# Patient Record
Sex: FEMALE | Race: WHITE | NOT HISPANIC OR LATINO | Employment: UNEMPLOYED | URBAN - METROPOLITAN AREA
[De-identification: names, ages, dates, MRNs, and addresses within clinical notes are randomized per-mention and may not be internally consistent; named-entity substitution may affect disease eponyms.]

---

## 2017-01-10 ENCOUNTER — ALLSCRIPTS OFFICE VISIT (OUTPATIENT)
Dept: OTHER | Facility: OTHER | Age: 22
End: 2017-01-10

## 2017-01-10 ENCOUNTER — LAB REQUISITION (OUTPATIENT)
Dept: LAB | Facility: HOSPITAL | Age: 22
End: 2017-01-10
Payer: COMMERCIAL

## 2017-01-10 DIAGNOSIS — Z32.01 ENCOUNTER FOR PREGNANCY TEST, RESULT POSITIVE: ICD-10-CM

## 2017-01-10 LAB
ABO GROUP BLD: NORMAL
B-HCG SERPL-ACNC: 3944 MIU/ML
BLD GP AB SCN SERPL QL: NEGATIVE
PROGEST SERPL-MCNC: 5.8 NG/ML
RH BLD: POSITIVE

## 2017-01-10 PROCEDURE — 86850 RBC ANTIBODY SCREEN: CPT | Performed by: OBSTETRICS & GYNECOLOGY

## 2017-01-10 PROCEDURE — 86900 BLOOD TYPING SEROLOGIC ABO: CPT | Performed by: OBSTETRICS & GYNECOLOGY

## 2017-01-10 PROCEDURE — 84702 CHORIONIC GONADOTROPIN TEST: CPT | Performed by: OBSTETRICS & GYNECOLOGY

## 2017-01-10 PROCEDURE — 86901 BLOOD TYPING SEROLOGIC RH(D): CPT | Performed by: OBSTETRICS & GYNECOLOGY

## 2017-01-10 PROCEDURE — 84144 ASSAY OF PROGESTERONE: CPT | Performed by: OBSTETRICS & GYNECOLOGY

## 2017-01-11 ENCOUNTER — GENERIC CONVERSION - ENCOUNTER (OUTPATIENT)
Dept: OTHER | Facility: OTHER | Age: 22
End: 2017-01-11

## 2017-01-16 ENCOUNTER — ALLSCRIPTS OFFICE VISIT (OUTPATIENT)
Dept: OTHER | Facility: OTHER | Age: 22
End: 2017-01-16

## 2017-01-17 LAB — HCG QUANTITATIVE (HISTORICAL): ABNORMAL MIU/ML

## 2017-03-30 ENCOUNTER — ALLSCRIPTS OFFICE VISIT (OUTPATIENT)
Dept: OTHER | Facility: OTHER | Age: 22
End: 2017-03-30

## 2017-03-31 ENCOUNTER — LAB CONVERSION - ENCOUNTER (OUTPATIENT)
Dept: OTHER | Facility: OTHER | Age: 22
End: 2017-03-31

## 2017-07-31 ENCOUNTER — ALLSCRIPTS OFFICE VISIT (OUTPATIENT)
Dept: OTHER | Facility: OTHER | Age: 22
End: 2017-07-31

## 2017-07-31 LAB — HCG, QUALITATIVE (HISTORICAL): NEGATIVE

## 2017-08-09 ENCOUNTER — LAB REQUISITION (OUTPATIENT)
Dept: LAB | Facility: HOSPITAL | Age: 22
End: 2017-08-09
Payer: COMMERCIAL

## 2017-08-09 ENCOUNTER — ALLSCRIPTS OFFICE VISIT (OUTPATIENT)
Dept: OTHER | Facility: OTHER | Age: 22
End: 2017-08-09

## 2017-08-09 DIAGNOSIS — Z11.3 ENCOUNTER FOR SCREENING FOR INFECTIONS WITH PREDOMINANTLY SEXUAL MODE OF TRANSMISSION: ICD-10-CM

## 2017-08-09 PROCEDURE — 87491 CHLMYD TRACH DNA AMP PROBE: CPT | Performed by: NURSE PRACTITIONER

## 2017-08-09 PROCEDURE — 87591 N.GONORRHOEAE DNA AMP PROB: CPT | Performed by: NURSE PRACTITIONER

## 2017-08-11 LAB — MISCELLANEOUS LAB TEST RESULT: NORMAL

## 2017-08-28 ENCOUNTER — GENERIC CONVERSION - ENCOUNTER (OUTPATIENT)
Dept: OTHER | Facility: OTHER | Age: 22
End: 2017-08-28

## 2017-08-28 ENCOUNTER — LAB REQUISITION (OUTPATIENT)
Dept: LAB | Facility: HOSPITAL | Age: 22
End: 2017-08-28
Payer: COMMERCIAL

## 2017-08-28 DIAGNOSIS — B37.3 CANDIDIASIS OF VULVA AND VAGINA: ICD-10-CM

## 2017-08-28 DIAGNOSIS — Z11.3 ENCOUNTER FOR SCREENING FOR INFECTIONS WITH PREDOMINANTLY SEXUAL MODE OF TRANSMISSION: ICD-10-CM

## 2017-08-28 PROCEDURE — 87591 N.GONORRHOEAE DNA AMP PROB: CPT | Performed by: NURSE PRACTITIONER

## 2017-08-28 PROCEDURE — 36415 COLL VENOUS BLD VENIPUNCTURE: CPT | Performed by: NURSE PRACTITIONER

## 2017-08-28 PROCEDURE — 87510 GARDNER VAG DNA DIR PROBE: CPT | Performed by: NURSE PRACTITIONER

## 2017-08-28 PROCEDURE — 87660 TRICHOMONAS VAGIN DIR PROBE: CPT | Performed by: NURSE PRACTITIONER

## 2017-08-28 PROCEDURE — 87480 CANDIDA DNA DIR PROBE: CPT | Performed by: NURSE PRACTITIONER

## 2017-08-28 PROCEDURE — 87491 CHLMYD TRACH DNA AMP PROBE: CPT | Performed by: NURSE PRACTITIONER

## 2017-08-29 LAB
CANDIDA RRNA VAG QL PROBE: POSITIVE
G VAGINALIS RRNA GENITAL QL PROBE: NEGATIVE
T VAGINALIS RRNA GENITAL QL PROBE: NEGATIVE

## 2017-08-30 LAB — MISCELLANEOUS LAB TEST RESULT: NORMAL

## 2017-11-10 ENCOUNTER — LAB REQUISITION (OUTPATIENT)
Dept: LAB | Facility: HOSPITAL | Age: 22
End: 2017-11-10
Payer: COMMERCIAL

## 2017-11-10 ENCOUNTER — ALLSCRIPTS OFFICE VISIT (OUTPATIENT)
Dept: OTHER | Facility: OTHER | Age: 22
End: 2017-11-10

## 2017-11-10 DIAGNOSIS — N91.2 AMENORRHEA: ICD-10-CM

## 2017-11-10 DIAGNOSIS — R10.2 PELVIC AND PERINEAL PAIN: ICD-10-CM

## 2017-11-10 LAB
ABO GROUP BLD: NORMAL
B-HCG SERPL-ACNC: <2 MIU/ML
BLD GP AB SCN SERPL QL: NEGATIVE
PROGEST SERPL-MCNC: 1.4 NG/ML
RH BLD: POSITIVE
SPECIMEN EXPIRATION DATE: NORMAL

## 2017-11-10 PROCEDURE — 84702 CHORIONIC GONADOTROPIN TEST: CPT | Performed by: OBSTETRICS & GYNECOLOGY

## 2017-11-10 PROCEDURE — 86850 RBC ANTIBODY SCREEN: CPT | Performed by: OBSTETRICS & GYNECOLOGY

## 2017-11-10 PROCEDURE — 86901 BLOOD TYPING SEROLOGIC RH(D): CPT | Performed by: OBSTETRICS & GYNECOLOGY

## 2017-11-10 PROCEDURE — 86900 BLOOD TYPING SEROLOGIC ABO: CPT | Performed by: OBSTETRICS & GYNECOLOGY

## 2017-11-10 PROCEDURE — 84144 ASSAY OF PROGESTERONE: CPT | Performed by: OBSTETRICS & GYNECOLOGY

## 2017-11-10 PROCEDURE — 87086 URINE CULTURE/COLONY COUNT: CPT | Performed by: OBSTETRICS & GYNECOLOGY

## 2017-11-11 LAB — BACTERIA UR CULT: NORMAL

## 2017-11-14 ENCOUNTER — GENERIC CONVERSION - ENCOUNTER (OUTPATIENT)
Dept: OTHER | Facility: OTHER | Age: 22
End: 2017-11-14

## 2017-12-14 ENCOUNTER — GENERIC CONVERSION - ENCOUNTER (OUTPATIENT)
Dept: OTHER | Facility: OTHER | Age: 22
End: 2017-12-14

## 2017-12-14 ENCOUNTER — LAB REQUISITION (OUTPATIENT)
Dept: LAB | Facility: HOSPITAL | Age: 22
End: 2017-12-14
Payer: COMMERCIAL

## 2017-12-14 DIAGNOSIS — Z11.3 ENCOUNTER FOR SCREENING FOR INFECTIONS WITH PREDOMINANTLY SEXUAL MODE OF TRANSMISSION: ICD-10-CM

## 2017-12-14 LAB — HBV SURFACE AG SER QL: NORMAL

## 2017-12-14 PROCEDURE — 87591 N.GONORRHOEAE DNA AMP PROB: CPT | Performed by: NURSE PRACTITIONER

## 2017-12-14 PROCEDURE — 86592 SYPHILIS TEST NON-TREP QUAL: CPT | Performed by: NURSE PRACTITIONER

## 2017-12-14 PROCEDURE — 87491 CHLMYD TRACH DNA AMP PROBE: CPT | Performed by: NURSE PRACTITIONER

## 2017-12-14 PROCEDURE — 87389 HIV-1 AG W/HIV-1&-2 AB AG IA: CPT | Performed by: NURSE PRACTITIONER

## 2017-12-14 PROCEDURE — 87340 HEPATITIS B SURFACE AG IA: CPT | Performed by: NURSE PRACTITIONER

## 2017-12-15 LAB
CHLAMYDIA DNA CVX QL NAA+PROBE: NORMAL
HIV 1+2 AB+HIV1 P24 AG SERPL QL IA: NORMAL
N GONORRHOEA DNA GENITAL QL NAA+PROBE: NORMAL
RPR SER QL: NORMAL

## 2018-01-10 NOTE — PROGRESS NOTES
Chief Complaint  Pt presents for hcg, progesterone and t&s  LMP 11/28/16 +upt  Pt feeling very anxious as this pregnancy was unplanned  Aware will receive call with results as soon as they are back  Active Problems    1  Abnormal weight gain (783 1) (R63 5)   2  Amenorrhea (626 0) (N91 2)   3  Birth control counseling (V25 09) (Z30 9)   4  Bleeding after intercourse (626 7) (N93 0)   5  Depression with anxiety (300 4) (F41 8)   6  Encounter for gynecological examination with Papanicolaou smear of cervix   (V72 31,V76 2) (Z01 419,Z12 4)   7  Encounter for gynecological examination with Papanicolaou smear of cervix   (V72 31,V76 2) (Z01 419,Z12 4)   8  Female pelvic pain (625 9) (R10 2)   9  General counseling for initiation of other contraceptive measures (V25 02) (Z30 09)   10  IUD check up (V25 42) (Z30 431)   11  IUD contraception (V45 51) (Z97 5)   12  Oral contraceptive prescribed (V25 01) (Z30 011)   13  Painful intercourse (625 0)   14  Pregnancy test-positive (V72 42) (Z32 01)   15  Screen for sexually transmitted diseases (V74 5) (Z11 3)   16  Screening for STD (sexually transmitted disease) (V74 5) (Z11 3)    Current Meds   1  ALPRAZolam 0 5 MG Oral Tablet; Therapy: [id number] to (Evaluate:12Jun2016) Recorded   2  Lo Loestrin Fe 1 MG-10 MCG / 10 MCG Oral Tablet; Take 1 tablet daily; Therapy: 59Wog5998 to (Evaluate:95Szw2145)  Requested for: 89Qrb2661; Last   Rx:[id number] Ordered    Allergies    1  No Known Drug Allergies    2  Seasonal    Signatures   Electronically signed by : Cameron Torres, ; Elijah 10 2017 11:45AM EST                       (Author)    Electronically signed by :  Corrinne Pap, M D ; Elijha 10 2017  1:33PM EST                       (Author)

## 2018-01-12 NOTE — PROGRESS NOTES
Chief Complaint  Pt presents for hcg/prog/t&s and urine cc  Pt has h/o irregular menses  LMP 06/2017  Pt states she began Lo Loestrin Fe 06/2017 as well but was very irregular taking pills  Pt d/c OCP approximately 3w ago but did not start menses  Pt had unprotected sex and took morning after pill 11/2/17  Pt took 2 negative upts  Pt still concerned about pregnancy thus labs drawn today  Pt also notes lower back pain and cramping  Urine cc sent  Pt will RTO Monday 11/13 for std testing and further infection check  ms      Active Problems    1  Abnormal weight gain (783 1) (R63 5)   2  Amenorrhea (626 0) (N91 2)   3  Bacterial infection (041 9) (A49 9)   4  Birth control counseling (V25 09) (Z30 09)   5  Bleeding after intercourse (626 7) (N93 0)   6  Candidiasis (112 9) (B37 9)   7  Depression with anxiety (300 4) (F41 8)   8  Encounter for gynecological examination with Papanicolaou smear of cervix (V72 31)   (Z01 419)   9  Encounter for gynecological examination with Papanicolaou smear of cervix (V72 31)   (Z01 419)   10  Female pelvic pain (625 9) (R10 2)   11  General counseling for initiation of other contraceptive measures (V25 02) (Z30 09)   12  IUD check up (V25 42) (Z30 431)   13  IUD contraception (V45 51) (Z97 5)   14  Oral contraceptive prescribed (V25 01) (Z30 011)   15  Painful intercourse (625 0)   16  Pregnancy test-positive (V72 42) (Z32 01)   17  Screen for sexually transmitted diseases (V74 5) (Z11 3)   18  Screening for STD (sexually transmitted disease) (V74 5) (Z11 3)   19  Vaginal candidiasis (112 1) (B37 3)   20  Vaginal itching (698 1) (L29 8)   21  Visit for routine gyn exam (V72 31) (Z01 419)    Current Meds   1  ALPRAZolam 0 5 MG Oral Tablet; Therapy: 74HTV1135 to (Evaluate:12Jun2016) Recorded   2  Fluconazole 150 MG Oral Tablet; TAKE 1 TABLET NOW, AND REPEAT IN 4 DAYS; Therapy: 41KYT4114 to (Last Rx:30Mar2017)  Requested for: 03Apr2017 Ordered   3   Fluconazole 200 MG Oral Tablet; take one tablet now and repeat in 4 days; Therapy: 24Xjh3730 to (Last Jeremías Batista)  Requested for: 28Aug2017 Ordered   4  Lo Loestrin Fe 1 MG-10 MCG / 10 MCG Oral Tablet; Take 1 tablet daily; Therapy: 84Qjx8255 to (Evaluate:24Jan2018)  Requested for: 09Aug2017; Last   Rx:09Aug2017 Ordered   5  MetroNIDAZOLE 500 MG Oral Tablet; TAKE 1 TABLET TWICE DAILY UNTIL FINISHED; Therapy: 89NFJ0167 to (Evaluate:10Apr2017)  Requested for: 07Apr2017; Last   Rx:03Apr2017 Ordered    Allergies    1  No Known Drug Allergies    2   Seasonal    Signatures   Electronically signed by : Jerry Warren, ; Nov 10 2017  9:37AM EST                       (Co-author)    Electronically signed by : CHLOE Connelly ; Nov 10 2017  3:56PM EST

## 2018-01-13 VITALS — WEIGHT: 229.56 LBS | DIASTOLIC BLOOD PRESSURE: 80 MMHG | BODY MASS INDEX: 38.2 KG/M2 | SYSTOLIC BLOOD PRESSURE: 120 MMHG

## 2018-01-13 VITALS
BODY MASS INDEX: 39.52 KG/M2 | SYSTOLIC BLOOD PRESSURE: 118 MMHG | HEIGHT: 65 IN | WEIGHT: 237.19 LBS | DIASTOLIC BLOOD PRESSURE: 60 MMHG

## 2018-01-13 NOTE — RESULT NOTES
Verified Results  (1) PROGESTERONE [id number] 01:24PM Chick Person     Test Name Result Flag Reference   PROGESTERONE 5 80 ng/mL     PROGESTERONE:     Serum progesterone 5-25 ng/mL should not be the sole determinant in excluding pregnancy  Menstruating Females:    Follicular phase 0 616-9 28 ng/mL   Luteal phase 2 25-24 2 ng/mL   Mid-luteal phase 8 76-21 6 ng/mL   Postmenopausal Females <0 200-0 901 ng/mL     Pregnant Females:   First trimester of pregnancy 11 4-41 0 ng/mL   Second trimester of pregnancy 13 8-156 ng/mL   Third trimester of pregnancy 51 4->200 ng/mL     (1) HCG QUANT [id number] 01:24PM Chick Person     Test Name Result Flag Reference   HCG QUANTITATIVE 3944 mIU/mL H <=6   Expected Ranges:     Approximate               Approximate HCG  Gestation age          Concentration ( mIU/mL)  _____________          ______________________   Ashley Oar                      HCG values  0 2-1                       5-50  1-2                           2-3                         100-5000  3-4                         500-28157  4-5                         1000-15657  5-6                         [id number]  6-8                         [id number]  8-12                        [id number]     (1) TYPE & SCREEN [id number] 01:24PM Chick Person     Test Name Result Flag Reference   ABO GROUPING A     RH FACTOR Positive     ANTIBODY SCREEN Negative

## 2018-01-16 NOTE — RESULT NOTES
Verified Results  (1) HCG QUANT [id number] 12:51PM Peola Ache     Test Name Result Flag Reference   HCG QUANTITATIVE <2 mIU/mL  <=6   Expected Ranges:     Approximate               Approximate HCG  Gestation age          Concentration ( mIU/mL)  _____________          ______________________   Jhon Bowen                      HCG values  0 2-1                       5-50  1-2                           2-3                         100-5000  3-4                         500-16158  4-5                         1000-79167  5-6                         [id number]  6-8                         [id number]  8-12                        [id number]     (1) PROGESTERONE [id number] 12:51PM [facility], [address]     Test Name Result Flag Reference   PROGESTERONE 1 40 ng/mL     Patients taking DHEA-S may have falsely elevated Progesterone levels  Recommend ordering Progesterone, Lab [facility] [id number] to be done by [facility]  PROGESTERONE:     Serum progesterone 5-25 ng/mL should not be the sole determinant in excluding pregnancy  Menstruating Females:    Follicular phase 3 273-7 92 ng/mL   Luteal phase 2 25-24 2 ng/mL   Mid-luteal phase 8 76-21 6 ng/mL   Postmenopausal Females <0 200-0 901 ng/mL     Pregnant Females:   First trimester of pregnancy 11 4-41 0 ng/mL   Second trimester of pregnancy 13 8-156 ng/mL   Third trimester of pregnancy 51 4->200 ng/mL     (1) TYPE & SCREEN [id number] 12:51PM Daphnei, [address]     Test Name Result Flag Reference   ABO GROUPING A     RH FACTOR Positive     ANTIBODY SCREEN Negative     SPECIMEN EXPIRATION DATE [id number]       (1) URINE CULTURE [id number] 12:51PM Peola Ache     Test Name Result Flag Reference   CLINICAL REPORT (Report)     Test:        Urine culture  Specimen Type:   Urine  Specimen Date:   11/10/2017 12:51 PM  Result Date:    11/11/2017 10:32 AM  Result Status:   Final result  Resulting Lab:   [address] [facility]            Tel: [phone]      CULTURE                                       ------------------                                   No Growth <1000 cfu/mL

## 2018-01-22 VITALS — SYSTOLIC BLOOD PRESSURE: 118 MMHG | DIASTOLIC BLOOD PRESSURE: 78 MMHG | BODY MASS INDEX: 38.77 KG/M2 | WEIGHT: 233 LBS

## 2018-01-23 NOTE — PROCEDURES
Assessment    1  General counseling for initiation of other contraceptive measures (V25 02) (Z30 09)   2  Birth control counseling (V25 09) (Z30 9)   3  Oral contraceptive prescribed (V25 01) (Z30 011)    Discussion/Summary  Discussion Summary:   IUD removed easily and intact  bc methods d/w pt and partner and she plans to use condoms  after discussion, she consented to ocp use and 3 sample packs of lo loestrin were given along with written and verbal instructions for use  also given info on paragard to review, liked mirena, but didn't like s e  she experienced  will rto in 3 months for annual and ocp check  Medication SE Review and Pt Understands Tx: Possible side effects of new medications were reviewed with the patient/guardian today  The treatment plan was reviewed with the patient/guardian  The patient/guardian understands and agrees with the treatment plan   Counseling Documentation With Imm: The patient was counseled regarding diagnostic results, instructions for management, risk factor reductions, prognosis, patient and family education, impressions, risks and benefits of treatment options, importance of compliance with treatment  total time of encounter was 15 minutes and 10 minutes was spent counseling  Active Problems    1  Abnormal weight gain (783 1) (R63 5)   2  Amenorrhea (626 0) (N91 2)   3  Birth control counseling (V25 09) (Z30 9)   4  Bleeding after intercourse (626 7) (N93 0)   5  Depression with anxiety (300 4) (F41 8)   6  Female pelvic pain (625 9) (R10 2)   7  General counseling for initiation of other contraceptive measures (V25 02) (Z30 09)   8  IUD check up (V25 42) (Z30 431)   9  IUD contraception (V45 51) (Z97 5)   10  Oral contraceptive prescribed (V25 01) (Z30 011)   11  Painful intercourse (625 0)   12  Pregnancy test-positive (V72 42) (Z32 01)   13  Screen for sexually transmitted diseases (V74 5) (Z11 3)   14   Screening for STD (sexually transmitted disease) (V74 5) (Z11 3)    Current Meds    1  ALPRAZolam 0 5 MG Oral Tablet; Therapy: 09SVY1856 to (Evaluate:12Jun2016) Recorded    Allergies    1  Seasonal    Procedure  Procedure: removal of Mirena IUD  Indications for the procedure include c/o wt gain and moodiness on this method  Risks and benefits were discussed with the patient  Consent was obtained prior to the procedure and is detailed in the patient's record  Procedure Note:   Procedure Start Time: 230   Procedure Completion Time: 240 a speculum was placed in the vagina, the IUD strings were visualized and the strings were grasped with forceps and the IUD was removed  The IUD was discarded  Post-Procedure:   the patient tolerated the procedure well  Complications: hemostasis was achievednone  Follow-up in the office in 3 month(s) and for annual and ocp check  Future Appointments    Date/Time Provider Specialty Site   08/24/2016 04:00 PM [facility] Obstetrics[facility]Gyne[facility] [place]     Signatures   Electronically signed by :  Deborah Leigh; May 23 2016  2:45PM EST                       (Author)    Electronically signed by : CHLOE Faria ; May 27 2016  2:32PM EST                       (Author)

## 2018-01-24 VITALS
HEIGHT: 65 IN | DIASTOLIC BLOOD PRESSURE: 76 MMHG | SYSTOLIC BLOOD PRESSURE: 112 MMHG | WEIGHT: 220.06 LBS | BODY MASS INDEX: 36.66 KG/M2

## 2018-01-26 ENCOUNTER — OFFICE VISIT (OUTPATIENT)
Dept: OBGYN CLINIC | Facility: MEDICAL CENTER | Age: 23
End: 2018-01-26
Payer: COMMERCIAL

## 2018-01-26 VITALS — BODY MASS INDEX: 36.94 KG/M2 | DIASTOLIC BLOOD PRESSURE: 80 MMHG | SYSTOLIC BLOOD PRESSURE: 120 MMHG | WEIGHT: 222 LBS

## 2018-01-26 DIAGNOSIS — Z32.02 PREGNANCY TEST NEGATIVE: ICD-10-CM

## 2018-01-26 DIAGNOSIS — Z11.3 SCREENING FOR STD (SEXUALLY TRANSMITTED DISEASE): ICD-10-CM

## 2018-01-26 DIAGNOSIS — N89.8 VAGINAL DISCHARGE: Primary | ICD-10-CM

## 2018-01-26 LAB — SL AMB POCT URINE HCG: NEGATIVE

## 2018-01-26 PROCEDURE — 99213 OFFICE O/P EST LOW 20 MIN: CPT | Performed by: NURSE PRACTITIONER

## 2018-01-26 PROCEDURE — 87660 TRICHOMONAS VAGIN DIR PROBE: CPT | Performed by: NURSE PRACTITIONER

## 2018-01-26 PROCEDURE — 87480 CANDIDA DNA DIR PROBE: CPT | Performed by: NURSE PRACTITIONER

## 2018-01-26 PROCEDURE — 81025 URINE PREGNANCY TEST: CPT | Performed by: NURSE PRACTITIONER

## 2018-01-26 PROCEDURE — 87591 N.GONORRHOEAE DNA AMP PROB: CPT | Performed by: NURSE PRACTITIONER

## 2018-01-26 PROCEDURE — 87491 CHLMYD TRACH DNA AMP PROBE: CPT | Performed by: NURSE PRACTITIONER

## 2018-01-26 PROCEDURE — 87510 GARDNER VAG DNA DIR PROBE: CPT | Performed by: NURSE PRACTITIONER

## 2018-01-26 RX ORDER — ALPRAZOLAM 0.5 MG/1
TABLET ORAL
COMMUNITY
Start: 2016-05-13 | End: 2019-06-13

## 2018-01-26 RX ORDER — CARBAMIDE PEROXIDE 6.5% 6.5 G/100ML
LIQUID AURICULAR (OTIC)
Refills: 0 | COMMUNITY
Start: 2017-12-22 | End: 2019-09-30 | Stop reason: ALTCHOICE

## 2018-01-26 RX ORDER — CLINDAMYCIN PHOSPHATE 10 UG/ML
LOTION TOPICAL
Refills: 5 | COMMUNITY
Start: 2018-01-17 | End: 2019-09-30 | Stop reason: ALTCHOICE

## 2018-01-26 RX ORDER — FLUTICASONE PROPIONATE 50 MCG
SPRAY, SUSPENSION (ML) NASAL
COMMUNITY
Start: 2018-01-22 | End: 2019-09-30 | Stop reason: ALTCHOICE

## 2018-01-26 RX ORDER — BUPROPION HYDROCHLORIDE 150 MG/1
TABLET ORAL
Refills: 1 | COMMUNITY
Start: 2018-01-17 | End: 2019-09-30 | Stop reason: ALTCHOICE

## 2018-01-26 NOTE — PROGRESS NOTES
A/P:  25 y o  yo female with concerns about "possible infection  Shruti was seen today for vaginal itching and d/c  Thinks she might have a bv infection  Denies odor  Recently ended relationship, used condoms "all the time"  Had neg std screen 12/17  Vaginal discharge    1  Wet prep was not obtained  Cultures for gonorrhea and chlamydia were collected  Affirm was obtained  2   Will contact pt with results  3  Patient was not treated today       CHIEF COMPLAINT: vaginal discharge    HISTORY OF PRESENT ILLNESS:  Digna Vieyra is a 25 y o  yo female who presents for vag d/c c/o  Dara Soulier Her symptoms started 1 week ago  Alleviating factors: none  Aggravating factors:none    ROS:   She denies hematuria, dysuria, constipation, diarrhea, fevers, chills, nausea or emesis  Social History     Social History    Marital status: Single     Spouse name: N/A    Number of children: N/A    Years of education: N/A     Occupational History    Not on file  Social History Main Topics    Smoking status: denies    Smokeless tobacco: Not on file    Alcohol use social    Drug use: denies    Sexual activity: yes     Other Topics Concern    Not on file     Social History Narrative    No narrative on file       Wt 101 kg (222 lb)   LMP 01/14/2018   BMI 36 94 kg/m²     GEN: The patient was alert and oriented x3, pleasant well-appearing female in no acute distress  Pelvic: Normal appearing external female genitalia, normal vaginal epithelium, normalappearing cervix  positive discharge noted

## 2018-01-27 LAB
CANDIDA RRNA VAG QL PROBE: NEGATIVE
G VAGINALIS RRNA GENITAL QL PROBE: POSITIVE
T VAGINALIS RRNA GENITAL QL PROBE: NEGATIVE

## 2018-01-30 LAB
CHLAMYDIA DNA CVX QL NAA+PROBE: NORMAL
N GONORRHOEA DNA GENITAL QL NAA+PROBE: NORMAL

## 2018-02-02 DIAGNOSIS — N76.0 BV (BACTERIAL VAGINOSIS): Primary | ICD-10-CM

## 2018-02-02 DIAGNOSIS — Z30.41 ENCOUNTER FOR SURVEILLANCE OF CONTRACEPTIVE PILLS: ICD-10-CM

## 2018-02-02 DIAGNOSIS — B96.89 BV (BACTERIAL VAGINOSIS): Primary | ICD-10-CM

## 2018-02-04 RX ORDER — METRONIDAZOLE 500 MG/1
500 TABLET ORAL EVERY 8 HOURS SCHEDULED
Qty: 21 TABLET | Refills: 0 | Status: SHIPPED | OUTPATIENT
Start: 2018-02-04 | End: 2018-02-11

## 2018-02-04 RX ORDER — NORETHINDRONE ACETATE AND ETHINYL ESTRADIOL, ETHINYL ESTRADIOL AND FERROUS FUMARATE 1MG-10(24)
KIT ORAL
Qty: 84 TABLET | Refills: 1 | Status: SHIPPED | OUTPATIENT
Start: 2018-02-04 | End: 2018-02-05 | Stop reason: SDUPTHER

## 2018-02-05 DIAGNOSIS — Z30.41 ENCOUNTER FOR SURVEILLANCE OF CONTRACEPTIVE PILLS: ICD-10-CM

## 2018-02-19 DIAGNOSIS — B37.3 CANDIDIASIS OF GENITALIA IN FEMALE: Primary | ICD-10-CM

## 2018-02-19 RX ORDER — FLUCONAZOLE 150 MG/1
150 TABLET ORAL ONCE
Qty: 1 TABLET | Refills: 1 | Status: SHIPPED | OUTPATIENT
Start: 2018-02-19 | End: 2018-02-19

## 2018-03-01 DIAGNOSIS — N89.8 VAGINAL ITCHING: Primary | ICD-10-CM

## 2018-03-01 RX ORDER — FLUCONAZOLE 150 MG/1
150 TABLET ORAL ONCE
Qty: 1 TABLET | Refills: 0 | Status: SHIPPED | OUTPATIENT
Start: 2018-03-01 | End: 2018-03-01

## 2018-03-01 NOTE — TELEPHONE ENCOUNTER
Pt was seen the other day for an infection check  States she had Bv  Took the antibiotic but is still having sxs  Ok to call in a dose of fluconazole and if sxs do not get better, have pt follow up for an appt?

## 2018-03-26 ENCOUNTER — OFFICE VISIT (OUTPATIENT)
Dept: OBGYN CLINIC | Facility: MEDICAL CENTER | Age: 23
End: 2018-03-26
Payer: COMMERCIAL

## 2018-03-26 VITALS — BODY MASS INDEX: 35.1 KG/M2 | DIASTOLIC BLOOD PRESSURE: 60 MMHG | SYSTOLIC BLOOD PRESSURE: 110 MMHG | WEIGHT: 210.9 LBS

## 2018-03-26 DIAGNOSIS — N89.8 VAGINAL DISCHARGE: Primary | ICD-10-CM

## 2018-03-26 DIAGNOSIS — R30.0 BURNING WITH URINATION: ICD-10-CM

## 2018-03-26 DIAGNOSIS — Z72.51 UNPROTECTED SEXUAL INTERCOURSE: ICD-10-CM

## 2018-03-26 PROBLEM — A49.9 BACTERIAL INFECTION: Status: ACTIVE | Noted: 2017-04-03

## 2018-03-26 PROCEDURE — 99214 OFFICE O/P EST MOD 30 MIN: CPT | Performed by: NURSE PRACTITIONER

## 2018-03-26 NOTE — PROGRESS NOTES
A/P:  25 y o  yo female with: c/o vaginal d/c, burning with urination      1  Wet prep was not obtained due to limited amt of d/c  Cultures for gonorrhea and chlamydia were not collected  Sure swab culture was obtained  2   Will contact pt with results  3  Patient was not treated   4  Vaginal hygiene d/w pt, enc  Her to refrain from scented products to vulva  5  Enc  Safe sex practices with condom use and given written and verbal info on nexplanon to review  Will call if wants this method and prior auth  Will be initiated  HISTORY OF PRESENT ILLNESS:  Jennifer Key is a 25 y o  yo No obstetric history on file  female who presents for vaginal discharge  She describes the discharge as  watery and white  Her symptoms started 1 weeks ago  and  show no change  Alleviating factors:none  Aggravating factors: burning when urine hits the skin w voiding  ROS:   She denies hematuria, dysuria, constipation, diarrhea, fever, chills, nausea or emesis  No past medical history on file  No past medical history on file  Social History     Social History    Marital status: Single     Spouse name: N/A    Number of children: N/A    Years of education: N/A     Occupational History    Not on file  Social History Main Topics    Smoking status: Not on file    Smokeless tobacco: Not on file    Alcohol use Not on file    Drug use: Unknown    Sexual activity: Not on file     Other Topics Concern    Not on file     Social History Narrative    No narrative on file       /60   Wt 95 7 kg (210 lb 14 4 oz)   BMI 35 10 kg/m²     GEN: The patient was alert and oriented x3, pleasant well-appearing female in no acute distress  Pelvic: Normal appearing external female genitalia, normal vaginal epithelium, normalappearing cervix  positive discharge noted        Wet Prep: no pathogens

## 2018-04-05 DIAGNOSIS — N76.0 BV (BACTERIAL VAGINOSIS): Primary | ICD-10-CM

## 2018-04-05 DIAGNOSIS — B96.89 BV (BACTERIAL VAGINOSIS): Primary | ICD-10-CM

## 2018-04-05 RX ORDER — METRONIDAZOLE 500 MG/1
500 TABLET ORAL EVERY 12 HOURS SCHEDULED
Qty: 14 TABLET | Refills: 0 | Status: SHIPPED | OUTPATIENT
Start: 2018-04-05 | End: 2018-04-12

## 2018-04-17 ENCOUNTER — OFFICE VISIT (OUTPATIENT)
Dept: OBGYN CLINIC | Facility: MEDICAL CENTER | Age: 23
End: 2018-04-17
Payer: COMMERCIAL

## 2018-04-17 VITALS — BODY MASS INDEX: 35.45 KG/M2 | SYSTOLIC BLOOD PRESSURE: 108 MMHG | WEIGHT: 213 LBS | DIASTOLIC BLOOD PRESSURE: 74 MMHG

## 2018-04-17 DIAGNOSIS — B96.89 BV (BACTERIAL VAGINOSIS): ICD-10-CM

## 2018-04-17 DIAGNOSIS — Z11.3 SCREENING EXAMINATION FOR STD (SEXUALLY TRANSMITTED DISEASE): Primary | ICD-10-CM

## 2018-04-17 DIAGNOSIS — N76.0 BV (BACTERIAL VAGINOSIS): ICD-10-CM

## 2018-04-17 PROCEDURE — 87389 HIV-1 AG W/HIV-1&-2 AB AG IA: CPT | Performed by: NURSE PRACTITIONER

## 2018-04-17 PROCEDURE — 36415 COLL VENOUS BLD VENIPUNCTURE: CPT | Performed by: NURSE PRACTITIONER

## 2018-04-17 PROCEDURE — 87340 HEPATITIS B SURFACE AG IA: CPT | Performed by: NURSE PRACTITIONER

## 2018-04-17 PROCEDURE — 86592 SYPHILIS TEST NON-TREP QUAL: CPT | Performed by: NURSE PRACTITIONER

## 2018-04-17 PROCEDURE — 99214 OFFICE O/P EST MOD 30 MIN: CPT | Performed by: NURSE PRACTITIONER

## 2018-04-17 RX ORDER — CLINDAMYCIN HYDROCHLORIDE 300 MG/1
CAPSULE ORAL
Qty: 14 CAPSULE | Refills: 0 | Status: SHIPPED | OUTPATIENT
Start: 2018-04-17 | End: 2018-04-24

## 2018-04-17 NOTE — PROGRESS NOTES
A/P:  25 y o  yo female presenting w request to change rx for her treatment of bv       1   Wet prep was not obtained  Cultures for gonorrhea and chlamydia were not collected  Affirm was not obtained today, recent one + for bv    2   Will contact pt with results  3  Patient was treated with clindamycin  4  Pt  Will rto after rx completed for dave  5  D/W her my recommendation to start probiotic  Gave written instructions of what to look for and how often to take it  CHIEF COMPLAINT:     HISTORY OF PRESENT ILLNESS:  Coretta Blanco is a 25 y o  yo  female who presents for vaginal discharge  She describes the discharge as watery and white  Her symptoms started 1 weeks ago  She saw me a few days ago and was given flagyl for tx  States she decided not to take it b/c if makes her gag with the taste of it  She wants to try a different rx  Show no change  Alleviating factors: none  Aggravating factors: none  ROS:   She denies hematuria, dysuria, constipation, diarrhea, fever, chills, nausea or emesis  History reviewed  No pertinent past medical history  History reviewed  No pertinent past medical history  Social History     Social History    Marital status: Single     Spouse name: N/A    Number of children: N/A    Years of education: N/A     Occupational History    Not on file  Social History Main Topics    Smoking status: Current Every Day Smoker     Packs/day: 0 25     Years: 8 00    Smokeless tobacco: Never Used    Alcohol use No    Drug use: No    Sexual activity: Not Currently     Other Topics Concern    Not on file     Social History Narrative    No narrative on file       /74   Wt 96 6 kg (213 lb)   LMP 04/10/2018 (Exact Date)   BMI 35 45 kg/m²     GEN: The patient was alert and oriented x3, pleasant well-appearing female in no acute distress  Wet Prep: not done again

## 2018-04-18 LAB
HBV SURFACE AG SER QL: NORMAL
HIV 1+2 AB+HIV1 P24 AG SERPL QL IA: NORMAL
RPR SER QL: NORMAL

## 2018-04-20 ENCOUNTER — TELEPHONE (OUTPATIENT)
Dept: OBGYN CLINIC | Facility: MEDICAL CENTER | Age: 23
End: 2018-04-20

## 2018-04-20 NOTE — TELEPHONE ENCOUNTER
Patient called stating that you recommended she start a probiotic otc  She said they are costly and wanted to know if there was one we could send a script for into her pharm  Suggestions?

## 2018-04-24 DIAGNOSIS — B96.89 BV (BACTERIAL VAGINOSIS): Primary | ICD-10-CM

## 2018-04-24 DIAGNOSIS — N76.0 BV (BACTERIAL VAGINOSIS): Primary | ICD-10-CM

## 2018-04-24 RX ORDER — SACCHAROMYCES BOULARDII 250 MG
250 CAPSULE ORAL 2 TIMES DAILY
Qty: 60 CAPSULE | Refills: 2 | Status: SHIPPED | OUTPATIENT
Start: 2018-04-24 | End: 2018-05-24

## 2018-04-25 ENCOUNTER — TELEPHONE (OUTPATIENT)
Dept: OBGYN CLINIC | Facility: MEDICAL CENTER | Age: 23
End: 2018-04-25

## 2018-04-25 NOTE — TELEPHONE ENCOUNTER
----- Message from Leticia Simmons, [address] sent at 4/24/2018  8:52 AM EDT -----  Pl call and tell her I sent a probiotic to her pharmacy

## 2018-07-25 ENCOUNTER — OFFICE VISIT (OUTPATIENT)
Dept: OBGYN CLINIC | Facility: MEDICAL CENTER | Age: 23
End: 2018-07-25
Payer: COMMERCIAL

## 2018-07-25 VITALS — SYSTOLIC BLOOD PRESSURE: 100 MMHG | WEIGHT: 209 LBS | BODY MASS INDEX: 34.78 KG/M2 | DIASTOLIC BLOOD PRESSURE: 72 MMHG

## 2018-07-25 DIAGNOSIS — Z30.015 ENCOUNTER FOR INITIAL PRESCRIPTION OF VAGINAL RING HORMONAL CONTRACEPTIVE: Primary | ICD-10-CM

## 2018-07-25 DIAGNOSIS — B37.3 CANDIDIASIS OF FEMALE GENITALIA: ICD-10-CM

## 2018-07-25 DIAGNOSIS — Z11.3 SCREENING EXAMINATION FOR SEXUALLY TRANSMITTED DISEASE: ICD-10-CM

## 2018-07-25 DIAGNOSIS — Z30.011 ENCOUNTER FOR BCP (BIRTH CONTROL PILLS) INITIAL PRESCRIPTION: ICD-10-CM

## 2018-07-25 LAB — SL AMB POCT URINE HCG: NEGATIVE

## 2018-07-25 PROCEDURE — 99214 OFFICE O/P EST MOD 30 MIN: CPT | Performed by: NURSE PRACTITIONER

## 2018-07-25 PROCEDURE — 87491 CHLMYD TRACH DNA AMP PROBE: CPT | Performed by: NURSE PRACTITIONER

## 2018-07-25 PROCEDURE — 81025 URINE PREGNANCY TEST: CPT | Performed by: NURSE PRACTITIONER

## 2018-07-25 PROCEDURE — 87591 N.GONORRHOEAE DNA AMP PROB: CPT | Performed by: NURSE PRACTITIONER

## 2018-07-25 RX ORDER — FLUCONAZOLE 200 MG/1
TABLET ORAL
Qty: 2 TABLET | Refills: 0 | Status: SHIPPED | OUTPATIENT
Start: 2018-07-25 | End: 2018-07-29

## 2018-07-25 RX ORDER — TOPIRAMATE 25 MG/1
25 CAPSULE, COATED PELLETS ORAL 2 TIMES DAILY
COMMUNITY
End: 2019-06-13

## 2018-07-25 RX ORDER — ETONOGESTREL AND ETHINYL ESTRADIOL 11.7; 2.7 MG/1; MG/1
INSERT, EXTENDED RELEASE VAGINAL
Qty: 1 EACH | Refills: 2 | Status: SHIPPED | OUTPATIENT
Start: 2018-07-25 | End: 2018-11-22 | Stop reason: SDUPTHER

## 2018-07-25 NOTE — PROGRESS NOTES
Assessment Diagnoses and all orders for this visit:    Encounter for initial prescription of vaginal ring hormonal contraceptive  -     etonogestrel-ethinyl estradiol (NUVARING) 0 12-0 015 MG/24HR vaginal ring; Insert vaginally and leave in place for 3 consecutive weeks, then remove for 1 week  Screening examination for sexually transmitted disease  -     Chlamydia/GC amplified DNA by PCR    Encounter for BCP (birth control pills) initial prescription  -     POCT urine HCG    Candidiasis of female genitalia  -     fluconazole (DIFLUCAN) 200 mg tablet; Take one tablet now and repeat in 4 days if needed  Other orders  -     topiramate (TOPAMAX) 25 mg sprinkle capsule; Take 25 mg by mouth 2 (two) times a day        Plan: Nuva ring start: Written and verbal information on this was previously given to her  She understands that this will not be effective barrier to prevent STDs and needs to continue with condom use for that  Patient advised to insert the NuvaRing calendar day 1 in removing calendar day 25 for her convenience she will then have bleeding at the times when the ring is out  She is to call me in 3 months to let me know if she would like to continue this method she will call sooner with any questions or concerns  Will call her with results of testing   21 y o  female starting NuvaRing vaginal inserts, no contraindications  Livier Galicia is a 21 y o  female who presents for contraception counseling  The patient does have complaints today  The patient is not currently sexually active  Pertinent past medical history: none  Request UPT because of unprotected sex  Wants G and C culture done today  Itching and is pretty certain that she has a yeast infection  States has thought about birth control that I have discussed with her in the past and would like a prescription for the NuvaRing        Patient Active Problem List   Diagnosis    Bacterial infection       Past Medical History: Diagnosis Date    Migraine     Seasonal allergies        No past surgical history on file  Family History   Problem Relation Age of Onset    No Known Problems Mother        Social History     Social History    Marital status: Single     Spouse name: N/A    Number of children: N/A    Years of education: N/A     Occupational History    Not on file  Social History Main Topics    Smoking status: Current Every Day Smoker     Packs/day: 0 25     Years: 8 00    Smokeless tobacco: Never Used    Alcohol use Yes      Comment: occasional    Drug use: No    Sexual activity: Yes     Partners: Male     Birth control/ protection: None     Other Topics Concern    Not on file     Social History Narrative    No narrative on file          Current Outpatient Prescriptions:     fluocinonide (LIDEX) 0 05 % cream, APPLY TO AREAS OF ECZEMA TWICE A DAY FOR 2-3 DAYS A WEEK ONLY AS NEEDED, Disp: , Rfl: 2    fluticasone (FLONASE) 50 mcg/act nasal spray, USE 1 SPRAY INTO EACH NOSTRIL 2 (TWO) TIMES A DAY , Disp: , Rfl:     topiramate (TOPAMAX) 25 mg sprinkle capsule, Take 25 mg by mouth 2 (two) times a day, Disp: , Rfl:     ALPRAZolam (XANAX) 0 5 mg tablet, Take by mouth, Disp: , Rfl:     buPROPion (WELLBUTRIN XL) 150 mg 24 hr tablet, TAKE 1 TABLET (150 MG TOTAL) BY MOUTH EVERY MORNING , Disp: , Rfl: 1    clindamycin (CLEOCIN T) 1 % lotion, APPLY TO PIMPLE LIKE RASH ON BUTTOCKS TWICE DAILY AS NEEDED, Disp: , Rfl: 5    [employer] EAR DROPS 6 5 % otic solution, ADMINISTER 5 DROPS INTO BOTH EARS 2 (TWO) TIMES A DAY FOR 10 DAYS , Disp: , Rfl: 0    etonogestrel-ethinyl estradiol (NUVARING) 0 12-0 015 MG/24HR vaginal ring, Insert vaginally and leave in place for 3 consecutive weeks, then remove for 1 week , Disp: 1 each, Rfl: 2    fluconazole (DIFLUCAN) 200 mg tablet, Take one tablet now and repeat in 4 days if needed  , Disp: 2 tablet, Rfl: 0    Norethin-Eth Estrad-Fe Biphas (LO LOESTRIN FE) 1 MG-10 MCG / 10 MCG TABS, Take 1 tablet by mouth daily, Disp: 84 tablet, Rfl: 1    Allergies   Allergen Reactions    Other     Pollen Extract        Review of Systems  Constitutional :no fever, feels well, no tiredness, no recent weight gain or loss  ENT: no ear ache, no loss of hearing, no nosebleeds or nasal discharge, no sore throat or hoarseness  Cardiovascular: no complaints of slow or fast heart beat, no chest pain, no palpitations, no leg claudication or lower extremity edema  Respiratory: no complaints of shortness of shortness of breath, no ZAVALA  Breasts:no complaints of breast pain, breast lump, or nipple discharge  Gastrointestinal: no complaints of abdominal pain, constipation, nausea, vomiting, or diarrhea or bloody stools  Genitourinary : no complaints of dysuria, incontinence, pelvic pain, no dysmenorrhea, vaginal discharge or abnormal vaginal bleeding and as noted in HPI  Musculoskeletal: no complaints of arthralgia, no myalgia, no joint swelling or stiffness, no limb pain or swelling  Integumentary: no complaints of skin rash or lesion, itching or dry skin  Neurological: no complaints of headache, no confusion, no numbness or tingling, no dizziness or fainting    Objective     /72   Wt 94 8 kg (209 lb)   LMP 07/03/2018   BMI 34 78 kg/m²     General:   appears stated age, cooperative, alert normal mood and affect   Vulva: normal female genitalia   Vagina: normal vagina + blood, wet mt= few spores/hyphae   Urethra: normal   Cervix: Normal, no discharge  GCC done  Uterus: not examined       Lymphatic palpation of lymph nodes in neck, axilla, groin and/or other locations: no lymphadenopathy or masses noted   Skin normal skin turgor and no rashes     Psychiatric orientation to person, place, and time: normal  mood and affect: normal

## 2018-07-27 LAB
CHLAMYDIA DNA CVX QL NAA+PROBE: NORMAL
N GONORRHOEA DNA GENITAL QL NAA+PROBE: NORMAL

## 2018-11-22 DIAGNOSIS — Z30.015 ENCOUNTER FOR INITIAL PRESCRIPTION OF VAGINAL RING HORMONAL CONTRACEPTIVE: ICD-10-CM

## 2018-11-24 RX ORDER — ETONOGESTREL/ETHINYL ESTRADIOL .12-.015MG
RING, VAGINAL VAGINAL
Qty: 3 EACH | Refills: 2 | Status: SHIPPED | OUTPATIENT
Start: 2018-11-24 | End: 2020-01-16 | Stop reason: SDUPTHER

## 2019-01-09 ENCOUNTER — OFFICE VISIT (OUTPATIENT)
Dept: OBGYN CLINIC | Facility: MEDICAL CENTER | Age: 24
End: 2019-01-09
Payer: COMMERCIAL

## 2019-01-09 VITALS — DIASTOLIC BLOOD PRESSURE: 74 MMHG | WEIGHT: 202 LBS | SYSTOLIC BLOOD PRESSURE: 114 MMHG | BODY MASS INDEX: 33.61 KG/M2

## 2019-01-09 DIAGNOSIS — N89.8 VAGINAL DISCHARGE: Primary | ICD-10-CM

## 2019-01-09 DIAGNOSIS — Z11.3 SCREENING EXAMINATION FOR STD (SEXUALLY TRANSMITTED DISEASE): ICD-10-CM

## 2019-01-09 DIAGNOSIS — N30.00 ACUTE CYSTITIS WITHOUT HEMATURIA: ICD-10-CM

## 2019-01-09 DIAGNOSIS — N89.8 VAGINAL ODOR: ICD-10-CM

## 2019-01-09 LAB
C TRACH DNA SPEC QL NAA+PROBE: NEGATIVE
N GONORRHOEA DNA SPEC QL NAA+PROBE: NEGATIVE

## 2019-01-09 PROCEDURE — 87660 TRICHOMONAS VAGIN DIR PROBE: CPT | Performed by: NURSE PRACTITIONER

## 2019-01-09 PROCEDURE — 87480 CANDIDA DNA DIR PROBE: CPT | Performed by: NURSE PRACTITIONER

## 2019-01-09 PROCEDURE — 99214 OFFICE O/P EST MOD 30 MIN: CPT | Performed by: NURSE PRACTITIONER

## 2019-01-09 PROCEDURE — 87591 N.GONORRHOEAE DNA AMP PROB: CPT | Performed by: NURSE PRACTITIONER

## 2019-01-09 PROCEDURE — 87510 GARDNER VAG DNA DIR PROBE: CPT | Performed by: NURSE PRACTITIONER

## 2019-01-09 PROCEDURE — 87491 CHLMYD TRACH DNA AMP PROBE: CPT | Performed by: NURSE PRACTITIONER

## 2019-01-09 RX ORDER — SULFAMETHOXAZOLE AND TRIMETHOPRIM 800; 160 MG/1; MG/1
1 TABLET ORAL EVERY 12 HOURS SCHEDULED
Qty: 6 TABLET | Refills: 1 | Status: SHIPPED | OUTPATIENT
Start: 2019-01-09 | End: 2019-01-12

## 2019-01-09 NOTE — PROGRESS NOTES
Ralph Ruiz was seen today for urinary tract infection and vaginal discharge  Diagnoses and all orders for this visit:    Acute cystitis without hematuria  -     sulfamethoxazole-trimethoprim (BACTRIM DS) 800-160 mg per tablet; Take 1 tablet by mouth every 12 (twelve) hours for 3 days    Vaginal odor         Plan:  rx for bactrim ds sent to pharmacy  Advised increase  Water intake  Affirm culture of vaginal discharge sent, GC and chlamydia sent  Patient given a note excusing her from work for today  Encourage safe sex practices    Subjective   Edwina Farooq is a 21 y o  female here for a problem visit  Patient is complaining of  Urinary frequency and urgency, symptoms started several days ago  complaining about vaginal discharge with odor,  Also requesting to have the day off today because of urinating frequently and pain  States that she used azo last night and that helped her symptoms a little  Patient Active Problem List   Diagnosis    Bacterial infection       Gynecologic History  Patient's last menstrual period was 12/15/2018 (approximate)  The current method of family planning is condoms most of the time  Past Medical History:   Diagnosis Date    Migraine     Seasonal allergies      No past surgical history on file  Family History   Problem Relation Age of Onset    No Known Problems Mother      Social History     Social History    Marital status: Single     Spouse name: N/A    Number of children: N/A    Years of education: N/A     Occupational History    Not on file       Social History Main Topics    Smoking status: Current Every Day Smoker     Packs/day: 0 25     Years: 8 00    Smokeless tobacco: Never Used    Alcohol use Yes      Comment: occasional    Drug use: No    Sexual activity: Yes     Partners: Male     Birth control/ protection: None     Other Topics Concern    Not on file     Social History Narrative    No narrative on file     Allergies   Allergen Reactions    Other     Pollen Extract        Current Outpatient Prescriptions:     ALPRAZolam (XANAX) 0 5 mg tablet, Take by mouth, Disp: , Rfl:     buPROPion (WELLBUTRIN XL) 150 mg 24 hr tablet, TAKE 1 TABLET (150 MG TOTAL) BY MOUTH EVERY MORNING , Disp: , Rfl: 1    clindamycin (CLEOCIN T) 1 % lotion, APPLY TO PIMPLE LIKE RASH ON BUTTOCKS TWICE DAILY AS NEEDED, Disp: , Rfl: 5    fluocinonide (LIDEX) 0 05 % cream, APPLY TO AREAS OF ECZEMA TWICE A DAY FOR 2-3 DAYS A WEEK ONLY AS NEEDED, Disp: , Rfl: 2    fluticasone (FLONASE) 50 mcg/act nasal spray, USE 1 SPRAY INTO EACH NOSTRIL 2 (TWO) TIMES A DAY , Disp: , Rfl:     topiramate (TOPAMAX) 25 mg sprinkle capsule, Take 25 mg by mouth 2 (two) times a day, Disp: , Rfl:     [facility] EAR DROPS 6 5 % otic solution, ADMINISTER 5 DROPS INTO BOTH EARS 2 (TWO) TIMES A DAY FOR 10 DAYS , Disp: , Rfl: 0    NUVARING 0 12-0 015 MG/24HR vaginal ring, INSERT VAGINALLY AND LEAVE IN FOR 3 WEEKS THEN REMOVE FOR 1 WEEK (Patient not taking: Reported on 1/9/2019), Disp: 3 each, Rfl: 2    sulfamethoxazole-trimethoprim (BACTRIM DS) 800-160 mg per tablet, Take 1 tablet by mouth every 12 (twelve) hours for 3 days, Disp: 6 tablet, Rfl: 1    Review of Systems  Constitutional :no fever, feels well, no tiredness, no recent weight gain or loss  ENT: no ear ache, no loss of hearing, no nosebleeds or nasal discharge, no sore throat or hoarseness  Cardiovascular: no complaints of slow or fast heart beat, no chest pain, no palpitations, no leg claudication or lower extremity edema    Respiratory: no complaints of shortness of shortness of breath, no ZAVALA  Breasts:no complaints of breast pain, breast lump, or nipple discharge  Gastrointestinal: no complaints of abdominal pain, constipation, nausea, vomiting, or diarrhea or bloody stools  Genitourinary : no complaints of dysuria, incontinence, pelvic pain, no dysmenorrhea, vaginal discharge or abnormal vaginal bleeding and as noted in HPI   Musculoskeletal: no complaints of arthralgia, no myalgia, no joint swelling or stiffness, no limb pain or swelling  Integumentary: no complaints of skin rash or lesion, itching or dry skin  Neurological: no complaints of headache, no confusion, no numbness or tingling, no dizziness or fainting     Objective     /74   Wt 91 6 kg (202 lb)   LMP 12/15/2018 (Approximate)   BMI 33 61 kg/m²     General:   appears stated age, cooperative, alert normal mood and affect   Abdomen: soft, non-tender, without masses or organomegaly   Vulva: normal female genitalia   Vagina: normal vagina, thin white d/c present  Urethra: normal   Cervix: Normal, no discharge  Uterus: normal size, contour, position, consistency, mobility, non-tender   Adnexa: normal adnexa       Skin normal skin turgor and no rashes     Psychiatric orientation to person, place, and time: normal  mood and affect: normal

## 2019-01-11 DIAGNOSIS — B96.89 BV (BACTERIAL VAGINOSIS): Primary | ICD-10-CM

## 2019-01-11 DIAGNOSIS — N76.0 BV (BACTERIAL VAGINOSIS): Primary | ICD-10-CM

## 2019-01-11 RX ORDER — METRONIDAZOLE 500 MG/1
500 TABLET ORAL EVERY 12 HOURS SCHEDULED
Qty: 14 TABLET | Refills: 0 | Status: SHIPPED | OUTPATIENT
Start: 2019-01-11 | End: 2019-01-18

## 2019-01-17 ENCOUNTER — TELEPHONE (OUTPATIENT)
Dept: OBGYN CLINIC | Facility: MEDICAL CENTER | Age: 24
End: 2019-01-17

## 2019-01-17 NOTE — TELEPHONE ENCOUNTER
Attempt to contact patient unsuccessful  + [phone] is not accepting calls  # [phone] has a voicemail that is not set-up    Letter sent to patients listed home address asking her to contact office regarding results

## 2019-01-28 DIAGNOSIS — A49.9 BACTERIAL INFECTION: Primary | ICD-10-CM

## 2019-01-28 RX ORDER — METRONIDAZOLE 500 MG/1
500 TABLET ORAL EVERY 12 HOURS SCHEDULED
Qty: 14 TABLET | Refills: 0 | Status: SHIPPED | OUTPATIENT
Start: 2019-01-28 | End: 2019-02-04

## 2019-01-28 NOTE — TELEPHONE ENCOUNTER
We have been attempting to contact the patient via phone and mail and she was able to get back to us on Friday  I attempted to call and leave a message but was unable to  Per Roger Luther a script for the Metronidazole was sent over to the pharmacy in the patient's chart

## 2019-02-04 ENCOUNTER — TELEPHONE (OUTPATIENT)
Dept: OBGYN CLINIC | Facility: MEDICAL CENTER | Age: 24
End: 2019-02-04

## 2019-02-04 DIAGNOSIS — N76.0 BV (BACTERIAL VAGINOSIS): Primary | ICD-10-CM

## 2019-02-04 DIAGNOSIS — B96.89 BV (BACTERIAL VAGINOSIS): Primary | ICD-10-CM

## 2019-02-04 RX ORDER — TINIDAZOLE 500 MG/1
2 TABLET ORAL
Qty: 8 TABLET | Refills: 0 | Status: SHIPPED | OUTPATIENT
Start: 2019-02-04 | End: 2019-02-06

## 2019-02-04 NOTE — TELEPHONE ENCOUNTER
The patient called in late this morning and stated that she wanted to know if a different medication could be called in for her instead of the metronidazole because she does not like the taste  Can you please let me know what can be sent so I can contact the patient

## 2019-02-07 ENCOUNTER — OFFICE VISIT (OUTPATIENT)
Dept: OBGYN CLINIC | Facility: MEDICAL CENTER | Age: 24
End: 2019-02-07
Payer: COMMERCIAL

## 2019-02-07 VITALS — DIASTOLIC BLOOD PRESSURE: 82 MMHG | SYSTOLIC BLOOD PRESSURE: 120 MMHG

## 2019-02-07 DIAGNOSIS — N89.8 VAGINAL ODOR: Primary | ICD-10-CM

## 2019-02-07 PROCEDURE — 99213 OFFICE O/P EST LOW 20 MIN: CPT | Performed by: NURSE PRACTITIONER

## 2019-02-07 PROCEDURE — 87480 CANDIDA DNA DIR PROBE: CPT | Performed by: NURSE PRACTITIONER

## 2019-02-07 PROCEDURE — 87660 TRICHOMONAS VAGIN DIR PROBE: CPT | Performed by: NURSE PRACTITIONER

## 2019-02-07 PROCEDURE — 87510 GARDNER VAG DNA DIR PROBE: CPT | Performed by: NURSE PRACTITIONER

## 2019-02-07 NOTE — PROGRESS NOTES
A/P:  21 y o  yo female with: vaginal d/c and odor  1   Wet prep was not obtained  Cultures for gonorrhea and chlamydia were not collected  Affirm was obtained  2   Will contact pt with results  3  Patient was not treated   4  Advised safe sex with condoms every time  Will  rx I sent for nuva ring and will start it on            HISTORY OF PRESENT ILLNESS:  Kayla Goldstein is a 21 y o  yo  female who presents for vaginal discharge  She describes the discharge as white  Her symptoms started 1 weeks ago  show no change  States recently became sexually active with new partner and her told her she has a vag  Odor  She states partner has just been released from senior care, but has a good job now and goes to [employer]  She does not notice it but had bv one month ago and worried that the tx did not work  No itching, burning or abnormal d/c   Alleviating factors: none   Aggravating factors: sex    ROS:   She denies hematuria, dysuria, constipation, diarrhea, fever, chills, nausea or emesis  Past Medical History:   Diagnosis Date    Migraine     Seasonal allergies        Past Medical History:   Diagnosis Date    Migraine     Seasonal allergies        Social History     Social History    Marital status: Single     Spouse name: N/A    Number of children: N/A    Years of education: N/A     Occupational History    Not on file  Social History Main Topics    Smoking status: Current Every Day Smoker     Packs/day: 0 25     Years: 8 00    Smokeless tobacco: Never Used    Alcohol use Yes      Comment: occasional    Drug use: No    Sexual activity: Yes     Partners: Male     Birth control/ protection: None     Other Topics Concern    Not on file     Social History Narrative    No narrative on file       /82   LMP 2019     GEN: The patient was alert and oriented x3, pleasant well-appearing female in no acute distress     Pelvic: Normal appearing external female genitalia, normal vaginal epithelium, normalappearing cervix  positive discharge noted

## 2019-02-11 DIAGNOSIS — N76.0 BV (BACTERIAL VAGINOSIS): Primary | ICD-10-CM

## 2019-02-11 DIAGNOSIS — B96.89 BV (BACTERIAL VAGINOSIS): Primary | ICD-10-CM

## 2019-02-11 RX ORDER — METRONIDAZOLE 7.5 MG/G
GEL VAGINAL
Qty: 70 G | Refills: 1 | Status: SHIPPED | OUTPATIENT
Start: 2019-02-11 | End: 2019-02-15

## 2019-02-14 ENCOUNTER — TELEPHONE (OUTPATIENT)
Dept: OBGYN CLINIC | Facility: MEDICAL CENTER | Age: 24
End: 2019-02-14

## 2019-02-14 NOTE — TELEPHONE ENCOUNTER
Left message x3 on pts  Listed preferred number asking her to contact office    Letter sent to listed home address

## 2019-03-27 ENCOUNTER — OFFICE VISIT (OUTPATIENT)
Dept: OBGYN CLINIC | Facility: MEDICAL CENTER | Age: 24
End: 2019-03-27
Payer: COMMERCIAL

## 2019-03-27 VITALS
BODY MASS INDEX: 34.16 KG/M2 | HEIGHT: 65 IN | SYSTOLIC BLOOD PRESSURE: 112 MMHG | DIASTOLIC BLOOD PRESSURE: 72 MMHG | WEIGHT: 205 LBS

## 2019-03-27 DIAGNOSIS — R30.0 DYSURIA: ICD-10-CM

## 2019-03-27 DIAGNOSIS — N92.6 ABNORMAL MENSES: ICD-10-CM

## 2019-03-27 DIAGNOSIS — N89.8 VAGINAL DISCHARGE: Primary | ICD-10-CM

## 2019-03-27 LAB
SL AMB  POCT GLUCOSE, UA: NORMAL
SL AMB LEUKOCYTE ESTERASE,UA: NORMAL
SL AMB POCT BILIRUBIN,UA: NORMAL
SL AMB POCT BLOOD,UA: NORMAL
SL AMB POCT CLARITY,UA: NORMAL
SL AMB POCT COLOR,UA: YELLOW
SL AMB POCT KETONES,UA: NORMAL
SL AMB POCT NITRITE,UA: NORMAL
SL AMB POCT PH,UA: 7
SL AMB POCT SPECIFIC GRAVITY,UA: 1.01
SL AMB POCT URINE HCG: NEGATIVE
SL AMB POCT URINE PROTEIN: NORMAL
SL AMB POCT UROBILINOGEN: 0.2

## 2019-03-27 PROCEDURE — 87660 TRICHOMONAS VAGIN DIR PROBE: CPT | Performed by: NURSE PRACTITIONER

## 2019-03-27 PROCEDURE — 87480 CANDIDA DNA DIR PROBE: CPT | Performed by: NURSE PRACTITIONER

## 2019-03-27 PROCEDURE — 81002 URINALYSIS NONAUTO W/O SCOPE: CPT | Performed by: NURSE PRACTITIONER

## 2019-03-27 PROCEDURE — 99214 OFFICE O/P EST MOD 30 MIN: CPT | Performed by: NURSE PRACTITIONER

## 2019-03-27 PROCEDURE — 87077 CULTURE AEROBIC IDENTIFY: CPT | Performed by: NURSE PRACTITIONER

## 2019-03-27 PROCEDURE — 81025 URINE PREGNANCY TEST: CPT | Performed by: NURSE PRACTITIONER

## 2019-03-27 PROCEDURE — 87510 GARDNER VAG DNA DIR PROBE: CPT | Performed by: NURSE PRACTITIONER

## 2019-03-27 PROCEDURE — 87086 URINE CULTURE/COLONY COUNT: CPT | Performed by: NURSE PRACTITIONER

## 2019-03-27 PROCEDURE — 87147 CULTURE TYPE IMMUNOLOGIC: CPT | Performed by: NURSE PRACTITIONER

## 2019-03-27 NOTE — PROGRESS NOTES
A/P:  21 y o  yo female with: Hx of bv infections, wants recheck     1  Wet prep was not obtained  Cultures for gonorrhea and chlamydia were not collected  Affirm was obtained  2   Will contact pt with results  3  Patient was not treated   4  Will call with results of urine culture and affim  If + for bv will treat with flagyl and then start suppression tx with metrogel 2  Times a week for 4 months  5  If urinary sxs persist, will d/w pcp urology referral        HISTORY OF PRESENT ILLNESS:  Jennifer Key is a 21 y o  yo  female who presents for vaginal discharge  She describes the discharge as minimal  Her symptoms started 1 week  ago  And  show no change, she was treated for bv in  and feb  That were confirmed with affirm cultures and pt states sxs did resolve after treatment  Recently diagnosed with Celiac disease and undergoing work up  Has nuva ring for bcm but forgot to insert it, will start it , to use condoms in interim  [facility] d/w pt and given info on Paragard and nexplanon  C/o feeling like she has to urinate all the time, but small amts come out  Admits to lots of caffeine in diet and not enough water  Alleviating factors: none  Aggravating factors: none known    ROS:   She denies hematuria, dysuria, constipation, diarrhea, fever, chills, nausea or emesis      Past Medical History:   Diagnosis Date    Migraine     Seasonal allergies        Past Medical History:   Diagnosis Date    Migraine     Seasonal allergies        Social History     Socioeconomic History    Marital status: Single     Spouse name: Not on file    Number of children: Not on file    Years of education: Not on file    Highest education level: Not on file   Occupational History    Not on file   Social Needs    Financial resource strain: Not on file    Food insecurity:     Worry: Not on file     Inability: Not on file    Transportation needs:     Medical: Not on file     Non-medical: Not on file   Tobacco Use    Smoking status: Current Every Day Smoker     Packs/day: 0 25     Years: 8 00     Pack years: 2 00    Smokeless tobacco: Never Used   Substance and Sexual Activity    Alcohol use: Yes     Comment: occasional    Drug use: No    Sexual activity: Yes     Partners: Male     Birth control/protection: None   Lifestyle    Physical activity:     Days per week: Not on file     Minutes per session: Not on file    Stress: Not on file   Relationships    Social connections:     Talks on phone: Not on file     Gets together: Not on file     Attends Church service: Not on file     Active member of club or organization: Not on file     Attends meetings of clubs or organizations: Not on file     Relationship status: Not on file    Intimate partner violence:     Fear of current or ex partner: Not on file     Emotionally abused: Not on file     Physically abused: Not on file     Forced sexual activity: Not on file   Other Topics Concern    Not on file   Social History Narrative    Not on file       /72   Ht 5' 5" (1 651 m)   Wt 93 kg (205 lb)   LMP 03/01/2019 Comment: only spotting  BMI 34 11 kg/m²     GEN: The patient was alert and oriented x3, pleasant well-appearing female in no acute distress  Pelvic: Normal appearing external female genitalia, normal vaginal epithelium, normalappearing cervix  positive discharge noted        Wet Prep: not done due to scant amt of vag d/c

## 2019-03-28 LAB
CANDIDA RRNA VAG QL PROBE: NEGATIVE
G VAGINALIS RRNA GENITAL QL PROBE: NEGATIVE
T VAGINALIS RRNA GENITAL QL PROBE: NEGATIVE

## 2019-03-29 LAB — BACTERIA UR CULT: ABNORMAL

## 2019-04-01 DIAGNOSIS — R82.71 GBS BACTERIURIA: Primary | ICD-10-CM

## 2019-04-01 RX ORDER — AMOXICILLIN 500 MG/1
500 CAPSULE ORAL EVERY 12 HOURS SCHEDULED
Qty: 14 CAPSULE | Refills: 0 | Status: SHIPPED | OUTPATIENT
Start: 2019-04-01 | End: 2019-09-04 | Stop reason: SDUPTHER

## 2019-05-14 ENCOUNTER — OFFICE VISIT (OUTPATIENT)
Dept: OBGYN CLINIC | Facility: MEDICAL CENTER | Age: 24
End: 2019-05-14
Payer: COMMERCIAL

## 2019-05-14 VITALS
WEIGHT: 201 LBS | HEIGHT: 65 IN | SYSTOLIC BLOOD PRESSURE: 100 MMHG | DIASTOLIC BLOOD PRESSURE: 60 MMHG | BODY MASS INDEX: 33.49 KG/M2

## 2019-05-14 DIAGNOSIS — N39.3 STRESS INCONTINENCE OF URINE: Primary | ICD-10-CM

## 2019-05-14 LAB
SL AMB  POCT GLUCOSE, UA: NORMAL
SL AMB LEUKOCYTE ESTERASE,UA: NORMAL
SL AMB POCT BILIRUBIN,UA: NORMAL
SL AMB POCT BLOOD,UA: NORMAL
SL AMB POCT CLARITY,UA: NORMAL
SL AMB POCT COLOR,UA: YELLOW
SL AMB POCT KETONES,UA: NORMAL
SL AMB POCT NITRITE,UA: NORMAL
SL AMB POCT PH,UA: 6
SL AMB POCT SPECIFIC GRAVITY,UA: 1.03
SL AMB POCT URINE PROTEIN: NORMAL
SL AMB POCT UROBILINOGEN: 0.2

## 2019-05-14 PROCEDURE — 81002 URINALYSIS NONAUTO W/O SCOPE: CPT | Performed by: NURSE PRACTITIONER

## 2019-05-14 PROCEDURE — 99214 OFFICE O/P EST MOD 30 MIN: CPT | Performed by: NURSE PRACTITIONER

## 2019-06-13 ENCOUNTER — OFFICE VISIT (OUTPATIENT)
Dept: OBGYN CLINIC | Facility: CLINIC | Age: 24
End: 2019-06-13
Payer: COMMERCIAL

## 2019-06-13 VITALS — HEIGHT: 65 IN | BODY MASS INDEX: 33.92 KG/M2 | WEIGHT: 203.6 LBS

## 2019-06-13 DIAGNOSIS — R39.9 URINARY SYMPTOM OR SIGN: ICD-10-CM

## 2019-06-13 DIAGNOSIS — R10.2 PELVIC PAIN IN FEMALE: ICD-10-CM

## 2019-06-13 DIAGNOSIS — N89.8 VAGINAL DISCHARGE: Primary | ICD-10-CM

## 2019-06-13 LAB
SL AMB  POCT GLUCOSE, UA: NEGATIVE
SL AMB LEUKOCYTE ESTERASE,UA: ABNORMAL
SL AMB POCT BILIRUBIN,UA: NEGATIVE
SL AMB POCT BLOOD,UA: ABNORMAL
SL AMB POCT CLARITY,UA: ABNORMAL
SL AMB POCT COLOR,UA: YELLOW
SL AMB POCT KETONES,UA: NEGATIVE
SL AMB POCT NITRITE,UA: NEGATIVE
SL AMB POCT PH,UA: 6.5
SL AMB POCT SPECIFIC GRAVITY,UA: 1.02
SL AMB POCT URINE HCG: NEGATIVE
SL AMB POCT URINE PROTEIN: ABNORMAL
SL AMB POCT UROBILINOGEN: NEGATIVE

## 2019-06-13 PROCEDURE — 87186 SC STD MICRODIL/AGAR DIL: CPT | Performed by: NURSE PRACTITIONER

## 2019-06-13 PROCEDURE — 87660 TRICHOMONAS VAGIN DIR PROBE: CPT | Performed by: NURSE PRACTITIONER

## 2019-06-13 PROCEDURE — 87480 CANDIDA DNA DIR PROBE: CPT | Performed by: NURSE PRACTITIONER

## 2019-06-13 PROCEDURE — 87086 URINE CULTURE/COLONY COUNT: CPT | Performed by: NURSE PRACTITIONER

## 2019-06-13 PROCEDURE — 81002 URINALYSIS NONAUTO W/O SCOPE: CPT | Performed by: NURSE PRACTITIONER

## 2019-06-13 PROCEDURE — 99214 OFFICE O/P EST MOD 30 MIN: CPT | Performed by: NURSE PRACTITIONER

## 2019-06-13 PROCEDURE — 87510 GARDNER VAG DNA DIR PROBE: CPT | Performed by: NURSE PRACTITIONER

## 2019-06-13 PROCEDURE — 81025 URINE PREGNANCY TEST: CPT | Performed by: NURSE PRACTITIONER

## 2019-06-13 PROCEDURE — 87077 CULTURE AEROBIC IDENTIFY: CPT | Performed by: NURSE PRACTITIONER

## 2019-06-13 RX ORDER — NITROFURANTOIN 25; 75 MG/1; MG/1
100 CAPSULE ORAL 2 TIMES DAILY
Qty: 10 CAPSULE | Refills: 0 | Status: SHIPPED | OUTPATIENT
Start: 2019-06-13 | End: 2019-06-18

## 2019-06-13 RX ORDER — ALPRAZOLAM 0.25 MG/1
0.25 TABLET ORAL 2 TIMES DAILY PRN
COMMUNITY
Start: 2019-05-14 | End: 2020-01-16 | Stop reason: DRUGHIGH

## 2019-06-15 LAB
BACTERIA UR CULT: ABNORMAL
CANDIDA RRNA VAG QL PROBE: NEGATIVE
G VAGINALIS RRNA GENITAL QL PROBE: POSITIVE
T VAGINALIS RRNA GENITAL QL PROBE: NEGATIVE

## 2019-06-17 DIAGNOSIS — N76.0 BV (BACTERIAL VAGINOSIS): Primary | ICD-10-CM

## 2019-06-17 DIAGNOSIS — B37.3 VAGINAL YEAST INFECTION: ICD-10-CM

## 2019-06-17 DIAGNOSIS — B96.89 BV (BACTERIAL VAGINOSIS): Primary | ICD-10-CM

## 2019-06-17 RX ORDER — METRONIDAZOLE 500 MG/1
500 TABLET ORAL EVERY 12 HOURS SCHEDULED
Qty: 14 TABLET | Refills: 0 | Status: SHIPPED | OUTPATIENT
Start: 2019-06-17 | End: 2019-06-24

## 2019-06-17 RX ORDER — FLUCONAZOLE 200 MG/1
200 TABLET ORAL DAILY
Qty: 1 TABLET | Refills: 1 | Status: SHIPPED | OUTPATIENT
Start: 2019-06-17 | End: 2019-06-18

## 2019-09-04 DIAGNOSIS — R82.71 GBS BACTERIURIA: ICD-10-CM

## 2019-09-09 RX ORDER — AMOXICILLIN 500 MG/1
500 CAPSULE ORAL EVERY 12 HOURS SCHEDULED
Qty: 14 CAPSULE | Refills: 0 | Status: SHIPPED | OUTPATIENT
Start: 2019-09-09 | End: 2019-12-22 | Stop reason: SDUPTHER

## 2019-09-30 ENCOUNTER — OFFICE VISIT (OUTPATIENT)
Dept: OBGYN CLINIC | Facility: MEDICAL CENTER | Age: 24
End: 2019-09-30
Payer: COMMERCIAL

## 2019-09-30 VITALS — DIASTOLIC BLOOD PRESSURE: 72 MMHG | SYSTOLIC BLOOD PRESSURE: 104 MMHG | BODY MASS INDEX: 35.11 KG/M2 | WEIGHT: 211 LBS

## 2019-09-30 DIAGNOSIS — Z11.3 SCREENING EXAMINATION FOR STD (SEXUALLY TRANSMITTED DISEASE): ICD-10-CM

## 2019-09-30 DIAGNOSIS — N89.8 VAGINAL DISCHARGE: Primary | ICD-10-CM

## 2019-09-30 PROCEDURE — 87480 CANDIDA DNA DIR PROBE: CPT | Performed by: NURSE PRACTITIONER

## 2019-09-30 PROCEDURE — 87510 GARDNER VAG DNA DIR PROBE: CPT | Performed by: NURSE PRACTITIONER

## 2019-09-30 PROCEDURE — 87591 N.GONORRHOEAE DNA AMP PROB: CPT | Performed by: NURSE PRACTITIONER

## 2019-09-30 PROCEDURE — 99213 OFFICE O/P EST LOW 20 MIN: CPT | Performed by: NURSE PRACTITIONER

## 2019-09-30 PROCEDURE — 87660 TRICHOMONAS VAGIN DIR PROBE: CPT | Performed by: NURSE PRACTITIONER

## 2019-09-30 PROCEDURE — 87491 CHLMYD TRACH DNA AMP PROBE: CPT | Performed by: NURSE PRACTITIONER

## 2019-09-30 NOTE — PROGRESS NOTES
A/P:  25 y o  yo female with:  Diagnoses and all orders for this visit:    Screening examination for STD (sexually transmitted disease)  -     Chlamydia/GC amplified DNA by PCR    Vaginal discharge        1  Wet prep was not obtained  Cultures for gonorrhea and chlamydia were collected  Affirm was obtained  2   Will contact pt with results  3  Patient was not treated   HISTORY OF PRESENT ILLNESS:  Paresh Powers is a 25 y o  yo  female who presents for vaginal discharge  She describes the discharge as watery and fishy odor  Her symptoms started 1 weeks ago  show no change    Alleviating factors: none  Aggravating factors: none    ROS:   She denies hematuria, dysuria, constipation, diarrhea, fever, chills, nausea or emesis      Past Medical History:   Diagnosis Date    Migraine     Seasonal allergies        Past Medical History:   Diagnosis Date    Migraine     Seasonal allergies        Social History     Socioeconomic History    Marital status: Single     Spouse name: Not on file    Number of children: Not on file    Years of education: Not on file    Highest education level: Not on file   Occupational History    Not on file   Social Needs    Financial resource strain: Not on file    Food insecurity:     Worry: Not on file     Inability: Not on file    Transportation needs:     Medical: Not on file     Non-medical: Not on file   Tobacco Use    Smoking status: Current Every Day Smoker     Packs/day: 0 25     Years: 8 00     Pack years: 2 00    Smokeless tobacco: Never Used   Substance and Sexual Activity    Alcohol use: Yes     Comment: occasional    Drug use: No    Sexual activity: Yes     Partners: Male     Birth control/protection: None   Lifestyle    Physical activity:     Days per week: Not on file     Minutes per session: Not on file    Stress: Not on file   Relationships    Social connections:     Talks on phone: Not on file     Gets together: Not on file Attends Worship service: Not on file     Active member of club or organization: Not on file     Attends meetings of clubs or organizations: Not on file     Relationship status: Not on file    Intimate partner violence:     Fear of current or ex partner: Not on file     Emotionally abused: Not on file     Physically abused: Not on file     Forced sexual activity: Not on file   Other Topics Concern    Not on file   Social History Narrative    Not on file       /72   Wt 95 7 kg (211 lb)   LMP 09/03/2019   BMI 35 11 kg/m²     GEN: The patient was alert and oriented x3, pleasant well-appearing female in no acute distress  Pelvic: Normal appearing external female genitalia, normal vaginal epithelium, normalappearing cervix  positive discharge noted  Wet Prep: not done due to not enough sample size after affirm sent

## 2019-10-01 LAB
C TRACH DNA SPEC QL NAA+PROBE: NEGATIVE
N GONORRHOEA DNA SPEC QL NAA+PROBE: NEGATIVE

## 2019-10-02 LAB
CANDIDA RRNA VAG QL PROBE: POSITIVE
G VAGINALIS RRNA GENITAL QL PROBE: POSITIVE
T VAGINALIS RRNA GENITAL QL PROBE: NEGATIVE

## 2019-10-03 DIAGNOSIS — B96.89 BV (BACTERIAL VAGINOSIS): Primary | ICD-10-CM

## 2019-10-03 DIAGNOSIS — N76.0 BV (BACTERIAL VAGINOSIS): Primary | ICD-10-CM

## 2019-10-03 DIAGNOSIS — B37.9 YEAST INFECTION: ICD-10-CM

## 2019-10-03 RX ORDER — FLUCONAZOLE 200 MG/1
200 TABLET ORAL DAILY
Qty: 1 TABLET | Refills: 1 | Status: SHIPPED | OUTPATIENT
Start: 2019-10-03 | End: 2019-10-05

## 2019-10-03 RX ORDER — METRONIDAZOLE 500 MG/1
500 TABLET ORAL 2 TIMES DAILY
Qty: 14 TABLET | Refills: 0 | Status: SHIPPED | OUTPATIENT
Start: 2019-10-03 | End: 2019-10-10

## 2019-12-22 DIAGNOSIS — R82.71 GBS BACTERIURIA: ICD-10-CM

## 2019-12-24 RX ORDER — AMOXICILLIN 500 MG/1
500 CAPSULE ORAL EVERY 12 HOURS SCHEDULED
Qty: 14 CAPSULE | Refills: 0 | Status: SHIPPED | OUTPATIENT
Start: 2019-12-24 | End: 2019-12-31

## 2020-01-15 DIAGNOSIS — R82.71 GBS BACTERIURIA: ICD-10-CM

## 2020-01-16 ENCOUNTER — OFFICE VISIT (OUTPATIENT)
Dept: OBGYN CLINIC | Facility: CLINIC | Age: 25
End: 2020-01-16
Payer: COMMERCIAL

## 2020-01-16 VITALS
SYSTOLIC BLOOD PRESSURE: 122 MMHG | BODY MASS INDEX: 35.82 KG/M2 | DIASTOLIC BLOOD PRESSURE: 72 MMHG | HEIGHT: 65 IN | WEIGHT: 215 LBS

## 2020-01-16 DIAGNOSIS — N76.0 BV (BACTERIAL VAGINOSIS): Primary | ICD-10-CM

## 2020-01-16 DIAGNOSIS — B96.89 BV (BACTERIAL VAGINOSIS): Primary | ICD-10-CM

## 2020-01-16 DIAGNOSIS — Z30.015 ENCOUNTER FOR INITIAL PRESCRIPTION OF VAGINAL RING HORMONAL CONTRACEPTIVE: ICD-10-CM

## 2020-01-16 DIAGNOSIS — B37.3 YEAST VAGINITIS: ICD-10-CM

## 2020-01-16 LAB
BV WHIFF TEST VAG QL: ABNORMAL
CLUE CELLS SPEC QL WET PREP: ABNORMAL
PH SMN: ABNORMAL [PH]
SL AMB POCT WET MOUNT: ABNORMAL
T VAGINALIS VAG QL WET PREP: ABNORMAL
YEAST VAG QL WET PREP: ABNORMAL

## 2020-01-16 PROCEDURE — 87210 SMEAR WET MOUNT SALINE/INK: CPT | Performed by: NURSE PRACTITIONER

## 2020-01-16 PROCEDURE — 99214 OFFICE O/P EST MOD 30 MIN: CPT | Performed by: NURSE PRACTITIONER

## 2020-01-16 RX ORDER — CETIRIZINE HYDROCHLORIDE 10 MG/1
10 TABLET ORAL DAILY PRN
Refills: 3 | COMMUNITY
Start: 2019-12-17 | End: 2020-05-12 | Stop reason: ALTCHOICE

## 2020-01-16 RX ORDER — METRONIDAZOLE 250 MG/1
500 TABLET ORAL EVERY 12 HOURS SCHEDULED
Status: DISCONTINUED | OUTPATIENT
Start: 2020-01-16 | End: 2020-01-16

## 2020-01-16 RX ORDER — FAMOTIDINE 20 MG/1
TABLET, FILM COATED ORAL
COMMUNITY
Start: 2020-01-14 | End: 2020-07-13

## 2020-01-16 RX ORDER — ALPRAZOLAM 0.5 MG/1
0.5 TABLET ORAL 2 TIMES DAILY PRN
COMMUNITY
Start: 2019-11-13 | End: 2020-07-13

## 2020-01-16 RX ORDER — PROCHLORPERAZINE MALEATE 10 MG
TABLET ORAL
Refills: 0 | COMMUNITY
Start: 2019-12-13 | End: 2020-04-10

## 2020-01-16 RX ORDER — ETONOGESTREL AND ETHINYL ESTRADIOL 11.7; 2.7 MG/1; MG/1
1 INSERT, EXTENDED RELEASE VAGINAL
Qty: 3 EACH | Refills: 3 | Status: SHIPPED | OUTPATIENT
Start: 2020-01-16 | End: 2020-05-21

## 2020-01-16 RX ORDER — METRONIDAZOLE 500 MG/1
500 TABLET ORAL EVERY 12 HOURS SCHEDULED
Qty: 14 TABLET | Refills: 0 | Status: SHIPPED | OUTPATIENT
Start: 2020-01-16 | End: 2020-01-23

## 2020-01-16 RX ORDER — FLUCONAZOLE 200 MG/1
TABLET ORAL
Qty: 2 TABLET | Refills: 1 | Status: SHIPPED | OUTPATIENT
Start: 2020-01-16 | End: 2020-01-20

## 2020-01-16 RX ORDER — TOPIRAMATE 100 MG/1
100 TABLET, FILM COATED ORAL DAILY
COMMUNITY
Start: 2019-08-28 | End: 2020-07-13

## 2020-01-16 RX ORDER — AMOXICILLIN 500 MG/1
500 CAPSULE ORAL EVERY 12 HOURS SCHEDULED
Qty: 14 CAPSULE | Refills: 0 | OUTPATIENT
Start: 2020-01-16 | End: 2020-01-23

## 2020-01-16 RX ORDER — ESCITALOPRAM OXALATE 10 MG/1
10 TABLET ORAL DAILY
COMMUNITY
Start: 2019-08-28 | End: 2020-07-13

## 2020-01-16 NOTE — PROGRESS NOTES
A/P:  25 y o  yo female with:  Diagnoses and all orders for this visit:    BV (bacterial vaginosis)  -     metroNIDAZOLE (FLAGYL) tablet 500 mg  -     POCT wet mount    Encounter for initial prescription of vaginal ring hormonal contraceptive  -     etonogestrel-ethinyl estradiol (NUVARING) 0 12-0 015 MG/24HR vaginal ring; Insert 1 each into the vagina every 28 days Insert vaginally and leave in place for 3 consecutive weeks, then remove for 1 week  Yeast vaginitis  -     fluconazole (DIFLUCAN) 200 mg tablet; Take one tab after antibiotic completed and repeat in 4 days if needed  Other orders  -     ALPRAZolam (XANAX) 0 5 mg tablet; Take 0 5 mg by mouth 2 (two) times a day as needed  -     topiramate (TOPAMAX) 100 mg tablet; Take 100 mg by mouth daily  -     prochlorperazine (COMPAZINE) 10 mg tablet; TAKE 1 TABLET (10 MG TOTAL) BY MOUTH EVERY 6 (SIX) HOURS AS NEEDED FOR NAUSEA OR VOMITING  -     cetirizine (ZyrTEC) 10 mg tablet; Take 10 mg by mouth daily as needed  -     famotidine (PEPCID) 20 mg tablet  -     escitalopram (LEXAPRO) 10 mg tablet; Take 10 mg by mouth daily        1  Wet prep was not obtained  Cultures for gonorrhea and chlamydia were not collected  Affirm was not obtained  2   Will contact pt with results  3  Patient was treated with flagyl and fluconazole        HISTORY OF PRESENT ILLNESS:  Carlito Bedoya is a 25 y o  yo  female who presents for vaginal discharge  She describes the discharge as white  Her symptoms started 1 weeks ago  show no change    Alleviating factors: none  Aggravating factors:none    ROS:   She denies hematuria, dysuria, constipation, diarrhea, fever, chills, nausea or emesis                    A/P:  25 y o  yo female with:  Diagnoses and all orders for this visit:    BV (bacterial vaginosis)  -     metroNIDAZOLE (FLAGYL) tablet 500 mg  -     POCT wet mount    Encounter for initial prescription of vaginal ring hormonal contraceptive  - etonogestrel-ethinyl estradiol (NUVARING) 0 12-0 015 MG/24HR vaginal ring; Insert 1 each into the vagina every 28 days Insert vaginally and leave in place for 3 consecutive weeks, then remove for 1 week  Yeast vaginitis  -     fluconazole (DIFLUCAN) 200 mg tablet; Take one tab after antibiotic completed and repeat in 4 days if needed  Other orders  -     ALPRAZolam (XANAX) 0 5 mg tablet; Take 0 5 mg by mouth 2 (two) times a day as needed  -     topiramate (TOPAMAX) 100 mg tablet; Take 100 mg by mouth daily  -     prochlorperazine (COMPAZINE) 10 mg tablet; TAKE 1 TABLET (10 MG TOTAL) BY MOUTH EVERY 6 (SIX) HOURS AS NEEDED FOR NAUSEA OR VOMITING  -     cetirizine (ZyrTEC) 10 mg tablet; Take 10 mg by mouth daily as needed  -     famotidine (PEPCID) 20 mg tablet  -     escitalopram (LEXAPRO) 10 mg tablet; Take 10 mg by mouth daily        1  Wet prep was obtained  Cultures for gonorrhea and chlamydia were not collected  Affirm was not obtained  2   Will contact pt with results  3  Patient was treated with flagyl and fluconazole, vaginal hygeine and use of unscented products to vulva/vagina d/w her  4  Refill rx for nuva ring  Reviewed how to restart nuva ring  Last for menes but recent neg serum hcg testing  5  IF UNABLE TO TOLERATE FLAGYL WILL CALL OFFICE AND GET ORDER FOR METROGEL  PT AWARE I WILL BE ON VACAY AND WILL ASK MA TO GET RX FROM [facility] 25 minutes was spent with pt of which >50% was counseling and coordination of care  HISTORY OF PRESENT ILLNESS:  Tasia Cortés is a 25 y o  yo  female who presents for vaginal discharge  She describes the discharge as white  Her symptoms started 1 weeks ago  show no change  Is working for [employer], likes the job, good money  Is very active there and notices a foul vaginal odor  Thinks it might be BV which she is prone to       Alleviating factors: NONE  Aggravating factors: NONE    ROS:   She denies hematuria, dysuria, constipation, diarrhea, fever, chills, nausea or emesis  Past Medical History:   Diagnosis Date    Migraine     Seasonal allergies        Past Medical History:   Diagnosis Date    Migraine     Seasonal allergies        Social History     Socioeconomic History    Marital status: Single     Spouse name: Not on file    Number of children: Not on file    Years of education: Not on file    Highest education level: Not on file   Occupational History    Not on file   Social Needs    Financial resource strain: Not on file    Food insecurity:     Worry: Not on file     Inability: Not on file    Transportation needs:     Medical: Not on file     Non-medical: Not on file   Tobacco Use    Smoking status: Current Every Day Smoker     Packs/day: 0 25     Years: 8 00     Pack years: 2 00    Smokeless tobacco: Never Used   Substance and Sexual Activity    Alcohol use: Yes     Comment: occasional    Drug use: No    Sexual activity: Yes     Partners: Male     Birth control/protection: None   Lifestyle    Physical activity:     Days per week: Not on file     Minutes per session: Not on file    Stress: Not on file   Relationships    Social connections:     Talks on phone: Not on file     Gets together: Not on file     Attends Confucianist service: Not on file     Active member of club or organization: Not on file     Attends meetings of clubs or organizations: Not on file     Relationship status: Not on file    Intimate partner violence:     Fear of current or ex partner: Not on file     Emotionally abused: Not on file     Physically abused: Not on file     Forced sexual activity: Not on file   Other Topics Concern    Not on file   Social History Narrative    Not on file       /72   Ht 5' 5" (1 651 m)   Wt 97 5 kg (215 lb)   Breastfeeding? No   BMI 35 78 kg/m²     GEN: The patient was alert and oriented x3, pleasant well-appearing female in no acute distress     Pelvic: Normal appearing external female genitalia, normal vaginal epithelium, normalappearing cervix  positive discharge noted        Wet Prep: positive hyphae, positive clue cells and positive whiff test

## 2020-03-02 ENCOUNTER — HOSPITAL ENCOUNTER (EMERGENCY)
Facility: HOSPITAL | Age: 25
Discharge: HOME | End: 2020-03-02
Attending: EMERGENCY MEDICINE | Admitting: EMERGENCY MEDICINE
Payer: COMMERCIAL

## 2020-03-02 ENCOUNTER — APPOINTMENT (EMERGENCY)
Dept: CT IMAGING | Facility: HOSPITAL | Age: 25
End: 2020-03-02
Payer: COMMERCIAL

## 2020-03-02 VITALS
OXYGEN SATURATION: 97 % | WEIGHT: 214.73 LBS | BODY MASS INDEX: 35.73 KG/M2 | TEMPERATURE: 99.9 F | SYSTOLIC BLOOD PRESSURE: 98 MMHG | RESPIRATION RATE: 18 BRPM | HEART RATE: 83 BPM | DIASTOLIC BLOOD PRESSURE: 48 MMHG

## 2020-03-02 DIAGNOSIS — E86.0 DEHYDRATION: ICD-10-CM

## 2020-03-02 DIAGNOSIS — R11.2 NAUSEA AND VOMITING: ICD-10-CM

## 2020-03-02 DIAGNOSIS — R10.9 ACUTE ABDOMINAL PAIN: Primary | ICD-10-CM

## 2020-03-02 LAB
ALBUMIN SERPL BCP-MCNC: 3.8 G/DL (ref 3.5–5)
ALP SERPL-CCNC: 107 U/L (ref 46–116)
ALT SERPL W P-5'-P-CCNC: 27 U/L (ref 12–78)
ANION GAP SERPL CALCULATED.3IONS-SCNC: 14 MMOL/L (ref 4–13)
AST SERPL W P-5'-P-CCNC: 11 U/L (ref 5–45)
BACTERIA UR QL AUTO: ABNORMAL /HPF
BASOPHILS # BLD AUTO: 0.04 THOUSANDS/ΜL (ref 0–0.1)
BASOPHILS NFR BLD AUTO: 0 % (ref 0–1)
BILIRUB SERPL-MCNC: 0.52 MG/DL (ref 0.2–1)
BILIRUB UR QL STRIP: NEGATIVE
BUN SERPL-MCNC: 11 MG/DL (ref 5–25)
CALCIUM SERPL-MCNC: 8.4 MG/DL (ref 8.3–10.1)
CHLORIDE SERPL-SCNC: 100 MMOL/L (ref 100–108)
CLARITY UR: ABNORMAL
CO2 SERPL-SCNC: 22 MMOL/L (ref 21–32)
COLOR UR: YELLOW
CREAT SERPL-MCNC: 0.98 MG/DL (ref 0.6–1.3)
EOSINOPHIL # BLD AUTO: 0.02 THOUSAND/ΜL (ref 0–0.61)
EOSINOPHIL NFR BLD AUTO: 0 % (ref 0–6)
ERYTHROCYTE [DISTWIDTH] IN BLOOD BY AUTOMATED COUNT: 12.2 % (ref 11.6–15.1)
EXT PREG TEST URINE: NEGATIVE
EXT. CONTROL ED NAV: NORMAL
GFR SERPL CREATININE-BSD FRML MDRD: 81 ML/MIN/1.73SQ M
GLUCOSE SERPL-MCNC: 121 MG/DL (ref 65–140)
GLUCOSE UR STRIP-MCNC: NEGATIVE MG/DL
HCT VFR BLD AUTO: 44.4 % (ref 34.8–46.1)
HGB BLD-MCNC: 14.8 G/DL (ref 11.5–15.4)
HGB UR QL STRIP.AUTO: ABNORMAL
IMM GRANULOCYTES # BLD AUTO: 0.18 THOUSAND/UL (ref 0–0.2)
IMM GRANULOCYTES NFR BLD AUTO: 1 % (ref 0–2)
KETONES UR STRIP-MCNC: NEGATIVE MG/DL
LEUKOCYTE ESTERASE UR QL STRIP: NEGATIVE
LIPASE SERPL-CCNC: 136 U/L (ref 73–393)
LYMPHOCYTES # BLD AUTO: 1.19 THOUSANDS/ΜL (ref 0.6–4.47)
LYMPHOCYTES NFR BLD AUTO: 7 % (ref 14–44)
MCH RBC QN AUTO: 31.1 PG (ref 26.8–34.3)
MCHC RBC AUTO-ENTMCNC: 33.3 G/DL (ref 31.4–37.4)
MCV RBC AUTO: 93 FL (ref 82–98)
MONOCYTES # BLD AUTO: 0.9 THOUSAND/ΜL (ref 0.17–1.22)
MONOCYTES NFR BLD AUTO: 5 % (ref 4–12)
NEUTROPHILS # BLD AUTO: 15.83 THOUSANDS/ΜL (ref 1.85–7.62)
NEUTS SEG NFR BLD AUTO: 87 % (ref 43–75)
NITRITE UR QL STRIP: NEGATIVE
NON-SQ EPI CELLS URNS QL MICRO: ABNORMAL /HPF
NRBC BLD AUTO-RTO: 0 /100 WBCS
PH UR STRIP.AUTO: 7 [PH] (ref 4.5–8)
PLATELET # BLD AUTO: 361 THOUSANDS/UL (ref 149–390)
PMV BLD AUTO: 10.4 FL (ref 8.9–12.7)
POTASSIUM SERPL-SCNC: 3.8 MMOL/L (ref 3.5–5.3)
PROT SERPL-MCNC: 7.8 G/DL (ref 6.4–8.2)
PROT UR STRIP-MCNC: ABNORMAL MG/DL
RBC # BLD AUTO: 4.76 MILLION/UL (ref 3.81–5.12)
RBC #/AREA URNS AUTO: ABNORMAL /HPF
SODIUM SERPL-SCNC: 136 MMOL/L (ref 136–145)
SP GR UR STRIP.AUTO: 1.02 (ref 1–1.03)
UROBILINOGEN UR QL STRIP.AUTO: 0.2 E.U./DL
WBC # BLD AUTO: 18.16 THOUSAND/UL (ref 4.31–10.16)
WBC #/AREA URNS AUTO: ABNORMAL /HPF

## 2020-03-02 PROCEDURE — 99284 EMERGENCY DEPT VISIT MOD MDM: CPT | Performed by: EMERGENCY MEDICINE

## 2020-03-02 PROCEDURE — 81001 URINALYSIS AUTO W/SCOPE: CPT

## 2020-03-02 PROCEDURE — 80053 COMPREHEN METABOLIC PANEL: CPT | Performed by: EMERGENCY MEDICINE

## 2020-03-02 PROCEDURE — 99284 EMERGENCY DEPT VISIT MOD MDM: CPT

## 2020-03-02 PROCEDURE — 36415 COLL VENOUS BLD VENIPUNCTURE: CPT | Performed by: EMERGENCY MEDICINE

## 2020-03-02 PROCEDURE — 74177 CT ABD & PELVIS W/CONTRAST: CPT

## 2020-03-02 PROCEDURE — 85025 COMPLETE CBC W/AUTO DIFF WBC: CPT | Performed by: EMERGENCY MEDICINE

## 2020-03-02 PROCEDURE — 96361 HYDRATE IV INFUSION ADD-ON: CPT

## 2020-03-02 PROCEDURE — 96375 TX/PRO/DX INJ NEW DRUG ADDON: CPT

## 2020-03-02 PROCEDURE — 83690 ASSAY OF LIPASE: CPT | Performed by: EMERGENCY MEDICINE

## 2020-03-02 PROCEDURE — 81025 URINE PREGNANCY TEST: CPT | Performed by: EMERGENCY MEDICINE

## 2020-03-02 PROCEDURE — 96374 THER/PROPH/DIAG INJ IV PUSH: CPT

## 2020-03-02 RX ORDER — ACETAMINOPHEN 325 MG/1
650 TABLET ORAL ONCE
Status: COMPLETED | OUTPATIENT
Start: 2020-03-02 | End: 2020-03-02

## 2020-03-02 RX ORDER — KETOROLAC TROMETHAMINE 30 MG/ML
15 INJECTION, SOLUTION INTRAMUSCULAR; INTRAVENOUS ONCE
Status: COMPLETED | OUTPATIENT
Start: 2020-03-02 | End: 2020-03-02

## 2020-03-02 RX ORDER — ONDANSETRON 4 MG/1
4 TABLET, FILM COATED ORAL EVERY 8 HOURS PRN
Qty: 7 TABLET | Refills: 0 | Status: SHIPPED | OUTPATIENT
Start: 2020-03-02 | End: 2020-04-10

## 2020-03-02 RX ORDER — FAMOTIDINE 20 MG/1
20 TABLET, FILM COATED ORAL 2 TIMES DAILY
Qty: 28 TABLET | Refills: 0 | Status: SHIPPED | OUTPATIENT
Start: 2020-03-02 | End: 2020-04-10

## 2020-03-02 RX ORDER — ONDANSETRON 2 MG/ML
4 INJECTION INTRAMUSCULAR; INTRAVENOUS ONCE
Status: COMPLETED | OUTPATIENT
Start: 2020-03-02 | End: 2020-03-02

## 2020-03-02 RX ADMIN — KETOROLAC TROMETHAMINE 15 MG: 30 INJECTION, SOLUTION INTRAMUSCULAR at 16:19

## 2020-03-02 RX ADMIN — SODIUM CHLORIDE 1000 ML: 0.9 INJECTION, SOLUTION INTRAVENOUS at 15:49

## 2020-03-02 RX ADMIN — ONDANSETRON 4 MG: 2 INJECTION INTRAMUSCULAR; INTRAVENOUS at 16:20

## 2020-03-02 RX ADMIN — ACETAMINOPHEN 650 MG: 325 TABLET ORAL at 16:39

## 2020-03-02 RX ADMIN — IOHEXOL 100 ML: 350 INJECTION, SOLUTION INTRAVENOUS at 17:09

## 2020-03-02 NOTE — ED NOTES
Pt stated having her menstrual period at this time ok to get toradol for pain     Sixto Arechiga RN  03/02/20 [id number]

## 2020-03-02 NOTE — ED PROVIDER NOTES
History  Chief Complaint   Patient presents with    Abdominal Pain     pt reports vomiting and abdominal paint that started this morning; patient also reports diarrhea       History provided by:  Patient  Abdominal Pain   Pain location:  Generalized  Pain quality: sharp    Pain radiates to:  Does not radiate  Pain severity:  Severe  Onset quality:  Gradual  Duration:  1 day  Timing:  Constant  Progression:  Worsening  Chronicity:  New  Relieved by:  Nothing  Worsened by:  Nothing  Associated symptoms: chills, diarrhea (w/o blood/mucus), fever, nausea and vomiting (nbnb)    Associated symptoms: no anorexia, no belching, no chest pain, no constipation, no cough, no dysuria, no fatigue, no hematuria, no melena, no shortness of breath, no vaginal bleeding and no vaginal discharge        Prior to Admission Medications   Prescriptions Last Dose Informant Patient Reported? Taking? ALPRAZolam (XANAX) 0 5 mg tablet   Yes Yes   Sig: Take 0 5 mg by mouth 2 (two) times a day as needed   cetirizine (ZyrTEC) 10 mg tablet   Yes Yes   Sig: Take 10 mg by mouth daily as needed   escitalopram (LEXAPRO) 10 mg tablet 3/1/2020 at Unknown time  Yes Yes   Sig: Take 10 mg by mouth daily   etonogestrel-ethinyl estradiol (NUVARING) 0 12-0 015 MG/24HR vaginal ring   No No   Sig: Insert 1 each into the vagina every 28 days Insert vaginally and leave in place for 3 consecutive weeks, then remove for 1 week  famotidine (PEPCID) 20 mg tablet Not Taking at Unknown time  Yes No   prochlorperazine (COMPAZINE) 10 mg tablet Not Taking at Unknown time  Yes No   Sig: TAKE 1 TABLET (10 MG TOTAL) BY MOUTH EVERY 6 (SIX) HOURS AS NEEDED FOR NAUSEA OR VOMITING  topiramate (TOPAMAX) 100 mg tablet 3/1/2020 at Unknown time  Yes Yes   Sig: Take 100 mg by mouth daily      Facility-Administered Medications: None       Past Medical History:   Diagnosis Date    Migraine     Seasonal allergies        History reviewed  No pertinent surgical history      Family History   Problem Relation Age of Onset    No Known Problems Mother      I have reviewed and agree with the history as documented  E-Cigarette/Vaping     E-Cigarette/Vaping Substances     Social History     Tobacco Use    Smoking status: Current Every Day Smoker     Packs/day: 0 25     Years: 8 00     Pack years: 2 00    Smokeless tobacco: Never Used   Substance Use Topics    Alcohol use: Yes     Comment: occasional    Drug use: No       Review of Systems   Constitutional: Positive for chills and fever  Negative for appetite change, diaphoresis and fatigue  HENT: Negative for congestion, dental problem and rhinorrhea  Eyes: Negative for pain  Respiratory: Negative for cough, chest tightness and shortness of breath  Cardiovascular: Negative for chest pain and leg swelling  Gastrointestinal: Positive for abdominal pain, diarrhea (w/o blood/mucus), nausea and vomiting (nbnb)  Negative for anorexia, blood in stool, constipation and melena  Endocrine: Negative for polydipsia, polyphagia and polyuria  Genitourinary: Negative for difficulty urinating, dyspareunia, dysuria, enuresis, flank pain, frequency, hematuria, pelvic pain, vaginal bleeding and vaginal discharge  Musculoskeletal: Negative for arthralgias, back pain and myalgias  Skin: Negative for color change and rash  Neurological: Negative for dizziness, weakness, light-headedness and headaches  Psychiatric/Behavioral: Negative for hallucinations and suicidal ideas  Physical Exam  Physical Exam   Constitutional: She is oriented to person, place, and time  She appears well-developed and well-nourished  HENT:   Mouth/Throat: No oropharyngeal exudate  TMs normal bilaterally no pharyngeal erythema no rhinorrhea nontender palpation of sinuses, normal looking turbinates   Eyes: Conjunctivae and EOM are normal    Neck: Normal range of motion  Neck supple     No meningeal signs   Cardiovascular: Normal rate, regular rhythm, normal heart sounds and intact distal pulses  Pulmonary/Chest: Effort normal and breath sounds normal  No respiratory distress  She has no wheezes  She has no rales  She exhibits no tenderness  Abdominal: Soft  Bowel sounds are normal  She exhibits no distension and no mass  There is no tenderness  No hernia  No cvat   Musculoskeletal: Normal range of motion  She exhibits no edema  Lymphadenopathy:     She has no cervical adenopathy  Neurological: She is alert and oriented to person, place, and time  No cranial nerve deficit  Skin: No rash noted  No erythema  No edema   Psychiatric: She has a normal mood and affect  Her behavior is normal    Nursing note and vitals reviewed        Vital Signs  ED Triage Vitals   Temperature Pulse Respirations Blood Pressure SpO2   03/02/20 1520 03/02/20 1520 03/02/20 1520 03/02/20 1521 03/02/20 1520   99 6 °F (37 6 °C) 83 20 114/55 99 %      Temp Source Heart Rate Source Patient Position - Orthostatic VS BP Location FiO2 (%)   03/02/20 1520 03/02/20 1737 03/02/20 1520 03/02/20 1520 --   Temporal Monitor Sitting Right arm       Pain Score       03/02/20 1520       Worst Possible Pain           Vitals:    03/02/20 1520 03/02/20 1521 03/02/20 1622 03/02/20 1737   BP:  114/55 103/57 93/52   Pulse: 83  85 90   Patient Position - Orthostatic VS: Sitting  Lying Lying         Visual Acuity      ED Medications  Medications   sodium chloride 0 9 % bolus 1,000 mL (0 mL Intravenous Stopped 3/2/20 1630)   ketorolac (TORADOL) injection 15 mg (15 mg Intravenous Given 3/2/20 1619)   ondansetron (ZOFRAN) injection 4 mg (4 mg Intravenous Given 3/2/20 1620)   acetaminophen (TYLENOL) tablet 650 mg (650 mg Oral Given 3/2/20 1639)   iohexol (OMNIPAQUE) 350 MG/ML injection (SINGLE-DOSE) 100 mL (100 mL Intravenous Given 3/2/20 1709)       Diagnostic Studies  Results Reviewed     Procedure Component Value Units Date/Time    Urine Microscopic [[id number]]  (Abnormal) Collected:  03/02/20 1643    Lab Status:  Final result Specimen:  Urine, Clean Catch Updated:  03/02/20 1726     RBC, UA 2-4 /hpf      WBC, UA 0-1 /hpf      Epithelial Cells Occasional /hpf      Bacteria, UA Occasional /hpf     POCT pregnancy, urine [[id number]]  (Normal) Resulted:  03/02/20 1645    Lab Status:  Final result Updated:  03/02/20 1646     EXT PREG TEST UR (Ref: Negative) Negative     Control Valid    POCT urinalysis dipstick [[id number]]  (Abnormal) Resulted:  03/02/20 1645    Lab Status:  Final result Specimen:  Urine Updated:  03/02/20 1645    Urine Macroscopic, POC [[id number]]  (Abnormal) Collected:  03/02/20 1643    Lab Status:  Final result Specimen:  Urine Updated:  03/02/20 1645     Color, UA Yellow     Clarity, UA Cloudy     pH, UA 7 0     Leukocytes, UA Negative     Nitrite, UA Negative     Protein, UA Trace mg/dl      Glucose, UA Negative mg/dl      Ketones, UA Negative mg/dl      Urobilinogen, UA 0 2 E U /dl      Bilirubin, UA Negative     Blood, UA Trace     Specific [place], UA 1 025    Narrative:       CLINITEK RESULT    Comprehensive metabolic panel [[id number]]  (Abnormal) Collected:  03/02/20 1549    Lab Status:  Final result Specimen:  Blood from Arm, Right Updated:  03/02/20 1609     Sodium 136 mmol/L      Potassium 3 8 mmol/L      Chloride 100 mmol/L      CO2 22 mmol/L      ANION GAP 14 mmol/L      BUN 11 mg/dL      Creatinine 0 98 mg/dL      Glucose 121 mg/dL      Calcium 8 4 mg/dL      AST 11 U/L      ALT 27 U/L      Alkaline Phosphatase 107 U/L      Total Protein 7 8 g/dL      Albumin 3 8 g/dL      Total Bilirubin 0 52 mg/dL      eGFR 81 ml/min/1 73sq m     Narrative:       [facility] guidelines for Chronic Kidney Disease (CKD):     Stage 1 with normal or high GFR (GFR > 90 mL/min/1 73 square meters)    Stage 2 Mild CKD (GFR = 60-89 mL/min/1 73 square meters)    Stage 3A Moderate CKD (GFR = 45-59 mL/min/1 73 square meters)    Stage 3B Moderate CKD (GFR = 30-44 mL/min/1 73 square meters)    Stage 4 Severe CKD (GFR = 15-29 mL/min/1 73 square meters)    Stage 5 End Stage CKD (GFR <15 mL/min/1 73 square meters)  Note: GFR calculation is accurate only with a steady state creatinine    Lipase [[id number]]  (Normal) Collected:  03/02/20 1549    Lab Status:  Final result Specimen:  Blood from Arm, Right Updated:  03/02/20 1609     Lipase 136 u/L     CBC and differential [[id number]]  (Abnormal) Collected:  03/02/20 1549    Lab Status:  Final result Specimen:  Blood from Arm, Right Updated:  03/02/20 1556     WBC 18 16 Thousand/uL      RBC 4 76 Million/uL      Hemoglobin 14 8 g/dL      Hematocrit 44 4 %      MCV 93 fL      MCH 31 1 pg      MCHC 33 3 g/dL      RDW 12 2 %      MPV 10 4 fL      Platelets 732 Thousands/uL      nRBC 0 /100 WBCs      Neutrophils Relative 87 %      Immat GRANS % 1 %      Lymphocytes Relative 7 %      Monocytes Relative 5 %      Eosinophils Relative 0 %      Basophils Relative 0 %      Neutrophils Absolute 15 83 Thousands/µL      Immature Grans Absolute 0 18 Thousand/uL      Lymphocytes Absolute 1 19 Thousands/µL      Monocytes Absolute 0 90 Thousand/µL      Eosinophils Absolute 0 02 Thousand/µL      Basophils Absolute 0 04 Thousands/µL                  CT abdomen pelvis with contrast   Final Result by Ronie Runner, MD (03/02 1730)      No acute inflammatory process in the abdomen or pelvis  Workstation performed: [id number]                    Procedures  Procedures         ED Course  ED Course as of Mar 02 1823   Mon Mar 02, 2020   1634 Leukocytosis and fever-will do CT abdomen pelvis rule out  acute intra-abdominal pathology  1816 Patient's workup is reviewed and benign  Her symptoms have improved  Patient is appropriate for discharge home with reassurance, counseling, symptomatic treatment                                    MDM  Number of Diagnoses or Management Options  Diagnosis management comments: Abdominal pain, nausea vomiting, diarrhea with benign exam-will check abdominal labs, urine dip, urine pregnancy, IV fluids, treat symptoms, reassess  Disposition  Final diagnoses:   Acute abdominal pain   Nausea and vomiting   Dehydration     Time reflects when diagnosis was documented in both MDM as applicable and the Disposition within this note     Time User Action Codes Description Comment    3/2/2020  6:17 PM Kayden Ryan Add [R10 9] Acute abdominal pain     3/2/2020  6:18 PM Kayden Ryan Add [R11 2] Nausea and vomiting     3/2/2020  6:18 PM Kayden Ryan Add [E86 0] Dehydration       ED Disposition     ED Disposition Condition Date/Time Comment    Discharge Stable Mon Mar 2, 2020  6:17 PM New [place] discharge to home/self care  Follow-up Information     Follow up With Specialties Details Why Contact Info    [employer]  Schedule an appointment as soon as possible for a visit in 2 days  [phone]            Patient's Medications   Discharge Prescriptions    FAMOTIDINE (PEPCID) 20 MG TABLET    Take 1 tablet (20 mg total) by mouth 2 (two) times a day for 28 doses       Start Date: 3/2/2020  End Date: 3/16/2020       Order Dose: 20 mg       Quantity: 28 tablet    Refills: 0    ONDANSETRON (ZOFRAN) 4 MG TABLET    Take 1 tablet (4 mg total) by mouth every 8 (eight) hours as needed for nausea or vomiting for up to 7 doses       Start Date: 3/2/2020  End Date: --       Order Dose: 4 mg       Quantity: 7 tablet    Refills: 0     No discharge procedures on file      PDMP Review     None          ED Provider  Electronically Signed by           Padilla Pacheco MD  03/02/20 [id number]

## 2020-03-13 DIAGNOSIS — R82.71 GBS BACTERIURIA: ICD-10-CM

## 2020-03-16 RX ORDER — AMOXICILLIN 500 MG/1
500 CAPSULE ORAL EVERY 12 HOURS SCHEDULED
Qty: 14 CAPSULE | Refills: 0 | OUTPATIENT
Start: 2020-03-16 | End: 2020-03-23

## 2020-04-10 ENCOUNTER — TELEPHONE (OUTPATIENT)
Dept: OBGYN CLINIC | Facility: MEDICAL CENTER | Age: 25
End: 2020-04-10

## 2020-04-10 ENCOUNTER — TELEMEDICINE (OUTPATIENT)
Dept: OBGYN CLINIC | Facility: MEDICAL CENTER | Age: 25
End: 2020-04-10
Payer: COMMERCIAL

## 2020-04-10 DIAGNOSIS — N76.0 BV (BACTERIAL VAGINOSIS): Primary | ICD-10-CM

## 2020-04-10 DIAGNOSIS — B96.89 BV (BACTERIAL VAGINOSIS): Primary | ICD-10-CM

## 2020-04-10 DIAGNOSIS — N92.6 MISSED MENSES: ICD-10-CM

## 2020-04-10 PROCEDURE — 99213 OFFICE O/P EST LOW 20 MIN: CPT | Performed by: NURSE PRACTITIONER

## 2020-04-10 RX ORDER — METRONIDAZOLE 500 MG/1
500 TABLET ORAL EVERY 12 HOURS SCHEDULED
Qty: 14 TABLET | Refills: 0 | Status: SHIPPED | OUTPATIENT
Start: 2020-04-10 | End: 2020-04-17

## 2020-05-12 ENCOUNTER — HOSPITAL ENCOUNTER (EMERGENCY)
Facility: HOSPITAL | Age: 25
Discharge: HOME | End: 2020-05-12
Attending: EMERGENCY MEDICINE | Admitting: EMERGENCY MEDICINE
Payer: COMMERCIAL

## 2020-05-12 VITALS
TEMPERATURE: 98.2 F | HEART RATE: 67 BPM | RESPIRATION RATE: 18 BRPM | SYSTOLIC BLOOD PRESSURE: 113 MMHG | DIASTOLIC BLOOD PRESSURE: 80 MMHG | BODY MASS INDEX: 33.9 KG/M2 | WEIGHT: 203.71 LBS | OXYGEN SATURATION: 100 %

## 2020-05-12 DIAGNOSIS — R11.2 NAUSEA AND VOMITING: ICD-10-CM

## 2020-05-12 DIAGNOSIS — R10.13 EPIGASTRIC ABDOMINAL PAIN: Primary | ICD-10-CM

## 2020-05-12 LAB
ALBUMIN SERPL BCP-MCNC: 4.1 G/DL (ref 3.5–5)
ALP SERPL-CCNC: 78 U/L (ref 46–116)
ALT SERPL W P-5'-P-CCNC: 35 U/L (ref 12–78)
ANION GAP SERPL CALCULATED.3IONS-SCNC: 9 MMOL/L (ref 4–13)
AST SERPL W P-5'-P-CCNC: 33 U/L (ref 5–45)
BASOPHILS # BLD AUTO: 0.04 THOUSANDS/ΜL (ref 0–0.1)
BASOPHILS NFR BLD AUTO: 0 % (ref 0–1)
BILIRUB SERPL-MCNC: 0.74 MG/DL (ref 0.2–1)
BILIRUB UR QL STRIP: NEGATIVE
BUN SERPL-MCNC: 9 MG/DL (ref 5–25)
CALCIUM SERPL-MCNC: 8.9 MG/DL (ref 8.3–10.1)
CHLORIDE SERPL-SCNC: 106 MMOL/L (ref 100–108)
CLARITY UR: CLEAR
CO2 SERPL-SCNC: 24 MMOL/L (ref 21–32)
COLOR UR: YELLOW
COLOR, POC: YELLOW
CREAT SERPL-MCNC: 0.74 MG/DL (ref 0.6–1.3)
EOSINOPHIL # BLD AUTO: 0.03 THOUSAND/ΜL (ref 0–0.61)
EOSINOPHIL NFR BLD AUTO: 0 % (ref 0–6)
ERYTHROCYTE [DISTWIDTH] IN BLOOD BY AUTOMATED COUNT: 12.3 % (ref 11.6–15.1)
EXT PREG TEST URINE: NEGATIVE
EXT. CONTROL ED NAV: NORMAL
GFR SERPL CREATININE-BSD FRML MDRD: 114 ML/MIN/1.73SQ M
GLUCOSE SERPL-MCNC: 91 MG/DL (ref 65–140)
GLUCOSE UR STRIP-MCNC: NEGATIVE MG/DL
HCT VFR BLD AUTO: 41.4 % (ref 34.8–46.1)
HGB BLD-MCNC: 14 G/DL (ref 11.5–15.4)
HGB UR QL STRIP.AUTO: NEGATIVE
IMM GRANULOCYTES # BLD AUTO: 0.05 THOUSAND/UL (ref 0–0.2)
IMM GRANULOCYTES NFR BLD AUTO: 0 % (ref 0–2)
KETONES UR STRIP-MCNC: ABNORMAL MG/DL
LEUKOCYTE ESTERASE UR QL STRIP: NEGATIVE
LIPASE SERPL-CCNC: 104 U/L (ref 73–393)
LYMPHOCYTES # BLD AUTO: 2.73 THOUSANDS/ΜL (ref 0.6–4.47)
LYMPHOCYTES NFR BLD AUTO: 20 % (ref 14–44)
MCH RBC QN AUTO: 31.1 PG (ref 26.8–34.3)
MCHC RBC AUTO-ENTMCNC: 33.8 G/DL (ref 31.4–37.4)
MCV RBC AUTO: 92 FL (ref 82–98)
MONOCYTES # BLD AUTO: 0.74 THOUSAND/ΜL (ref 0.17–1.22)
MONOCYTES NFR BLD AUTO: 5 % (ref 4–12)
NEUTROPHILS # BLD AUTO: 10.38 THOUSANDS/ΜL (ref 1.85–7.62)
NEUTS SEG NFR BLD AUTO: 75 % (ref 43–75)
NITRITE UR QL STRIP: NEGATIVE
NRBC BLD AUTO-RTO: 0 /100 WBCS
PH UR STRIP.AUTO: 6 [PH] (ref 4.5–8)
PLATELET # BLD AUTO: 318 THOUSANDS/UL (ref 149–390)
PMV BLD AUTO: 10.9 FL (ref 8.9–12.7)
POTASSIUM SERPL-SCNC: 3.9 MMOL/L (ref 3.5–5.3)
PROT SERPL-MCNC: 8.4 G/DL (ref 6.4–8.2)
PROT UR STRIP-MCNC: NEGATIVE MG/DL
RBC # BLD AUTO: 4.5 MILLION/UL (ref 3.81–5.12)
SODIUM SERPL-SCNC: 139 MMOL/L (ref 136–145)
SP GR UR STRIP.AUTO: 1.02 (ref 1–1.03)
UROBILINOGEN UR QL STRIP.AUTO: 0.2 E.U./DL
WBC # BLD AUTO: 13.97 THOUSAND/UL (ref 4.31–10.16)

## 2020-05-12 PROCEDURE — 96361 HYDRATE IV INFUSION ADD-ON: CPT

## 2020-05-12 PROCEDURE — 80053 COMPREHEN METABOLIC PANEL: CPT | Performed by: PHYSICIAN ASSISTANT

## 2020-05-12 PROCEDURE — 99283 EMERGENCY DEPT VISIT LOW MDM: CPT

## 2020-05-12 PROCEDURE — 81003 URINALYSIS AUTO W/O SCOPE: CPT

## 2020-05-12 PROCEDURE — 85025 COMPLETE CBC W/AUTO DIFF WBC: CPT | Performed by: PHYSICIAN ASSISTANT

## 2020-05-12 PROCEDURE — 96375 TX/PRO/DX INJ NEW DRUG ADDON: CPT

## 2020-05-12 PROCEDURE — 99285 EMERGENCY DEPT VISIT HI MDM: CPT | Performed by: PHYSICIAN ASSISTANT

## 2020-05-12 PROCEDURE — 83690 ASSAY OF LIPASE: CPT | Performed by: PHYSICIAN ASSISTANT

## 2020-05-12 PROCEDURE — 36415 COLL VENOUS BLD VENIPUNCTURE: CPT | Performed by: PHYSICIAN ASSISTANT

## 2020-05-12 PROCEDURE — 96374 THER/PROPH/DIAG INJ IV PUSH: CPT

## 2020-05-12 PROCEDURE — 81025 URINE PREGNANCY TEST: CPT | Performed by: PHYSICIAN ASSISTANT

## 2020-05-12 RX ORDER — FAMOTIDINE 20 MG/1
20 TABLET, FILM COATED ORAL DAILY
Qty: 14 TABLET | Refills: 0 | Status: SHIPPED | OUTPATIENT
Start: 2020-05-12 | End: 2020-05-19

## 2020-05-12 RX ORDER — ONDANSETRON 4 MG/1
4 TABLET, ORALLY DISINTEGRATING ORAL EVERY 6 HOURS PRN
Qty: 20 TABLET | Refills: 0 | Status: SHIPPED | OUTPATIENT
Start: 2020-05-12 | End: 2020-07-13

## 2020-05-12 RX ORDER — ALUMINA, MAGNESIA, AND SIMETHICONE 2400; 2400; 240 MG/30ML; MG/30ML; MG/30ML
15 SUSPENSION ORAL EVERY 6 HOURS PRN
Qty: 355 ML | Refills: 0 | Status: SHIPPED | OUTPATIENT
Start: 2020-05-12 | End: 2020-07-13

## 2020-05-12 RX ORDER — ONDANSETRON 2 MG/ML
4 INJECTION INTRAMUSCULAR; INTRAVENOUS ONCE
Status: COMPLETED | OUTPATIENT
Start: 2020-05-12 | End: 2020-05-12

## 2020-05-12 RX ADMIN — FAMOTIDINE 20 MG: 10 INJECTION, SOLUTION INTRAVENOUS at 13:50

## 2020-05-12 RX ADMIN — SODIUM CHLORIDE 1000 ML: 0.9 INJECTION, SOLUTION INTRAVENOUS at 13:48

## 2020-05-12 RX ADMIN — ONDANSETRON 4 MG: 2 INJECTION INTRAMUSCULAR; INTRAVENOUS at 13:48

## 2020-05-19 ENCOUNTER — TELEMEDICINE (OUTPATIENT)
Dept: OBGYN CLINIC | Facility: MEDICAL CENTER | Age: 25
End: 2020-05-19
Payer: COMMERCIAL

## 2020-05-19 DIAGNOSIS — N76.1 CHRONIC VAGINITIS: Primary | ICD-10-CM

## 2020-05-19 PROCEDURE — G2012 BRIEF CHECK IN BY MD/QHP: HCPCS | Performed by: OBSTETRICS & GYNECOLOGY

## 2020-05-21 ENCOUNTER — OFFICE VISIT (OUTPATIENT)
Dept: OBGYN CLINIC | Facility: CLINIC | Age: 25
End: 2020-05-21
Payer: COMMERCIAL

## 2020-05-21 VITALS
TEMPERATURE: 99.1 F | SYSTOLIC BLOOD PRESSURE: 112 MMHG | DIASTOLIC BLOOD PRESSURE: 74 MMHG | WEIGHT: 209 LBS | BODY MASS INDEX: 34.78 KG/M2

## 2020-05-21 DIAGNOSIS — N89.8 VAGINAL DISCHARGE: Primary | ICD-10-CM

## 2020-05-21 PROCEDURE — 99213 OFFICE O/P EST LOW 20 MIN: CPT | Performed by: OBSTETRICS & GYNECOLOGY

## 2020-05-28 ENCOUNTER — OFFICE VISIT (OUTPATIENT)
Dept: OBGYN CLINIC | Facility: MEDICAL CENTER | Age: 25
End: 2020-05-28
Payer: COMMERCIAL

## 2020-05-28 VITALS
BODY MASS INDEX: 35.28 KG/M2 | SYSTOLIC BLOOD PRESSURE: 110 MMHG | DIASTOLIC BLOOD PRESSURE: 80 MMHG | TEMPERATURE: 98.9 F | WEIGHT: 212 LBS

## 2020-05-28 DIAGNOSIS — R30.0 BURNING WITH URINATION: ICD-10-CM

## 2020-05-28 DIAGNOSIS — N89.8 VAGINAL DISCHARGE: Primary | ICD-10-CM

## 2020-05-28 LAB
BV WHIFF TEST VAG QL: NORMAL
CLUE CELLS SPEC QL WET PREP: NORMAL
PH SMN: NORMAL [PH]
SL AMB  POCT GLUCOSE, UA: NEGATIVE
SL AMB LEUKOCYTE ESTERASE,UA: NORMAL
SL AMB POCT BILIRUBIN,UA: NORMAL
SL AMB POCT BLOOD,UA: NORMAL
SL AMB POCT CLARITY,UA: NORMAL
SL AMB POCT COLOR,UA: YELLOW
SL AMB POCT KETONES,UA: NORMAL
SL AMB POCT NITRITE,UA: NORMAL
SL AMB POCT PH,UA: 5
SL AMB POCT SPECIFIC GRAVITY,UA: 1.01
SL AMB POCT URINE PROTEIN: NORMAL
SL AMB POCT UROBILINOGEN: 0.2
SL AMB POCT WET MOUNT: NORMAL
T VAGINALIS VAG QL WET PREP: NORMAL
YEAST VAG QL WET PREP: NORMAL

## 2020-05-28 PROCEDURE — 99213 OFFICE O/P EST LOW 20 MIN: CPT | Performed by: NURSE PRACTITIONER

## 2020-05-28 PROCEDURE — 87210 SMEAR WET MOUNT SALINE/INK: CPT | Performed by: NURSE PRACTITIONER

## 2020-05-28 PROCEDURE — 81002 URINALYSIS NONAUTO W/O SCOPE: CPT | Performed by: NURSE PRACTITIONER

## 2020-06-22 ENCOUNTER — APPOINTMENT (OUTPATIENT)
Dept: LAB | Age: 25
End: 2020-06-22
Payer: COMMERCIAL

## 2020-06-22 DIAGNOSIS — N91.2 AMENORRHEA: Primary | ICD-10-CM

## 2020-06-22 DIAGNOSIS — N91.2 AMENORRHEA: ICD-10-CM

## 2020-06-22 LAB
ABO GROUP BLD: NORMAL
B-HCG SERPL-ACNC: 1395 MIU/ML
BLD GP AB SCN SERPL QL: NEGATIVE
PROGEST SERPL-MCNC: 13.4 NG/ML
RH BLD: POSITIVE
SPECIMEN EXPIRATION DATE: NORMAL

## 2020-06-22 PROCEDURE — 84144 ASSAY OF PROGESTERONE: CPT

## 2020-06-22 PROCEDURE — 84702 CHORIONIC GONADOTROPIN TEST: CPT

## 2020-06-22 PROCEDURE — 86850 RBC ANTIBODY SCREEN: CPT

## 2020-06-22 PROCEDURE — 36415 COLL VENOUS BLD VENIPUNCTURE: CPT

## 2020-06-22 PROCEDURE — 86901 BLOOD TYPING SEROLOGIC RH(D): CPT

## 2020-06-22 PROCEDURE — 86900 BLOOD TYPING SEROLOGIC ABO: CPT

## 2020-06-23 DIAGNOSIS — N92.6 MISSED MENSES: Primary | ICD-10-CM

## 2020-06-24 ENCOUNTER — APPOINTMENT (OUTPATIENT)
Dept: LAB | Age: 25
End: 2020-06-24
Payer: COMMERCIAL

## 2020-06-24 LAB — B-HCG SERPL-ACNC: 2748 MIU/ML

## 2020-06-24 PROCEDURE — 84702 CHORIONIC GONADOTROPIN TEST: CPT

## 2020-06-24 PROCEDURE — 36415 COLL VENOUS BLD VENIPUNCTURE: CPT

## 2020-06-26 ENCOUNTER — NURSE TRIAGE (OUTPATIENT)
Dept: OTHER | Facility: OTHER | Age: 25
End: 2020-06-26

## 2020-07-06 ENCOUNTER — HOSPITAL ENCOUNTER (EMERGENCY)
Facility: HOSPITAL | Age: 25
Discharge: HOME | End: 2020-07-06
Attending: EMERGENCY MEDICINE | Admitting: EMERGENCY MEDICINE
Payer: COMMERCIAL

## 2020-07-06 ENCOUNTER — TELEPHONE (OUTPATIENT)
Dept: OBGYN CLINIC | Facility: MEDICAL CENTER | Age: 25
End: 2020-07-06

## 2020-07-06 VITALS
DIASTOLIC BLOOD PRESSURE: 58 MMHG | WEIGHT: 211.64 LBS | RESPIRATION RATE: 18 BRPM | TEMPERATURE: 98.1 F | BODY MASS INDEX: 35.22 KG/M2 | OXYGEN SATURATION: 100 % | HEART RATE: 70 BPM | SYSTOLIC BLOOD PRESSURE: 100 MMHG

## 2020-07-06 DIAGNOSIS — O21.9 NAUSEA AND VOMITING IN PREGNANCY: Primary | ICD-10-CM

## 2020-07-06 LAB
ALBUMIN SERPL BCP-MCNC: 3.9 G/DL (ref 3.5–5)
ALP SERPL-CCNC: 59 U/L (ref 46–116)
ALT SERPL W P-5'-P-CCNC: 20 U/L (ref 12–78)
ANION GAP SERPL CALCULATED.3IONS-SCNC: 11 MMOL/L (ref 4–13)
AST SERPL W P-5'-P-CCNC: 14 U/L (ref 5–45)
BASOPHILS # BLD AUTO: 0.05 THOUSANDS/ΜL (ref 0–0.1)
BASOPHILS NFR BLD AUTO: 0 % (ref 0–1)
BILIRUB SERPL-MCNC: 0.41 MG/DL (ref 0.2–1)
BILIRUB UR QL STRIP: NEGATIVE
BUN SERPL-MCNC: 5 MG/DL (ref 5–25)
CALCIUM SERPL-MCNC: 8.8 MG/DL (ref 8.3–10.1)
CHLORIDE SERPL-SCNC: 104 MMOL/L (ref 100–108)
CLARITY UR: CLEAR
CLARITY, POC: CLEAR
CO2 SERPL-SCNC: 21 MMOL/L (ref 21–32)
COLOR UR: YELLOW
COLOR, POC: YELLOW
CREAT SERPL-MCNC: 0.64 MG/DL (ref 0.6–1.3)
EOSINOPHIL # BLD AUTO: 0.02 THOUSAND/ΜL (ref 0–0.61)
EOSINOPHIL NFR BLD AUTO: 0 % (ref 0–6)
ERYTHROCYTE [DISTWIDTH] IN BLOOD BY AUTOMATED COUNT: 12.5 % (ref 11.6–15.1)
GFR SERPL CREATININE-BSD FRML MDRD: 124 ML/MIN/1.73SQ M
GLUCOSE SERPL-MCNC: 94 MG/DL (ref 65–140)
GLUCOSE UR STRIP-MCNC: NEGATIVE MG/DL
HCT VFR BLD AUTO: 38.8 % (ref 34.8–46.1)
HGB BLD-MCNC: 13 G/DL (ref 11.5–15.4)
HGB UR QL STRIP.AUTO: NEGATIVE
IMM GRANULOCYTES # BLD AUTO: 0.06 THOUSAND/UL (ref 0–0.2)
IMM GRANULOCYTES NFR BLD AUTO: 0 % (ref 0–2)
KETONES UR STRIP-MCNC: ABNORMAL MG/DL
LEUKOCYTE ESTERASE UR QL STRIP: NEGATIVE
LIPASE SERPL-CCNC: 90 U/L (ref 73–393)
LYMPHOCYTES # BLD AUTO: 2.67 THOUSANDS/ΜL (ref 0.6–4.47)
LYMPHOCYTES NFR BLD AUTO: 17 % (ref 14–44)
MCH RBC QN AUTO: 31.4 PG (ref 26.8–34.3)
MCHC RBC AUTO-ENTMCNC: 33.5 G/DL (ref 31.4–37.4)
MCV RBC AUTO: 94 FL (ref 82–98)
MONOCYTES # BLD AUTO: 0.67 THOUSAND/ΜL (ref 0.17–1.22)
MONOCYTES NFR BLD AUTO: 4 % (ref 4–12)
NEUTROPHILS # BLD AUTO: 11.93 THOUSANDS/ΜL (ref 1.85–7.62)
NEUTS SEG NFR BLD AUTO: 79 % (ref 43–75)
NITRITE UR QL STRIP: NEGATIVE
NRBC BLD AUTO-RTO: 0 /100 WBCS
PH UR STRIP.AUTO: 7.5 [PH] (ref 4.5–8)
PLATELET # BLD AUTO: 315 THOUSANDS/UL (ref 149–390)
PMV BLD AUTO: 11.2 FL (ref 8.9–12.7)
POTASSIUM SERPL-SCNC: 3.7 MMOL/L (ref 3.5–5.3)
PROT SERPL-MCNC: 7.9 G/DL (ref 6.4–8.2)
PROT UR STRIP-MCNC: NEGATIVE MG/DL
RBC # BLD AUTO: 4.14 MILLION/UL (ref 3.81–5.12)
SODIUM SERPL-SCNC: 136 MMOL/L (ref 136–145)
SP GR UR STRIP.AUTO: 1.02 (ref 1–1.03)
UROBILINOGEN UR QL STRIP.AUTO: 0.2 E.U./DL
WBC # BLD AUTO: 15.4 THOUSAND/UL (ref 4.31–10.16)

## 2020-07-06 PROCEDURE — 85025 COMPLETE CBC W/AUTO DIFF WBC: CPT | Performed by: NURSE PRACTITIONER

## 2020-07-06 PROCEDURE — 96374 THER/PROPH/DIAG INJ IV PUSH: CPT

## 2020-07-06 PROCEDURE — 81003 URINALYSIS AUTO W/O SCOPE: CPT

## 2020-07-06 PROCEDURE — 36415 COLL VENOUS BLD VENIPUNCTURE: CPT | Performed by: NURSE PRACTITIONER

## 2020-07-06 PROCEDURE — 99284 EMERGENCY DEPT VISIT MOD MDM: CPT | Performed by: EMERGENCY MEDICINE

## 2020-07-06 PROCEDURE — 96361 HYDRATE IV INFUSION ADD-ON: CPT

## 2020-07-06 PROCEDURE — 99284 EMERGENCY DEPT VISIT MOD MDM: CPT

## 2020-07-06 PROCEDURE — 83690 ASSAY OF LIPASE: CPT | Performed by: NURSE PRACTITIONER

## 2020-07-06 PROCEDURE — 87086 URINE CULTURE/COLONY COUNT: CPT

## 2020-07-06 PROCEDURE — 80053 COMPREHEN METABOLIC PANEL: CPT | Performed by: NURSE PRACTITIONER

## 2020-07-06 RX ORDER — ONDANSETRON 2 MG/ML
4 INJECTION INTRAMUSCULAR; INTRAVENOUS ONCE
Status: COMPLETED | OUTPATIENT
Start: 2020-07-06 | End: 2020-07-06

## 2020-07-06 RX ORDER — DEXTROSE AND SODIUM CHLORIDE 5; .9 G/100ML; G/100ML
1000 INJECTION, SOLUTION INTRAVENOUS ONCE
Status: COMPLETED | OUTPATIENT
Start: 2020-07-06 | End: 2020-07-06

## 2020-07-06 RX ORDER — ONDANSETRON 4 MG/1
4 TABLET, FILM COATED ORAL EVERY 6 HOURS
Qty: 20 TABLET | Refills: 0 | Status: SHIPPED | OUTPATIENT
Start: 2020-07-06 | End: 2021-09-23 | Stop reason: ALTCHOICE

## 2020-07-06 RX ADMIN — DEXTROSE AND SODIUM CHLORIDE 1000 ML: 5; .9 INJECTION, SOLUTION INTRAVENOUS at 12:52

## 2020-07-06 RX ADMIN — ONDANSETRON 4 MG: 2 INJECTION INTRAMUSCULAR; INTRAVENOUS at 12:50

## 2020-07-06 NOTE — TELEPHONE ENCOUNTER
Patient called c/o severe vomiting  [place] given promethazine  However she is not having any relief and is unable to keep water down  Spoke to [address] who recommends patient go back to the ER for fluids  Patient aware

## 2020-07-06 NOTE — ED PROVIDER NOTES
History  Chief Complaint   Patient presents with    Vomiting During Pregnancy     vomiting ongoing for 6 days  seen in ED on Saturday was given promethazine no relief today  last emesis episode 1 hr pta  called ob advised to come to ed for eval denies abd or bleeding     This is a 23 yo female that is approxamtely 7 weeks pregnant, Martina Fuller, presenting today for evaluation pregnancy associated vomiting  Patient does have a history of bacterial vaginosis, gastroparesis, Celiac's disease  Patient states that she has been vomiting for the past 6 days and was recently admitted for intractable vomiting at a hospital in [facility]  At discharge, she was prescribed promethazine for continued nausea vomiting  Patient states that since discharge her nausea has been mild and typically present in the morning; until this morning where she describes having significant vomiting that was uncontrolled with Promethazine  Patient denies any fevers, chills, abdominal pain, dysuria, vaginal discharge, vaginal bleeding, hematuria, constipation, diarrhea, headache, dizziness, lightheadedness  Patient states that she had hyperemesis with her 1st pregnancy as well  Patient states that her p o  Intake has been minimal over the past several days  Prior to Admission Medications   Prescriptions Last Dose Informant Patient Reported? Taking?    ALPRAZolam (XANAX) 0 5 mg tablet   Yes No   Sig: Take 0 5 mg by mouth 2 (two) times a day as needed   aluminum-magnesium hydroxide-simethicone (MAALOX MAX) 400-400-40 MG/5ML suspension   No No   Sig: Take 15 mL by mouth every 6 (six) hours as needed for indigestion or heartburn   escitalopram (LEXAPRO) 10 mg tablet   Yes No   Sig: Take 10 mg by mouth daily   famotidine (PEPCID) 20 mg tablet   Yes No   ondansetron (ZOFRAN-ODT) 4 mg disintegrating tablet   No No   Sig: Take 1 tablet (4 mg total) by mouth every 6 (six) hours as needed for nausea or vomiting   topiramate (TOPAMAX) 100 mg tablet   Yes Yes   Sig: Take 100 mg by mouth daily      Facility-Administered Medications: None       Past Medical History:   Diagnosis Date    Celiac disease     Gastroparesis     Migraine     Seasonal allergies        History reviewed  No pertinent surgical history  Family History   Problem Relation Age of Onset    No Known Problems Mother      I have reviewed and agree with the history as documented  E-Cigarette/Vaping    E-Cigarette Use Never User      E-Cigarette/Vaping Substances     Social History     Tobacco Use    Smoking status: Current Every Day Smoker     Packs/day: 0 25     Years: 8 00     Pack years: 2 00    Smokeless tobacco: Never Used   Substance Use Topics    Alcohol use: Yes     Comment: occasional    Drug use: No       Review of Systems   Constitutional: Positive for appetite change  Negative for chills, diaphoresis, fatigue and fever  HENT: Negative for hearing loss, mouth sores, rhinorrhea, sinus pressure, sinus pain, sneezing and sore throat  Respiratory: Negative for cough, choking, chest tightness, shortness of breath and wheezing  Cardiovascular: Negative for chest pain, palpitations and leg swelling  Gastrointestinal: Positive for nausea and vomiting  Negative for abdominal distention, abdominal pain, anal bleeding, blood in stool, constipation, diarrhea and rectal pain  Genitourinary: Negative for decreased urine volume, dysuria, flank pain, frequency, hematuria, urgency, vaginal bleeding, vaginal discharge and vaginal pain  Skin: Negative for color change, pallor, rash and wound  Neurological: Negative for dizziness, syncope, weakness, light-headedness and headaches  Physical Exam  Physical Exam   Constitutional: She is oriented to person, place, and time  She appears well-developed and well-nourished  No distress  HENT:   Head: Normocephalic and atraumatic     Right Ear: Tympanic membrane, external ear and ear canal normal    Left Ear: Tympanic membrane, external ear and ear canal normal    Mouth/Throat: Uvula is midline, oropharynx is clear and moist and mucous membranes are normal  Mucous membranes are not pale, not dry and not cyanotic  Eyes: Pupils are equal, round, and reactive to light  Conjunctivae and EOM are normal  Lids are everted and swept, no foreign bodies found  Neck: Trachea normal and normal range of motion  Neck supple  Cardiovascular: Normal rate, regular rhythm, normal heart sounds, intact distal pulses and normal pulses  Pulmonary/Chest: Effort normal and breath sounds normal    Abdominal: Soft  Bowel sounds are normal  There is no hepatosplenomegaly  There is no tenderness  No hernia  Musculoskeletal: Normal range of motion  Neurological: She is alert and oriented to person, place, and time  She has normal strength  No sensory deficit  GCS eye subscore is 4  GCS verbal subscore is 5  GCS motor subscore is 6  Skin: Skin is warm, dry and intact  Capillary refill takes less than 2 seconds  She is not diaphoretic  No pallor  Psychiatric: She has a normal mood and affect   Her behavior is normal  Judgment and thought content normal        Vital Signs  ED Triage Vitals [07/06/20 1151]   Temperature Pulse Respirations Blood Pressure SpO2   98 1 °F (36 7 °C) 81 16 120/80 99 %      Temp Source Heart Rate Source Patient Position - Orthostatic VS BP Location FiO2 (%)   Temporal Monitor Sitting Right arm --      Pain Score       No Pain           Vitals:    07/06/20 1151 07/06/20 1334 07/06/20 1404   BP: 120/80 106/60 100/58   Pulse: 81 75 70   Patient Position - Orthostatic VS: Sitting Lying Lying         Visual Acuity      ED Medications  Medications   ondansetron (ZOFRAN) injection 4 mg (4 mg Intravenous Given 7/6/20 1250)   dextrose 5 % and sodium chloride 0 9 % infusion (0 mL Intravenous Stopped 7/6/20 1429)       Diagnostic Studies  Results Reviewed     Procedure Component Value Units Date/Time    Comprehensive metabolic panel [[id number]] Collected:  07/06/20 1249    Lab Status:  Final result Specimen:  Blood from Arm, Left Updated:  07/06/20 1334     Sodium 136 mmol/L      Potassium 3 7 mmol/L      Chloride 104 mmol/L      CO2 21 mmol/L      ANION GAP 11 mmol/L      BUN 5 mg/dL      Creatinine 0 64 mg/dL      Glucose 94 mg/dL      Calcium 8 8 mg/dL      AST 14 U/L      ALT 20 U/L      Alkaline Phosphatase 59 U/L      Total Protein 7 9 g/dL      Albumin 3 9 g/dL      Total Bilirubin 0 41 mg/dL      eGFR 124 ml/min/1 73sq m     Narrative:       National Kidney Disease Foundation guidelines for Chronic Kidney Disease (CKD):     Stage 1 with normal or high GFR (GFR > 90 mL/min/1 73 square meters)    Stage 2 Mild CKD (GFR = 60-89 mL/min/1 73 square meters)    Stage 3A Moderate CKD (GFR = 45-59 mL/min/1 73 square meters)    Stage 3B Moderate CKD (GFR = 30-44 mL/min/1 73 square meters)    Stage 4 Severe CKD (GFR = 15-29 mL/min/1 73 square meters)    Stage 5 End Stage CKD (GFR <15 mL/min/1 73 square meters)  Note: GFR calculation is accurate only with a steady state creatinine    Lipase [[id number]]  (Normal) Collected:  07/06/20 1249    Lab Status:  Final result Specimen:  Blood from Arm, Left Updated:  07/06/20 1334     Lipase 90 u/L     POCT urinalysis dipstick [[id number]]  (Normal) Resulted:  07/06/20 1333    Lab Status:  Final result Specimen:  Urine Updated:  07/06/20 1333     Color, UA yellow     Clarity, UA clear    CBC and differential [[id number]]  (Abnormal) Collected:  07/06/20 1249    Lab Status:  Final result Specimen:  Blood from Arm, Left Updated:  07/06/20 1300     WBC 15 40 Thousand/uL      RBC 4 14 Million/uL      Hemoglobin 13 0 g/dL      Hematocrit 38 8 %      MCV 94 fL      MCH 31 4 pg      MCHC 33 5 g/dL      RDW 12 5 %      MPV 11 2 fL      Platelets 846 Thousands/uL      nRBC 0 /100 WBCs      Neutrophils Relative 79 %      Immat GRANS % 0 %      Lymphocytes Relative 17 %      Monocytes Relative 4 % Eosinophils Relative 0 %      Basophils Relative 0 %      Neutrophils Absolute 11 93 Thousands/µL      Immature Grans Absolute 0 06 Thousand/uL      Lymphocytes Absolute 2 67 Thousands/µL      Monocytes Absolute 0 67 Thousand/µL      Eosinophils Absolute 0 02 Thousand/µL      Basophils Absolute 0 05 Thousands/µL     Urine culture [[id number]] Collected:  07/06/20 1214    Lab Status: In process Specimen:  Urine, Clean Catch Updated:  07/06/20 1231    Urine Macroscopic, POC [[id number]]  (Abnormal) Collected:  07/06/20 1214    Lab Status:  Final result Specimen:  Urine Updated:  07/06/20 1216     Color, UA Yellow     Clarity, UA Clear     pH, UA 7 5     Leukocytes, UA Negative     Nitrite, UA Negative     Protein, UA Negative mg/dl      Glucose, UA Negative mg/dl      Ketones, UA Trace mg/dl      Urobilinogen, UA 0 2 E U /dl      Bilirubin, UA Negative     Blood, UA Negative     Specific Gravity, UA 1 025    Narrative:       CLINITEK RESULT                 No orders to display              Procedures  Procedures         ED Course  ED Course as of Jul 06 1613   Mon Jul 06, 2020   1348 Patient states that she feels much better and is agreeable to p o  Challenge  1420 Patient passed p o  Challenge  US AUDIT      Most Recent Value   Initial Alcohol Screen: US AUDIT-C    1  How often do you have a drink containing alcohol?  0 Filed at: 07/06/2020 1150   2  How many drinks containing alcohol do you have on a typical day you are drinking? 0 Filed at: 07/06/2020 1150   3a  Male UNDER 65: How often do you have five or more drinks on one occasion? 0 Filed at: 07/06/2020 1150   3b  FEMALE Any Age, or MALE 65+: How often do you have 4 or more drinks on one occassion? 0 Filed at: 07/06/2020 1150   Audit-C Score  0 Filed at: 07/06/2020 1150                  ARIES/DAST-10      Most Recent Value   How many times in the past year have you       Used an illegal drug or used a prescription medication for non-medical reasons? Never Filed at: 07/06/2020 1150                                [facility]  Number of Diagnoses or Management Options  Nausea and vomiting in pregnancy: new and requires workup  Diagnosis management comments: This is a 61-year-old female presenting today for evaluation of pregnancy associated vomiting  Patient was recently admitted 2 days ago at a hospital in [facility] for pregnancy associated intractable vomiting  Patient was discharged with promethazine, which has been helping to limit her feelings of nausea and vomiting to morning, but today patient describes nausea and vomiting being worse, again  Patient denies having any associated fevers, chills, dizziness, lightheadedness, syncope, abdominal pain, dysuria, vaginal discharge/bleeding  Patient's symptoms are likely again associated with her pregnancy, but I will get basic blood and urine studies to rule out any associated signs of infection, dehydration, electrolyte abnormalities  There is no signs of aborted pregnancy at this time  On re-evaluation, patient's symptoms were relieved by 1 dose of Zofran  Patient will be prescribe Zofran  Messaged sent to [place] provider, notifying them of admission  Return precautions were discussed patient verbally confirmed understanding  Amount and/or Complexity of Data Reviewed  Clinical lab tests: ordered and reviewed  Tests in the radiology section of CPT®: ordered and reviewed    Patient Progress  Patient progress: improved        Disposition  Final diagnoses:   Nausea and vomiting in pregnancy     Time reflects when diagnosis was documented in both MDM as applicable and the Disposition within this note     Time User Action Codes Description Comment    7/6/2020  2:21 PM Cherise Lockett [O21 9] Nausea and vomiting in pregnancy       ED Disposition     ED Disposition Condition Date/Time Comment    Discharge Stable Mon Jul 6, 2020  2:21 PM [place] discharge to home/self care  Follow-up Information     Follow up With Specialties Details Why Contact Info Additional [facility], Nurse Practitioner, Obstetrics, Gynecology Go in 1 week  [place] [phone]       [address] [place] [address] Emergency Department Emergency Medicine  As needed, If symptoms worsen [place] [id number]  [phone] [place] [facility], [address] [facility] , [place], [place], [address]          Discharge Medication List as of 7/6/2020  2:24 PM      START taking these medications    Details   ondansetron (ZOFRAN) 4 mg tablet Take 1 tablet (4 mg total) by mouth every 6 (six) hours, Starting Mon 7/6/2020, Print         CONTINUE these medications which have NOT CHANGED    Details   topiramate (TOPAMAX) 100 mg tablet Take 100 mg by mouth daily, Starting Wed 8/28/2019, Historical Med      ALPRAZolam (XANAX) 0 5 mg tablet Take 0 5 mg by mouth 2 (two) times a day as needed, Starting Wed 11/13/2019, Historical Med      aluminum-magnesium hydroxide-simethicone (MAALOX MAX) 400-400-40 MG/5ML suspension Take 15 mL by mouth every 6 (six) hours as needed for indigestion or heartburn, Starting Tue 5/12/2020, Normal      escitalopram (LEXAPRO) 10 mg tablet Take 10 mg by mouth daily, Starting Wed 8/28/2019, Until Tue 5/12/2020, Historical Med      famotidine (PEPCID) 20 mg tablet Starting Tue 1/14/2020, Historical Med      ondansetron (ZOFRAN-ODT) 4 mg disintegrating tablet Take 1 tablet (4 mg total) by mouth every 6 (six) hours as needed for nausea or vomiting, Starting Tue 5/12/2020, Normal           No discharge procedures on file      PDMP Review     None          ED Provider  Electronically Signed by           Von Szymanski  07/06/20 2230

## 2020-07-06 NOTE — ED ATTENDING ATTESTATION
7/6/2020  IJoselito MD, saw and evaluated the patient  I have discussed the patient with the resident/non-physician practitioner and agree with the resident's/non-physician practitioner's findings, Plan of Care, and MDM as documented in the resident's/non-physician practitioner's note, except where noted  All available labs and Radiology studies were reviewed  I was present for key portions of any procedure(s) performed by the resident/non-physician practitioner and I was immediately available to provide assistance  At this point I agree with the current assessment done in the Emergency Department  I have conducted an independent evaluation of this patient a history and physical is as follows:    23 YO female presents with vomiting during pregnancy, denies formal U/S, likely ~7 weeks pregnant  States issues with vomiting during previous pregnancies  Was admitted over the weekend for hyperemesis  Pt was discharged with Promethazine as Zofran was not working  Did try the Promethazine this morning which did not help  Pt denies abdominal pain or vaginal bleeding  Pt denies CP/SOB/F/C/N/V/D/C, no dysuria, burning on urination or blood in urine  Gen: Pt is in NAD  HEENT: Head is atraumatic, EOM's intact, neck has FROM  Chest: CTAB, non-tender  Heart: RRR  Abdomen: Soft, NT/ND  Musculoskeletal: FROM in all extremities  Skin: No rash, no ecchymosis  Neuro: Awake, alert, oriented x4; Cranial nerves II-XII intact  Psych: Normal affect    MDM -  Pt with continuing vomiting during pregnancy, Hx of similar during previous pregnancies  Will check urine for hydration status, CBC, metabolic panel for electrolyte abnormalities and dehydration, Will give dextrose containing fluids IV and antiemetics      ED Course         Critical Care Time  Procedures

## 2020-07-08 LAB — BACTERIA UR CULT: NORMAL

## 2020-07-13 ENCOUNTER — ULTRASOUND (OUTPATIENT)
Dept: OBGYN CLINIC | Facility: MEDICAL CENTER | Age: 25
End: 2020-07-13
Payer: COMMERCIAL

## 2020-07-13 VITALS
WEIGHT: 212 LBS | TEMPERATURE: 98.4 F | BODY MASS INDEX: 35.28 KG/M2 | DIASTOLIC BLOOD PRESSURE: 70 MMHG | SYSTOLIC BLOOD PRESSURE: 102 MMHG

## 2020-07-13 DIAGNOSIS — F41.9 ANXIETY: ICD-10-CM

## 2020-07-13 DIAGNOSIS — N91.2 AMENORRHEA: Primary | ICD-10-CM

## 2020-07-13 PROCEDURE — 99214 OFFICE O/P EST MOD 30 MIN: CPT | Performed by: OBSTETRICS & GYNECOLOGY

## 2020-07-13 RX ORDER — ALPRAZOLAM 0.5 MG/1
0.5 TABLET ORAL
Qty: 30 TABLET | Refills: 0 | Status: SHIPPED | OUTPATIENT
Start: 2020-07-13 | End: 2021-01-03 | Stop reason: ALTCHOICE

## 2020-07-13 NOTE — PROGRESS NOTES
LMP 5/19/20 EDC 2/23/21-   LMP and sonogram are consistent dating       Pregnancy complicated by     1  Bipolor / Anxiety    Previous on Topomax - 7/5- stopped    Switched to lamital awaiting to start   Will give a few xanax to hold over till the meds kick in     2  Smoker - quit also in early July was only smoking about 10 -15 a  Week      Pregnancy Counseling     1  Testing   Advised of the labs that we drawl and why and when they should be done     Genetic testing    CF / SMA - info given for codes to call insurance company for coverage      Nuchal / blood - pt explained timing of the test and why we do it      US - dating and anatomy scan     2  Life style    Exercise    Cont with the plan that done now if nothing recommend exercise 3 x week for  20 min    Weight gain - 25-30 pounds total gain    No smoking or drinking and no drugs       3   Eating    Recommendation of diet during pregnancy

## 2020-07-18 ENCOUNTER — HOSPITAL ENCOUNTER (EMERGENCY)
Facility: HOSPITAL | Age: 25
Discharge: HOME | End: 2020-07-18
Attending: EMERGENCY MEDICINE | Admitting: EMERGENCY MEDICINE
Payer: COMMERCIAL

## 2020-07-18 ENCOUNTER — APPOINTMENT (EMERGENCY)
Dept: RADIOLOGY | Facility: HOSPITAL | Age: 25
End: 2020-07-18
Payer: COMMERCIAL

## 2020-07-18 VITALS
SYSTOLIC BLOOD PRESSURE: 105 MMHG | HEART RATE: 89 BPM | DIASTOLIC BLOOD PRESSURE: 58 MMHG | OXYGEN SATURATION: 99 % | WEIGHT: 210.32 LBS | TEMPERATURE: 98.1 F | BODY MASS INDEX: 35 KG/M2 | RESPIRATION RATE: 16 BRPM

## 2020-07-18 DIAGNOSIS — S80.212A ABRASION OF LEFT KNEE, INITIAL ENCOUNTER: ICD-10-CM
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
## 2020-07-18 DIAGNOSIS — S93.602A FOOT SPRAIN, LEFT, INITIAL ENCOUNTER: ICD-10-CM

## 2020-07-18 DIAGNOSIS — S93.601A SPRAIN OF RIGHT FOOT, INITIAL ENCOUNTER: ICD-10-CM

## 2020-07-18 PROCEDURE — 99284 EMERGENCY DEPT VISIT MOD MDM: CPT | Performed by: PHYSICIAN ASSISTANT

## 2020-07-18 PROCEDURE — 73630 X-RAY EXAM OF FOOT: CPT

## 2020-07-18 PROCEDURE — 99283 EMERGENCY DEPT VISIT LOW MDM: CPT

## 2020-07-18 RX ORDER — BACITRACIN, NEOMYCIN, POLYMYXIN B 400; 3.5; 5 [USP'U]/G; MG/G; [USP'U]/G
1 OINTMENT TOPICAL ONCE
Status: COMPLETED | OUTPATIENT
Start: 2020-07-18 | End: 2020-07-18

## 2020-07-18 RX ORDER — ONDANSETRON 4 MG/1
4 TABLET, ORALLY DISINTEGRATING ORAL ONCE
Status: COMPLETED | OUTPATIENT
Start: 2020-07-18 | End: 2020-07-18

## 2020-07-18 RX ADMIN — BACITRACIN, NEOMYCIN, POLYMYXIN B 1 SMALL APPLICATION: 400; 3.5; 5 OINTMENT TOPICAL at 22:33

## 2020-07-18 RX ADMIN — ONDANSETRON 4 MG: 4 TABLET, ORALLY DISINTEGRATING ORAL at 22:33

## 2020-07-19 NOTE — ED PROVIDER NOTES
History  Chief Complaint   Patient presents with    Foot Injury     Pt reports missed 1 step and fell  c/o left foot and knee pain  Pt reports being 9 weeks pregnant denies any vaginal bleeding or contractions  Patient is a 75-year-old female  currently 9 weeks who presents with fall just prior to arrival with bilateral foot pain and abrasion to left knee  Patient was stepping over the threshold of the door and did not realize there was a step down  She fell forward, "bending all the toes on both my feet backward," and scrapped to my left knee  She states she was able to ambulate after the incident, but notes pain at the base of all of her toes, pain worse on the left compared to the right with mild swelling  The pain is worse with walking, better with rest   She denies any head injury, LOC, headaches, vision changes, abdominal injury, abdominal pain, vaginal bleeding or discharge  She notes an abrasion to her left knee, but denies any pain or decreased range of motion of the knee  Patient also denies any numbness, tingling, weakness of the legs, decreased range of motion  She denies any other injuries  Patient notes her baseline nausea, which has been present for her entire pregnancy, but has been unable to take her Zofran and is requesting a dose here  She states she has been following with her OB and just had her ultrasound earlier this week, and states everything has been going well with the pregnancy  She has not taken anything to help alleviate her pain  Patient states she is otherwise in her usual state of health and denies any fevers, chills, diaphoresis, congestion, cough, shortness of breath, chest pain, palpitations, dysuria, hematuria, urinary frequency or urgency, recent travel, known sick contacts  Prior to Admission Medications   Prescriptions Last Dose Informant Patient Reported? Taking?    ALPRAZolam (XANAX) 0 5 mg tablet   No No   Sig: Take 1 tablet (0 5 mg total) by mouth daily at bedtime as needed for anxiety   Fexofenadine HCl (ALLEGRA PO)   Yes No   Sig: Take by mouth   Prenatal Vit-Fe Fumarate-FA (PRENATAL VITAMINS PO)   Yes No   Sig: Take 1 tablet by mouth daily   ondansetron (ZOFRAN) 4 mg tablet   No No   Sig: Take 1 tablet (4 mg total) by mouth every 6 (six) hours      Facility-Administered Medications: None       Past Medical History:   Diagnosis Date    Celiac disease     Gastroparesis     Migraine     Seasonal allergies        History reviewed  No pertinent surgical history  Family History   Problem Relation Age of Onset    No Known Problems Mother      I have reviewed and agree with the history as documented  E-Cigarette/Vaping    E-Cigarette Use Never User      E-Cigarette/Vaping Substances     Social History     Tobacco Use    Smoking status: Former Smoker     Packs/day: 0 25     Years: 8 00     Pack years: 2 00    Smokeless tobacco: Never Used   Substance Use Topics    Alcohol use: Not Currently     Comment: occasional    Drug use: No       Review of Systems   Constitutional: Negative for chills, diaphoresis and fever  HENT: Negative for congestion  Eyes: Negative for visual disturbance  Respiratory: Negative for cough, shortness of breath, wheezing and stridor  Cardiovascular: Negative for chest pain, palpitations and leg swelling  Gastrointestinal: Positive for nausea  Negative for abdominal pain, diarrhea and vomiting  Genitourinary: Negative for difficulty urinating, dysuria, frequency, hematuria, urgency, vaginal bleeding, vaginal discharge and vaginal pain  Musculoskeletal: Negative for neck pain and neck stiffness  Pain in bilateral feet and toes with swelling   Skin: Negative for color change, pallor and rash  Neurological: Negative for dizziness, weakness, light-headedness, numbness and headaches  All other systems reviewed and are negative        Physical Exam  Physical Exam   Constitutional: She is oriented to person, place, and time  Vital signs are normal  She appears well-developed and well-nourished  She is active and cooperative  Non-toxic appearance  She does not have a sickly appearance  She does not appear ill  No distress  Patient appears well, no acute distress, nontoxic-appearing  HENT:   Head: Normocephalic and atraumatic  Head is without raccoon's eyes, without Avalos's sign, without abrasion, without contusion and without laceration  Hair is normal    Right Ear: Tympanic membrane, external ear and ear canal normal  No hemotympanum  Left Ear: Tympanic membrane, external ear and ear canal normal  No hemotympanum  Nose: Nose normal    Eyes: Pupils are equal, round, and reactive to light  Conjunctivae and EOM are normal    Neck: Normal range of motion  Neck supple  Cardiovascular: Normal rate, regular rhythm, S1 normal, S2 normal, normal heart sounds, intact distal pulses and normal pulses  Pulses:       Dorsalis pedis pulses are 2+ on the right side, and 2+ on the left side  Posterior tibial pulses are 2+ on the right side, and 2+ on the left side  Pulmonary/Chest: Effort normal and breath sounds normal  No stridor  No respiratory distress  She has no decreased breath sounds  She has no wheezes  She has no rhonchi  She has no rales  Abdominal: Soft  Normal appearance and bowel sounds are normal  She exhibits no distension  There is no tenderness  Musculoskeletal:        Right knee: Normal         Left knee: She exhibits normal range of motion, no swelling, no effusion, no ecchymosis and normal alignment  No tenderness found  Right ankle: Normal         Left ankle: Normal         Right lower leg: Normal         Left lower leg: Normal         Legs:       Right foot: There is tenderness and swelling  There is normal range of motion, normal capillary refill, no crepitus, no deformity and no laceration  Left foot: There is decreased range of motion, tenderness and swelling  There is normal capillary refill, no crepitus, no deformity and no laceration  Feet:    Neurovascularly intact, 5/5 strength in bilateral upper and lower extremities  Patient tender to palpation in bilateral feet from approximately distal 3rd of foot out into the base of the toes  On the left foot, greatest tenderness at the base of the 2nd toe and 4th and 5th toes  On the right foot, greatest tenderness at the base of 5th toe  Mild swelling noted across the base of all toes on bilateral feet  Mild decreased ROM of toes secondary to pain  Full active ROM of bilateral ankles  5/5 strength  Neurovascularly intact distal to injury  Abrasion noted to left knee  No surrounding swelling, erythema, foreign bodies, purulent drainage noted  Full active ROM of left knee without pain, nontender to palpation  Neurological: She is alert and oriented to person, place, and time  GCS eye subscore is 4  GCS verbal subscore is 5  GCS motor subscore is 6  Skin: Skin is warm and dry  Capillary refill takes less than 2 seconds  She is not diaphoretic  Nursing note and vitals reviewed        Vital Signs  ED Triage Vitals [07/18/20 2142]   Temperature Pulse Respirations Blood Pressure SpO2   98 1 °F (36 7 °C) 89 16 105/58 99 %      Temp Source Heart Rate Source Patient Position - Orthostatic VS BP Location FiO2 (%)   Temporal Monitor Sitting Right arm --      Pain Score       7           Vitals:    07/18/20 2142   BP: 105/58   Pulse: 89   Patient Position - Orthostatic VS: Sitting         Visual Acuity      ED Medications  Medications   ondansetron (ZOFRAN-ODT) dispersible tablet 4 mg (4 mg Oral Given 7/18/20 2233)   neomycin-bacitracin-polymyxin b (NEOSPORIN) ointment 1 small application (1 small application Topical Given 7/18/20 2233)       Diagnostic Studies  Results Reviewed     None                 XR foot 3+ views RIGHT   ED Interpretation by Kayleigh Rodriguez PA-C (07/18 2323)   No acute osseous abnormality noted      XR foot 3+ views LEFT   ED Interpretation by Katelin Queen PA-C (07/18 8113)   No acute osseous abnormality noted                 Procedures  Procedures         ED Course                                             MDM  Number of Diagnoses or Management Options  Abrasion of left knee, initial encounter:   Fall, initial encounter: Foot sprain, left, initial encounter:   Sprain of right foot, initial encounter:   Diagnosis management comments: Reviewed results with patient, no acute pathology noted  Informed that we will call if radiology notes any significant findings  Patient provided with surgical shoe for left foot given this was area of most significant pain  Neurovascularly intact  Reviewed treatment of foot/toe sprain and abrasion at home  Recommended follow-up with Podiatry for further evaluation and monitoring of symptoms  Recommended follow-up with PCP as needed  Recommended follow-up with OBGYN as scheduled  The management plan was discussed in detail with the patient at bedside and all questions were answered  Provided both verbal and written instructions  Reviewed red flag symptoms and strict return to ED instructions  Patient notes understanding and agrees to plan  Disposition  Final diagnoses:   Fall, initial encounter   Abrasion of left knee, initial encounter   Sprain of right foot, initial encounter   Foot sprain, left, initial encounter     Time reflects when diagnosis was documented in both MDM as applicable and the Disposition within this note     Time User Action Codes Description Comment    7/18/2020 11:18 PM [address] [facility],[address] [facility], [address] [W19  Giselle Body Fall, initial encounter     7/18/2020 11:18 PM Costlow, Karlyn Castleman Add [0 212A] Abrasion of left knee, initial encounter     7/18/2020 11:18 PM Costlow, Karlyn Castleman Add Kaplan Kluver Sprain of right foot, initial encounter     7/18/2020 11:18 PM Costlow, Karlyn Castleman Add [[id number]] Foot sprain, left, initial encounter       ED Disposition     ED Disposition Condition Date/Time Comment    Discharge Stable Sat Jul 18, 2020 11:18 PM Orestes Fontanez discharge to home/self care  Follow-up Information     Follow up With Specialties Details Why Contact Info Additional [address] SIMONE Reid Nurse Practitioner In 3 days  [facility]  [address] [place] [address]  [address] Emergency Department Emergency Medicine  If symptoms worsen [facility] [address] [phone] [place] ED, [address] [facility] , [facility], [address] [place] [address], [address] [facility] Podiatry Schedule an appointment as soon as possible for a visit   [address] [facility] [phone]  [address] , [address], [address], [place], [place], [address] [facility]          Discharge Medication List as of 7/18/2020 11:20 PM      CONTINUE these medications which have NOT CHANGED    Details   ALPRAZolam (XANAX) 0 5 mg tablet Take 1 tablet (0 5 mg total) by mouth daily at bedtime as needed for anxiety, Starting Mon 7/13/2020, Until Wed 8/12/2020, Normal      Fexofenadine HCl (ALLEGRA PO) Take by mouth, Historical Med      ondansetron (ZOFRAN) 4 mg tablet Take 1 tablet (4 mg total) by mouth every 6 (six) hours, Starting Mon 7/6/2020, Print      Prenatal Vit-Fe Fumarate-FA (PRENATAL VITAMINS PO) Take 1 tablet by mouth daily, Historical Med           No discharge procedures on file      PDMP Review     None          ED Provider  Electronically Signed by           Kalina Carter PA-C  07/19/20 0159

## 2020-07-19 NOTE — DISCHARGE INSTRUCTIONS
Abrasion clean and dry  Apply Neosporin to the area 2 times a day and keep the area covered  Use surgical shoe as needed to help alleviate pain    Make sure to wear supportive shoes such of sneakers  Continue Tylenol at home as needed for pain  Rest, ice, elevation may also help alleviate symptoms  Follow-up with podiatry for further evaluation and monitoring of foot pain  Return to ED if symptoms worsen including increasing pain, numbness, tingling, weakness of the feet, inability to bear weight, abdominal pain, vaginal bleeding or discharge, inability to tolerate food or fluid

## 2020-07-29 ENCOUNTER — INITIAL PRENATAL (OUTPATIENT)
Dept: OBGYN CLINIC | Facility: MEDICAL CENTER | Age: 25
End: 2020-07-29
Payer: COMMERCIAL

## 2020-07-29 ENCOUNTER — APPOINTMENT (OUTPATIENT)
Dept: LAB | Facility: MEDICAL CENTER | Age: 25
End: 2020-07-29
Payer: COMMERCIAL

## 2020-07-29 DIAGNOSIS — Z34.91 ENCOUNTER FOR PREGNANCY RELATED EXAMINATION IN FIRST TRIMESTER: ICD-10-CM

## 2020-07-29 DIAGNOSIS — Z34.91 ENCOUNTER FOR PREGNANCY RELATED EXAMINATION IN FIRST TRIMESTER: Primary | ICD-10-CM

## 2020-07-29 LAB
ABO GROUP BLD: NORMAL
BASOPHILS # BLD AUTO: 0.03 THOUSANDS/ΜL (ref 0–0.1)
BASOPHILS NFR BLD AUTO: 0 % (ref 0–1)
BILIRUB UR QL STRIP: NEGATIVE
BLD GP AB SCN SERPL QL: NEGATIVE
CLARITY UR: CLEAR
COLOR UR: YELLOW
EOSINOPHIL # BLD AUTO: 0.07 THOUSAND/ΜL (ref 0–0.61)
EOSINOPHIL NFR BLD AUTO: 1 % (ref 0–6)
ERYTHROCYTE [DISTWIDTH] IN BLOOD BY AUTOMATED COUNT: 12.7 % (ref 11.6–15.1)
GLUCOSE UR STRIP-MCNC: NEGATIVE MG/DL
HBV SURFACE AG SER QL: NORMAL
HCT VFR BLD AUTO: 38 % (ref 34.8–46.1)
HGB BLD-MCNC: 12.5 G/DL (ref 11.5–15.4)
HGB UR QL STRIP.AUTO: NEGATIVE
IMM GRANULOCYTES # BLD AUTO: 0.05 THOUSAND/UL (ref 0–0.2)
IMM GRANULOCYTES NFR BLD AUTO: 0 % (ref 0–2)
KETONES UR STRIP-MCNC: NEGATIVE MG/DL
LEUKOCYTE ESTERASE UR QL STRIP: NEGATIVE
LYMPHOCYTES # BLD AUTO: 2.63 THOUSANDS/ΜL (ref 0.6–4.47)
LYMPHOCYTES NFR BLD AUTO: 20 % (ref 14–44)
MCH RBC QN AUTO: 31.1 PG (ref 26.8–34.3)
MCHC RBC AUTO-ENTMCNC: 32.9 G/DL (ref 31.4–37.4)
MCV RBC AUTO: 95 FL (ref 82–98)
MONOCYTES # BLD AUTO: 0.69 THOUSAND/ΜL (ref 0.17–1.22)
MONOCYTES NFR BLD AUTO: 5 % (ref 4–12)
NEUTROPHILS # BLD AUTO: 9.5 THOUSANDS/ΜL (ref 1.85–7.62)
NEUTS SEG NFR BLD AUTO: 74 % (ref 43–75)
NITRITE UR QL STRIP: NEGATIVE
NRBC BLD AUTO-RTO: 0 /100 WBCS
PH UR STRIP.AUTO: 7 [PH]
PLATELET # BLD AUTO: 316 THOUSANDS/UL (ref 149–390)
PMV BLD AUTO: 11.5 FL (ref 8.9–12.7)
PROT UR STRIP-MCNC: NEGATIVE MG/DL
RBC # BLD AUTO: 4.02 MILLION/UL (ref 3.81–5.12)
RH BLD: POSITIVE
RUBV IGG SERPL IA-ACNC: 30.4 IU/ML
SP GR UR STRIP.AUTO: 1.02 (ref 1–1.03)
SPECIMEN EXPIRATION DATE: NORMAL
UROBILINOGEN UR QL STRIP.AUTO: 0.2 E.U./DL
WBC # BLD AUTO: 12.97 THOUSAND/UL (ref 4.31–10.16)

## 2020-07-29 PROCEDURE — 81003 URINALYSIS AUTO W/O SCOPE: CPT

## 2020-07-29 PROCEDURE — T1001 NURSING ASSESSMENT/EVALUATN: HCPCS | Performed by: OBSTETRICS & GYNECOLOGY

## 2020-07-29 PROCEDURE — 36415 COLL VENOUS BLD VENIPUNCTURE: CPT

## 2020-07-29 PROCEDURE — 87086 URINE CULTURE/COLONY COUNT: CPT

## 2020-07-29 PROCEDURE — 80081 OBSTETRIC PANEL INC HIV TSTG: CPT

## 2020-07-29 NOTE — PATIENT INSTRUCTIONS
Pregnancy at 7 to [address]:   What changes are happening to your body:  Pregnancy hormones may cause your body to go through many changes during this stage of your pregnancy  You may feel more tired than usual, and have mood swings, nausea and vomiting, and headaches  Your breasts may feel tender and swollen and you may urinate more frequently  Seek care immediately if:   · You have pain or cramping in your abdomen or low back  · You have heavy vaginal bleeding or clotting  · You pass material that looks like tissue or large clots  Collect the material and bring it with you  Contact your healthcare provider if:   · You have light bleeding  · You have chills or a fever  · You have vaginal itching, burning, or pain  · You have yellow, green, white, or foul-smelling vaginal discharge  · You have pain or burning when you urinate, less urine than usual, or pink or bloody urine  · You have questions or concerns about your condition or care  How to care for yourself at this stage of your pregnancy:   · Manage nausea and vomiting  Avoid fatty and spicy foods  Eat small meals throughout the day instead of large meals  Roseanne may help to decrease nausea  Ask your healthcare provider about other ways of decreasing nausea and vomiting  · Eat a variety of healthy foods  Healthy foods include fruits, vegetables, whole-grain breads, low-fat dairy foods, beans, lean meats, and fish  Drink liquids as directed  Ask how much liquid to drink each day and which liquids are best for you  Limit caffeine to less than 200 milligrams each day  Limit your intake of fish to 2 servings each week  Choose fish low in mercury such as canned light tuna, shrimp, salmon, cod, or tilapia  Do not  eat fish high in mercury such as swordfish, tilefish, salima mackerel, and shark  · Take prenatal vitamins as directed    Your need for certain vitamins and minerals, such as folic acid, increases during pregnancy  Prenatal vitamins provide some of the extra vitamins and minerals you need  Prenatal vitamins may also help to decrease the risk of certain birth defects  · Ask how much weight you should gain each month  Too much or too little weight gain can be unhealthy for you and your baby  · Do not smoke  If you smoke, it is never too late to quit  Smoking increases your risk of a miscarriage and other health problems during your pregnancy  Smoking can cause your baby to be born too early or weigh less at birth  Ask your healthcare provider for information if you need help quitting  · Do not drink alcohol  Alcohol passes from your body to your baby through the placenta  It can affect your baby's brain development and cause fetal alcohol syndrome (FAS)  FAS is a group of conditions that causes mental, behavior, and growth problems  · Talk to your healthcare provider before you take any medicines  Many medicines may harm your baby if you take them when you are pregnant  Do not take any medicines, vitamins, herbs, or supplements without first talking to your healthcare provider  Never use illegal or street drugs (such as marijuana or cocaine) while you are pregnant  Safety tips during pregnancy:   · Avoid hot tubs and saunas  Do not use a hot tub or sauna while you are pregnant, especially during your first trimester  Hot tubs and saunas may raise your baby's temperature and increase the risk of birth defects  · Avoid toxoplasmosis  This is an infection caused by eating raw meat or being around infected cat feces  It can cause birth defects, miscarriages, and other problems  Wash your hands after you touch raw meat  Make sure any meat is well-cooked before you eat it  Avoid raw eggs and unpasteurized milk  Use gloves or ask someone else to clean your cat's litter box while you are pregnant    Changes that are happening with your baby:  By 10 weeks, your baby will be about 2 ½ inches long from the top of the head to the rump (baby's bottom)  Your baby weighs about ½ ounce  Major body organs, such as the brain, heart, and lungs, are forming  Your baby's facial features are also starting to form  What you need to know about prenatal care:  Prenatal care is a series of visits with your healthcare provider throughout your pregnancy  During the first 28 weeks of your pregnancy, you will see your healthcare provider once a month  Prenatal care can help prevent problems during pregnancy and childbirth  Your healthcare provider will ask questions about your health and any previous pregnancies you have had  He will also ask about any medicines you are taking  You may also need any of the following:  · A pap smear  will be done to check your cervix for abnormal cells  The cervix is the narrow opening at the bottom of your uterus  The cervix meets the top part of the vagina  · A pelvic exam  allows your healthcare provider to see your cervix (the bottom part of your uterus)  Your healthcare provider uses a speculum to gently open your vagina  He will check the size and shape of your uterus  · Blood tests  may be done to check for anemia or blood type  Your healthcare provider may also order other blood tests to check if you are immune to certain diseases such as Hepatitis B  He may also recommend an HIV test     · Urine tests  may also be done to check for signs of infection  · Your blood pressure and weight  will be checked  © 2017 [address] Information is for End User's use only and may not be sold, redistributed or otherwise used for commercial purposes  All illustrations and images included in CareNotes® are the copyrighted property of A D A M , Inc  or Gene Mohamud  The above information is an  only  It is not intended as medical advice for individual conditions or treatments   Talk to your doctor, nurse or pharmacist before following any medical regimen to see if it is safe and effective for you  Pregnancy at 11 to 14 Weeks   AMBULATORY CARE:   What changes are happening to your body: You are now at the end of your first trimester and entering your second trimester  Morning sickness usually goes away by this time  You may have other symptoms such as fatigue, frequent urination, and headaches  You may have gained between 2 to 4 pounds by now  Seek care immediately if:   · You have pain or cramping in your abdomen or low back  · You have heavy vaginal bleeding or clotting  · You pass material that looks like tissue or large clots  Collect the material and bring it with you  Contact your healthcare provider if:   · You cannot keep food or drinks down, and you are losing weight  · You have light bleeding  · You have chills or a fever  · You have vaginal itching, burning, or pain  · You have yellow, green, white, or foul-smelling vaginal discharge  · You have pain or burning when you urinate, less urine than usual, or pink or bloody urine  · You have questions or concerns about your condition or care  How to care for yourself at this stage of your pregnancy:   · Get plenty of rest   You may feel more tired than normal  You may need to take naps or go to bed earlier  · Manage nausea and vomiting  Avoid fatty and spicy foods  Eat small meals throughout the day instead of large meals  Roseanne may help to decrease nausea  Ask your healthcare provider about other ways of decreasing nausea and vomiting  · Eat a variety of healthy foods  Healthy foods include fruits, vegetables, whole-grain breads, low-fat dairy foods, beans, lean meats, and fish  Drink liquids as directed  Ask how much liquid to drink each day and which liquids are best for you  Limit caffeine to less than 200 milligrams each day  Limit your intake of fish to 2 servings each week  Choose fish low in mercury such as canned light tuna, shrimp, salmon, cod, or tilapia   Do not  eat fish high in mercury such as swordfish, tilefish, salima mackerel, and shark  · Take prenatal vitamins as directed  Your need for certain vitamins and minerals, such as folic acid, increases during pregnancy  Prenatal vitamins provide some of the extra vitamins and minerals you need  Prenatal vitamins may also help to decrease the risk of certain birth defects  · Do not smoke  If you smoke, it is never too late to quit  Smoking increases your risk of a miscarriage and other health problems during your pregnancy  Smoking can cause your baby to be born too early or weigh less at birth  Ask your healthcare provider for information if you need help quitting  · Do not drink alcohol  Alcohol passes from your body to your baby through the placenta  It can affect your baby's brain development and cause fetal alcohol syndrome (FAS)  FAS is a group of conditions that causes mental, behavior, and growth problems  · Talk to your healthcare provider before you take any medicines  Many medicines may harm your baby if you take them when you are pregnant  Do not take any medicines, vitamins, herbs, or supplements without first talking to your healthcare provider  Never use illegal or street drugs (such as marijuana or cocaine) while you are pregnant  Safety tips during pregnancy:   · Avoid hot tubs and saunas  Do not use a hot tub or sauna while you are pregnant, especially during your first trimester  Hot tubs and saunas may raise your baby's temperature and increase the risk of birth defects  · Avoid toxoplasmosis  This is an infection caused by eating raw meat or being around infected cat feces  It can cause birth defects, miscarriages, and other problems  Wash your hands after you touch raw meat  Make sure any meat is well-cooked before you eat it  Avoid raw eggs and unpasteurized milk  Use gloves or ask someone else to clean your cat's litter box while you are pregnant    Changes that are happening with your baby: Your baby has fully formed fingernails and toenails  Your baby's heartbeat can now be heard  Ask your healthcare provider if you can listen to your baby's heartbeat  By week 14, your baby is over 4 inches long from the top of the head to the rump (baby's bottom)  Your baby weighs over 3 ounces  What you need to know about prenatal care:  During the first 28 weeks of your pregnancy, you will see your healthcare provider once a month  Prenatal care can help prevent problems during pregnancy and childbirth  Your healthcare provider will check your blood pressure and weight  You may also need any of the following:  · A urine test  may also be done to check for sugar and protein  These can be signs of gestational diabetes or infection  · Genetic disorders screening tests  may be offered to you  This screening test checks your baby's risk of genetic disorders such as Down syndrome  The screening test includes a blood test and ultrasound  · Your baby's heart rate  will be checked  © 2017 [address]  Information is for End User's use only and may not be sold, redistributed or otherwise used for commercial purposes  All illustrations and images included in CareNotes® are the copyrighted property of A D A M , Inc  or Gene Mohamud  The above information is an  only  It is not intended as medical advice for individual conditions or treatments  Talk to your doctor, nurse or pharmacist before following any medical regimen to see if it is safe and effective for you

## 2020-07-29 NOTE — PROGRESS NOTES
OB INTAKE INTERVIEW      Pt presents for OB intake  [id number]  OB History    Para Term  AB Living   3       2     SAB TAB Ectopic Multiple Live Births     2     0      # Outcome Date GA Lbr Chris/2nd Weight Sex Delivery Anes PTL Lv   3 Current            2 TAB 2017 8w0d    TAB         Birth Comments: medically induced    1 TAB 2015 9w0d    TAB         Birth Comments: medically induced          Hx of  delivery prior to 36 weeks 6 days: NO  Last Menstrual Period:   Patient's last menstrual period was 2020 (exact date)  Ultrasound date:   2020 7 weeks 6 days   Estimated date of delivery:   Estimated Date of Delivery: 21  confirmed by LMP   ? History of Diabetes: NO  History of Hypertension: NO      Infection Screening: Does the pt have a hx of MRSA? NO    H&P visit scheduled  ?  Interview education  Information on [facility] Pregnancy Essentials reviewed  Handouts given: [facility] support [facility]  [facility] Emilie Radamilcar in Pregnancy information sheet   COVID-19: precautions and travel restrictions reviewed    Interview education    [facility]  Discussed genetic testing-    - pt interested   In seq  Screen  Desires CF and SMA carrier screening  Process to initiate prior auth started  Discussed Tdap and Influenza vaccines         Depression Screening Follow-up Plan: Patient's depression screening was Positive  with an [place] score of  21  Denies all thoughts of harming self or others  Referral to [facility] Me support center made  Patient states she has appointment with psychiatrist next week  Has been feeling anxious and depressed since she stopped medications and marijuana use  Encouraged her to contact psych  To see if she could be seen sooner  The patient was oriented to our practice and all questions were answered    Interviewed by: Hannah Grant RN 20

## 2020-07-30 LAB — RPR SER QL: NORMAL

## 2020-07-31 LAB
BACTERIA UR CULT: NORMAL
HIV 1+2 AB+HIV1 P24 AG SERPL QL IA: NORMAL

## 2020-08-04 ENCOUNTER — TELEPHONE (OUTPATIENT)
Dept: OBGYN CLINIC | Facility: MEDICAL CENTER | Age: 25
End: 2020-08-04

## 2020-08-04 NOTE — TELEPHONE ENCOUNTER
----- Message from Reza Ingram sent at 8/4/2020  3:22 PM EDT -----  Ace Pacheco  ----- Message -----  From: Reza Ingram  Sent: 7/31/2020   1:55 PM EDT  To: Reza NICK faxed   ----- Message -----  From: Dave Hong RN  Sent: 7/29/2020   2:12 PM EDT  To: Reza Ingram    [facility] [place]  Needs ONAF    Also wants CF and SMA screening-please initiate prior auth

## 2020-08-10 ENCOUNTER — OFFICE VISIT (OUTPATIENT)
Dept: FAMILY MEDICINE CLINIC | Facility: CLINIC | Age: 25
End: 2020-08-10
Payer: COMMERCIAL

## 2020-08-10 VITALS
HEART RATE: 104 BPM | BODY MASS INDEX: 35.49 KG/M2 | OXYGEN SATURATION: 98 % | WEIGHT: 213 LBS | HEIGHT: 65 IN | DIASTOLIC BLOOD PRESSURE: 60 MMHG | SYSTOLIC BLOOD PRESSURE: 110 MMHG | TEMPERATURE: 99.1 F

## 2020-08-10 DIAGNOSIS — H92.02 LEFT EAR PAIN: Primary | ICD-10-CM

## 2020-08-10 DIAGNOSIS — H92.03 ACUTE EAR PAIN, BILATERAL: ICD-10-CM

## 2020-08-10 PROBLEM — N60.19 DIFFUSE CYSTIC MASTOPATHY: Status: ACTIVE | Noted: 2019-12-09

## 2020-08-10 PROBLEM — F17.200 NICOTINE USE DISORDER: Status: ACTIVE | Noted: 2018-07-10

## 2020-08-10 PROBLEM — F12.90 MARIJUANA USE, CONTINUOUS: Status: ACTIVE | Noted: 2018-07-10

## 2020-08-10 PROBLEM — F12.90 MARIJUANA USE, CONTINUOUS: Status: RESOLVED | Noted: 2018-07-10 | Resolved: 2020-08-10

## 2020-08-10 PROBLEM — F31.32 BIPOLAR AFFECTIVE DISORDER, CURRENTLY DEPRESSED, MODERATE (HCC): Status: ACTIVE | Noted: 2018-07-10

## 2020-08-10 PROBLEM — A49.9 BACTERIAL INFECTION: Status: RESOLVED | Noted: 2017-04-03 | Resolved: 2020-08-10

## 2020-08-10 PROBLEM — F40.01 PANIC DISORDER WITH AGORAPHOBIA: Status: ACTIVE | Noted: 2017-12-22

## 2020-08-10 PROBLEM — M85.80 OSTEOPENIA: Status: ACTIVE | Noted: 2019-08-08

## 2020-08-10 PROBLEM — F50.9 EATING DISORDER: Status: ACTIVE | Noted: 2018-11-11

## 2020-08-10 PROBLEM — K59.01 SLOW TRANSIT CONSTIPATION: Status: ACTIVE | Noted: 2019-08-08

## 2020-08-10 PROBLEM — E66.09 CLASS 1 OBESITY DUE TO EXCESS CALORIES WITH SERIOUS COMORBIDITY AND BODY MASS INDEX (BMI) OF 33.0 TO 33.9 IN ADULT: Status: ACTIVE | Noted: 2018-11-09

## 2020-08-10 PROBLEM — F43.10 PTSD (POST-TRAUMATIC STRESS DISORDER): Status: ACTIVE | Noted: 2018-07-18

## 2020-08-10 PROBLEM — E66.811 CLASS 1 OBESITY DUE TO EXCESS CALORIES WITH SERIOUS COMORBIDITY AND BODY MASS INDEX (BMI) OF 33.0 TO 33.9 IN ADULT: Status: ACTIVE | Noted: 2018-11-09

## 2020-08-10 PROBLEM — F17.200 NICOTINE USE DISORDER: Status: RESOLVED | Noted: 2018-07-10 | Resolved: 2020-08-10

## 2020-08-10 PROBLEM — K90.0 CELIAC DISEASE: Status: ACTIVE | Noted: 2019-08-08

## 2020-08-10 PROCEDURE — 3725F SCREEN DEPRESSION PERFORMED: CPT | Performed by: NURSE PRACTITIONER

## 2020-08-10 PROCEDURE — 1036F TOBACCO NON-USER: CPT | Performed by: NURSE PRACTITIONER

## 2020-08-10 PROCEDURE — 99213 OFFICE O/P EST LOW 20 MIN: CPT | Performed by: NURSE PRACTITIONER

## 2020-08-10 RX ORDER — AMOXICILLIN 875 MG/1
875 TABLET, COATED ORAL 2 TIMES DAILY
Qty: 14 TABLET | Refills: 0 | Status: SHIPPED | OUTPATIENT
Start: 2020-08-10 | End: 2020-08-17

## 2020-08-10 NOTE — PROGRESS NOTES
Assessment/Plan:    No problem-specific Assessment & Plan notes found for this encounter  Diagnoses and all orders for this visit:    Left ear pain  Acute ear pain, bilateral  -     amoxicillin (AMOXIL) 875 mg tablet; Take 1 tablet (875 mg total) by mouth 2 (two) times a day for 7 days  Put a drop of all of oil in each ear daily to soften the wax  Follow-up in 7-10 days for cerumen removal or sooner if symptoms worsen  Subjective:      Patient ID: Paula Yen is a 22 y o  female  Pt here to establish care and for ear pain, left > right, x 1 mo  Was seen in UC but did not get an antibiotic because her TMs were obscured by wax  Is moving her care to [place] [address]  She is pregnant; ARRON 2/23/21  Says she has had a metallic taste in her mouth with this pregnancy  The following portions of the patient's history were reviewed and updated as appropriate: allergies, current medications, past family history, past medical history, past social history, past surgical history and problem list     Review of Systems   Constitutional: Positive for appetite change  Negative for activity change, fatigue and fever  HENT: Positive for congestion, ear pain and postnasal drip  Negative for sore throat and trouble swallowing  Eyes: Positive for itching  Respiratory: Negative for cough, shortness of breath and wheezing  Gastrointestinal: Negative for diarrhea, nausea and vomiting  Neurological: Negative for dizziness and weakness  Objective:      /60 (BP Location: Left arm, Patient Position: Sitting, Cuff Size: Adult)   Pulse 104   Temp 99 1 °F (37 3 °C)   Ht 5' 5 35" (1 66 m)   Wt 96 6 kg (213 lb)   LMP 05/19/2020 (Exact Date)   SpO2 98%   BMI 35 06 kg/m²          Physical Exam  Constitutional:       Appearance: Normal appearance  HENT:      Head: Normocephalic  Comments: Cerumen partially obstructing ear canals octavio   Right TM is mildly erythematous (this is the ear with less pain) and left is normal with the partial view  Cardiovascular:      Rate and Rhythm: Normal rate and regular rhythm  Pulmonary:      Effort: Pulmonary effort is normal       Breath sounds: Normal breath sounds  Neurological:      Mental Status: She is alert and oriented to person, place, and time  Psychiatric:         Thought Content:  Thought content normal

## 2020-08-11 ENCOUNTER — TELEPHONE (OUTPATIENT)
Dept: PERINATAL CARE | Facility: CLINIC | Age: 25
End: 2020-08-11

## 2020-08-11 DIAGNOSIS — F41.9 ANXIETY: ICD-10-CM

## 2020-08-11 NOTE — TELEPHONE ENCOUNTER
Attempted to reach patient by phone and left voicemail to confirm appointment for MFM ultrasound  1 support person ( must be over the age of 15) may accompany you for your appointment  If you or your support person have traveled outside the state in the past 2 weeks, please call and notify our office today #[phone]  You and your support person must wear a mask ,covering nose and mouth,during your entire visit  You and your support person will have temperature screened upon arrival     To minimize your exposure in our waiting room, please call our office prior to entering the building  Check in and rooming questions will be done via phone  We will give you directions when to enter for your appointment  Inside office # provided:  [place] [facility] line:  [phone]    IF you are not feeling well- cough, fever, shortness of breath or any flu like symptoms, contact your primary care physician or [address] [place] [address] [place]    Any questions with these instructions please call Maternal Fetal Medicine nurse line today @ # [phone]

## 2020-08-12 ENCOUNTER — ROUTINE PRENATAL (OUTPATIENT)
Dept: PERINATAL CARE | Facility: OTHER | Age: 25
End: 2020-08-12
Payer: COMMERCIAL

## 2020-08-12 VITALS
SYSTOLIC BLOOD PRESSURE: 122 MMHG | HEART RATE: 78 BPM | DIASTOLIC BLOOD PRESSURE: 74 MMHG | HEIGHT: 66 IN | TEMPERATURE: 97 F | BODY MASS INDEX: 34.07 KG/M2 | WEIGHT: 212 LBS

## 2020-08-12 DIAGNOSIS — F31.9 BIPOLAR DISEASE DURING PREGNANCY IN FIRST TRIMESTER (HCC): ICD-10-CM

## 2020-08-12 DIAGNOSIS — Z34.91 ENCOUNTER FOR PREGNANCY RELATED EXAMINATION IN FIRST TRIMESTER: ICD-10-CM

## 2020-08-12 DIAGNOSIS — Z3A.12 12 WEEKS GESTATION OF PREGNANCY: ICD-10-CM

## 2020-08-12 DIAGNOSIS — Z36.82 ENCOUNTER FOR ANTENATAL SCREENING FOR NUCHAL TRANSLUCENCY: ICD-10-CM

## 2020-08-12 DIAGNOSIS — O99.211 MATERNAL OBESITY, ANTEPARTUM, FIRST TRIMESTER: Primary | ICD-10-CM

## 2020-08-12 DIAGNOSIS — O99.341 BIPOLAR DISEASE DURING PREGNANCY IN FIRST TRIMESTER (HCC): ICD-10-CM

## 2020-08-12 PROCEDURE — 76813 OB US NUCHAL MEAS 1 GEST: CPT | Performed by: OBSTETRICS & GYNECOLOGY

## 2020-08-12 PROCEDURE — 99241 PR OFFICE CONSULTATION NEW/ESTAB PATIENT 15 MIN: CPT | Performed by: OBSTETRICS & GYNECOLOGY

## 2020-08-12 PROCEDURE — 1036F TOBACCO NON-USER: CPT | Performed by: OBSTETRICS & GYNECOLOGY

## 2020-08-12 PROCEDURE — 3008F BODY MASS INDEX DOCD: CPT | Performed by: ADVANCED PRACTICE MIDWIFE

## 2020-08-12 RX ORDER — ASPIRIN 81 MG/1
162 TABLET, CHEWABLE ORAL DAILY
Qty: 180 TABLET | Refills: 1 | Status: SHIPPED | OUTPATIENT
Start: 2020-08-12 | End: 2021-02-20 | Stop reason: HOSPADM

## 2020-08-12 NOTE — LETTER
August 12, 2020     Yaz Kwon MD  [facility]    Patient: Orestes Fontanez   YOB: 1995   Date of Visit: 8/12/2020       Dear Dr Daniela Carrasco: Thank you for referring Marshall Efren to me for evaluation  Below are my notes for this consultation  If you have questions, please do not hesitate to call me  I look forward to following your patient along with you           Sincerely,        Jac Lenz MD        CC: No Recipients

## 2020-08-12 NOTE — PROGRESS NOTES
Thank you very much for your kind referral of Kacie Orozco for first-trimester ultrasound evaluation, genetic screening, and [facility]  Barbara Yanez is a 66-year-old  white female who is currently at 15 and 1/7 weeks gestation by an estimated due date of 2021 which is based upon menstrual dating  Consultation is performed for the indication of Class 1 obesity  With the exception of occasional nausea and vomiting, which has improved recently, and an occasional migraine headache, Barbara Yanez has no complaints  She denies vaginal bleeding  Her early prenatal course has been unremarkable   Her pregravid BMI was 34 2Jocelyn has a history of two voluntary interruptions of pregnancy  Her past medical history is significant for bipolar disorder, PTSD, gastroparesis, and celiac disease  Barbara Yanez is currently not treated medically for the indication of bipolar disorder  Medical therapy was recently discontinued by her psychiatrist, Dr Reji Jones, in [facility]  Barbara Yanez does not feel well since treatment of bipolar disorder was discontinued  Her past surgical history significant for a tonsillectomy  Daily medications include a prenatal vitamin, Unisom, vitamin B6, Zofran, and Tylenol as needed  Though she has a history of tobacco and marijuana use pre dating this pregnancy, Barbara Yanez denies current tobacco, alcohol, marijuana, or other illicit drug use  The family genetic history is negative with respect to genetic abnormalities, birth defects, or mental retardation  The family medical history is negative with respect to first-degree relatives with diabetes, hypertension, venous thromboembolism, or preeclampsia  Today's ultrasound findings and suggested follow-up were discussed  with Shruti  The Sequential Screen was discussed in detail, including the sensitivity for detection of Down syndrome    Definitive prenatal diagnosis is possible only through genetic amniocentesis or CVS    Estephania Guerrier had a fingerstick blood collection for hCG and HAIR-A to complete the initial component of the Sequential Screen  Results should be available within one week  Follow-up multiple marker serum screening at 16 to 20 weeks gestation is recommended to complete the Sequential Screen  Fetal Level II ultrasound imaging is scheduled at about 20 weeks gestation  Class 1 obesity is associated with an increased risk for adverse pregnancy outcomes, including gestational diabetes, fetal growth abnormalities including macrosomia, fetal structural abnormalities, preeclampsia, venous thromboembolism, stillbirth, and increased likelihood for  section  Serial fetal growth evaluations are recommended during the second half of the pregnancy  Weekly non stress testing is recommended for additional pregnancy surveillance beginning at 36 weeks gestation, sooner if otherwise indicated, with a BMI of 40 or greater  Early screening for gestational diabetes should be considered, with repeat evaluation between 24 and 28 weeks gestation, if initially negative  Shruti's a BMI and history of nulliparity increase the risk for preeclampsia  I recommended, and ordered, prophylaxis with 162 mg of aspirin a day which will significantly reduce her risk  Continuation of daily low dose aspirin therapy is recommended until delivery  Finally, Estephania Guerrier has an appointment with her psychiatrist later today  We discussed that she should have a conversation with her psychiatrist regarding the possibility to treat bipolar disorder during pregnancy  We discussed that multiple safe and effective treatments for bipolar disorder during pregnancy are available  The face to face time, in addition to time spent discussing ultrasound results, was approximately 15 minutes, greater than 50% of which was spent during counseling and coordination of care

## 2020-08-13 ENCOUNTER — INITIAL PRENATAL (OUTPATIENT)
Dept: OBGYN CLINIC | Facility: MEDICAL CENTER | Age: 25
End: 2020-08-13
Payer: COMMERCIAL

## 2020-08-13 VITALS
SYSTOLIC BLOOD PRESSURE: 100 MMHG | TEMPERATURE: 97.2 F | WEIGHT: 211 LBS | BODY MASS INDEX: 34.58 KG/M2 | DIASTOLIC BLOOD PRESSURE: 60 MMHG

## 2020-08-13 DIAGNOSIS — Z3A.12 12 WEEKS GESTATION OF PREGNANCY: Primary | ICD-10-CM

## 2020-08-13 DIAGNOSIS — Z11.3 SCREENING EXAMINATION FOR STD (SEXUALLY TRANSMITTED DISEASE): ICD-10-CM

## 2020-08-13 DIAGNOSIS — Z34.91 FIRST TRIMESTER PREGNANCY: ICD-10-CM

## 2020-08-13 PROCEDURE — 87591 N.GONORRHOEAE DNA AMP PROB: CPT | Performed by: NURSE PRACTITIONER

## 2020-08-13 PROCEDURE — 99213 OFFICE O/P EST LOW 20 MIN: CPT | Performed by: NURSE PRACTITIONER

## 2020-08-13 PROCEDURE — 1036F TOBACCO NON-USER: CPT | Performed by: NURSE PRACTITIONER

## 2020-08-13 PROCEDURE — 87491 CHLMYD TRACH DNA AMP PROBE: CPT | Performed by: NURSE PRACTITIONER

## 2020-08-13 PROCEDURE — G0145 SCR C/V CYTO,THINLAYER,RESCR: HCPCS | Performed by: NURSE PRACTITIONER

## 2020-08-13 RX ORDER — ALPRAZOLAM 0.5 MG/1
TABLET ORAL
Qty: 30 TABLET | Refills: 0 | OUTPATIENT
Start: 2020-08-13

## 2020-08-13 NOTE — PROGRESS NOTES
Red Rivera is a 22y o  year old  at 12w2d for first prenatal visit  Pregnancy was desired  She is currently taking PNV    Nausea No Vomiting No   Exam done today - see OB flowsheet  Pap done Yes  Gonorrhea and Chlamydia sent  Labs reviewed    Genetic testing had seq screen  Taking amoxicill for ear infection  OB complications     Pt has been counseled re diet, exercise, weight gain, foods to avoid, vaccines in pregnancy, trisomy screening, travel precautions to include seat belt use and VTE risk reduction  She has been provided our pregnancy packet which includes how and when to contact providers, medication recommendations, dietary suggestions, breastfeeding information as well as websites for additional information, hospital and delivery concerns

## 2020-08-14 ENCOUNTER — TELEPHONE (OUTPATIENT)
Dept: PERINATAL CARE | Facility: CLINIC | Age: 25
End: 2020-08-14

## 2020-08-14 LAB
C TRACH DNA SPEC QL NAA+PROBE: NEGATIVE
N GONORRHOEA DNA SPEC QL NAA+PROBE: NEGATIVE

## 2020-08-14 NOTE — TELEPHONE ENCOUNTER
Left patient a message that her MFM appointment had to be rescheduled  The new time, date and location were provided - 10/7/20 2:30 [facility]  The patient has been instructed to please call us back at [phone] with any questions or concerns

## 2020-08-17 ENCOUNTER — TELEPHONE (OUTPATIENT)
Dept: OBGYN CLINIC | Facility: MEDICAL CENTER | Age: 25
End: 2020-08-17

## 2020-08-17 DIAGNOSIS — B37.3 VAGINAL YEAST INFECTION: Primary | ICD-10-CM

## 2020-08-17 RX ORDER — FLUCONAZOLE 150 MG/1
150 TABLET ORAL ONCE
Qty: 1 TABLET | Refills: 0 | Status: SHIPPED | OUTPATIENT
Start: 2020-08-17 | End: 2020-08-17

## 2020-08-17 NOTE — TELEPHONE ENCOUNTER
Pt called stating she is experiencing itching and discharge  Denied any odor or pain  She had this before and believes it is a yeast infection  She is wondering if something can be sent to the pharmacy

## 2020-08-18 ENCOUNTER — TELEPHONE (OUTPATIENT)
Dept: PERINATAL CARE | Facility: OTHER | Age: 25
End: 2020-08-18

## 2020-08-18 LAB
LAB AP GYN PRIMARY INTERPRETATION: NORMAL
LAB AP LMP: NORMAL
Lab: NORMAL
PATH INTERP SPEC-IMP: NORMAL

## 2020-08-18 NOTE — TELEPHONE ENCOUNTER
Phone call to patient, reviewed [facility] Part 1 Sequential Screen result  Patient given instructions for Part 2 collection and she verbalized understanding  TRF for Part 2 and collection instruction letter mailed  Patient given phone number to call M nurse line any questions [phone]

## 2020-08-18 NOTE — LETTER
08/18/20  Shruti Vee  1995    Thank you for completing Part 1 of your Sequential Screen  To obtain a complete test result, please complete blood work for Part 2 Sequential Screen between the weeks of 09/07/20 to 09/21/20  Based on your insurance coverage, please use one of the following locations  Call our office for any questions at [phone]      [address]  1492 [facility], [place], [address]   300 [facility], [facility], [address]  Phone:  [phone]     Phone:  [phone]    [facility] [place] [address]  [facility] 6 [facility], [address] [place] [address], [address] [place] [address] Road  Phone: [phone]      Phone: [phone] Liane Valentin for lab)    53 [facility]  59 [facility], [facility], [facility]   700 [facility], [employer], [address]  Phone: [phone]      Phone:  [phone]    [address]  1401 [facility], [facility] 6   [facility], [address] [place] [address]  Phone: [phone]      Phone:  793 [facility]  207 [facility], [facility], [address]   819 [facility], [place] [address]  Phone: [phone]      Phone: [phone]    [facility] [place]     1896 [facility]  1430 [facility], [facility], [address], [place], [address] Toby [address]ter  Phone:  [phone]     Phone: [phone]    [address] [place] [address] - [place], Skylar Joy[address]  Phone: [phone]

## 2020-08-18 NOTE — TELEPHONE ENCOUNTER
----- Message from Shruthi Stoner MD sent at 8/18/2020 10:56 AM EDT -----  I reviewed the lab study today and the results are normal

## 2020-08-21 ENCOUNTER — ROUTINE PRENATAL (OUTPATIENT)
Dept: OBGYN CLINIC | Facility: MEDICAL CENTER | Age: 25
End: 2020-08-21
Payer: COMMERCIAL

## 2020-08-21 VITALS — SYSTOLIC BLOOD PRESSURE: 110 MMHG | WEIGHT: 211 LBS | BODY MASS INDEX: 34.58 KG/M2 | DIASTOLIC BLOOD PRESSURE: 60 MMHG

## 2020-08-21 DIAGNOSIS — N76.0 BV (BACTERIAL VAGINOSIS): Primary | ICD-10-CM

## 2020-08-21 DIAGNOSIS — B96.89 BV (BACTERIAL VAGINOSIS): Primary | ICD-10-CM

## 2020-08-21 DIAGNOSIS — B37.3 CANDIDIASIS OF VAGINA: ICD-10-CM

## 2020-08-21 DIAGNOSIS — Z3A.13 13 WEEKS GESTATION OF PREGNANCY: ICD-10-CM

## 2020-08-21 LAB
BV WHIFF TEST VAG QL: POSITIVE
CLUE CELLS SPEC QL WET PREP: POSITIVE
PH SMN: 4 [PH]
SL AMB POCT WET MOUNT: ABNORMAL
T VAGINALIS VAG QL WET PREP: NEGATIVE
YEAST VAG QL WET PREP: ABNORMAL

## 2020-08-21 PROCEDURE — 1036F TOBACCO NON-USER: CPT | Performed by: ADVANCED PRACTICE MIDWIFE

## 2020-08-21 PROCEDURE — 99214 OFFICE O/P EST MOD 30 MIN: CPT | Performed by: ADVANCED PRACTICE MIDWIFE

## 2020-08-21 PROCEDURE — 87210 SMEAR WET MOUNT SALINE/INK: CPT | Performed by: ADVANCED PRACTICE MIDWIFE

## 2020-08-21 RX ORDER — FLUCONAZOLE 150 MG/1
150 TABLET ORAL ONCE
Qty: 1 TABLET | Refills: 0 | Status: SHIPPED | OUTPATIENT
Start: 2020-08-21 | End: 2020-08-21

## 2020-08-21 RX ORDER — METRONIDAZOLE 7.5 MG/G
5 GEL VAGINAL DAILY
Qty: 70 G | Refills: 0 | Status: SHIPPED | OUTPATIENT
Start: 2020-08-21 | End: 2020-10-09

## 2020-08-21 NOTE — PROGRESS NOTES
Assessment  22 y o  [id number] at 13w3d presenting for problem prenatal visit  Vulvovaginitis- BV with coexistence of candidiasis  Plan  Diagnoses and all orders for this visit:    BV (bacterial vaginosis)  -     metroNIDAZOLE (METROGEL) 0 75 % vaginal gel; Insert 5 application into the vagina daily  -     POCT wet mount    Candidiasis of vagina  -     fluconazole (DIFLUCAN) 150 mg tablet; Take 1 tablet (150 mg total) by mouth once for 1 dose  -     POCT wet mount    13 weeks gestation of pregnancy    1  Hx of metrogel working better for BV- to use as prescribed and repeat diflucan  2   To call if no improvement  3  Recommend condom use if occurrence associated with intercourse  4   Discussed comfort measures  5   To increase water intake  6   To keep next regular scheduled visit      ____________________________________________________________        William Carbone is a 22 y o   at 13w3d who presents for problem prenatal visit  She denies contractions, loss of fluid, or vaginal bleeding  States that she was on antibiotic for ear infection and after finishing, started having vaginal itching and burning  Was given Diflucan 150mg, but symptoms did not resolve and feels that she has BV and is prone to BV       Pregnancy Problems:  Patient Active Problem List   Diagnosis    Bipolar affective disorder, currently depressed, moderate (HCC)    Celiac disease    Class 1 obesity due to excess calories with serious comorbidity and body mass index (BMI) of 33 0 to 33 9 in adult    Diffuse cystic mastopathy    Eating disorder    Osteopenia    Panic disorder with agoraphobia    PTSD (post-traumatic stress disorder)    Slow transit constipation    Seasonal allergies    Migraine    Depression    Anxiety    Maternal obesity, antepartum, first trimester    Bipolar disease during pregnancy in first trimester ([facility] [address] )         Objective     /60   Wt 95 7 kg (211 lb)   LMP 2020 (Exact Date)   BMI 34 58 kg/m²   FHT: 161 BPM   Uterine Size: size equals dates   Presentations: N/A   Vulva Redness, erythema, white discharge labia minora   Vagina White discharge, erythema, no lesions   Cx Closed, no lesions or discharge noted   Pelvic Exam:     Dilation: Closed on visual exam    Effacement:  Thick    Station:  N/A

## 2020-08-21 NOTE — PROGRESS NOTES
Subjective:       Sallie Alexander presents to the clinic {numbers; 0-10:48797} weeks following ***  Eating a regular diet {WITH-WITHOUT:5700} difficulty  Bowel movements are {NORMAL / ABNORMAL (RESULT):76755::"Normal"}  {PAIN CONTROL:45276::"The patient is not having any pain "}  Objective:      LMP 05/19/2020 (Exact Date)     General:  {gen appearance:49785}   Abdomen: {POST OP ABD EXAM:61157::"soft","bowel sounds active","non-tender"}   Incision:   {INCISION:56176::"no dehiscence","incision well approximated","healing well","no drainage","no erythema","no hernia","no seroma","no swelling"}       Assessment:      {DOING WELL:44850::"Doing well postoperatively "}      Plan:      1  Continue any current medications  2  Wound care discussed  3  {DISPOSITION:03031}  4   Follow up: {numbers; 0-10:23723} {units:11} for suture removal

## 2020-08-23 NOTE — RESULT ENCOUNTER NOTE
Pl advise normal lab results  Normal pap but yeast seen, have her buy otc monistat for 7 days  She uses fluconazole in the past but cant now 2/2 to pregnancy   G/C was neg

## 2020-09-09 ENCOUNTER — ROUTINE PRENATAL (OUTPATIENT)
Dept: OBGYN CLINIC | Facility: MEDICAL CENTER | Age: 25
End: 2020-09-09
Payer: COMMERCIAL

## 2020-09-09 VITALS — SYSTOLIC BLOOD PRESSURE: 110 MMHG | WEIGHT: 209.9 LBS | BODY MASS INDEX: 34.4 KG/M2 | DIASTOLIC BLOOD PRESSURE: 62 MMHG

## 2020-09-09 DIAGNOSIS — Z34.02 ENCOUNTER FOR SUPERVISION OF NORMAL FIRST PREGNANCY IN SECOND TRIMESTER: Primary | ICD-10-CM

## 2020-09-09 PROCEDURE — 99214 OFFICE O/P EST MOD 30 MIN: CPT | Performed by: OBSTETRICS & GYNECOLOGY
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
## 2020-09-13 ENCOUNTER — HOSPITAL ENCOUNTER (EMERGENCY)
Facility: HOSPITAL | Age: 25
Discharge: HOME | End: 2020-09-13
Attending: EMERGENCY MEDICINE | Admitting: EMERGENCY MEDICINE
Payer: COMMERCIAL

## 2020-09-13 VITALS
WEIGHT: 209.44 LBS | OXYGEN SATURATION: 99 % | TEMPERATURE: 98 F | SYSTOLIC BLOOD PRESSURE: 108 MMHG | HEART RATE: 72 BPM | RESPIRATION RATE: 16 BRPM | DIASTOLIC BLOOD PRESSURE: 68 MMHG | BODY MASS INDEX: 34.32 KG/M2

## 2020-09-13 DIAGNOSIS — K29.70 GASTRITIS: ICD-10-CM

## 2020-09-13 DIAGNOSIS — R11.2 NAUSEA AND VOMITING: Primary | ICD-10-CM

## 2020-09-13 LAB
ALBUMIN SERPL BCP-MCNC: 3.1 G/DL (ref 3.5–5)
ALP SERPL-CCNC: 50 U/L (ref 46–116)
ALT SERPL W P-5'-P-CCNC: 18 U/L (ref 12–78)
ANION GAP SERPL CALCULATED.3IONS-SCNC: 8 MMOL/L (ref 4–13)
AST SERPL W P-5'-P-CCNC: 11 U/L (ref 5–45)
BASOPHILS # BLD AUTO: 0.03 THOUSANDS/ΜL (ref 0–0.1)
BASOPHILS NFR BLD AUTO: 0 % (ref 0–1)
BILIRUB SERPL-MCNC: 0.33 MG/DL (ref 0.2–1)
BUN SERPL-MCNC: 3 MG/DL (ref 5–25)
CALCIUM SERPL-MCNC: 8.3 MG/DL (ref 8.3–10.1)
CHLORIDE SERPL-SCNC: 105 MMOL/L (ref 100–108)
CO2 SERPL-SCNC: 23 MMOL/L (ref 21–32)
CREAT SERPL-MCNC: 0.45 MG/DL (ref 0.6–1.3)
EOSINOPHIL # BLD AUTO: 0.02 THOUSAND/ΜL (ref 0–0.61)
EOSINOPHIL NFR BLD AUTO: 0 % (ref 0–6)
ERYTHROCYTE [DISTWIDTH] IN BLOOD BY AUTOMATED COUNT: 12.2 % (ref 11.6–15.1)
GFR SERPL CREATININE-BSD FRML MDRD: 140 ML/MIN/1.73SQ M
GLUCOSE SERPL-MCNC: 88 MG/DL (ref 65–140)
HCT VFR BLD AUTO: 35.6 % (ref 34.8–46.1)
HGB BLD-MCNC: 11.9 G/DL (ref 11.5–15.4)
IMM GRANULOCYTES # BLD AUTO: 0.1 THOUSAND/UL (ref 0–0.2)
IMM GRANULOCYTES NFR BLD AUTO: 1 % (ref 0–2)
LIPASE SERPL-CCNC: 75 U/L (ref 73–393)
LYMPHOCYTES # BLD AUTO: 1.74 THOUSANDS/ΜL (ref 0.6–4.47)
LYMPHOCYTES NFR BLD AUTO: 12 % (ref 14–44)
MCH RBC QN AUTO: 31.3 PG (ref 26.8–34.3)
MCHC RBC AUTO-ENTMCNC: 33.4 G/DL (ref 31.4–37.4)
MCV RBC AUTO: 94 FL (ref 82–98)
MONOCYTES # BLD AUTO: 0.54 THOUSAND/ΜL (ref 0.17–1.22)
MONOCYTES NFR BLD AUTO: 4 % (ref 4–12)
NEUTROPHILS # BLD AUTO: 12.12 THOUSANDS/ΜL (ref 1.85–7.62)
NEUTS SEG NFR BLD AUTO: 83 % (ref 43–75)
NRBC BLD AUTO-RTO: 0 /100 WBCS
PLATELET # BLD AUTO: 259 THOUSANDS/UL (ref 149–390)
PMV BLD AUTO: 10.8 FL (ref 8.9–12.7)
POTASSIUM SERPL-SCNC: 3.7 MMOL/L (ref 3.5–5.3)
PROT SERPL-MCNC: 6.9 G/DL (ref 6.4–8.2)
RBC # BLD AUTO: 3.8 MILLION/UL (ref 3.81–5.12)
SODIUM SERPL-SCNC: 136 MMOL/L (ref 136–145)
WBC # BLD AUTO: 14.55 THOUSAND/UL (ref 4.31–10.16)

## 2020-09-13 PROCEDURE — 96361 HYDRATE IV INFUSION ADD-ON: CPT

## 2020-09-13 PROCEDURE — 99283 EMERGENCY DEPT VISIT LOW MDM: CPT

## 2020-09-13 PROCEDURE — 36415 COLL VENOUS BLD VENIPUNCTURE: CPT | Performed by: EMERGENCY MEDICINE

## 2020-09-13 PROCEDURE — 96374 THER/PROPH/DIAG INJ IV PUSH: CPT

## 2020-09-13 PROCEDURE — 83690 ASSAY OF LIPASE: CPT | Performed by: EMERGENCY MEDICINE

## 2020-09-13 PROCEDURE — 99284 EMERGENCY DEPT VISIT MOD MDM: CPT | Performed by: EMERGENCY MEDICINE

## 2020-09-13 PROCEDURE — 80053 COMPREHEN METABOLIC PANEL: CPT | Performed by: EMERGENCY MEDICINE

## 2020-09-13 PROCEDURE — 85025 COMPLETE CBC W/AUTO DIFF WBC: CPT | Performed by: EMERGENCY MEDICINE

## 2020-09-13 RX ORDER — METOCLOPRAMIDE HYDROCHLORIDE 5 MG/ML
10 INJECTION INTRAMUSCULAR; INTRAVENOUS ONCE
Status: COMPLETED | OUTPATIENT
Start: 2020-09-13 | End: 2020-09-13

## 2020-09-13 RX ORDER — METOCLOPRAMIDE 10 MG/1
10 TABLET ORAL 3 TIMES DAILY
Qty: 15 TABLET | Refills: 0 | Status: SHIPPED | OUTPATIENT
Start: 2020-09-13 | End: 2020-10-09

## 2020-09-13 RX ORDER — MAGNESIUM HYDROXIDE/ALUMINUM HYDROXICE/SIMETHICONE 120; 1200; 1200 MG/30ML; MG/30ML; MG/30ML
30 SUSPENSION ORAL ONCE
Status: COMPLETED | OUTPATIENT
Start: 2020-09-13 | End: 2020-09-13

## 2020-09-13 RX ADMIN — METOCLOPRAMIDE 10 MG: 5 INJECTION, SOLUTION INTRAMUSCULAR; INTRAVENOUS at 12:57

## 2020-09-13 RX ADMIN — SODIUM CHLORIDE 1000 ML: 0.9 INJECTION, SOLUTION INTRAVENOUS at 12:59

## 2020-09-13 RX ADMIN — ALUMINUM HYDROXIDE, MAGNESIUM HYDROXIDE, AND SIMETHICONE 30 ML: 200; 200; 20 SUSPENSION ORAL at 13:06

## 2020-09-13 NOTE — ED PROVIDER NOTES
History  Chief Complaint   Patient presents with    Vomiting     pt reports vomiting, epgastric pain, and on and off chest pain that started this morning; patient is 16 weeks pregnant and denies vaginal discharge or bleeding       History provided by:  Patient   used: No    Vomiting   Severity:  Moderate  Duration:  1 day  Timing:  Intermittent  Quality:  Stomach contents  Able to tolerate:  Liquids  Progression:  Partially resolved  Chronicity:  Recurrent  Recent urination:  Normal  Context: not post-tussive    Relieved by:  Nothing  Worsened by:  Nothing  Associated symptoms: no abdominal pain, no chills, no cough, no diarrhea, no fever, no headaches and no sore throat    Risk factors: pregnant        Prior to Admission Medications   Prescriptions Last Dose Informant Patient Reported? Taking? ALPRAZolam (XANAX) 0 5 mg tablet  Self No No   Sig: Take 1 tablet (0 5 mg total) by mouth daily at bedtime as needed for anxiety   Doxylamine Succinate, Sleep, (UNISOM PO)   Yes No   Sig: Take by mouth daily   Prenatal Vit-Fe Fumarate-FA (PRENATAL VITAMINS PO)  Self Yes No   Sig: Take 1 tablet by mouth daily   Pyridoxine HCl (VITAMIN B-6 PO)   Yes No   Sig: Take by mouth daily   aspirin 81 mg chewable tablet   No No   Sig: Chew 2 tablets (162 mg total) daily   lurasidone (LATUDA) 20 mg tablet   Yes No   Sig: Take 20 mg by mouth   metroNIDAZOLE (METROGEL) 0 75 % vaginal gel   No No   Sig: Insert 5 application into the vagina daily   Patient not taking: Reported on 9/9/2020   ondansetron (ZOFRAN) 4 mg tablet  Self No No   Sig: Take 1 tablet (4 mg total) by mouth every 6 (six) hours      Facility-Administered Medications: None       Past Medical History:   Diagnosis Date    Celiac disease     Gastroparesis     Marijuana use, continuous 7/10/2018    Last Assessment & Plan:  Priscilla Vela reports that she has less ability to use THC due to her step-daughter being in the house    She reports that she has significantly decrease to twice/day  Reviewed risks of routine daily use, she does not feel this is an issue & states "I know my body" & is not willing to quit at this point in time   Nicotine use disorder 7/10/2018    Last Assessment & Plan:  Lisbeth Winn continues to smoke 1 cigarette/day  She reports that "it will not be hard for her to stop" & plans on quitting immediately   Personality disorder ([facility] [address] )     [facility]     marijuana    Trauma     PTSD    Varicella        Past Surgical History:   Procedure Laterality Date    TONSILLECTOMY         Family History   Problem Relation Age of Onset    Other Mother         mental health issues    Psoriasis Mother     No Known Problems Father     No Known Problems Brother     Heart disease Maternal Grandmother     Thrombophlebitis Maternal Grandmother     Cancer Maternal Grandfather     Stomach cancer Paternal Grandmother     Bone cancer Paternal Grandfather     Breast cancer Neg Hx     Colon cancer Neg Hx     Ovarian cancer Neg Hx      I have reviewed and agree with the history as documented  E-Cigarette/Vaping    E-Cigarette Use Never User      E-Cigarette/Vaping Substances    Nicotine No     THC No     CBD No     Flavoring No     Other No     Unknown No      Social History     Tobacco Use    Smoking status: Former Smoker     Packs/day: 0 25     Years: 8 00     Pack years: 2 00    Smokeless tobacco: Never Used    Tobacco comment: quit with knowledge of pregnancy   Substance Use Topics    Alcohol use: Not Currently     Comment: occasional    Drug use: Not Currently     Types: Marijuana     Comment: every day smoker prior to pregnancy       Review of Systems   Constitutional: Negative for chills and fever  HENT: Negative for facial swelling, sore throat and trouble swallowing  Eyes: Negative for pain and visual disturbance  Respiratory: Negative for cough and shortness of breath      Cardiovascular: Negative for chest pain and leg swelling  Gastrointestinal: Positive for vomiting  Negative for abdominal pain, blood in stool, diarrhea and nausea  Genitourinary: Negative for dysuria and flank pain  Musculoskeletal: Negative for back pain, neck pain and neck stiffness  Skin: Negative for pallor and rash  Allergic/Immunologic: Negative for environmental allergies and immunocompromised state  Neurological: Negative for dizziness and headaches  Hematological: Negative for adenopathy  Does not bruise/bleed easily  Psychiatric/Behavioral: Negative for agitation and behavioral problems  All other systems reviewed and are negative  Physical Exam  Physical Exam  Vitals signs and nursing note reviewed  Constitutional:       General: She is not in acute distress  Appearance: She is well-developed  HENT:      Head: Normocephalic and atraumatic  Neck:      Musculoskeletal: Normal range of motion and neck supple  Cardiovascular:      Rate and Rhythm: Normal rate and regular rhythm  Pulmonary:      Effort: Pulmonary effort is normal    Abdominal:      Palpations: Abdomen is soft  Tenderness: There is abdominal tenderness (Mild epigastric)  There is no guarding or rebound  Musculoskeletal: Normal range of motion  Skin:     General: Skin is warm and dry  Neurological:      Mental Status: She is alert and oriented to person, place, and time           Vital Signs  ED Triage Vitals   Temperature Pulse Respirations Blood Pressure SpO2   09/13/20 1353 09/13/20 1203 09/13/20 1203 09/13/20 1203 09/13/20 1203   98 °F (36 7 °C) 85 18 114/61 99 %      Temp src Heart Rate Source Patient Position - Orthostatic VS BP Location FiO2 (%)   -- 09/13/20 1353 09/13/20 1353 09/13/20 1353 --    Monitor Lying Right arm       Pain Score       09/13/20 1353       No Pain           Vitals:    09/13/20 1203 09/13/20 1353   BP: 114/61 108/68   Pulse: 85 72   Patient Position - Orthostatic VS:  Lying         Visual Acuity      ED Medications  Medications   sodium chloride 0 9 % bolus 1,000 mL (0 mL Intravenous Stopped 9/13/20 1415)   metoclopramide (REGLAN) injection 10 mg (10 mg Intravenous Given 9/13/20 1257)   aluminum-magnesium hydroxide-simethicone (MYLANTA) 200-200-20 mg/5 mL oral suspension 30 mL (30 mL Oral Given 9/13/20 1306)       Diagnostic Studies  Results Reviewed     Procedure Component Value Units Date/Time    Comprehensive metabolic panel [[id number]]  (Abnormal) Collected:  09/13/20 1256    Lab Status:  Final result Specimen:  Blood from Arm, Left Updated:  09/13/20 1329     Sodium 136 mmol/L      Potassium 3 7 mmol/L      Chloride 105 mmol/L      CO2 23 mmol/L      ANION GAP 8 mmol/L      BUN 3 mg/dL      Creatinine 0 45 mg/dL      Glucose 88 mg/dL      Calcium 8 3 mg/dL      AST 11 U/L      ALT 18 U/L      Alkaline Phosphatase 50 U/L      Total Protein 6 9 g/dL      Albumin 3 1 g/dL      Total Bilirubin 0 33 mg/dL      eGFR 140 ml/min/1 73sq m     Narrative:       [place] guidelines for Chronic Kidney Disease (CKD):     Stage 1 with normal or high GFR (GFR > 90 mL/min/1 73 square meters)    Stage 2 Mild CKD (GFR = 60-89 mL/min/1 73 square meters)    Stage 3A Moderate CKD (GFR = 45-59 mL/min/1 73 square meters)    Stage 3B Moderate CKD (GFR = 30-44 mL/min/1 73 square meters)    Stage 4 Severe CKD (GFR = 15-29 mL/min/1 73 square meters)    Stage 5 End Stage CKD (GFR <15 mL/min/1 73 square meters)  Note: GFR calculation is accurate only with a steady state creatinine    Lipase [[id number]]  (Normal) Collected:  09/13/20 1256    Lab Status:  Final result Specimen:  Blood from Arm, Left Updated:  09/13/20 1329     Lipase 75 u/L     CBC and differential [[id number]]  (Abnormal) Collected:  09/13/20 1256    Lab Status:  Final result Specimen:  Blood from Arm, Left Updated:  09/13/20 1312     WBC 14 55 Thousand/uL      RBC 3 80 Million/uL      Hemoglobin 11 9 g/dL      Hematocrit 35 6 % MCV 94 fL      MCH 31 3 pg      MCHC 33 4 g/dL      RDW 12 2 %      MPV 10 8 fL      Platelets 979 Thousands/uL      nRBC 0 /100 WBCs      Neutrophils Relative 83 %      Immat GRANS % 1 %      Lymphocytes Relative 12 %      Monocytes Relative 4 %      Eosinophils Relative 0 %      Basophils Relative 0 %      Neutrophils Absolute 12 12 Thousands/µL      Immature Grans Absolute 0 10 Thousand/uL      Lymphocytes Absolute 1 74 Thousands/µL      Monocytes Absolute 0 54 Thousand/µL      Eosinophils Absolute 0 02 Thousand/µL      Basophils Absolute 0 03 Thousands/µL                  No orders to display              Procedures  Procedures         ED Course  ED Course as of Sep 13 144   Sun Sep 13, 2020   1315 WBC(!): 14 55   1315 Hemoglobin: 11 9   1315 CBC noted  Platelet Count: 330   1354 Sodium: 136   1354 Potassium: 3 7   1354 BUN(!): 3   1354 Creatinine(!): 0 45   1354 AST: 11   1354 ALT: 18   1354 Alkaline Phosphatase: 50   1354 TOTAL BILIRUBIN: 0 33   1354 CMP, Lipase no acute abnormality  Lipase: 75   1413 Patient feels better, drank water, no active N/V; wants to leave; we will give Reglan script, follow up with her OBGYN                                           MDM  Number of Diagnoses or Management Options  Nausea and vomiting: new and requires workup  Diagnosis management comments: Patient is a 19-year-old female, 16 weeks pregnant, ; comes in with complaints of nausea, vomiting, started today morning, but states that and she has had similar symptoms every few days, tried Zofran with partial relief, denies fever, diarrhea, blood in vomiting, no lower abdominal pain or vaginal bleeding  On exam, no acute distress:  Vital signs stable, abdomen soft, mild tenderness in epigastrium noted    Impression:  Possible hyperemesis gravidarum, gastritis, GERD, will check labs including CBC, CMP, lipase, give IV fluids, Reglan, Maalox, patient does not want to try Pepcid as she has had no response in the past according to her  Amount and/or Complexity of Data Reviewed  Clinical lab tests: reviewed and ordered  Tests in the medicine section of CPT®: ordered and reviewed        Disposition  Final diagnoses:   Nausea and vomiting   Gastritis     Time reflects when diagnosis was documented in both MDM as applicable and the Disposition within this note     Time User Action Codes Description Comment    9/13/2020 12:30 PM Curvin Baptise Add [R11 10,  R19 7] Vomiting and diarrhea     9/13/2020 12:31 PM Abraham Siddiqui [address] [R11 10,  R19 7] Vomiting and diarrhea     9/13/2020 12:31 PM Curvin Baptise Add [R11 2] Nausea and vomiting     9/13/2020  2:15 PM Curvin Baptise Add [K29 70] Gastritis       ED Disposition     ED Disposition Condition Date/Time Comment    Discharge Stable [place] Sep 13, 2020  2:15 PM [place] discharge to home/self care              Follow-up Information     Follow up With Specialties Details Why Contact Info Additional [address] [place], [address] [facility] 26  [address] [phone]       [address] [facility]   [address], [address] [facility] [address] [address]  [facility], [address], [address] [place] [address], [address] [phone]          Discharge Medication List as of 9/13/2020  2:16 PM      START taking these medications    Details   metoclopramide (REGLAN) 10 mg tablet Take 1 tablet (10 mg total) by mouth 3 (three) times a day, Starting Sun 9/13/2020, Print         CONTINUE these medications which have NOT CHANGED    Details   ALPRAZolam (XANAX) 0 5 mg tablet Take 1 tablet (0 5 mg total) by mouth daily at bedtime as needed for anxiety, Starting Mon 7/13/2020, Until Wed 9/9/2020, Normal      aspirin 81 mg chewable tablet Chew 2 tablets (162 mg total) daily, Starting Wed 8/12/2020, Until Tue 11/10/2020, Normal      Doxylamine Succinate, Sleep, (UNISOM PO) Take by mouth daily, Historical Med      lurasidone (LATUDA) 20 mg tablet Take 20 mg by mouth, Starting Wed 8/12/2020, Until Thu 8/12/2021, Historical Med      metroNIDAZOLE (METROGEL) 0 75 % vaginal gel Insert 5 application into the vagina daily, Starting Fri 8/21/2020, Normal      ondansetron (ZOFRAN) 4 mg tablet Take 1 tablet (4 mg total) by mouth every 6 (six) hours, Starting Mon 7/6/2020, Print      Prenatal Vit-Fe Fumarate-FA (PRENATAL VITAMINS PO) Take 1 tablet by mouth daily, Historical Med      Pyridoxine HCl (VITAMIN B-6 PO) Take by mouth daily, Historical Med           No discharge procedures on file      PDMP Review     None          ED Provider  Electronically Signed by           Kathleen Gannon MD  09/13/20 [phone]

## 2020-09-13 NOTE — ED NOTES
Called OB regarding patient at this time, OB would like patient to be seen in the department before going to L&D     Layton Holden RN  09/13/20 2175

## 2020-09-18 ENCOUNTER — TELEPHONE (OUTPATIENT)
Dept: PERINATAL CARE | Facility: CLINIC | Age: 25
End: 2020-09-18

## 2020-09-18 NOTE — TELEPHONE ENCOUNTER
Left VMM for pt with results of part 2 Sequential Screen  Pt instructed to contact office with questions

## 2020-09-18 NOTE — TELEPHONE ENCOUNTER
----- Message from Starlene Hamman, MD sent at 9/14/2020 12:29 PM EDT -----  Elevated inhibin  Will need serial fetal growth ultrasound studies  Otherwise, reassuring results

## 2020-10-06 ENCOUNTER — TELEPHONE (OUTPATIENT)
Dept: PERINATAL CARE | Facility: CLINIC | Age: 25
End: 2020-10-06

## 2020-10-07 ENCOUNTER — ROUTINE PRENATAL (OUTPATIENT)
Dept: PERINATAL CARE | Facility: OTHER | Age: 25
End: 2020-10-07
Payer: COMMERCIAL

## 2020-10-07 VITALS — BODY MASS INDEX: 35.39 KG/M2 | HEIGHT: 65 IN

## 2020-10-07 DIAGNOSIS — O28.8 HIGH RISK PREGNANCY WITH HIGH INHIBIN: ICD-10-CM

## 2020-10-07 DIAGNOSIS — O99.212 MATERNAL OBESITY, ANTEPARTUM, SECOND TRIMESTER: Primary | ICD-10-CM

## 2020-10-07 DIAGNOSIS — O09.899 HIGH RISK PREGNANCY WITH HIGH INHIBIN: ICD-10-CM

## 2020-10-07 DIAGNOSIS — Z36.86 ENCOUNTER FOR ANTENATAL SCREENING FOR CERVICAL LENGTH: ICD-10-CM

## 2020-10-07 DIAGNOSIS — F31.9 BIPOLAR DISEASE DURING PREGNANCY IN SECOND TRIMESTER (HCC): ICD-10-CM

## 2020-10-07 DIAGNOSIS — O99.342 BIPOLAR DISEASE DURING PREGNANCY IN SECOND TRIMESTER (HCC): ICD-10-CM

## 2020-10-07 DIAGNOSIS — Z3A.20 20 WEEKS GESTATION OF PREGNANCY: ICD-10-CM

## 2020-10-07 PROCEDURE — 76817 TRANSVAGINAL US OBSTETRIC: CPT | Performed by: OBSTETRICS & GYNECOLOGY

## 2020-10-07 PROCEDURE — 76811 OB US DETAILED SNGL FETUS: CPT | Performed by: OBSTETRICS & GYNECOLOGY

## 2020-10-07 PROCEDURE — 99213 OFFICE O/P EST LOW 20 MIN: CPT | Performed by: OBSTETRICS & GYNECOLOGY

## 2020-10-09 ENCOUNTER — ROUTINE PRENATAL (OUTPATIENT)
Dept: OBGYN CLINIC | Facility: MEDICAL CENTER | Age: 25
End: 2020-10-09
Payer: COMMERCIAL

## 2020-10-09 VITALS — WEIGHT: 214.8 LBS | DIASTOLIC BLOOD PRESSURE: 64 MMHG | SYSTOLIC BLOOD PRESSURE: 122 MMHG | BODY MASS INDEX: 36.3 KG/M2

## 2020-10-09 DIAGNOSIS — O99.342 BIPOLAR DISEASE DURING PREGNANCY IN SECOND TRIMESTER (HCC): Primary | ICD-10-CM

## 2020-10-09 DIAGNOSIS — F31.9 BIPOLAR DISEASE DURING PREGNANCY IN SECOND TRIMESTER (HCC): Primary | ICD-10-CM

## 2020-10-09 DIAGNOSIS — Z3A.20 20 WEEKS GESTATION OF PREGNANCY: ICD-10-CM

## 2020-10-09 PROCEDURE — 1036F TOBACCO NON-USER: CPT | Performed by: OBSTETRICS & GYNECOLOGY

## 2020-10-09 PROCEDURE — 99213 OFFICE O/P EST LOW 20 MIN: CPT | Performed by: OBSTETRICS & GYNECOLOGY

## 2020-10-23 ENCOUNTER — ROUTINE PRENATAL (OUTPATIENT)
Dept: OBGYN CLINIC | Facility: MEDICAL CENTER | Age: 25
End: 2020-10-23
Payer: COMMERCIAL

## 2020-10-23 VITALS — BODY MASS INDEX: 36.89 KG/M2 | SYSTOLIC BLOOD PRESSURE: 140 MMHG | WEIGHT: 218.3 LBS | DIASTOLIC BLOOD PRESSURE: 75 MMHG

## 2020-10-23 DIAGNOSIS — O99.212 MATERNAL OBESITY, ANTEPARTUM, SECOND TRIMESTER: ICD-10-CM

## 2020-10-23 DIAGNOSIS — O26.899 PELVIC PAIN IN PREGNANCY: Primary | ICD-10-CM

## 2020-10-23 DIAGNOSIS — O21.9 NAUSEA AND VOMITING DURING PREGNANCY: ICD-10-CM

## 2020-10-23 DIAGNOSIS — R10.2 PELVIC PAIN IN PREGNANCY: Primary | ICD-10-CM

## 2020-10-23 DIAGNOSIS — F31.9 BIPOLAR DISEASE DURING PREGNANCY IN FIRST TRIMESTER (HCC): ICD-10-CM

## 2020-10-23 DIAGNOSIS — O99.341 BIPOLAR DISEASE DURING PREGNANCY IN FIRST TRIMESTER (HCC): ICD-10-CM

## 2020-10-23 PROCEDURE — 99214 OFFICE O/P EST MOD 30 MIN: CPT | Performed by: OBSTETRICS & GYNECOLOGY

## 2020-10-23 RX ORDER — CYCLOBENZAPRINE HCL 5 MG
5 TABLET ORAL 2 TIMES DAILY PRN
Qty: 8 TABLET | Refills: 0 | Status: SHIPPED | OUTPATIENT
Start: 2020-10-23 | End: 2020-11-23 | Stop reason: ALTCHOICE

## 2020-11-02 ENCOUNTER — TELEPHONE (OUTPATIENT)
Dept: FAMILY MEDICINE CLINIC | Facility: CLINIC | Age: 25
End: 2020-11-02

## 2020-11-13 ENCOUNTER — ROUTINE PRENATAL (OUTPATIENT)
Dept: OBGYN CLINIC | Facility: MEDICAL CENTER | Age: 25
End: 2020-11-13
Payer: COMMERCIAL

## 2020-11-13 ENCOUNTER — TELEPHONE (OUTPATIENT)
Dept: OBGYN CLINIC | Facility: MEDICAL CENTER | Age: 25
End: 2020-11-13

## 2020-11-13 VITALS
TEMPERATURE: 97.6 F | BODY MASS INDEX: 37.82 KG/M2 | HEIGHT: 65 IN | DIASTOLIC BLOOD PRESSURE: 76 MMHG | SYSTOLIC BLOOD PRESSURE: 126 MMHG | WEIGHT: 227 LBS

## 2020-11-13 DIAGNOSIS — O09.899 HIGH RISK PREGNANCY WITH HIGH INHIBIN: ICD-10-CM

## 2020-11-13 DIAGNOSIS — O99.613 GASTROESOPHAGEAL REFLUX DURING PREGNANCY IN THIRD TRIMESTER, ANTEPARTUM: ICD-10-CM

## 2020-11-13 DIAGNOSIS — O21.9 NAUSEA/VOMITING IN PREGNANCY: ICD-10-CM

## 2020-11-13 DIAGNOSIS — O26.899 CARPAL TUNNEL SYNDROME DURING PREGNANCY: ICD-10-CM

## 2020-11-13 DIAGNOSIS — Z3A.25 25 WEEKS GESTATION OF PREGNANCY: Primary | ICD-10-CM

## 2020-11-13 DIAGNOSIS — O99.342 BIPOLAR DISEASE DURING PREGNANCY IN SECOND TRIMESTER (HCC): ICD-10-CM

## 2020-11-13 DIAGNOSIS — G56.00 CARPAL TUNNEL SYNDROME DURING PREGNANCY: ICD-10-CM

## 2020-11-13 DIAGNOSIS — O28.8 HIGH RISK PREGNANCY WITH HIGH INHIBIN: ICD-10-CM

## 2020-11-13 DIAGNOSIS — K21.9 GASTROESOPHAGEAL REFLUX DURING PREGNANCY IN THIRD TRIMESTER, ANTEPARTUM: ICD-10-CM

## 2020-11-13 DIAGNOSIS — F31.9 BIPOLAR DISEASE DURING PREGNANCY IN SECOND TRIMESTER (HCC): ICD-10-CM

## 2020-11-13 DIAGNOSIS — Z34.92 SECOND TRIMESTER PREGNANCY: ICD-10-CM

## 2020-11-13 DIAGNOSIS — O99.212 MATERNAL OBESITY, ANTEPARTUM, SECOND TRIMESTER: ICD-10-CM

## 2020-11-13 PROCEDURE — 1036F TOBACCO NON-USER: CPT | Performed by: OBSTETRICS & GYNECOLOGY

## 2020-11-13 PROCEDURE — 99213 OFFICE O/P EST LOW 20 MIN: CPT | Performed by: OBSTETRICS & GYNECOLOGY

## 2020-11-13 PROCEDURE — 3008F BODY MASS INDEX DOCD: CPT | Performed by: OBSTETRICS & GYNECOLOGY

## 2020-11-13 RX ORDER — FAMOTIDINE 10 MG
TABLET ORAL
Qty: 60 TABLET | Refills: 3 | Status: SHIPPED | OUTPATIENT
Start: 2020-11-13 | End: 2021-01-03 | Stop reason: ALTCHOICE

## 2020-11-13 RX ORDER — MULTIVITAMIN WITH IRON
100 TABLET ORAL DAILY
COMMUNITY
End: 2021-02-20 | Stop reason: HOSPADM

## 2020-11-13 RX ORDER — PROMETHAZINE HYDROCHLORIDE 12.5 MG/1
12.5 TABLET ORAL EVERY 6 HOURS PRN
Qty: 30 TABLET | Refills: 1 | Status: SHIPPED | OUTPATIENT
Start: 2020-11-13 | End: 2021-01-03 | Stop reason: ALTCHOICE

## 2020-11-23 ENCOUNTER — OFFICE VISIT (OUTPATIENT)
Dept: OBGYN CLINIC | Facility: MEDICAL CENTER | Age: 25
End: 2020-11-23
Payer: COMMERCIAL

## 2020-11-23 VITALS — WEIGHT: 224 LBS | DIASTOLIC BLOOD PRESSURE: 70 MMHG | BODY MASS INDEX: 37.28 KG/M2 | SYSTOLIC BLOOD PRESSURE: 102 MMHG

## 2020-11-23 DIAGNOSIS — M54.9 BACK PAIN IN PREGNANCY: ICD-10-CM

## 2020-11-23 DIAGNOSIS — E66.09 CLASS 1 OBESITY DUE TO EXCESS CALORIES WITH SERIOUS COMORBIDITY AND BODY MASS INDEX (BMI) OF 33.0 TO 33.9 IN ADULT: ICD-10-CM

## 2020-11-23 DIAGNOSIS — M25.511 ACUTE PAIN OF BOTH SHOULDERS: ICD-10-CM

## 2020-11-23 DIAGNOSIS — M79.642 BILATERAL HAND PAIN: ICD-10-CM

## 2020-11-23 DIAGNOSIS — G56.02 CARPAL TUNNEL SYNDROME OF LEFT WRIST: Primary | ICD-10-CM

## 2020-11-23 DIAGNOSIS — M25.559 HIP PAIN: ICD-10-CM

## 2020-11-23 DIAGNOSIS — M25.512 ACUTE PAIN OF BOTH SHOULDERS: ICD-10-CM

## 2020-11-23 DIAGNOSIS — O26.899 CARPAL TUNNEL SYNDROME DURING PREGNANCY: ICD-10-CM

## 2020-11-23 DIAGNOSIS — O99.891 BACK PAIN IN PREGNANCY: ICD-10-CM

## 2020-11-23 DIAGNOSIS — Z34.92 SECOND TRIMESTER PREGNANCY: ICD-10-CM

## 2020-11-23 DIAGNOSIS — G56.00 CARPAL TUNNEL SYNDROME DURING PREGNANCY: ICD-10-CM

## 2020-11-23 DIAGNOSIS — M79.641 BILATERAL HAND PAIN: ICD-10-CM

## 2020-11-23 PROCEDURE — 99213 OFFICE O/P EST LOW 20 MIN: CPT | Performed by: NURSE PRACTITIONER

## 2020-12-01 ENCOUNTER — TELEPHONE (OUTPATIENT)
Dept: PERINATAL CARE | Facility: CLINIC | Age: 25
End: 2020-12-01

## 2020-12-02 ENCOUNTER — ULTRASOUND (OUTPATIENT)
Dept: PERINATAL CARE | Facility: OTHER | Age: 25
End: 2020-12-02
Payer: COMMERCIAL

## 2020-12-02 VITALS
HEART RATE: 81 BPM | HEIGHT: 65 IN | BODY MASS INDEX: 39.09 KG/M2 | WEIGHT: 234.6 LBS | DIASTOLIC BLOOD PRESSURE: 64 MMHG | SYSTOLIC BLOOD PRESSURE: 96 MMHG

## 2020-12-02 DIAGNOSIS — O99.213 MATERNAL OBESITY, ANTEPARTUM, THIRD TRIMESTER: ICD-10-CM

## 2020-12-02 DIAGNOSIS — O28.8 HIGH RISK PREGNANCY WITH HIGH INHIBIN: Primary | ICD-10-CM

## 2020-12-02 DIAGNOSIS — Z3A.28 28 WEEKS GESTATION OF PREGNANCY: ICD-10-CM

## 2020-12-02 DIAGNOSIS — O09.899 HIGH RISK PREGNANCY WITH HIGH INHIBIN: Primary | ICD-10-CM

## 2020-12-02 PROCEDURE — 1036F TOBACCO NON-USER: CPT | Performed by: OBSTETRICS & GYNECOLOGY

## 2020-12-02 PROCEDURE — 76816 OB US FOLLOW-UP PER FETUS: CPT | Performed by: OBSTETRICS & GYNECOLOGY

## 2020-12-02 PROCEDURE — 3008F BODY MASS INDEX DOCD: CPT | Performed by: OBSTETRICS & GYNECOLOGY

## 2020-12-02 PROCEDURE — 99212 OFFICE O/P EST SF 10 MIN: CPT | Performed by: OBSTETRICS & GYNECOLOGY

## 2020-12-14 ENCOUNTER — ROUTINE PRENATAL (OUTPATIENT)
Dept: OBGYN CLINIC | Facility: MEDICAL CENTER | Age: 25
End: 2020-12-14
Payer: COMMERCIAL

## 2020-12-14 VITALS — DIASTOLIC BLOOD PRESSURE: 60 MMHG | BODY MASS INDEX: 38.11 KG/M2 | SYSTOLIC BLOOD PRESSURE: 100 MMHG | WEIGHT: 229 LBS

## 2020-12-14 DIAGNOSIS — M54.31 SCIATICA OF RIGHT SIDE: ICD-10-CM

## 2020-12-14 DIAGNOSIS — O99.213 MATERNAL OBESITY, ANTEPARTUM, THIRD TRIMESTER: ICD-10-CM

## 2020-12-14 DIAGNOSIS — G56.00 CARPAL TUNNEL SYNDROME DURING PREGNANCY: ICD-10-CM

## 2020-12-14 DIAGNOSIS — O09.899 HIGH RISK PREGNANCY WITH HIGH INHIBIN: ICD-10-CM

## 2020-12-14 DIAGNOSIS — F31.32 BIPOLAR AFFECTIVE DISORDER, CURRENTLY DEPRESSED, MODERATE (HCC): ICD-10-CM

## 2020-12-14 DIAGNOSIS — Z34.93 THIRD TRIMESTER PREGNANCY: ICD-10-CM

## 2020-12-14 DIAGNOSIS — O28.8 HIGH RISK PREGNANCY WITH HIGH INHIBIN: ICD-10-CM

## 2020-12-14 DIAGNOSIS — Z3A.30 30 WEEKS GESTATION OF PREGNANCY: Primary | ICD-10-CM

## 2020-12-14 DIAGNOSIS — N39.8 VOIDING DYSFUNCTION: ICD-10-CM

## 2020-12-14 DIAGNOSIS — O26.899 CARPAL TUNNEL SYNDROME DURING PREGNANCY: ICD-10-CM

## 2020-12-14 LAB
BACTERIA UR QL AUTO: NORMAL /HPF
BASOPHILS # BLD AUTO: 0.04 THOUSANDS/ΜL (ref 0–0.1)
BASOPHILS NFR BLD AUTO: 0 % (ref 0–1)
BILIRUB UR QL STRIP: NEGATIVE
CLARITY UR: CLEAR
COLOR UR: NORMAL
EOSINOPHIL # BLD AUTO: 0.1 THOUSAND/ΜL (ref 0–0.61)
EOSINOPHIL NFR BLD AUTO: 1 % (ref 0–6)
ERYTHROCYTE [DISTWIDTH] IN BLOOD BY AUTOMATED COUNT: 13 % (ref 11.6–15.1)
GLUCOSE 1H P 50 G GLC PO SERPL-MCNC: 115 MG/DL
GLUCOSE UR STRIP-MCNC: NEGATIVE MG/DL
HCT VFR BLD AUTO: 37.2 % (ref 34.8–46.1)
HGB BLD-MCNC: 11.6 G/DL (ref 11.5–15.4)
HGB UR QL STRIP.AUTO: NEGATIVE
HYALINE CASTS #/AREA URNS LPF: NORMAL /LPF
IMM GRANULOCYTES # BLD AUTO: 0.11 THOUSAND/UL (ref 0–0.2)
IMM GRANULOCYTES NFR BLD AUTO: 1 % (ref 0–2)
KETONES UR STRIP-MCNC: NEGATIVE MG/DL
LEUKOCYTE ESTERASE UR QL STRIP: NEGATIVE
LYMPHOCYTES # BLD AUTO: 2.16 THOUSANDS/ΜL (ref 0.6–4.47)
LYMPHOCYTES NFR BLD AUTO: 15 % (ref 14–44)
MCH RBC QN AUTO: 29.7 PG (ref 26.8–34.3)
MCHC RBC AUTO-ENTMCNC: 31.2 G/DL (ref 31.4–37.4)
MCV RBC AUTO: 95 FL (ref 82–98)
MONOCYTES # BLD AUTO: 0.78 THOUSAND/ΜL (ref 0.17–1.22)
MONOCYTES NFR BLD AUTO: 6 % (ref 4–12)
NEUTROPHILS # BLD AUTO: 11.11 THOUSANDS/ΜL (ref 1.85–7.62)
NEUTS SEG NFR BLD AUTO: 77 % (ref 43–75)
NITRITE UR QL STRIP: NEGATIVE
NON-SQ EPI CELLS URNS QL MICRO: NORMAL /HPF
NRBC BLD AUTO-RTO: 0 /100 WBCS
PH UR STRIP.AUTO: 6.5 [PH]
PLATELET # BLD AUTO: 287 THOUSANDS/UL (ref 149–390)
PMV BLD AUTO: 11.5 FL (ref 8.9–12.7)
PROT UR STRIP-MCNC: NEGATIVE MG/DL
RBC # BLD AUTO: 3.9 MILLION/UL (ref 3.81–5.12)
RBC #/AREA URNS AUTO: NORMAL /HPF
SP GR UR STRIP.AUTO: 1.02 (ref 1–1.03)
UROBILINOGEN UR QL STRIP.AUTO: 0.2 E.U./DL
WBC # BLD AUTO: 14.3 THOUSAND/UL (ref 4.31–10.16)
WBC #/AREA URNS AUTO: NORMAL /HPF

## 2020-12-14 PROCEDURE — 99214 OFFICE O/P EST MOD 30 MIN: CPT | Performed by: OBSTETRICS & GYNECOLOGY

## 2020-12-14 PROCEDURE — 81001 URINALYSIS AUTO W/SCOPE: CPT | Performed by: OBSTETRICS & GYNECOLOGY

## 2020-12-14 PROCEDURE — 36415 COLL VENOUS BLD VENIPUNCTURE: CPT | Performed by: OBSTETRICS & GYNECOLOGY

## 2020-12-14 PROCEDURE — 82950 GLUCOSE TEST: CPT | Performed by: OBSTETRICS & GYNECOLOGY

## 2020-12-14 PROCEDURE — 87086 URINE CULTURE/COLONY COUNT: CPT | Performed by: OBSTETRICS & GYNECOLOGY

## 2020-12-14 PROCEDURE — 85025 COMPLETE CBC W/AUTO DIFF WBC: CPT | Performed by: OBSTETRICS & GYNECOLOGY

## 2020-12-15 LAB — BACTERIA UR CULT: NORMAL

## 2020-12-29 ENCOUNTER — ROUTINE PRENATAL (OUTPATIENT)
Dept: OBGYN CLINIC | Facility: MEDICAL CENTER | Age: 25
End: 2020-12-29
Payer: COMMERCIAL

## 2020-12-29 VITALS — SYSTOLIC BLOOD PRESSURE: 100 MMHG | WEIGHT: 233.6 LBS | BODY MASS INDEX: 38.87 KG/M2 | DIASTOLIC BLOOD PRESSURE: 62 MMHG

## 2020-12-29 DIAGNOSIS — M54.31 SCIATICA OF RIGHT SIDE: ICD-10-CM

## 2020-12-29 DIAGNOSIS — Z3A.32 32 WEEKS GESTATION OF PREGNANCY: Primary | ICD-10-CM

## 2020-12-29 PROCEDURE — 99213 OFFICE O/P EST LOW 20 MIN: CPT | Performed by: STUDENT IN AN ORGANIZED HEALTH CARE EDUCATION/TRAINING PROGRAM

## 2020-12-30 ENCOUNTER — TELEPHONE (OUTPATIENT)
Dept: PERINATAL CARE | Facility: CLINIC | Age: 25
End: 2020-12-30

## 2021-01-04 ENCOUNTER — ULTRASOUND (OUTPATIENT)
Dept: PERINATAL CARE | Facility: CLINIC | Age: 26
End: 2021-01-04
Payer: COMMERCIAL

## 2021-01-04 ENCOUNTER — EVALUATION (OUTPATIENT)
Dept: PHYSICAL THERAPY | Facility: REHABILITATION | Age: 26
End: 2021-01-04
Payer: COMMERCIAL

## 2021-01-04 VITALS
HEART RATE: 99 BPM | HEIGHT: 65 IN | WEIGHT: 234.6 LBS | BODY MASS INDEX: 39.09 KG/M2 | DIASTOLIC BLOOD PRESSURE: 70 MMHG | SYSTOLIC BLOOD PRESSURE: 122 MMHG

## 2021-01-04 DIAGNOSIS — O28.8 HIGH RISK PREGNANCY WITH HIGH INHIBIN: ICD-10-CM

## 2021-01-04 DIAGNOSIS — M54.16 LUMBAR RADICULOPATHY: Primary | ICD-10-CM

## 2021-01-04 DIAGNOSIS — O09.899 HIGH RISK PREGNANCY WITH HIGH INHIBIN: ICD-10-CM

## 2021-01-04 DIAGNOSIS — Z3A.32 32 WEEKS GESTATION OF PREGNANCY: ICD-10-CM

## 2021-01-04 DIAGNOSIS — K21.9 GASTROESOPHAGEAL REFLUX IN PREGNANCY IN THIRD TRIMESTER: ICD-10-CM

## 2021-01-04 DIAGNOSIS — O99.213 MATERNAL OBESITY, ANTEPARTUM, THIRD TRIMESTER: Primary | ICD-10-CM

## 2021-01-04 DIAGNOSIS — M54.31 SCIATICA OF RIGHT SIDE: ICD-10-CM

## 2021-01-04 DIAGNOSIS — O99.613 GASTROESOPHAGEAL REFLUX IN PREGNANCY IN THIRD TRIMESTER: ICD-10-CM

## 2021-01-04 PROCEDURE — 97162 PT EVAL MOD COMPLEX 30 MIN: CPT | Performed by: PHYSICAL THERAPIST

## 2021-01-04 PROCEDURE — 99213 OFFICE O/P EST LOW 20 MIN: CPT | Performed by: OBSTETRICS & GYNECOLOGY

## 2021-01-04 PROCEDURE — 97140 MANUAL THERAPY 1/> REGIONS: CPT | Performed by: PHYSICAL THERAPIST

## 2021-01-04 PROCEDURE — 76816 OB US FOLLOW-UP PER FETUS: CPT | Performed by: OBSTETRICS & GYNECOLOGY

## 2021-01-04 RX ORDER — FAMOTIDINE 20 MG/1
20 TABLET, FILM COATED ORAL 2 TIMES DAILY
Qty: 60 TABLET | Refills: 6 | Status: SHIPPED | OUTPATIENT
Start: 2021-01-04 | End: 2021-09-23 | Stop reason: SDUPTHER

## 2021-01-04 NOTE — LETTER
2021     MARGUERITE Finnegan  [address]  [address] [facility]    Patient: Jose David Gibbons   YOB: 1995   Date of Visit: 2021       Dear Dr Quinn Arrington: Thank you for referring Lele Fang to me for evaluation  Below are my notes for this consultation  If you have questions, please do not hesitate to call me  I look forward to following your patient along with you  Sincerely,        Kat Morel MD        CC: No Recipients  Kat Morel MD  2021  9:23 AM  Sign when Signing Visit  Jose David Gibbons has no complaints today  She reports regular fetal movements and does not report any problems related to hypertension, gestational diabetes,  labor, or vaginal bleeding  Her prenatal course has apparently been unremarkable in the interim since her most recent visit here  Problem list:  1  Increased BMI with a nml glucola  2  Persistent nausea currently on Zofran, vitamin B6 and Unisom  3  Elevated inhibin level  4  Bipolar depression on Latuda    Ultrasound findings: The ultrasound today shows normal interval fetal growth and fluid  Pregnancy ultrasound has limitations and is unable to detect all forms of fetal congenital abnormalities  The inaccuracy in the EFW can be off by 1 lb either way in the third trimester  Specific counseling was provided on the following problems: We reviewed Latuda and breast-feeding in [employer]: Available evidence and/or expert consensus is inconclusive or is inadequate for determining infant risk when used during breastfeeding  Weigh the potential benefits of drug treatment against potential risks before prescribing this drug during breastfeeding      She has a history of reflux which may be contributing to her persistent nausea and vomiting  Recommend she start Pepcid 20 milligrams 30 minutes before breakfast and dinner to see if this helps with her reflux   If further control of her reflux is necessary then Pepcid could be converted to a PPI like Omeprazole  Follow up recommended:   Tests ordered today: none  Future ultrasounds ordered today: none  Recommend weekly NST/fluid scans from 36 weeks which can be done locally  Pre visit time reviewing her prior ultrasound, problem list, medications, and recent ob notes   10 minutes  Face to face time 15 minutes  Post visit time on documentation of note, updating her problem list, adding orders and prescriptions 20 minutes  Procedures that were completed today were charged separately     The level of decision making was ( moderate complexity)    Catrachito Gonzales MD

## 2021-01-04 NOTE — PROGRESS NOTES
PT Evaluation     Today's date: 2021  Patient name: Louie Edouard  : 1995  MRN: [id number]  Referring provider: SIMONE Gibbons  Dx:   Encounter Diagnosis     ICD-10-CM    1  32 weeks gestation of pregnancy  Z3A 32 Ambulatory referral to Physical Therapy   2  Sciatica of right side  M54 31 Ambulatory referral to Physical Therapy                  Assessment  Assessment details: Pt is a 23 yo female at 35 weeks gestation with significant low back and hip pain  She presents with moderate muscle spasm and symptoms consistent with lumbar radiculopathy  She would benefit from physical therapy for STM and exercise and positioning to decrease stress on the lumbar spine and improve hip stability  Impairments: abnormal muscle tone, activity intolerance, pain with function, poor posture  and poor body mechanics    Symptom irritability: moderateUnderstanding of Dx/Px/POC: excellent  Goals  STG: in 4 weeks  Decrease pain to 5/10 at most  Able to assume positions that relieve pain  LTG: by discharge  Independent with pain management  Improved hip strength and stability    Plan  Patient would benefit from: skilled physical therapy  Planned modality interventions: cryotherapy and thermotherapy: hydrocollator packs  Planned therapy interventions: neuromuscular re-education, therapeutic exercise, stretching and home exercise program  Frequency: 2x week  Duration in weeks: 6  Treatment plan discussed with: patient        Subjective Evaluation    History of Present Illness  Mechanism of injury: Pt is 33 weeks gestation  She started having pain in the 6th month  She initially felt that her right hip was going out  Now she has low back pain  This increases with sitting too long  With prolonged standing the baby pushes down on her  If she lays down she breaths better  Side lying on the left  is most comfortable position  Rising from sitting is difficult     Pain  Current pain ratin  At best pain rating: 4  At worst pain rating: 10  Location: Across low back and into both hips  Numbness in the right thigh and just recently the left  Objective     Palpation   Left   No palpable tenderness to the proximal biceps femoris, proximal semimembranosus and proximal semitendinosus  Muscle spasm in the lumbar paraspinals, piriformis and quadratus lumborum  Tenderness of the gluteus medius, iliopsoas, lumbar paraspinals, piriformis and quadratus lumborum  Right   No palpable tenderness to the proximal biceps femoris, proximal semimembranosus and proximal semitendinosus  Muscle spasm in the lumbar paraspinals, piriformis and quadratus lumborum  Tenderness of the gluteus medius, iliopsoas, lumbar paraspinals, piriformis and quadratus lumborum  Lumbar Screen  Lumbar range of motion within normal limits with the following exceptions:ROM limited by size of the baby  It appears close to WNL  Pain with extension  SB caused contralateral stretch      Active Range of Motion   Left Hip   Normal active range of motion  Flexion: with pain  Abduction: with pain  External rotation (90/90): with pain  Internal rotation (90/90): with pain    Right Hip   Normal active range of motion  Abduction: with pain  External rotation (prone): with pain    Strength/Myotome Testing     Left Hip   Planes of Motion   Flexion: 4  Extension: 4  Abduction: 4  Adduction: 4    Right Hip   Planes of Motion   Flexion: 4  Extension: 4  Abduction: 4  Adduction: 4    Additional Strength Details  Knee and ankle WNL    Tests     Left Hip   Positive FADIR, Sammi and piriformis  SLR: Negative  Right Hip   Positive TITUS and Sammi  SLR: Negative                Precautions: Pregnancy      Manuals 1/4            STM in prone on 8 pillows NT            ITB str B                                      Neuro Re-Ed                                                                                                        Ther Ex             Cat-cow 10x TA 5"x10            Kegals HEP            On knees over ball 2 min            clamshells             Glut sets                                       Ther Activity                                       Gait Training                                       Modalities

## 2021-01-04 NOTE — PROGRESS NOTES
Keshav Mckeon has no complaints today  She reports regular fetal movements and does not report any problems related to hypertension, gestational diabetes,  labor, or vaginal bleeding  Her prenatal course has apparently been unremarkable in the interim since her most recent visit here  Problem list:  1  Increased BMI with a nml glucola  2  Persistent nausea currently on Zofran, vitamin B6 and Unisom  3  Elevated inhibin level  4  Bipolar depression on Latuda    Ultrasound findings: The ultrasound today shows normal interval fetal growth and fluid  Pregnancy ultrasound has limitations and is unable to detect all forms of fetal congenital abnormalities  The inaccuracy in the EFW can be off by 1 lb either way in the third trimester  Specific counseling was provided on the following problems: We reviewed Latuda and breast-feeding in [employer]: Available evidence and/or expert consensus is inconclusive or is inadequate for determining infant risk when used during breastfeeding  Weigh the potential benefits of drug treatment against potential risks before prescribing this drug during breastfeeding      She has a history of reflux which may be contributing to her persistent nausea and vomiting  Recommend she start Pepcid 20 milligrams 30 minutes before breakfast and dinner to see if this helps with her reflux  If further control of her reflux is necessary then Pepcid could be converted to a PPI like Omeprazole  Follow up recommended:   Tests ordered today: none  Future ultrasounds ordered today: none  Recommend weekly NST/fluid scans from 36 weeks which can be done locally  Pre visit time reviewing her prior ultrasound, problem list, medications, and recent ob notes   10 minutes  Face to face time 15 minutes  Post visit time on documentation of note, updating her problem list, adding orders and prescriptions 20 minutes  Procedures that were completed today were charged separately     The level of decision making was ( moderate complexity)    Sergeiudencgee Batista MD

## 2021-01-07 ENCOUNTER — OFFICE VISIT (OUTPATIENT)
Dept: PHYSICAL THERAPY | Facility: REHABILITATION | Age: 26
End: 2021-01-07
Payer: COMMERCIAL

## 2021-01-07 DIAGNOSIS — M54.16 LUMBAR RADICULOPATHY: ICD-10-CM

## 2021-01-07 DIAGNOSIS — Z3A.32 32 WEEKS GESTATION OF PREGNANCY: Primary | ICD-10-CM

## 2021-01-07 DIAGNOSIS — M54.31 SCIATICA OF RIGHT SIDE: ICD-10-CM

## 2021-01-07 PROCEDURE — 97140 MANUAL THERAPY 1/> REGIONS: CPT

## 2021-01-07 PROCEDURE — 97110 THERAPEUTIC EXERCISES: CPT

## 2021-01-07 NOTE — PROGRESS NOTES
Daily Note     Today's date: 2021  Patient name: Kieran Garcia  : 1995  MRN: [id number]  Referring provider: SIMONE Broderick  Dx: No diagnosis found  Subjective:    Back pain varies  She did do some of HEP which she santi well  Had some numbness when she first got up but it dissipated after she moved around      Objective: See treatment diary below      Assessment: Tolerated treatment well  Patient would benefit from continued PT   No c/o with ex  Felt better after STM      Plan: Continue per plan of care        Precautions: Pregnancy      Manuals  1-7           STM in prone on 8 pillows NT CD           ITB str B B  CD                                     Neuro Re-Ed                                                                                                        Ther Ex             Cat-cow 10x 10x           TA 5"x10 5" x10           Kegals HEP            On knees over ball 2 min 2'           clamshells  5" x10           Glut sets  5" x10           Press ups  10x                        Ther Activity                                       Gait Training                                       Modalities

## 2021-01-11 ENCOUNTER — OFFICE VISIT (OUTPATIENT)
Dept: PHYSICAL THERAPY | Facility: REHABILITATION | Age: 26
End: 2021-01-11
Payer: COMMERCIAL

## 2021-01-11 ENCOUNTER — ROUTINE PRENATAL (OUTPATIENT)
Dept: OBGYN CLINIC | Facility: MEDICAL CENTER | Age: 26
End: 2021-01-11
Payer: COMMERCIAL

## 2021-01-11 VITALS — BODY MASS INDEX: 39.44 KG/M2 | SYSTOLIC BLOOD PRESSURE: 100 MMHG | WEIGHT: 237 LBS | DIASTOLIC BLOOD PRESSURE: 78 MMHG

## 2021-01-11 DIAGNOSIS — M54.31 SCIATICA OF RIGHT SIDE: ICD-10-CM

## 2021-01-11 DIAGNOSIS — M54.16 LUMBAR RADICULOPATHY: ICD-10-CM

## 2021-01-11 DIAGNOSIS — O09.899 HIGH RISK PREGNANCY WITH HIGH INHIBIN: ICD-10-CM

## 2021-01-11 DIAGNOSIS — O28.8 HIGH RISK PREGNANCY WITH HIGH INHIBIN: ICD-10-CM

## 2021-01-11 DIAGNOSIS — Z3A.33 33 WEEKS GESTATION OF PREGNANCY: Primary | ICD-10-CM

## 2021-01-11 DIAGNOSIS — O99.213 MATERNAL OBESITY, ANTEPARTUM, THIRD TRIMESTER: ICD-10-CM

## 2021-01-11 DIAGNOSIS — O99.343 BIPOLAR DISEASE DURING PREGNANCY IN THIRD TRIMESTER (HCC): ICD-10-CM

## 2021-01-11 DIAGNOSIS — F31.9 BIPOLAR DISEASE DURING PREGNANCY IN THIRD TRIMESTER (HCC): ICD-10-CM

## 2021-01-11 DIAGNOSIS — Z3A.32 32 WEEKS GESTATION OF PREGNANCY: Primary | ICD-10-CM

## 2021-01-11 PROCEDURE — 99213 OFFICE O/P EST LOW 20 MIN: CPT | Performed by: OBSTETRICS & GYNECOLOGY

## 2021-01-11 PROCEDURE — 97110 THERAPEUTIC EXERCISES: CPT

## 2021-01-11 PROCEDURE — 97140 MANUAL THERAPY 1/> REGIONS: CPT

## 2021-01-11 NOTE — PROGRESS NOTES
Daily Note     Today's date: 2021  Patient name: Alonzo Tesfaye  : 1995  MRN: [id number]  Referring provider: SIMONE Brooks  Dx:   Encounter Diagnosis     ICD-10-CM    1  32 weeks gestation of pregnancy  Z3A 32    2  Sciatica of right side  M54 31    3  Lumbar radiculopathy  M54 16        Start Time: 1345          Subjective:  She reports she had long car ride which increased LBP  AM tingling in legs is getting better   Back does not feel too bad today but her R hip bothers her      Objective: See treatment diary below      Assessment: Tolerated treatment well  Patient would benefit from continued PT    Tight in R piriformis   No c/o with increases or new ex      Plan: Continue per plan of care        Precautions: Pregnancy      Manuals  1-7 1-11          STM in prone on 8 pillows NT CD CD          ITB str B B  CD B                                     Neuro Re-Ed                                                                                                        Ther Ex             Cat-cow 10x 10x 10x          TA 5"x10 5" x10 5" x15          Kegals HEP            On knees over ball 2 min 2' 3'          clamshells  5" x10 5" x 15          Glut sets  5" x10 5" x15          Press ups  10x 10x          Low bridge   5" x10          Ther Activity                                       Gait Training                                       Modalities

## 2021-01-11 NOTE — PROGRESS NOTES
Assessment  22 y o  [id number] at 33w6d presenting for routine prenatal visit  Plan  Diagnoses and all orders for this visit:    33 weeks gestation of pregnancy  Third trimester pregnancy  -  labor precautions  - FKC  - Return in 2wks for PN    High risk pregnancy with high inhibin  Maternal obesity, antepartum, third trimester  - Follows with MFM  - Will begin weekly antepartum fetal surveillance with NST/VALERIA at 36wks (prefers to do with PN visits here)    Bipolar disease during pregnancy in third trimester ([facility] )  - Follows with MFM; US's normal    ____________________________________________________________        Subjective    Shaheed Miller is a 22 y o   at 33w6d who presents for routine prenatal visit  She is still having pelvic pain and nausea  Tired of pregnancy secondary to discomforts  She denies contractions, loss of fluid, or vaginal bleeding  She feels regular fetal movements  Pregnancy Problems:  Patient Active Problem List   Diagnosis    Bipolar affective disorder, currently depressed, moderate (HCC)    Celiac disease    Diffuse cystic mastopathy    Eating disorder    Osteopenia    Panic disorder with agoraphobia    PTSD (post-traumatic stress disorder)    Slow transit constipation    Seasonal allergies    Migraine    Depression    Anxiety    Bipolar disease during pregnancy in third trimester ([facility] )    Maternal obesity, antepartum, third trimester    High risk pregnancy with high inhibin    33 weeks gestation of pregnancy    Sciatica of right side         Objective  /78   Wt 108 kg (237 lb)   LMP 2020 (Exact Date)   BMI 39 44 kg/m²     FHT: 155 BPM   Uterine Size: size equals dates     Physical Exam:  Physical Exam  Constitutional:       General: She is not in acute distress  Appearance: Normal appearance  She is well-developed  She is not ill-appearing, toxic-appearing or diaphoretic  HENT:      Head: Normocephalic and atraumatic     Eyes: General: No scleral icterus  Right eye: No discharge  Left eye: No discharge  Conjunctiva/sclera: Conjunctivae normal    Pulmonary:      Effort: Pulmonary effort is normal  No accessory muscle usage or respiratory distress  Abdominal:      General: There is distension (gravid)  Tenderness: There is no abdominal tenderness  There is no guarding or rebound  Skin:     General: Skin is warm and dry  Coloration: Skin is not jaundiced  Findings: No bruising, erythema or rash  Neurological:      Mental Status: She is alert  Psychiatric:         Mood and Affect: Mood normal          Behavior: Behavior normal          Thought Content:  Thought content normal          Judgment: Judgment normal

## 2021-01-18 ENCOUNTER — ROUTINE PRENATAL (OUTPATIENT)
Dept: OBGYN CLINIC | Facility: MEDICAL CENTER | Age: 26
End: 2021-01-18
Payer: COMMERCIAL

## 2021-01-18 ENCOUNTER — OFFICE VISIT (OUTPATIENT)
Dept: PHYSICAL THERAPY | Facility: REHABILITATION | Age: 26
End: 2021-01-18
Payer: COMMERCIAL

## 2021-01-18 VITALS — WEIGHT: 238.7 LBS | SYSTOLIC BLOOD PRESSURE: 108 MMHG | BODY MASS INDEX: 39.72 KG/M2 | DIASTOLIC BLOOD PRESSURE: 70 MMHG

## 2021-01-18 DIAGNOSIS — Z3A.34 34 WEEKS GESTATION OF PREGNANCY: Primary | ICD-10-CM

## 2021-01-18 DIAGNOSIS — M54.31 SCIATICA OF RIGHT SIDE: Primary | ICD-10-CM

## 2021-01-18 DIAGNOSIS — M54.16 LUMBAR RADICULOPATHY: ICD-10-CM

## 2021-01-18 PROCEDURE — 59025 FETAL NON-STRESS TEST: CPT | Performed by: STUDENT IN AN ORGANIZED HEALTH CARE EDUCATION/TRAINING PROGRAM

## 2021-01-18 PROCEDURE — 99213 OFFICE O/P EST LOW 20 MIN: CPT | Performed by: STUDENT IN AN ORGANIZED HEALTH CARE EDUCATION/TRAINING PROGRAM

## 2021-01-18 PROCEDURE — 97140 MANUAL THERAPY 1/> REGIONS: CPT | Performed by: PHYSICAL THERAPIST

## 2021-01-18 PROCEDURE — 97110 THERAPEUTIC EXERCISES: CPT | Performed by: PHYSICAL THERAPIST

## 2021-01-18 NOTE — ASSESSMENT & PLAN NOTE
- NST/VALERIA weekly starting at 36 weeks for elevated inhibin  - s/p GTT and CBC  - s/p Delivery consent; needs to return birth plan  - S/p Tdap and flu  - Patient Education: Fetal kick counts reviewed

## 2021-01-18 NOTE — PROGRESS NOTES
Daily Note     Today's date: 2021  Patient name: Arsh Li  : 1995  MRN: [id number]  Referring provider: SIMONE Markham  Dx:   Encounter Diagnosis     ICD-10-CM    1  Sciatica of right side  M54 31    2  Lumbar radiculopathy  M54 16                   Subjective:  Pt reports that that her back pain has not been too bad  She has some pain over the lower abdomen and front of her hips  She has been doing a lot of cleaning  Objective: See treatment diary below      Assessment:  Pt is now having difficulty getting comfortable in prone  Taped her for abdominal and low back support  She felt good when she left noting that the tape felt good  Plan: Continue per plan of care        Precautions: Pregnancy      Manuals  1- 1-         STM in prone on 8 pillows NT CD CD NT         ITB str B B  CD B  NT         KT  3 Istrips abdomen and 2 I lumbar    NT                      Neuro Re-Ed                                                                                                        Ther Ex             Cat-cow 10x 10x 10x 10x         TA 5"x10 5" x10 5" x15 5"x15         Kegals HEP            On knees over ball 2 min 2' 3' 3'         clamshells  5" x10 5" x 15 5"x20         Glut sets  5" x10 5" x15 5"x20         Press ups  10x 10x 10x         Low bridge   5" x10 5"x10                      Seated rocking on ball     2 min         Wall slides with ball    NV         Gait Training                                       Modalities

## 2021-01-18 NOTE — PROGRESS NOTES
Routine Prenatal Visit  [facility] [address] [place] [address]    Assessment/Plan:  Cathy Sumner is a 22y o  year old  at 34w6d who presents for routine prenatal visit  1  34 weeks gestation of pregnancy  Assessment & Plan:  - NST/VALERIA weekly starting at 36 weeks for elevated inhibin  - s/p GTT and CBC  - s/p Delivery consent; needs to return birth plan  - S/p Tdap and flu  - Patient Education: Fetal kick counts reviewed  Subjective:     CC: Prenatal care    Melissa Braun is a 22 y o   female who presents for routine prenatal care at 34w6d  Pregnancy ROS: Denies leakage of fluid or vaginal bleeding  Reports normal fetal movement  Has pelvic pain and pressure as well as sciatica that she is seeing PT for  The following portions of the patient's history were reviewed and updated as appropriate: allergies, current medications, past family history, past medical history, obstetric history, gynecologic history, past social history, past surgical history and problem list       Objective:  /70   Wt 108 kg (238 lb 11 2 oz)   LMP 2020 (Exact Date)   BMI 39 72 kg/m²   Pregravid Weight/BMI: 94 8 kg (209 lb) (BMI 34 78)  Current Weight: 108 kg (238 lb 11 2 oz)   Total Weight Gain: 13 5 kg (29 lb 11 2 oz)   Pre-Idris Vitals      Most Recent Value   Prenatal Assessment   Fetal Heart Rate  139   Movement  Present   Prenatal Vitals   Blood Pressure  108/70   Weight - Scale  108 kg (238 lb 11 2 oz)   Urine Albumin/Glucose   Dilation/Effacement/Station   Vaginal Drainage   Edema   LLE Edema  Trace   RLE Edema  Trace           General: Well appearing, no distress  Respiratory: Unlabored breathing  Cardiovascular: Regular rate  Abdomen: Soft, gravid, nontender  Fundal Height: Appropriate for gestational age  Extremities: Warm and well perfused  Non tender      Jim Galarza MD  [facility]  [address] [place] [address] Network  1/18/2021 10:58 AM

## 2021-01-21 ENCOUNTER — OFFICE VISIT (OUTPATIENT)
Dept: PHYSICAL THERAPY | Facility: REHABILITATION | Age: 26
End: 2021-01-21
Payer: COMMERCIAL

## 2021-01-21 DIAGNOSIS — M54.31 SCIATICA OF RIGHT SIDE: Primary | ICD-10-CM

## 2021-01-21 DIAGNOSIS — M54.16 LUMBAR RADICULOPATHY: ICD-10-CM

## 2021-01-21 DIAGNOSIS — Z3A.32 32 WEEKS GESTATION OF PREGNANCY: ICD-10-CM

## 2021-01-21 PROCEDURE — 97110 THERAPEUTIC EXERCISES: CPT

## 2021-01-21 PROCEDURE — 97140 MANUAL THERAPY 1/> REGIONS: CPT

## 2021-01-21 NOTE — PROGRESS NOTES
Daily Note     Today's date: 2021  Patient name: Lizzette Ross  : 1995  MRN: [id number]  Referring provider: SIMONE Salazar  Dx:   No diagnosis found  Subjective:  Pt reports that sleeping is difficult to get comfortable  Hips are sore  Back is stiff     Tape helped on abdomen, she got red welt on LB from it      Objective: See treatment diary below      Assessment:  Did well with new ex  Felt better after treatment      Plan: Continue per plan of care        Precautions: Pregnancy      Manuals  1- 1- 1-        STM in prone on 8 pillows NT CD CD NT CD        ITB str B B  CD B  NT B   CD        KT  3 Istrips abdomen and 2 I lumbar    NT Just abdomen  CD                     Neuro Re-Ed                                                                                                        Ther Ex             Cat-cow 10x 10x 10x 10x 10x        TA 5"x10 5" x10 5" x15 5"x15 5" x15        Kegals HEP            On knees over ball 2 min 2' 3' 3' 3'        clamshells  5" x10 5" x 15 5"x20 5" x20        Glut sets  5" x10 5" x15 5"x20 5" x20        Press ups  10x 10x 10x 10x        Low bridge   5" x10 5"x10 5" x15                     Seated rocking on ball     2 min & alt UE 20x  LE  10x        Wall slides with ball    NV g ball   5" x10        Gait Training                                       Modalities

## 2021-01-25 ENCOUNTER — ROUTINE PRENATAL (OUTPATIENT)
Dept: OBGYN CLINIC | Facility: MEDICAL CENTER | Age: 26
End: 2021-01-25
Payer: COMMERCIAL

## 2021-01-25 VITALS
SYSTOLIC BLOOD PRESSURE: 120 MMHG | DIASTOLIC BLOOD PRESSURE: 70 MMHG | WEIGHT: 236 LBS | BODY MASS INDEX: 39.32 KG/M2 | HEIGHT: 65 IN

## 2021-01-25 DIAGNOSIS — F41.9 ANXIETY: ICD-10-CM

## 2021-01-25 DIAGNOSIS — Z34.83 PRENATAL CARE, SUBSEQUENT PREGNANCY, THIRD TRIMESTER: Primary | ICD-10-CM

## 2021-01-25 PROCEDURE — 3008F BODY MASS INDEX DOCD: CPT | Performed by: OBSTETRICS & GYNECOLOGY

## 2021-01-25 PROCEDURE — 87081 CULTURE SCREEN ONLY: CPT | Performed by: OBSTETRICS & GYNECOLOGY

## 2021-01-25 PROCEDURE — 76815 OB US LIMITED FETUS(S): CPT | Performed by: OBSTETRICS & GYNECOLOGY

## 2021-01-25 PROCEDURE — 59025 FETAL NON-STRESS TEST: CPT | Performed by: OBSTETRICS & GYNECOLOGY

## 2021-01-25 PROCEDURE — 1036F TOBACCO NON-USER: CPT | Performed by: OBSTETRICS & GYNECOLOGY

## 2021-01-25 PROCEDURE — 99214 OFFICE O/P EST MOD 30 MIN: CPT | Performed by: OBSTETRICS & GYNECOLOGY

## 2021-01-25 RX ORDER — DIPHENHYDRAMINE HCL 25 MG
25 CAPSULE ORAL EVERY 6 HOURS PRN
COMMUNITY
End: 2021-09-23 | Stop reason: SDDI

## 2021-01-25 RX ORDER — ALPRAZOLAM 0.25 MG/1
0.25 TABLET ORAL
Qty: 5 TABLET | Refills: 0 | Status: ON HOLD | OUTPATIENT
Start: 2021-01-25 | End: 2021-02-20 | Stop reason: SDUPTHER

## 2021-01-25 NOTE — PROGRESS NOTES
Tanner Ugarte is a 22y o  year old  at 27w7d for routine prenatal visit    + FM, no vaginal bleeding, contractions, or LOF  Complaints: Yes pt is having some anxiety her partner was in a bad accident for DUI last night   He is on parole and afraid it will be revoked   Previous treated with Xanax for anxiety - 5 tablets given     Most recent ultrasound and labs reviewed         elevated Inhibin for NST     Today NST - reactive no deceleration   130 reactive no decelerations moderate LTV    GBS today collected closed

## 2021-01-28 ENCOUNTER — OFFICE VISIT (OUTPATIENT)
Dept: PHYSICAL THERAPY | Facility: REHABILITATION | Age: 26
End: 2021-01-28
Payer: COMMERCIAL

## 2021-01-28 DIAGNOSIS — M54.16 LUMBAR RADICULOPATHY: ICD-10-CM

## 2021-01-28 DIAGNOSIS — M54.31 SCIATICA OF RIGHT SIDE: Primary | ICD-10-CM

## 2021-01-28 DIAGNOSIS — Z3A.32 32 WEEKS GESTATION OF PREGNANCY: ICD-10-CM

## 2021-01-28 LAB — GP B STREP GENITAL QL CULT: NORMAL

## 2021-01-28 PROCEDURE — 97110 THERAPEUTIC EXERCISES: CPT

## 2021-01-28 PROCEDURE — 97140 MANUAL THERAPY 1/> REGIONS: CPT

## 2021-01-28 NOTE — PROGRESS NOTES
Daily Note     Today's date: 2021  Patient name: Pedro Pablo Larry  : 1995  MRN: [id number]  Referring provider: SIMONE Owens  Dx:   Encounter Diagnosis     ICD-10-CM    1  Sciatica of right side  M54 31    2  Lumbar radiculopathy  M54 16    3  32 weeks gestation of pregnancy  Z3A 32                   Subjective:  Pt reports has some aching after busy days      Objective: See treatment diary below      Assessment:  Doing well with ex  Decreased LB tightness after treatment      Plan: Continue per plan of care        Precautions: Pregnancy      Manuals  1- 1-       STM in prone on 8 pillows NT CD CD NT CD CD       ITB str B B  CD B  NT B   CD B CD       KT  3 Istrips abdomen and 2 I lumbar    NT Just abdomen  CD Just abdom 2 strips   CD                    Neuro Re-Ed                                                                                                        Ther Ex             Cat-cow 10x 10x 10x 10x 10x 10x       TA 5"x10 5" x10 5" x15 5"x15 5" x15 5" 15x       Kegals HEP            On knees over ball 2 min 2' 3' 3' 3' 3'       clamshells  5" x10 5" x 15 5"x20 5" x20 5" x20       Glut sets  5" x10 5" x15 5"x20 5" x20 5"x20       Press ups  10x 10x 10x 10x 10x       Low bridge   5" x10 5"x10 5" x15 5" 15x                    Seated rocking on ball     2 min & alt UE 20x  LE  10x 2' & alt UE 20x      LE 10x       Wall slides with ball    NV g ball   5" x10 g ball   5" 2x10       Gait Training                                       Modalities

## 2021-02-04 ENCOUNTER — NURSE TRIAGE (OUTPATIENT)
Dept: OTHER | Facility: OTHER | Age: 26
End: 2021-02-04

## 2021-02-04 DIAGNOSIS — Z20.828 SARS-ASSOCIATED CORONAVIRUS EXPOSURE: ICD-10-CM

## 2021-02-04 DIAGNOSIS — Z20.828 SARS-ASSOCIATED CORONAVIRUS EXPOSURE: Primary | ICD-10-CM

## 2021-02-04 PROCEDURE — U0003 INFECTIOUS AGENT DETECTION BY NUCLEIC ACID (DNA OR RNA); SEVERE ACUTE RESPIRATORY SYNDROME CORONAVIRUS 2 (SARS-COV-2) (CORONAVIRUS DISEASE [COVID-19]), AMPLIFIED PROBE TECHNIQUE, MAKING USE OF HIGH THROUGHPUT TECHNOLOGIES AS DESCRIBED BY CMS-2020-01-R: HCPCS | Performed by: FAMILY MEDICINE

## 2021-02-04 PROCEDURE — U0005 INFEC AGEN DETEC AMPLI PROBE: HCPCS | Performed by: FAMILY MEDICINE

## 2021-02-04 NOTE — TELEPHONE ENCOUNTER
Regarding: COVID19 / SYMPTOMATIC - SORE THROAT 1 of 3   ----- Message from Khloe Champion sent at 2/4/2021 12:39 PM EST -----  Patient calling to see about getting tested for COVID due to being exposed to some one who tested positive on Saturday  Patient has been having headaches and sore throat

## 2021-02-04 NOTE — TELEPHONE ENCOUNTER
Reason for Disposition   [1] COVID-19 infection suspected by caller or triager AND [2] mild symptoms (cough, fever, or others) AND [2] no complications or SOB    Answer Assessment - Initial Assessment Questions  1  COVID-19 DIAGNOSIS: "Who made your Coronavirus (COVID-19) diagnosis?" "Was it confirmed by a positive lab test?" If not diagnosed by a HCP, ask "Are there lots of cases (community spread) where you live?" (See public health department website, if unsure)      Community spread  2  COVID-19 EXPOSURE: "Was there any known exposure to COVID before the symptoms began?" [facility] Definition of close contact: within 6 feet (2 meters) for a total of 15 minutes or more over a 24-hour period  Stepdaughter  3  ONSET: "When did the COVID-19 symptoms start?"       Tuesday  4  WORST SYMPTOM: "What is your worst symptom?" (e g , cough, fever, shortness of breath, muscle aches)      Headache  5  COUGH: "Do you have a cough?" If so, ask: "How bad is the cough?"        Denies  6  FEVER: "Do you have a fever?" If so, ask: "What is your temperature, how was it measured, and when did it start?"      Denies  7  RESPIRATORY STATUS: "Describe your breathing?" (e g , shortness of breath, wheezing, unable to speak)       Unremarkable - able to converse with ease  8  BETTER-SAME-WORSE: "Are you getting better, staying the same or getting worse compared to yesterday?"  If getting worse, ask, "In what way?"     Better  9  HIGH RISK DISEASE: "Do you have any chronic medical problems?" (e g , asthma, heart or lung disease, weak immune system, obesity, etc )      Denies  10  PREGNANCY: "Is there any chance you are pregnant?" "When was your last menstrual period?"        37 weeks pregnant  11   OTHER SYMPTOMS: "Do you have any other symptoms?"  (e g , chills, fatigue, headache, loss of smell or taste, muscle pain, sore throat; new loss of smell or taste especially support the diagnosis of COVID-19)        Sore throat    Protocols used: CORONAVIRUS (COVID-19) DIAGNOSED OR SUSPECTED-ADULT-AH

## 2021-02-05 LAB — SARS-COV-2 RNA RESP QL NAA+PROBE: NEGATIVE

## 2021-02-07 ENCOUNTER — HOSPITAL ENCOUNTER (OUTPATIENT)
Facility: HOSPITAL | Age: 26
Discharge: HOME | End: 2021-02-07
Attending: OBSTETRICS & GYNECOLOGY | Admitting: OBSTETRICS & GYNECOLOGY
Payer: COMMERCIAL

## 2021-02-07 ENCOUNTER — TELEPHONE (OUTPATIENT)
Dept: LABOR AND DELIVERY | Facility: HOSPITAL | Age: 26
End: 2021-02-07

## 2021-02-07 ENCOUNTER — TELEPHONE (OUTPATIENT)
Dept: OTHER | Facility: OTHER | Age: 26
End: 2021-02-07

## 2021-02-07 VITALS
TEMPERATURE: 98.6 F | HEART RATE: 98 BPM | DIASTOLIC BLOOD PRESSURE: 58 MMHG | RESPIRATION RATE: 22 BRPM | SYSTOLIC BLOOD PRESSURE: 106 MMHG | OXYGEN SATURATION: 98 %

## 2021-02-07 PROBLEM — Z3A.37 37 WEEKS GESTATION OF PREGNANCY: Status: ACTIVE | Noted: 2020-12-29

## 2021-02-07 PROCEDURE — 99212 OFFICE O/P EST SF 10 MIN: CPT | Performed by: OBSTETRICS & GYNECOLOGY

## 2021-02-07 PROCEDURE — 99202 OFFICE O/P NEW SF 15 MIN: CPT

## 2021-02-07 NOTE — DISCHARGE INSTRUCTIONS
Pregnancy at [address] to [address]:   You are considered full term at the beginning of 37 weeks  Your breathing may be easier if your baby has moved down into a head-down position  You may need to urinate more often because the baby may be pressing on your bladder  You may also feel more discomfort and get tired easily  DISCHARGE INSTRUCTIONS:   Seek care immediately if:   · You develop a severe headache that does not go away  · You have new or increased vision changes, such as blurred or spotted vision  · You have new or increased swelling in your face or hands  · You have vaginal spotting or bleeding  · Your water broke or you feel warm water gushing or trickling from your vagina  Contact your healthcare provider if:   · You have more than 5 contractions in 1 hour  · You notice any changes in your baby's movements  · You have abdominal cramps, pressure, or tightening  · You have a change in vaginal discharge  · You have chills or a fever  · You have vaginal itching, burning, or pain  · You have yellow, green, white, or foul-smelling vaginal discharge  · You have pain or burning when you urinate, less urine than usual, or pink or bloody urine  · You have questions or concerns about your condition or care  How to care for yourself at this stage of your pregnancy:   · Eat a variety of healthy foods  Healthy foods include fruits, vegetables, whole-grain breads, low-fat dairy foods, beans, lean meats, and fish  Drink liquids as directed  Ask how much liquid to drink each day and which liquids are best for you  Limit caffeine to less than 200 milligrams each day  Limit your intake of fish to 2 servings each week  Choose fish low in mercury such as canned light tuna, shrimp, salmon, cod, or tilapia  Do not  eat fish high in mercury such as swordfish, tilefish, salima mackerel, and shark  · Take prenatal vitamins as directed    Your need for certain vitamins and minerals, such as folic acid, increases during pregnancy  Prenatal vitamins provide some of the extra vitamins and minerals you need  Prenatal vitamins may also help to decrease the risk of certain birth defects  · Rest as needed  Put your feet up if you have swelling in your ankles and feet  · Do not smoke  Smoking increases your risk of a miscarriage and other health problems during your pregnancy  Smoking can cause your baby to be born early or weigh less at birth  Ask your healthcare provider for information if you need help quitting  · Do not drink alcohol  Alcohol passes from your body to your baby through the placenta  It can affect your baby's brain development and cause fetal alcohol syndrome (FAS)  FAS is a group of conditions that causes mental, behavior, and growth problems  · Talk to your healthcare provider before you take any medicines  Many medicines may harm your baby if you take them when you are pregnant  Do not take any medicines, vitamins, herbs, or supplements without first talking to your healthcare provider  Never use illegal or street drugs (such as marijuana or cocaine) while you are pregnant  · Talk to your healthcare provider before you travel  You may not be able to travel in an airplane after 36 weeks  He may also recommend that you avoid long road trips  Safety tips:   · Avoid hot tubs and saunas  Do not use a hot tub or sauna while you are pregnant, especially during your first trimester  Hot tubs and saunas may raise your baby's temperature and increase the risk of birth defects  · Avoid toxoplasmosis  This is an infection caused by eating raw meat or being around infected cat feces  It can cause birth defects, miscarriages, and other problems  Wash your hands after you touch raw meat  Make sure any meat is well-cooked before you eat it  Avoid raw eggs and unpasteurized milk   Use gloves or ask someone else to clean your cat's litter box while you are pregnant  · Ask your healthcare provider about travel  The most comfortable time to travel is during the second trimester  Ask your healthcare provider if you can travel after 36 weeks  You may not be able to travel in an airplane after 36 weeks  He may also recommend that you avoid long road trips  Changes that are happening with your baby:  By 38 weeks, your baby may weigh between 6 and 9 pounds  Your baby may be about 14 inches long from the top of the head to the rump (baby's bottom)  Your baby hears well enough to know your voice  As your baby gets larger, you may feel fewer kicks and more stretching and rolling  Your baby may move into a head-down position  Your baby will also rest lower in your abdomen  What you need to know about prenatal care: Your healthcare provider will check your blood pressure and weight  You may also need the following:  · A urine test  may also be done to check for sugar and protein  These can be signs of gestational diabetes or infection  Protein in your urine may also be a sign of preeclampsia  Preeclampsia is a condition that can develop during week 20 or later of your pregnancy  It causes high blood pressure, and it can cause problems with your kidneys and other organs  · A blood test  may be done to check for anemia (low iron level)  · A Tdap vaccine  may be recommended by your healthcare provider  · A group B strep test  is a test that is done to check for group B strep infection  Group B strep is a type of bacteria that may be found in the vagina or rectum  It can be passed to your baby during delivery if you have it  Your healthcare provider will take swab your vagina or rectum and send the sample to the lab for tests  · Fundal height  is a measurement of your uterus to check your baby's growth  This number is usually the same as the number of weeks that you have been pregnant   Your healthcare provider may also check your baby's position  · Your baby's heart rate  will be checked  © Copyright [address] Information is for End User's use only and may not be sold, redistributed or otherwise used for commercial purposes  All illustrations and images included in CareNotes® are the copyrighted property of A D A M [employer] Inc  or Antonio Pope   The above information is an  only  It is not intended as medical advice for individual conditions or treatments  Talk to your doctor, nurse or pharmacist before following any medical regimen to see if it is safe and effective for you

## 2021-02-07 NOTE — TELEPHONE ENCOUNTER
Called with some swelling of hands face and legs and over all not feeling well  To come in for evaluation

## 2021-02-07 NOTE — TELEPHONE ENCOUNTER
Patient c/o hand,leg, tongue and facial swelling and does not feel well  Is also nauseated and vomiting  Patient is 38 weeks

## 2021-02-07 NOTE — PROGRESS NOTES
L&D Triage Note - OB/GYN  Adrien Rebolledo 22 y o  female MRN: [id number]  Unit/Bed#: L&D 329-02 Encounter: [id number]    Patient is seen by [facility]  _________________________________________________________  ASSESSMENT:  _________________________________________________________  Adrien Rebolledo is a 22 y o   at 37w5d r/o pre-eclampsia  _________________________________________________________  PLAN:  _________________________________________________________  1) Normal BP   - 100-120s/50-60s, pt denies HA, RUQ pain   - B/l LE edema is symmetric and pitting +1, no chest pain, no SOB    - Reviewed elevating feet at night, education on pre-eclampsia symptoms, warning signs reviewed, warm soak/ice pack to feet  2) Continue routine prenatal care  3) Discharge from [facility] triage with term labor precautions    - Reviewed rupture of membranes, false vs true labor, decreased fetal movement, and vaginal bleeding   - Pt to call provider with any concerns and follow up at her next scheduled prenatal appointment tomorrow   - Case discussed with Dr Camille Nunez who also saw the patient in triage separately    Future Appointments   Date Time Provider Janet Hutton   2021 11:00 AM MD [facility]   2021  1:00 PM Bob Nugent PT [facility] PT [facility]   2/10/2021  1:00 PM Bob Nugent, PT [facility] Nnamdi Molina   2/15/2021  1:00 PM MD [facility]   2021  1:00 PM MD [facility]     _________________________________________________________  SUBJECTIVE:  _________________________________________________________  [facility] 22 [place]  [id number] at 37w5d with an Estimated Date of Delivery: 21 who presents with lower extremity swelling  She had an episode of nausea and vomiting last night after eating food that she thinks may have been cross contaminated or contained gluten   She has a gluten allergy which involves nausea and vomiting  Currently she denies nausea and vomiting, headache, chest pain, shortness of breath  She is currently elevating her feet and notes that this helps overnight  She denies calf tenderness or difficulty ambulating      Contractions: none  Leakage of fluid: none  Vaginal Bleeding: none  Fetal movement: present  _________________________________________________________  OBJECTIVE:  _________________________________________________________  Vitals:    02/07/21 1255   BP: 106/58   Pulse: 98   Resp:    Temp:    SpO2:        ROS:  Constitutional: Negative  Respiratory: Negative  Cardiovascular: Negative    Gastrointestinal: Negative    General Physical Exam:  General: in no apparent distress, well developed and well nourished and non-toxic  Cardiovascular: Cor RRR  Lungs: non-labored breathing  Abdomen: abdomen is soft without significant tenderness, masses, organomegaly or guarding  Lower extremeties: nontender    Cervical Exam    Fetal monitoring:  FHT:  140 bpm/ Moderate 6 - 25 bpm / 15 x 15 accelerations present, no decelerations  Alto: contractions none       Siddharth Love DO  PGY-4 OB/GYN Resident   2/7/2021 1:27 PM

## 2021-02-08 ENCOUNTER — ROUTINE PRENATAL (OUTPATIENT)
Dept: OBGYN CLINIC | Facility: MEDICAL CENTER | Age: 26
End: 2021-02-08
Payer: COMMERCIAL

## 2021-02-08 ENCOUNTER — OFFICE VISIT (OUTPATIENT)
Dept: PHYSICAL THERAPY | Facility: REHABILITATION | Age: 26
End: 2021-02-08
Payer: COMMERCIAL

## 2021-02-08 ENCOUNTER — TELEPHONE (OUTPATIENT)
Dept: OBGYN CLINIC | Facility: MEDICAL CENTER | Age: 26
End: 2021-02-08

## 2021-02-08 VITALS
SYSTOLIC BLOOD PRESSURE: 110 MMHG | BODY MASS INDEX: 40.82 KG/M2 | HEIGHT: 65 IN | WEIGHT: 245 LBS | DIASTOLIC BLOOD PRESSURE: 70 MMHG | HEART RATE: 95 BPM

## 2021-02-08 DIAGNOSIS — Z34.93 THIRD TRIMESTER PREGNANCY: ICD-10-CM

## 2021-02-08 DIAGNOSIS — O99.213 MATERNAL OBESITY, ANTEPARTUM, THIRD TRIMESTER: ICD-10-CM

## 2021-02-08 DIAGNOSIS — M54.16 LUMBAR RADICULOPATHY: ICD-10-CM

## 2021-02-08 DIAGNOSIS — O28.8 HIGH RISK PREGNANCY WITH HIGH INHIBIN: ICD-10-CM

## 2021-02-08 DIAGNOSIS — O09.899 HIGH RISK PREGNANCY WITH HIGH INHIBIN: ICD-10-CM

## 2021-02-08 DIAGNOSIS — M54.31 SCIATICA OF RIGHT SIDE: Primary | ICD-10-CM

## 2021-02-08 DIAGNOSIS — Z3A.37 37 WEEKS GESTATION OF PREGNANCY: Primary | ICD-10-CM

## 2021-02-08 PROCEDURE — 76815 OB US LIMITED FETUS(S): CPT | Performed by: OBSTETRICS & GYNECOLOGY

## 2021-02-08 PROCEDURE — 59025 FETAL NON-STRESS TEST: CPT | Performed by: OBSTETRICS & GYNECOLOGY

## 2021-02-08 PROCEDURE — 97140 MANUAL THERAPY 1/> REGIONS: CPT

## 2021-02-08 PROCEDURE — 3008F BODY MASS INDEX DOCD: CPT | Performed by: OBSTETRICS & GYNECOLOGY

## 2021-02-08 PROCEDURE — 99213 OFFICE O/P EST LOW 20 MIN: CPT | Performed by: OBSTETRICS & GYNECOLOGY

## 2021-02-08 PROCEDURE — 97110 THERAPEUTIC EXERCISES: CPT

## 2021-02-08 NOTE — PROGRESS NOTES
Daily Note     Today's date: 2021  Patient name: Kole Quinones  : 1995  MRN: [id number]  Referring provider: SIMONE Dow  Dx:   Encounter Diagnosis     ICD-10-CM    1  Sciatica of right side  M54 31    2  Lumbar radiculopathy  M54 16                   Subjective:  Pt reports she is 2cm dilated & they may induce   Her back has been more painful with advanced pregnancy      Objective: See treatment diary  ASSESSMENT:   Avoided clinton abdom with ex     Less back pain after treatment  Plan:   D/C to HEP     Precautions: Pregnancy      Manuals  1-7 1-11  1- 1-28 2-8      STM in prone on 8 pillows NT CD CD NT CD CD CD      ITB str B B  CD B  NT B   CD B CD B CD      KT  3 Istrips abdomen and 2 I lumbar    NT Just abdomen  CD Just abdom 2 strips   CD                    Neuro Re-Ed                                                                                                        Ther Ex             Cat-cow 10x 10x 10x 10x 10x 10x 10x      TA 5"x10 5" x10 5" x15 5"x15 5" x15 5" 15x       [facility] HEP            On knees over ball 2 min 2' 3' 3' 3' 3' 3'      clamshells  5" x10 5" x 15 5"x20 5" x20 5" x20 5" 20x      Glut sets  5" x10 5" x15 5"x20 5" x20 5"x20       Press ups  10x 10x 10x 10x 10x 10x      Low bridge   5" x10 5"x10 5" x15 5" 15x                    Seated rocking on ball     2 min & alt UE 20x  LE  10x 2' & alt UE 20x      LE 10x 2' & alt UE  20x      Wall slides with ball    NV g ball   5" x10 g ball   5" 2x10       Gait Training                                       Modalities

## 2021-02-08 NOTE — PROGRESS NOTES
Assessment  22 y o  [id number] at 37w6d presenting for routine prenatal visit  Plan  Diagnoses and all orders for this visit:    37 weeks gestation of pregnancy  Third trimester pregnancy  - Labor precautions  - Considering IOL but wants to discuss with partner  Discussed would recommend cervical ripening if desired at 39wks; will call if wants to schedule  - Needs pediatrician still  Discussed options in [place] and referred to [facility] list  - Return in 1wk for PN    High risk pregnancy with high inhibin  Maternal obesity, antepartum, third trimester  - Follows with MFM  - Weekly NST/VALERIA for antepartum surveillance       ____________________________________________________________        Subjective    Robert Ladd is a 22 y o  Anu Kil at 37w6d who presents for routine prenatal visit  She is reporting lots of swelling  Feet only fit in slippers that are a size too big  Using compression socks, but not helpful  Was seen on L&D for same yesterday  Denies contractions, loss of fluid, or vaginal bleeding  She feels regular fetal movements       Pregnancy Problems:  Patient Active Problem List   Diagnosis    Bipolar affective disorder, currently depressed, moderate (HCC)    Celiac disease    Diffuse cystic mastopathy    Eating disorder    Osteopenia    Panic disorder with agoraphobia    PTSD (post-traumatic stress disorder)    Slow transit constipation    Seasonal allergies    Migraine    Depression    Anxiety    Bipolar disease during pregnancy in third trimester ([address] )    Maternal obesity, antepartum, third trimester    High risk pregnancy with high inhibin    37 weeks gestation of pregnancy    Sciatica of right side         Objective  /70   Pulse 95   Ht 5' 5" (1 651 m)   Wt 111 kg (245 lb)   LMP 05/19/2020 (Exact Date)   BMI 40 77 kg/m²     NST:  VALERIA: 140 BPM / moderate / +accels  10 03cm   Uterine Size: size equals dates   Presentations: cephalic   Pelvic Exam:     Dilation: 1cm Effacement: 50%    Station:  -2    Consistency: firm    Position: middle     Physical Exam:  Physical Exam  Constitutional:       General: She is not in acute distress  Appearance: Normal appearance  She is well-developed  She is not ill-appearing, toxic-appearing or diaphoretic  HENT:      Head: Normocephalic and atraumatic  Eyes:      General: No scleral icterus  Right eye: No discharge  Left eye: No discharge  Conjunctiva/sclera: Conjunctivae normal    Pulmonary:      Effort: Pulmonary effort is normal  No accessory muscle usage or respiratory distress  Abdominal:      General: There is distension (gravid)  Tenderness: There is no abdominal tenderness  There is no guarding or rebound  Skin:     General: Skin is warm and dry  Coloration: Skin is not jaundiced  Findings: No bruising, erythema or rash  Neurological:      Mental Status: She is alert  Psychiatric:         Mood and Affect: Mood normal          Behavior: Behavior normal          Thought Content:  Thought content normal          Judgment: Judgment normal

## 2021-02-09 ENCOUNTER — TELEPHONE (OUTPATIENT)
Dept: OBGYN CLINIC | Facility: MEDICAL CENTER | Age: 26
End: 2021-02-09

## 2021-02-09 NOTE — TELEPHONE ENCOUNTER
----- Message from Oxana Sheriff sent at 2/9/2021 10:53 AM EST -----  Spoke with Mushtaq Conley at L&D  Pt is scheduled for 2/16/21 at 8pm  Pt is aware    ----- Message -----  From: Anson Rene  Sent: 2/9/2021   9:06 AM EST  To: Oxana Sheriff    Pt would like to schedule to be induced please call when available

## 2021-02-10 ENCOUNTER — APPOINTMENT (OUTPATIENT)
Dept: PHYSICAL THERAPY | Facility: REHABILITATION | Age: 26
End: 2021-02-10
Payer: COMMERCIAL

## 2021-02-15 ENCOUNTER — ROUTINE PRENATAL (OUTPATIENT)
Dept: OBGYN CLINIC | Facility: MEDICAL CENTER | Age: 26
End: 2021-02-15
Payer: COMMERCIAL

## 2021-02-15 VITALS — SYSTOLIC BLOOD PRESSURE: 130 MMHG | DIASTOLIC BLOOD PRESSURE: 75 MMHG | WEIGHT: 249.2 LBS | BODY MASS INDEX: 41.47 KG/M2

## 2021-02-15 DIAGNOSIS — Z34.93 THIRD TRIMESTER PREGNANCY: ICD-10-CM

## 2021-02-15 DIAGNOSIS — O28.8 HIGH RISK PREGNANCY WITH HIGH INHIBIN: Primary | ICD-10-CM

## 2021-02-15 DIAGNOSIS — O09.899 HIGH RISK PREGNANCY WITH HIGH INHIBIN: Primary | ICD-10-CM

## 2021-02-15 PROCEDURE — 99214 OFFICE O/P EST MOD 30 MIN: CPT | Performed by: OBSTETRICS & GYNECOLOGY

## 2021-02-15 PROCEDURE — 1036F TOBACCO NON-USER: CPT | Performed by: OBSTETRICS & GYNECOLOGY

## 2021-02-15 NOTE — PROGRESS NOTES
Dimitri Matthews is a 22y o  year old  at 38w7d for routine prenatal visit    + FM, no vaginal bleeding, contractions, or LOF  Complaints: No   Most recent ultrasound and labs reviewed    Has IOL of labor elective scheduled for tomorrow night   I have discussed with patient thatDr Gay Couch is on call on Wednesday / I have offered to move her IOL to a day where on of is on but patient was already set for tomorrow night   I have spoken to Dr Gay Couch and aware   Aware needs ripening and that this can be a long process   NST ractive today and VALERIA was [phone]

## 2021-02-16 ENCOUNTER — HOSPITAL ENCOUNTER (INPATIENT)
Facility: HOSPITAL | Age: 26
LOS: 4 days | Discharge: HOME | DRG: 540 | End: 2021-02-20
Attending: OBSTETRICS & GYNECOLOGY | Admitting: OBSTETRICS & GYNECOLOGY
Payer: COMMERCIAL

## 2021-02-16 ENCOUNTER — HOSPITAL ENCOUNTER (OUTPATIENT)
Dept: LABOR AND DELIVERY | Facility: HOSPITAL | Age: 26
Discharge: HOME | DRG: 540 | End: 2021-02-16
Payer: COMMERCIAL

## 2021-02-16 DIAGNOSIS — Z3A.39 39 WEEKS GESTATION OF PREGNANCY: ICD-10-CM

## 2021-02-16 DIAGNOSIS — Z98.891 S/P PRIMARY LOW TRANSVERSE C-SECTION: Primary | ICD-10-CM

## 2021-02-16 DIAGNOSIS — F41.9 ANXIETY: ICD-10-CM

## 2021-02-16 LAB
ABO GROUP BLD: NORMAL
BLD GP AB SCN SERPL QL: NEGATIVE
ERYTHROCYTE [DISTWIDTH] IN BLOOD BY AUTOMATED COUNT: 13.8 % (ref 11.6–15.1)
HCT VFR BLD AUTO: 34.6 % (ref 34.8–46.1)
HGB BLD-MCNC: 11.3 G/DL (ref 11.5–15.4)
MCH RBC QN AUTO: 30.5 PG (ref 26.8–34.3)
MCHC RBC AUTO-ENTMCNC: 32.7 G/DL (ref 31.4–37.4)
MCV RBC AUTO: 94 FL (ref 82–98)
PLATELET # BLD AUTO: 271 THOUSANDS/UL (ref 149–390)
PMV BLD AUTO: 10.7 FL (ref 8.9–12.7)
RBC # BLD AUTO: 3.7 MILLION/UL (ref 3.81–5.12)
RH BLD: POSITIVE
SPECIMEN EXPIRATION DATE: NORMAL
WBC # BLD AUTO: 17.16 THOUSAND/UL (ref 4.31–10.16)

## 2021-02-16 PROCEDURE — 3E033VJ INTRODUCTION OF OTHER HORMONE INTO PERIPHERAL VEIN, PERCUTANEOUS APPROACH: ICD-10-PCS | Performed by: OBSTETRICS & GYNECOLOGY

## 2021-02-16 PROCEDURE — 3E0P7VZ INTRODUCTION OF HORMONE INTO FEMALE REPRODUCTIVE, VIA NATURAL OR ARTIFICIAL OPENING: ICD-10-PCS | Performed by: OBSTETRICS & GYNECOLOGY

## 2021-02-16 PROCEDURE — 86592 SYPHILIS TEST NON-TREP QUAL: CPT | Performed by: OBSTETRICS & GYNECOLOGY

## 2021-02-16 PROCEDURE — NC001 PR NO CHARGE: Performed by: OBSTETRICS & GYNECOLOGY

## 2021-02-16 PROCEDURE — 86850 RBC ANTIBODY SCREEN: CPT | Performed by: OBSTETRICS & GYNECOLOGY

## 2021-02-16 PROCEDURE — 86901 BLOOD TYPING SEROLOGIC RH(D): CPT | Performed by: OBSTETRICS & GYNECOLOGY

## 2021-02-16 PROCEDURE — 10907ZC DRAINAGE OF AMNIOTIC FLUID, THERAPEUTIC FROM PRODUCTS OF CONCEPTION, VIA NATURAL OR ARTIFICIAL OPENING: ICD-10-PCS | Performed by: OBSTETRICS & GYNECOLOGY

## 2021-02-16 PROCEDURE — 86900 BLOOD TYPING SEROLOGIC ABO: CPT | Performed by: OBSTETRICS & GYNECOLOGY

## 2021-02-16 PROCEDURE — 4A1HXCZ MONITORING OF PRODUCTS OF CONCEPTION, CARDIAC RATE, EXTERNAL APPROACH: ICD-10-PCS | Performed by: OBSTETRICS & GYNECOLOGY

## 2021-02-16 PROCEDURE — 85027 COMPLETE CBC AUTOMATED: CPT | Performed by: OBSTETRICS & GYNECOLOGY

## 2021-02-16 RX ORDER — ONDANSETRON 2 MG/ML
4 INJECTION INTRAMUSCULAR; INTRAVENOUS EVERY 6 HOURS PRN
Status: DISCONTINUED | OUTPATIENT
Start: 2021-02-16 | End: 2021-02-18

## 2021-02-16 RX ORDER — SODIUM CHLORIDE, SODIUM LACTATE, POTASSIUM CHLORIDE, CALCIUM CHLORIDE 600; 310; 30; 20 MG/100ML; MG/100ML; MG/100ML; MG/100ML
125 INJECTION, SOLUTION INTRAVENOUS CONTINUOUS
Status: DISCONTINUED | OUTPATIENT
Start: 2021-02-16 | End: 2021-02-20 | Stop reason: HOSPADM

## 2021-02-16 RX ADMIN — SODIUM CHLORIDE, SODIUM LACTATE, POTASSIUM CHLORIDE, AND CALCIUM CHLORIDE 125 ML/HR: .6; .31; .03; .02 INJECTION, SOLUTION INTRAVENOUS at 21:06

## 2021-02-16 RX ADMIN — MISOPROSTOL 25 MCG: 100 TABLET ORAL at 23:08

## 2021-02-16 RX ADMIN — LURASIDONE HYDROCHLORIDE 20 MG: 20 TABLET, FILM COATED ORAL at 21:52

## 2021-02-16 NOTE — LETTER
[address] [place] [address] LABOR AND DELIVERY  [address] [address]  Dept: [phone]    February 20, 2021     Patient: Hernán Fofana   YOB: 1995   Date of Visit: 2/7/2021       To Whom it May Concern:    Ming Coeloh is under my professional care  Her partner Kimberlee Fountain should be excused from work from Tuesday February 16 until Thursday Feb 25, 2021  If you have any questions or concerns, please don't hesitate to call           Sincerely,          Suzie Arizmendi MD

## 2021-02-17 ENCOUNTER — ANESTHESIA (INPATIENT)
Dept: LABOR AND DELIVERY | Facility: HOSPITAL | Age: 26
DRG: 540 | End: 2021-02-17
Payer: COMMERCIAL

## 2021-02-17 LAB — RPR SER QL: NORMAL

## 2021-02-17 PROCEDURE — 10H07YZ INSERTION OF OTHER DEVICE INTO PRODUCTS OF CONCEPTION, VIA NATURAL OR ARTIFICIAL OPENING: ICD-10-PCS | Performed by: OBSTETRICS & GYNECOLOGY

## 2021-02-17 RX ORDER — BUTORPHANOL TARTRATE 1 MG/ML
1 INJECTION, SOLUTION INTRAMUSCULAR; INTRAVENOUS ONCE
Status: COMPLETED | OUTPATIENT
Start: 2021-02-17 | End: 2021-02-17

## 2021-02-17 RX ORDER — LIDOCAINE HYDROCHLORIDE AND EPINEPHRINE 15; 5 MG/ML; UG/ML
INJECTION, SOLUTION EPIDURAL
Status: COMPLETED | OUTPATIENT
Start: 2021-02-17 | End: 2021-02-17

## 2021-02-17 RX ORDER — PROMETHAZINE HYDROCHLORIDE 25 MG/ML
12.5 INJECTION, SOLUTION INTRAMUSCULAR; INTRAVENOUS ONCE
Status: COMPLETED | OUTPATIENT
Start: 2021-02-17 | End: 2021-02-17

## 2021-02-17 RX ORDER — ROPIVACAINE HYDROCHLORIDE 2 MG/ML
INJECTION, SOLUTION EPIDURAL; INFILTRATION; PERINEURAL CONTINUOUS PRN
Status: DISCONTINUED | OUTPATIENT
Start: 2021-02-17 | End: 2021-02-18

## 2021-02-17 RX ORDER — ROPIVACAINE HYDROCHLORIDE 2 MG/ML
INJECTION, SOLUTION EPIDURAL; INFILTRATION; PERINEURAL AS NEEDED
Status: DISCONTINUED | OUTPATIENT
Start: 2021-02-17 | End: 2021-02-18

## 2021-02-17 RX ORDER — OXYTOCIN/RINGER'S LACTATE 30/500 ML
1-30 PLASTIC BAG, INJECTION (ML) INTRAVENOUS
Status: DISCONTINUED | OUTPATIENT
Start: 2021-02-17 | End: 2021-02-18

## 2021-02-17 RX ORDER — ALPRAZOLAM 0.5 MG/1
0.5 TABLET ORAL 3 TIMES DAILY PRN
Status: DISCONTINUED | OUTPATIENT
Start: 2021-02-17 | End: 2021-02-20 | Stop reason: HOSPADM

## 2021-02-17 RX ORDER — ROPIVACAINE HYDROCHLORIDE 2 MG/ML
INJECTION, SOLUTION EPIDURAL; INFILTRATION; PERINEURAL
Status: COMPLETED
Start: 2021-02-17 | End: 2021-02-17

## 2021-02-17 RX ADMIN — ALPRAZOLAM 0.5 MG: 0.5 TABLET ORAL at 04:45

## 2021-02-17 RX ADMIN — BUTORPHANOL TARTRATE 1 MG: 1 INJECTION, SOLUTION INTRAMUSCULAR; INTRAVENOUS at 04:45

## 2021-02-17 RX ADMIN — Medication 2 MILLI-UNITS/MIN: at 13:06

## 2021-02-17 RX ADMIN — ROPIVACAINE HYDROCHLORIDE: 2 INJECTION, SOLUTION EPIDURAL; INFILTRATION at 19:21

## 2021-02-17 RX ADMIN — SODIUM CHLORIDE, SODIUM LACTATE, POTASSIUM CHLORIDE, AND CALCIUM CHLORIDE 125 ML/HR: .6; .31; .03; .02 INJECTION, SOLUTION INTRAVENOUS at 23:49

## 2021-02-17 RX ADMIN — ROPIVACAINE HYDROCHLORIDE 10 ML/HR: 2 INJECTION, SOLUTION EPIDURAL; INFILTRATION at 19:08

## 2021-02-17 RX ADMIN — LIDOCAINE HYDROCHLORIDE AND EPINEPHRINE 3 ML: 15; 5 INJECTION, SOLUTION EPIDURAL at 18:55

## 2021-02-17 RX ADMIN — PROMETHAZINE HYDROCHLORIDE 12.5 MG: 25 INJECTION INTRAMUSCULAR; INTRAVENOUS at 07:41

## 2021-02-17 RX ADMIN — ROPIVACAINE HYDROCHLORIDE 3 ML: 2 INJECTION, SOLUTION EPIDURAL; INFILTRATION at 19:05

## 2021-02-17 RX ADMIN — SODIUM CHLORIDE, SODIUM LACTATE, POTASSIUM CHLORIDE, AND CALCIUM CHLORIDE 125 ML/HR: .6; .31; .03; .02 INJECTION, SOLUTION INTRAVENOUS at 04:42

## 2021-02-17 RX ADMIN — SODIUM CHLORIDE, SODIUM LACTATE, POTASSIUM CHLORIDE, AND CALCIUM CHLORIDE 125 ML/HR: .6; .31; .03; .02 INJECTION, SOLUTION INTRAVENOUS at 13:06

## 2021-02-17 RX ADMIN — MISOPROSTOL 25 MCG: 100 TABLET ORAL at 04:23

## 2021-02-17 RX ADMIN — LURASIDONE HYDROCHLORIDE 20 MG: 20 TABLET, FILM COATED ORAL at 21:04

## 2021-02-17 RX ADMIN — ALPRAZOLAM 0.5 MG: 0.5 TABLET ORAL at 18:48

## 2021-02-17 RX ADMIN — ONDANSETRON 4 MG: 2 INJECTION INTRAMUSCULAR; INTRAVENOUS at 19:16

## 2021-02-17 RX ADMIN — ONDANSETRON 4 MG: 2 INJECTION INTRAMUSCULAR; INTRAVENOUS at 04:52

## 2021-02-17 RX ADMIN — ROPIVACAINE HYDROCHLORIDE 3 ML: 2 INJECTION, SOLUTION EPIDURAL; INFILTRATION at 18:59

## 2021-02-17 RX ADMIN — BUTORPHANOL TARTRATE 1 MG: 1 INJECTION, SOLUTION INTRAMUSCULAR; INTRAVENOUS at 07:44

## 2021-02-17 RX ADMIN — ROPIVACAINE HYDROCHLORIDE 4 ML: 2 INJECTION, SOLUTION EPIDURAL; INFILTRATION at 18:56

## 2021-02-17 NOTE — OB LABOR/OXYTOCIN SAFETY PROGRESS
Oxytocin Safety Progress Check Note - Evinus Bose 22 y o  female MRN: [id number]    Unit/Bed#: L&D 325-01 Encounter: [id number]       Contraction Frequency (minutes): 2-5(irritable)  Contraction Quality: Mild  Tachysystole: No   Cervical Dilation: 1        Cervical Effacement: 50  Fetal Station: -3  Baseline Rate: 150 bpm  Fetal Heart Rate: 155 BPM                  Vital Signs:   Vitals:    02/17/21 0412   BP: 103/50   Pulse: 75   Resp:    Temp:            Notes/comments:      PROCEDURE:  JADE BALLOON PLACEMENT    A 24F jade with a 30cc balloon was selected, SVE was performed and cervix was located, jade was introduced over sterile gloved hands  Balloon advanced through cervix beyond the internal cervical os  A small amount amount of sterile saline solution was instilled in the balloon to confirm placement  Placement was confirmed to be beyond the internal cervical os  A total of 60cc of sterile saline solution was placed into the balloon  Pt tolerated well  Instructions left with RN to place jade to gravity with a 1L bag of IV fluid  Notify MD when jade dislodged  FHT category I  Novie with contractions however patient only feeling mild back pain, likely more uterine irritability that is picking up on the monitor  Since her cervix is still very thick, a 2nd dose of vaginal Cytotec was placed after the Jade balloon  Patient feeling uncomfortable after Jade balloon placement in stating that she has starting to get more anxious  Offered Atarax but she says that she is taking this in the past and gets very dizzy from it  Will give her a dose of Xanax 0 5 mg  Will give Stadol for pain       Dr Brie De Jesus aware     MD Dennise Francois MD 2/17/2021 4:33 AM

## 2021-02-17 NOTE — PLAN OF CARE
Problem: Knowledge Deficit  Goal: Verbalizes understanding of labor plan  Description: Assess patient/family/caregiver's baseline knowledge level and ability to understand information  Provide education via patient/family/caregiver's preferred learning method at appropriate level of understanding  1  Provide teaching at level of understanding  2  Provide teaching via preferred learning method(s)  Outcome: Progressing     Problem: Labor & Delivery  Goal: Manages discomfort  Description: Assess and monitor for signs and symptoms of discomfort  Assess patient's pain level regularly and per hospital policy  Administer medications as ordered  Support use of nonpharmacological methods to help control pain such as distraction, imagery, relaxation, and application of heat and cold  Collaborate with interdisciplinary team and patient to determine appropriate pain management plan  1  Include patient in decisions related to comfort  2  Offer non-pharmacological pain management interventions  3  Report ineffective pain management to physician  Outcome: Progressing  Goal: Patient vital signs are stable  Description: 1  Assess vital signs - vaginal delivery    Outcome: Progressing     Problem: ANTEPARTUM  Goal: Maintain pregnancy as long as maternal and/or fetal condition is stable  Description: INTERVENTIONS:  - Maternal surveillance  - Fetal surveillance  - Monitor uterine activity  - Medications as ordered  - Bedrest  Outcome: Progressing     Problem: BIRTH - VAGINAL/ SECTION  Goal: Fetal and maternal status remain reassuring during the birth process  Description: INTERVENTIONS:  - Monitor vital signs  - Monitor fetal heart rate  - Monitor uterine activity  - Monitor labor progression (vaginal delivery)  - DVT prophylaxis  - Antibiotic prophylaxis  Outcome: Progressing  Goal: Emotionally satisfying birthing experience for mother/fetus  Description: Interventions:  - Assess, plan, implement and evaluate the nursing care given to the patient in labor  - Advocate the philosophy that each childbirth experience is a unique experience and support the family's chosen level of involvement and control during the labor process   - Actively participate in both the patient's and family's teaching of the birth process  - Consider cultural, Mosque and age-specific factors and plan care for the patient in labor  Outcome: Progressing

## 2021-02-17 NOTE — OB LABOR/OXYTOCIN SAFETY PROGRESS
Oxytocin Safety Progress Check Note - Kole Quinones 22 y o  female MRN: [id number]    Unit/Bed#: L&D 325-01 Encounter: [id number]    Dose (gennaro-units/min) Oxytocin: 6 gennaro-units/min  Contraction Frequency (minutes): 3-4  Contraction Quality: Mild  Tachysystole: No   Cervical Dilation: 4        Cervical Effacement: 60  Fetal Station: -2  Baseline Rate: 150 bpm  Fetal Heart Rate: 130 BPM  FHR Category: Category I               Vital Signs:   Vitals:    02/17/21 1442   BP: 109/58   Pulse: 97   Resp:    Temp:            Notes/comments: The patient was started on pitocin 2 hours ago and her check is defered at this time  She is starting to get uncomfortable and would like an epidural, but not just yet  Cat I tracing  Plan to check pt in 2 hours   D/w Dr Neris Cano MD 2/17/2021 3:31 PM

## 2021-02-17 NOTE — OB LABOR/OXYTOCIN SAFETY PROGRESS
Oxytocin Safety Progress Check Note - Agata [address] 39 y o  female MRN: [id number]    Unit/Bed#: L&D 325-01 Encounter: [id number]    Dose (gennaro-units/min) Oxytocin: 14 gennaro-units/min  Contraction Frequency (minutes): 2-3  Contraction Quality: Moderate  Tachysystole: No   Cervical Dilation: 5        Cervical Effacement: 60  Fetal Station: -1  Baseline Rate: 145 bpm  Fetal Heart Rate: 150 BPM  FHR Category: Category I               Vital Signs:   Vitals:    02/17/21 1743   BP: 145/80   Pulse: 90   Resp:    Temp:            Notes/comments:   Pt complains of worsening pain with contractions, interested in epidural  Pt will get epidural and when comfortable and will plan AROM    Freya Velez MD 2/17/2021 6:31 PM

## 2021-02-17 NOTE — OB LABOR/OXYTOCIN SAFETY PROGRESS
Labor Progress Note - Lizzette Breath 22 y o  female MRN: [id number]    Unit/Bed#: L&D 325-01 Encounter: [id number]       Contraction Frequency (minutes): 2-3  Contraction Quality: Mild  Tachysystole: No   Cervical Dilation: 4        Cervical Effacement: 60  Fetal Station: -2  Baseline Rate: 130 bpm  Fetal Heart Rate: 130 BPM                  Vital Signs:   Vitals:    02/17/21 1000   BP:    Pulse: 80   Resp:    Temp: 97 9 °F (36 6 °C)           Notes/comments:   Pt comfortable, was sleeping  Montoya balloon had recently fallen out  SVE 4/60/-2, medium consistency, midpositon  Irregular contractions at this time, mild in strength  Will start pitocin at this time  D/w Dr Gay Gracia MD 2/17/2021 12:43 PM

## 2021-02-17 NOTE — OB LABOR/OXYTOCIN SAFETY PROGRESS
Labor Progress Note - Maxime Bean 22 y o  female MRN: [id number]    Unit/Bed#: L&D 325-01 Encounter: [id number]       Contraction Frequency (minutes): 2-4  Contraction Quality: Mild  Tachysystole: No   Cervical Dilation: 1        Cervical Effacement: 50  Fetal Station: -3  Baseline Rate: 125 bpm  Fetal Heart Rate: 155 BPM                  Vital Signs:   Vitals:    02/17/21 0518   BP: 119/55   Pulse: 68   Resp: 18   Temp:            Notes/comments:      Hollis balloon still firmly in place  Patient very uncomfortable with the FB, Stadol helped but not due for another dose for an hour  Will give Stadol and phenergan when able  Will attempt to weight hollis balloon if patient tolerates  FHT category I       Yissel Garcia MD 2/17/2021 7:04 AM

## 2021-02-17 NOTE — OB LABOR/OXYTOCIN SAFETY PROGRESS
Labor Progress Note - Robert Ladd 22 y o  female MRN: [id number]    Unit/Bed#: L&D 325-01 Encounter: [id number]       Contraction Frequency (minutes): 0(irritable)  Contraction Quality: Mild  Tachysystole: No   Cervical Dilation: Fingertip        Cervical Effacement: 30  Fetal Station: -3  Baseline Rate: 140 bpm  Fetal Heart Rate: 155 BPM                  Vital Signs:   Vitals:    02/16/21 2037   BP:    Pulse: 101   Resp: 18   Temp: 98 1 °F (36 7 °C)           Notes/comments:      First dose of vaginal Cytotec placed  FHT category I  Will recheck in 3-4 hours and attempt FB if able         Leoncio Murguia MD 2/16/2021 11:30 PM

## 2021-02-17 NOTE — H&P
H&P Exam - Obstetrics   Chelo Mccloud 22 y o  female MRN: [id number]  Unit/Bed#: L&D 325-01 Encounter: [id number]      ASSESSMENT:  22yo  at 39w0d weeks gestation who is being admitted for an elective IOL  EFW: 7 5 lbs  VTX by transabdominal ultrasound     PLAN:    Pregnancy at 39w0d  Admit  Follow up CBC, RPR, Blood Type  Start with vaginal Cytotec   GBS negative status  Analgesia and/or epidural at patient request  Anticipate     Discussed with Dr Annika Palomares      This patient will be an INPATIENT  and I certify the anticipated length of stay is >2 Midnights  History of Present Illness     Chief Complaint: Here for IOL    HPI:  Chelo Mccloud is a 22 y o   female with an ARRON of 2021, Date entered prior to episode creation at 39w0d weeks gestation who is being admitted for an elective IOL  She states that today around 3PM, she noticed some fluid leaking but is unsure if it was urine  She has since felt occasionally wet, but no obvious fluid  She is not feeling persistent contractions and denies vaginal bleeding  Reports good fetal movement  She is an [facility] patient  PREGNANCY COMPLICATIONS:   1  Bipolar disorder, on Latuda  2  Anxiety  3  PTSD  4  Obesity, BMI 41    OB History    Para Term  AB Living   3       2     SAB TAB Ectopic Multiple Live Births     2     0      # Outcome Date GA Lbr Chris/2nd Weight Sex Delivery Anes PTL Lv   3 Current            2 TAB 2017 8w0d    TAB         Birth Comments: medically induced    1 TAB 2015 9w0d    TAB         Birth Comments: medically induced        Baby complications/comments: None    Review of Systems   Constitutional: Negative for chills and fever  Eyes: Negative for visual disturbance  Respiratory: Negative for cough and shortness of breath  Cardiovascular: Negative for chest pain, palpitations and leg swelling     Gastrointestinal: Negative for abdominal pain, diarrhea, nausea and vomiting  Genitourinary: Negative for dysuria, hematuria, pelvic pain, vaginal bleeding, vaginal discharge and vaginal pain  Neurological: Negative for dizziness, light-headedness and headaches  Historical Information   Past Medical History:   Diagnosis Date    Celiac disease     Gastroparesis     Marijuana use, continuous 7/10/2018    Last Assessment & Plan:  Vicky Sylvester reports that she has less ability to use THC due to her step-daughter being in the house  She reports that she has significantly decrease to twice/day  Reviewed risks of routine daily use, she does not feel this is an issue & states "I know my body" & is not willing to quit at this point in time   Nicotine use disorder 7/10/2018    Last Assessment & Plan:  Jeb Ibrahim continues to smoke 1 cigarette/day  She reports that "it will not be hard for her to stop" & plans on quitting immediately      Personality disorder ([facility] [address] )     Substance abuse [facility]     marijuana    Trauma     PTSD    Varicella      Past Surgical History:   Procedure Laterality Date    TONSILLECTOMY       Social History   Social History     Substance and Sexual Activity   Alcohol Use Not Currently    Comment: occasional     Social History     Substance and Sexual Activity   Drug Use Not Currently    Types: Marijuana    Comment: every day smoker prior to pregnancy     Social History     Tobacco Use   Smoking Status Former Smoker    Packs/day: 0 25    Years: 8 00    Pack years: 2 00   Smokeless Tobacco Never Used   Tobacco Comment    quit with knowledge of pregnancy     Family History: non-contributory    Meds/Allergies      Medications Prior to Admission   Medication    ALPRAZolam (XANAX) 0 25 mg tablet    aspirin 81 mg chewable tablet    diphenhydrAMINE (BENADRYL) 25 mg capsule    Doxylamine Succinate, Sleep, (UNISOM PO)    Elastic Bandages & Supports (Abdominal Support/L-XL) MISC    famotidine (PEPCID) 20 mg tablet    lurasidone (LATUDA) 20 mg tablet    ondansetron (ZOFRAN) 4 mg tablet    Prenatal Vit-Fe Fumarate-FA (PRENATAL VITAMINS PO)    pyridoxine (VITAMIN B6) 100 mg tablet        Allergies   Allergen Reactions    Apple     Gluten Meal Vomiting    Other      Enviromental allergies       OBJECTIVE:    Vitals: Last menstrual period 05/19/2020, not currently breastfeeding  There is no height or weight on file to calculate BMI  Physical Exam  Constitutional:       General: She is not in acute distress  Appearance: She is well-developed  Cardiovascular:      Rate and Rhythm: Normal rate and regular rhythm  Heart sounds: Normal heart sounds  Pulmonary:      Effort: Pulmonary effort is normal  No respiratory distress  Breath sounds: Normal breath sounds  Abdominal:      Tenderness: There is no abdominal tenderness  There is no guarding  Comments: Gravid uterus   Musculoskeletal:         General: No tenderness  Neurological:      Mental Status: She is alert and oriented to person, place, and time  Psychiatric:         Behavior: Behavior normal          Thought Content:  Thought content normal        Cervix:   FT/30/-3    Fetal heart rate:    160 BPM, reactive with moderate variability     Bethune:    None     GBS: Negative     Prenatal Labs:   Blood Type:   Lab Results   Component Value Date/Time    ABO Grouping A 07/29/2020 02:05 PM     , D (Rh type):   Lab Results   Component Value Date/Time    Rh Factor Positive 07/29/2020 02:05 PM     , Antibody Screen: Negative  HCT/HGB:   Lab Results   Component Value Date/Time    Hematocrit 37 2 12/14/2020 11:49 AM    Hemoglobin 11 6 12/14/2020 11:49 AM      , MCV:   Lab Results   Component Value Date/Time    MCV 95 12/14/2020 11:49 AM      , Platelets:   Lab Results   Component Value Date/Time    Platelets 616 47/42/7551 11:49 AM      , 1 hour Glucola:   Lab Results   Component Value Date/Time    Glucose 115 12/14/2020 11:49 AM   , Rubella:   Lab Results   Component Value Date/Time    Rubella IgG Quant 30 4 07/29/2020 02:05 PM        , VDRL/RPR:   Lab Results   Component Value Date/Time    RPR SCREEN NON-REACTIVE 08/24/2016 12:00 AM    RPR Non-Reactive 07/29/2020 02:05 PM      , Urine Culture/Screen:   Lab Results   Component Value Date/Time    Urine Culture No Growth <1000 cfu/mL 12/14/2020 12:58 PM        Hep B:   Lab Results   Component Value Date/Time    HEPATITIS B SURFACE ANTIGEN NON-REACTIVE 08/24/2016 12:00 AM    Hepatitis B Surface Ag Non-reactive 07/29/2020 02:05 PM   HIV:   Lab Results   Component Value Date/Time    HIV-1/HIV-2 Ab Non-Reactive 07/29/2020 02:05 PM     , Chlamydia: Negative   Gonorrhea:   Lab Results   Component Value Date/Time    N GONORRHOEAE, AMPLIFIED DNA Negative 05/28/2015 12:00 AM    N gonorrhoeae, DNA Probe Negative 08/13/2020 11:09 AM    N gonorrhoeae, DNA Probe N  gonorrhoeae Amplified DNA Negative 07/25/2018 04:45 PM     , Group B Strep:  Negative     Invasive Devices     Peripheral Intravenous Line            Peripheral IV 09/13/20 Left Antecubital 156 days

## 2021-02-17 NOTE — ANESTHESIA PREPROCEDURE EVALUATION
Procedure:  LABOR ANALGESIA    Relevant Problems   ANESTHESIA (within normal limits)      CARDIO (within normal limits)   (+) Migraine      GYN   (+) 39 weeks gestation of pregnancy      MUSCULOSKELETAL   (+) Sciatica of right side      NEURO/PSYCH   (+) Anxiety   (+) Bipolar affective disorder, currently depressed, moderate (HCC)   (+) Depression   (+) PTSD (post-traumatic stress disorder)   (+) Panic disorder with agoraphobia      PULMONARY  thc abuse      Other   (+) Bipolar disease during pregnancy in third trimester (HCC)   (+) Celiac disease   (+) Eating disorder   (+) Maternal obesity, antepartum, third trimester   (+) Osteopenia        Physical Exam    Airway    Mallampati score: III  TM Distance: >3 FB  Neck ROM: full     Dental   No notable dental hx     Cardiovascular  Rhythm: regular, Rate: normal, Cardiovascular exam normal    Pulmonary  Pulmonary exam normal Breath sounds clear to auscultation,     Other Findings        Anesthesia Plan  ASA Score- 3     Anesthesia Type- epidural with ASA Monitors  Additional Monitors:   Airway Plan:           Plan Factors-    Chart reviewed  Existing labs reviewed  Patient summary reviewed  Patient is a current smoker  Patient instructed to abstain from smoking on day of procedure  Patient did not smoke on day of surgery  Induction-     Postoperative Plan-     Informed Consent- Anesthetic plan and risks discussed with patient

## 2021-02-18 VITALS — HEART RATE: 98 BPM

## 2021-02-18 PROBLEM — Z98.891 S/P PRIMARY LOW TRANSVERSE C-SECTION: Status: ACTIVE | Noted: 2021-02-18

## 2021-02-18 LAB
BASE EXCESS BLDCOA CALC-SCNC: -3.5 MMOL/L (ref 3–11)
BASE EXCESS BLDCOV CALC-SCNC: -1.6 MMOL/L (ref 1–9)
HCO3 BLDCOA-SCNC: 22.3 MMOL/L (ref 17.3–27.3)
HCO3 BLDCOV-SCNC: 23 MMOL/L (ref 12.2–28.6)
O2 CT VFR BLDCOA CALC: 9.9 ML/DL
OXYHGB MFR BLDCOA: 40.3 %
OXYHGB MFR BLDCOV: 45.9 %
PCO2 BLDCOA: 42.5 MM[HG] (ref 30–60)
PCO2 BLDCOV: 38.8 MM HG (ref 27–43)
PH BLDCOA: 7.34 [PH] (ref 7.23–7.43)
PH BLDCOV: 7.39 [PH] (ref 7.19–7.49)
PO2 BLDCOA: 18.3 MM HG (ref 5–25)
PO2 BLDCOV: 18.4 MM HG (ref 15–45)
SAO2 % BLDCOV: 11 ML/DL

## 2021-02-18 PROCEDURE — 99024 POSTOP FOLLOW-UP VISIT: CPT | Performed by: STUDENT IN AN ORGANIZED HEALTH CARE EDUCATION/TRAINING PROGRAM

## 2021-02-18 PROCEDURE — 82805 BLOOD GASES W/O2 SATURATION: CPT | Performed by: OBSTETRICS & GYNECOLOGY

## 2021-02-18 PROCEDURE — 59514 CESAREAN DELIVERY ONLY: CPT | Performed by: OBSTETRICS & GYNECOLOGY

## 2021-02-18 RX ORDER — ONDANSETRON 2 MG/ML
4 INJECTION INTRAMUSCULAR; INTRAVENOUS EVERY 8 HOURS PRN
Status: DISCONTINUED | OUTPATIENT
Start: 2021-02-19 | End: 2021-02-20 | Stop reason: HOSPADM

## 2021-02-18 RX ORDER — KETOROLAC TROMETHAMINE 30 MG/ML
30 INJECTION, SOLUTION INTRAMUSCULAR; INTRAVENOUS EVERY 6 HOURS PRN
Status: DISCONTINUED | OUTPATIENT
Start: 2021-02-18 | End: 2021-02-20 | Stop reason: HOSPADM

## 2021-02-18 RX ORDER — NALOXONE HYDROCHLORIDE 0.4 MG/ML
0.1 INJECTION, SOLUTION INTRAMUSCULAR; INTRAVENOUS; SUBCUTANEOUS
Status: ACTIVE | OUTPATIENT
Start: 2021-02-18 | End: 2021-02-19

## 2021-02-18 RX ORDER — LIDOCAINE HYDROCHLORIDE AND EPINEPHRINE 20; 5 MG/ML; UG/ML
INJECTION, SOLUTION EPIDURAL; INFILTRATION; INTRACAUDAL; PERINEURAL AS NEEDED
Status: DISCONTINUED | OUTPATIENT
Start: 2021-02-18 | End: 2021-02-18

## 2021-02-18 RX ORDER — MORPHINE SULFATE 0.5 MG/ML
INJECTION, SOLUTION EPIDURAL; INTRATHECAL; INTRAVENOUS AS NEEDED
Status: DISCONTINUED | OUTPATIENT
Start: 2021-02-18 | End: 2021-02-18

## 2021-02-18 RX ORDER — ACETAMINOPHEN 325 MG/1
650 TABLET ORAL EVERY 6 HOURS PRN
Status: DISCONTINUED | OUTPATIENT
Start: 2021-02-18 | End: 2021-02-20 | Stop reason: HOSPADM

## 2021-02-18 RX ORDER — ONDANSETRON 2 MG/ML
4 INJECTION INTRAMUSCULAR; INTRAVENOUS ONCE AS NEEDED
Status: DISCONTINUED | OUTPATIENT
Start: 2021-02-18 | End: 2021-02-20 | Stop reason: HOSPADM

## 2021-02-18 RX ORDER — FENTANYL CITRATE 50 UG/ML
INJECTION, SOLUTION INTRAMUSCULAR; INTRAVENOUS
Status: COMPLETED
Start: 2021-02-18 | End: 2021-02-18

## 2021-02-18 RX ORDER — FENTANYL CITRATE 50 UG/ML
INJECTION, SOLUTION INTRAMUSCULAR; INTRAVENOUS AS NEEDED
Status: DISCONTINUED | OUTPATIENT
Start: 2021-02-18 | End: 2021-02-18

## 2021-02-18 RX ORDER — DOCUSATE SODIUM 100 MG/1
100 CAPSULE, LIQUID FILLED ORAL 2 TIMES DAILY
Status: DISCONTINUED | OUTPATIENT
Start: 2021-02-18 | End: 2021-02-20 | Stop reason: HOSPADM

## 2021-02-18 RX ORDER — DEXAMETHASONE SODIUM PHOSPHATE 4 MG/ML
8 INJECTION, SOLUTION INTRA-ARTICULAR; INTRALESIONAL; INTRAMUSCULAR; INTRAVENOUS; SOFT TISSUE ONCE AS NEEDED
Status: ACTIVE | OUTPATIENT
Start: 2021-02-18 | End: 2021-02-19

## 2021-02-18 RX ORDER — SODIUM CHLORIDE, SODIUM LACTATE, POTASSIUM CHLORIDE, CALCIUM CHLORIDE 600; 310; 30; 20 MG/100ML; MG/100ML; MG/100ML; MG/100ML
INJECTION, SOLUTION INTRAVENOUS CONTINUOUS PRN
Status: DISCONTINUED | OUTPATIENT
Start: 2021-02-18 | End: 2021-02-18

## 2021-02-18 RX ORDER — OXYTOCIN/RINGER'S LACTATE 30/500 ML
62.5 PLASTIC BAG, INJECTION (ML) INTRAVENOUS CONTINUOUS
Status: ACTIVE | OUTPATIENT
Start: 2021-02-18 | End: 2021-02-18

## 2021-02-18 RX ORDER — MIDAZOLAM HYDROCHLORIDE 2 MG/2ML
INJECTION, SOLUTION INTRAMUSCULAR; INTRAVENOUS
Status: COMPLETED
Start: 2021-02-18 | End: 2021-02-18

## 2021-02-18 RX ORDER — OXYTOCIN/RINGER'S LACTATE 30/500 ML
PLASTIC BAG, INJECTION (ML) INTRAVENOUS CONTINUOUS PRN
Status: DISCONTINUED | OUTPATIENT
Start: 2021-02-18 | End: 2021-02-18

## 2021-02-18 RX ORDER — CEFAZOLIN SODIUM 2 G/50ML
2000 SOLUTION INTRAVENOUS ONCE
Status: COMPLETED | OUTPATIENT
Start: 2021-02-18 | End: 2021-02-18

## 2021-02-18 RX ORDER — MIDAZOLAM HYDROCHLORIDE 2 MG/2ML
INJECTION, SOLUTION INTRAMUSCULAR; INTRAVENOUS AS NEEDED
Status: DISCONTINUED | OUTPATIENT
Start: 2021-02-18 | End: 2021-02-18

## 2021-02-18 RX ORDER — ROPIVACAINE HYDROCHLORIDE 2 MG/ML
INJECTION, SOLUTION EPIDURAL; INFILTRATION; PERINEURAL
Status: COMPLETED
Start: 2021-02-18 | End: 2021-02-18

## 2021-02-18 RX ORDER — CALCIUM CARBONATE 200(500)MG
1000 TABLET,CHEWABLE ORAL DAILY PRN
Status: DISCONTINUED | OUTPATIENT
Start: 2021-02-18 | End: 2021-02-20 | Stop reason: HOSPADM

## 2021-02-18 RX ORDER — OXYCODONE HYDROCHLORIDE 10 MG/1
10 TABLET ORAL EVERY 4 HOURS PRN
Status: DISCONTINUED | OUTPATIENT
Start: 2021-02-19 | End: 2021-02-20 | Stop reason: HOSPADM

## 2021-02-18 RX ORDER — CHLOROPROCAINE HYDROCHLORIDE 30 MG/ML
INJECTION, SOLUTION EPIDURAL; INFILTRATION; INTRACAUDAL; PERINEURAL AS NEEDED
Status: DISCONTINUED | OUTPATIENT
Start: 2021-02-18 | End: 2021-02-18

## 2021-02-18 RX ORDER — OXYCODONE HYDROCHLORIDE AND ACETAMINOPHEN 5; 325 MG/1; MG/1
1 TABLET ORAL EVERY 6 HOURS PRN
Status: DISPENSED | OUTPATIENT
Start: 2021-02-18 | End: 2021-02-19

## 2021-02-18 RX ORDER — FENTANYL CITRATE/PF 50 MCG/ML
25 SYRINGE (ML) INJECTION
Status: DISCONTINUED | OUTPATIENT
Start: 2021-02-18 | End: 2021-02-20 | Stop reason: HOSPADM

## 2021-02-18 RX ORDER — ROPIVACAINE HYDROCHLORIDE 5 MG/ML
INJECTION, SOLUTION EPIDURAL; INFILTRATION; PERINEURAL AS NEEDED
Status: DISCONTINUED | OUTPATIENT
Start: 2021-02-18 | End: 2021-02-18

## 2021-02-18 RX ORDER — ONDANSETRON 2 MG/ML
INJECTION INTRAMUSCULAR; INTRAVENOUS AS NEEDED
Status: DISCONTINUED | OUTPATIENT
Start: 2021-02-18 | End: 2021-02-18

## 2021-02-18 RX ORDER — IBUPROFEN 600 MG/1
600 TABLET ORAL EVERY 6 HOURS PRN
Status: DISCONTINUED | OUTPATIENT
Start: 2021-02-19 | End: 2021-02-20 | Stop reason: HOSPADM

## 2021-02-18 RX ORDER — MORPHINE SULFATE 0.5 MG/ML
INJECTION, SOLUTION EPIDURAL; INTRATHECAL; INTRAVENOUS
Status: COMPLETED
Start: 2021-02-18 | End: 2021-02-18

## 2021-02-18 RX ORDER — OXYCODONE HYDROCHLORIDE 5 MG/1
5 TABLET ORAL EVERY 4 HOURS PRN
Status: DISCONTINUED | OUTPATIENT
Start: 2021-02-19 | End: 2021-02-20 | Stop reason: HOSPADM

## 2021-02-18 RX ORDER — HYDROMORPHONE HCL/PF 1 MG/ML
1 SYRINGE (ML) INJECTION EVERY 2 HOUR PRN
Status: DISCONTINUED | OUTPATIENT
Start: 2021-02-19 | End: 2021-02-20 | Stop reason: HOSPADM

## 2021-02-18 RX ORDER — DIPHENHYDRAMINE HYDROCHLORIDE 50 MG/ML
25 INJECTION INTRAMUSCULAR; INTRAVENOUS EVERY 6 HOURS PRN
Status: DISCONTINUED | OUTPATIENT
Start: 2021-02-18 | End: 2021-02-20 | Stop reason: HOSPADM

## 2021-02-18 RX ORDER — ONDANSETRON 2 MG/ML
4 INJECTION INTRAMUSCULAR; INTRAVENOUS EVERY 4 HOURS PRN
Status: ACTIVE | OUTPATIENT
Start: 2021-02-18 | End: 2021-02-19

## 2021-02-18 RX ADMIN — PHENYLEPHRINE HYDROCHLORIDE 100 MCG: 10 INJECTION INTRAVENOUS at 13:38

## 2021-02-18 RX ADMIN — MORPHINE SULFATE 3 MG: 0.5 INJECTION, SOLUTION EPIDURAL; INTRATHECAL; INTRAVENOUS at 13:52

## 2021-02-18 RX ADMIN — ROPIVACAINE HYDROCHLORIDE: 2 INJECTION, SOLUTION EPIDURAL; INFILTRATION at 12:11

## 2021-02-18 RX ADMIN — KETOROLAC TROMETHAMINE 30 MG: 30 INJECTION, SOLUTION INTRAMUSCULAR; INTRAVENOUS at 21:35

## 2021-02-18 RX ADMIN — MIDAZOLAM 1 MG: 1 INJECTION INTRAMUSCULAR; INTRAVENOUS at 14:36

## 2021-02-18 RX ADMIN — Medication 200 MILLI-UNITS/MIN: at 13:49

## 2021-02-18 RX ADMIN — PHENYLEPHRINE HYDROCHLORIDE 100 MCG: 10 INJECTION INTRAVENOUS at 13:59

## 2021-02-18 RX ADMIN — ROPIVACAINE HYDROCHLORIDE 6 ML: 5 INJECTION, SOLUTION EPIDURAL; INFILTRATION; PERINEURAL at 08:05

## 2021-02-18 RX ADMIN — MIDAZOLAM 1 MG: 1 INJECTION INTRAMUSCULAR; INTRAVENOUS at 14:29

## 2021-02-18 RX ADMIN — PHENYLEPHRINE HYDROCHLORIDE 100 MCG: 10 INJECTION INTRAVENOUS at 13:51

## 2021-02-18 RX ADMIN — LURASIDONE HYDROCHLORIDE 20 MG: 20 TABLET, FILM COATED ORAL at 20:17

## 2021-02-18 RX ADMIN — ONDANSETRON 4 MG: 2 INJECTION INTRAMUSCULAR; INTRAVENOUS at 04:33

## 2021-02-18 RX ADMIN — LIDOCAINE HYDROCHLORIDE,EPINEPHRINE BITARTRATE 5 ML: 20; .005 INJECTION, SOLUTION EPIDURAL; INFILTRATION; INTRACAUDAL; PERINEURAL at 13:29

## 2021-02-18 RX ADMIN — SODIUM CHLORIDE, SODIUM LACTATE, POTASSIUM CHLORIDE, AND CALCIUM CHLORIDE: .6; .31; .03; .02 INJECTION, SOLUTION INTRAVENOUS at 13:17

## 2021-02-18 RX ADMIN — CHLOROPROCAINE HYDROCHLORIDE 300 MG: 30 INJECTION, SOLUTION EPIDURAL; INFILTRATION; INTRACAUDAL; PERINEURAL at 13:44

## 2021-02-18 RX ADMIN — ONDANSETRON 4 MG: 2 INJECTION INTRAMUSCULAR; INTRAVENOUS at 13:50

## 2021-02-18 RX ADMIN — FENTANYL CITRATE 50 MCG: 50 INJECTION, SOLUTION INTRAMUSCULAR; INTRAVENOUS at 13:51

## 2021-02-18 RX ADMIN — FENTANYL CITRATE 50 MCG: 50 INJECTION, SOLUTION INTRAMUSCULAR; INTRAVENOUS at 14:32

## 2021-02-18 RX ADMIN — ALPRAZOLAM 0.5 MG: 0.5 TABLET ORAL at 12:13

## 2021-02-18 RX ADMIN — ONDANSETRON 4 MG: 2 INJECTION INTRAMUSCULAR; INTRAVENOUS at 11:56

## 2021-02-18 RX ADMIN — FENTANYL CITRATE 50 MCG: 50 INJECTION, SOLUTION INTRAMUSCULAR; INTRAVENOUS at 14:35

## 2021-02-18 RX ADMIN — MIDAZOLAM 1 MG: 1 INJECTION INTRAMUSCULAR; INTRAVENOUS at 13:53

## 2021-02-18 RX ADMIN — Medication 500 MILLI-UNITS/MIN: at 14:22

## 2021-02-18 RX ADMIN — CEFAZOLIN SODIUM 2000 MG: 2 SOLUTION INTRAVENOUS at 13:24

## 2021-02-18 RX ADMIN — SODIUM CHLORIDE 500 MG: 0.9 INJECTION, SOLUTION INTRAVENOUS at 13:07

## 2021-02-18 RX ADMIN — DOCUSATE SODIUM 100 MG: 100 CAPSULE, LIQUID FILLED ORAL at 18:02

## 2021-02-18 RX ADMIN — ROPIVACAINE HYDROCHLORIDE: 2 INJECTION, SOLUTION EPIDURAL; INFILTRATION at 05:00

## 2021-02-18 RX ADMIN — LIDOCAINE HYDROCHLORIDE,EPINEPHRINE BITARTRATE 10 ML: 20; .005 INJECTION, SOLUTION EPIDURAL; INFILTRATION; INTRACAUDAL; PERINEURAL at 13:03

## 2021-02-18 RX ADMIN — FENTANYL CITRATE 50 MCG: 50 INJECTION, SOLUTION INTRAMUSCULAR; INTRAVENOUS at 13:53

## 2021-02-18 RX ADMIN — KETOROLAC TROMETHAMINE 30 MG: 30 INJECTION, SOLUTION INTRAMUSCULAR; INTRAVENOUS at 15:35

## 2021-02-18 RX ADMIN — LIDOCAINE HYDROCHLORIDE,EPINEPHRINE BITARTRATE 5 ML: 20; .005 INJECTION, SOLUTION EPIDURAL; INFILTRATION; INTRACAUDAL; PERINEURAL at 13:43

## 2021-02-18 RX ADMIN — SODIUM CHLORIDE, SODIUM LACTATE, POTASSIUM CHLORIDE, AND CALCIUM CHLORIDE 125 ML/HR: .6; .31; .03; .02 INJECTION, SOLUTION INTRAVENOUS at 06:06

## 2021-02-18 RX ADMIN — PHENYLEPHRINE HYDROCHLORIDE 100 MCG: 10 INJECTION INTRAVENOUS at 13:21

## 2021-02-18 RX ADMIN — OXYCODONE HYDROCHLORIDE AND ACETAMINOPHEN 1 TABLET: 5; 325 TABLET ORAL at 23:25

## 2021-02-18 NOTE — UTILIZATION REVIEW
Initial Clinical Review    Admission: Date/Time/Statement:   Admission Orders (From admission, onward)     Ordered        21  INPATIENT ADMISSION  Once                   Orders Placed This Encounter   Procedures    INPATIENT ADMISSION     Standing Status:   Standing     Number of Occurrences:   1     Order Specific Question:   Level of Care     Answer:   Med Surg [16]     Order Specific Question:   Estimated length of stay     Answer:   More than 2 Midnights     Order Specific Question:   Certification     Answer:   I certify that inpatient services are medically necessary for this patient for a duration of greater than two midnights  See H&P and MD Progress Notes for additional information about the patient's course of treatment  ED Arrival Information     Patient not seen in ED                     Chief Complaint   Patient presents with    Scheduled Induction     Assessment/Plan: ON  26yo  at 39w0d weeks gestation  admitted for an elective IOL  Cervix exam : FT/30/-3  IOL consisted of Cytotec with hollis balloon, Epidural with AROM  Use of IUPC  Patient failed to make change after 13 hours of being ruptured with Pitocin  Decision was mad to proceed with primary low transverse C section on 2021 1349 with viable male  born apgars 9/9    2021 11:30 PM  Cervical Dilation: Fingertip, First dose of vaginal Cytotec     2021 4:20 am  Hollis balloon   2021 4:33 am  Cervical Dilation: 1  2021 12:43 PM Cervical Dilation: 4, Will start pitocin at this time  2021 6:31 PM  14 gennaro-units/min   Cervical Dilation: 5; interested in epidural  Pt will get epidural and when comfortable and will plan AROM  2021 6:54 PM  Epidural Block  2021 9:45 PM AROM for clear fluid, Oxytocin: 10 gennaro-units/min, Cervical Dilation: 4-5    2021 12:06 am  Oxytocin: 12 gennaro-units/min(increased pitocin to 12 per dr Rex Pierce verbal orders)  Cervical Dilation: 5  2021 4:10 am Patient feeling more uncomfortable and with more pressure  Cervix unchanged  A one hour pitocin break was given and was restarted, Oxytocin: 8 gennaro-units/min  Cervical Dilation: 5  2021 8:32 am  Oxytocin: 20 gennaro-units/min, Cervical Dilation: 5, Cervical Effacement: 70  Patient with no change on exam; overall comfortable with ctx s/p epidural  2021 11:22 am  Discussed w/ patient that unfortunately at this time she meets criteria for failed induction of labor  She has been ruptured for 13hrs and has been on pitocin during that time  She has been 5cm for 12 hours without any cervical change appreciated  Her tracing has been Category I without decelerations  At this point, recommend proceeding with 1LTCS for failed induction of labor  2021 1349 OPERATIVE REPORT  SURGERY DATE: 2021  Procedure(s) (LRB):   SECTION () (N/A)   Anesthesia Type:   Epidural  Operative Indications:  Failed induction of labor  Operative Findings:  Operative Findings:  1   Viable male  at 80 with APGARs of 9 and 9 at 1 and 5 minutes     Triage Vitals   Temperature Pulse Respirations Blood Pressure SpO2   21 --   98 1 °F (36 7 °C) 101 18 117/72       Temp Source Heart Rate Source Patient Position - Orthostatic VS BP Location FiO2 (%)   21 --   Oral Monitor Lying Left arm       Pain Score       21       No Pain          Wt Readings from Last 1 Encounters:   21 113 kg (249 lb)     Additional Vital Signs:   Date/Time  Temp  Pulse  Resp  BP  MAP (mmHg)  SpO2  O2 Device  Cardiac (WDL)  Patient Position - Orthostatic VS   21 1500  97 4 °F (36 3 °C)Abnormal   83  18  127/83  --  --  None (Room air)  WDL  --   21 1100  97 5 °F (36 4 °C)  96  14  --  --  100 %  None (Room air)  --  Lying   21 0748  97 3 °F (36 3 °C)Abnormal   108Abnormal   12  131/90  --  99 % None (Room air)  WDL  Lying   02/20/21 0334  97 4 °F (36 3 °C)Abnormal   95  12  126/82  --  99 %  None (Room air)  --  Lying   02/19/21 2300  --  --  --  --  --  --  None (Room air)  WDL  --   02/19/21 2230  97 8 °F (36 6 °C)  103  18  137/84  --  --  --  --  Sitting   02/19/21 1900  97 7 °F (36 5 °C)  102  18  141/84  --  --  --  --  Sitting   02/19/21 1500  98 °F (36 7 °C)  72  18  124/77  --  98 %  None (Room air)  WDL  Lying - Orthostatic VS   02/19/21 1100  98 °F (36 7 °C)  96  18  118/87  --  98 %  None (Room air)  --  Lying   02/19/21 0742  97 9 °F (36 6 °C)  101  16  128/77  --  --  --  --  Lying   02/19/21 0700  --  --  --  --  --  --  None (Room air)  WDL  --   02/19/21 0335  98 °F (36 7 °C)  100  18  116/76  --  98 %  None (Room air)  --  Lying   02/19/21 0315  --  --  --  --  --  --  None (Room air)  --  --   02/19/21 0037  98 9 °F (37 2 °C)  100  18  118/72  --  99 %  None (Room air)  --  Lying   02/18/21 2300  --  --  --  --  --  --  None (Room air)  WDL  --   02/18/21 1940  98 5 °F (36 9 °C)  110Abnormal   18  128/78  --  98 %  None (Room air)  WDL  Lying   02/18/21 1840  98 6 °F (37 °C)  100  18  134/78  88  98 %  None (Room air)  WDL  Lying   02/18/21 1740  99 4 °F (37 4 °C)  97  18  119/73  85  97 %  None (Room air)  WDL  Lying   02/18/21 1640  98 6 °F (37 °C)  88  18  124/72  --  97 %  None (Room air)  WDL  Lying   02/18/21 1540  --  101  --  116/53  --  97 %  --  WDL  Lying   02/18/21 1530  --  103  --  125/58  --  97 %  --  WDL  Lying   02/18/21 1520  --  104  --  128/77  --  97 %  None (Room air)  WDL  Lying   02/18/21 1510  98 °F (36 7 °C)  108Abnormal   18  106/51  --  98 %  None (Room air)  WDL  Lying   02/18/21 1300  --  96  --  121/64  --  --  --  --  --   02/18/21 1245  --  89  --  104/55  --  --  --  --  --   02/18/21 1241  97 6 °F (36 4 °C)  --  --  --  --  --           Date/Time  Weight  Height   02/16/21 2037  113 kg (249 lb)  5' 5" (1 651 m)         Pertinent Labs/Diagnostic Test Results:       Results from last 7 days   Lab Units 02/16/21 2059   WBC Thousand/uL 17 16*   HEMOGLOBIN g/dL 11 3*   HEMATOCRIT % 34 6*   PLATELETS Thousands/uL 271     ED Treatment:   Medication Administration - No Administrations Displayed (No Start Event Found)     None        Past Medical History:   Diagnosis Date    Celiac disease     Gastroparesis     Marijuana use, continuous 7/10/2018    Last Assessment & Plan:  Mayo Lay reports that she has less ability to use THC due to her step-daughter being in the house  She reports that she has significantly decrease to twice/day  Reviewed risks of routine daily use, she does not feel this is an issue & states "I know my body" & is not willing to quit at this point in time   Nicotine use disorder 7/10/2018    Last Assessment & Plan:  Robert Ladd continues to smoke 1 cigarette/day  She reports that "it will not be hard for her to stop" & plans on quitting immediately   Personality disorder ([facility] )     Substance abuse ([facility] )     marijuana    Trauma     PTSD    Varicella      Present on Admission:  **None**    Admitting Diagnosis: Encounter for induction of labor [Z34 90]  Age/Sex: 22 y o  female  Admission Orders:  Continuous external uterine contraction & fetal heart rate monitoring   Scheduled Medications:  lurasidone, 20 mg, Oral, HS  sodium chloride, 1,000 mL, Intravenous, Once    Continuous IV Infusions:  lactated ringers, 125 mL/hr, Intravenous, Continuous  oxytocin, 1-30 gennaro-units/min, Intravenous, Titrated  ropivacaine 0 2%, , Epidural, Continuous    PRN Meds:  ALPRAZolam, 0 5 mg, Oral, TID PRN  ondansetron, 4 mg, Intravenous, Q6H PRN        IP CONSULT TO ANESTHESIOLOGY    Network Utilization Review Department  ATTENTION: Please call with any questions or concerns to [phone] and carefully listen to the prompts so that you are directed to the right person   All voicemails are confidential   Laney Nicole all requests for admission clinical reviews, approved or denied determinations and any other requests to dedicated fax number below belonging to the campus where the patient is receiving treatment   List of dedicated fax numbers for the Facilities:  [address] [place] [address] DENIALS (Administrative/Medical Necessity) [phone]   [address]  [facility] (Maternity/NICU/Pediatrics) [phone]   [address] [place] [address] Dr Renee Kang 3625 (  [facility] Simmons [facility]) [address] [address] [address]81 [address] [place]) [address] [place] [address] [phone]

## 2021-02-18 NOTE — OB LABOR/OXYTOCIN SAFETY PROGRESS
Oxytocin Safety Progress Check Note - Naoma Squibb 22 y o  female MRN: [id number]    Unit/Bed#: L&D 325-01 Encounter: [id number]    Dose (gennaro-units/min) Oxytocin: 8 gennaro-units/min  Contraction Frequency (minutes): 3-4  Contraction Quality: Moderate  Tachysystole: No   Cervical Dilation: 5        Cervical Effacement: 70  Fetal Station: -1  Baseline Rate: 140 bpm  Fetal Heart Rate: 140 BPM  FHR Category: Category I               Vital Signs:   Vitals:    02/18/21 0402   BP: 139/65   Pulse: 89   Resp:    Temp:            Notes/comments:      Patient feeling more uncomfortable and with more pressure  Cervix unchanged  A one hour pitocin break was given and was restarted  Will continue Pitocin titration  FHT category I       Shaq Cali MD 2/18/2021 4:10 AM

## 2021-02-18 NOTE — OB LABOR/OXYTOCIN SAFETY PROGRESS
Oxytocin Safety Progress Check Note - Ld Finley 22 y o  female MRN: [id number]    Unit/Bed#: L&D 325-01 Encounter: [id number]    Dose (gennaro-units/min) Oxytocin: 10 gennaro-units/min  Contraction Frequency (minutes): 2  Contraction Quality: Mild, Moderate  Tachysystole: No   Cervical Dilation: 4-5        Cervical Effacement: 60  Fetal Station: -1  Baseline Rate: 145 bpm  Fetal Heart Rate: 140 BPM  FHR Category: Category I               Vital Signs:   Vitals:    02/17/21 2115   BP: 121/78   Pulse: (!) 109   Resp:    Temp:            Notes/comments:   Pt reports that she is comfortable with epidural   AROM for clear fluid  If no cervical change at next examination, will place IUPC as contractions every 2 minutes  Reviewed with patient purpose and use of IUPC to measure strength of contractions in addition to duration and frequency  She is agreeable with plan of care if IUPC is needed       Brent Hurd MD 2/17/2021 9:45 PM

## 2021-02-18 NOTE — DISCHARGE SUMMARY
CS Discharge Summary - Alonzo Tesfaye 22 y o  female MRN: [id number]    Unit/Bed#: LD OR  Encounter: [id number]    Admission Date: 2021     Discharge Date: 21    Admitting Diagnosis:   44 weeks gestation of pregnancy  Maternal obesity  High risk pregnancy with high inhibin  Celiac disease  PTSD  Anxiety/Depression  Bipolar Disorder    Discharge Diagnosis:   Same, delivered    Procedures:   primary  section, low transverse incision    Admitting Attending: Dr Lauren Bolaños  Delivery Attending: Dr Stinson Antis  Discharge Attending: Dr Vanesa Holloway service: Case management  Consult attending: none    Hospital Course:     Alonzo Tesfaye is a 22 y o  [id number] who was admitted for elective induction of labor  Cervical ripening was performed with cytotec followed by FB insertion  Stadol and phenergan were administered for patient comfort  She was started on pitocin titration after hollis balloon was expelled  She was made comfortable with an epidural and membranes were artificially ruptured and IUPC was placed  Patient failed to make change after 13 hours of being ruptured with pitocin  The decision was made to proceed with primary low transverse  section  She then underwent an uncomplicated  section delivery and delivered a viable male  on 2021 at 1349  APGARS were 9, 9 at 1 and 5 minutes, respectively   weighed 7lb 10 8oz   was then transferred to  nursery  Patient tolerated the procedure well and was transferred to recovery in stable condition  The patient's post partum course was unremarkable        On day of discharge, she was ambulating and able to reasonably perform all ADLs  She was voiding and had appropriate bowel function  Pain was well controlled  She was discharged home on post-operative day #2 without complications   Patient was instructed to follow up with her OB as an outpatient and was given appropriate warnings to call provider if she develops signs of infection or uncontrolled pain  Complications:   None      Condition at discharge:   good     Provisions for Follow-Up Care:  See after visit summary for information related to follow-up care and any pertinent home health orders  Disposition:   See After Visit Summary for discharge disposition information  Planned Readmission:   No    Discharge Medications:   Prenatal vitamin daily for 6 months or the duration of nursing whichever is longer  Motrin 600 mg orally every 6 hours as needed for pain  Tylenol (over the counter) per bottle directions as needed for pain  Hydrocortisone cream 1% (over the counter) applied 1-2x daily to hemorrhoids as needed  Witch hazel pads for hemorrhoidal discomfort as needed      Discharge instructions :   -Do not place anything (no partner, tampons or douche) in your vagina for 6 weeks  -You may walk for exercise for the first 6 weeks then gradually return to your usual activities    -Please do not drive for 1 week if you have no stitches and for 2 weeks if you have stitches or underwent a  delivery     -You may take baths or shower per your preference    -Please look at your bust (breasts) in the mirror daily and call provider for redness or tenderness or increased warmth  - If you have had a  please look at your incision daily as well and call provider for increasing redness or steady drainage from the incision    -Please call your provider if temperature > 100 4*F or 38* C, worsening pain or a foul discharge      Celina Gusman MD  [address] [place] [address]  2021 8:12 PM

## 2021-02-18 NOTE — OB LABOR/OXYTOCIN SAFETY PROGRESS
Oxytocin Safety Progress Check Note - Maxime Bean 22 y o  female MRN: [id number]    Unit/Bed#: L&D 325-01 Encounter: [id number]    Dose (gennaro-units/min) Oxytocin: 24 gennaro-units/min  Contraction Frequency (minutes): 2  Contraction Quality: Moderate  Tachysystole: No   Cervical Dilation: 5  Cervical Effacement: 70  Fetal Station: -1  Baseline Rate: 150 bpm  Fetal Heart Rate: 140 BPM  FHR Category: Category I             Vitals:    02/18/21 1030   BP: 97/55   Pulse: 91   Resp:    Temp:            Notes/comments:      No cervical change appreciated  MVUs inadequate  Cat I tracing        Philippe Smith MD 2/18/2021 10:58 AM

## 2021-02-18 NOTE — ANESTHESIA PROCEDURE NOTES
Epidural Block    Start time: 2/17/2021 6:54 PM  Reason for block: primary anesthetic  Staffing  Anesthesiologist: Marie Osuna DO  Performed: anesthesiologist   Preanesthetic Checklist  Completed: patient identified, site marked, surgical consent, pre-op evaluation, timeout performed, IV checked, risks and benefits discussed and monitors and equipment checked  Epidural  Patient position: sitting  Prep: Betadine and site prepped and draped  Patient monitoring: heart rate and frequent blood pressure checks  Approach: midline  Location: lumbar (1-5)  Injection technique: KIKA air  Needle  Needle type: Tuohy   Needle gauge: 18 G  Catheter size: 20 G  Catheter at skin depth: 11 cm  Test dose: negativelidocaine 1 5% with epinephrine 1:200,000 test dose, 3 mLnegative aspiration for CSF, negative aspiration for heme and no paresthesia on injection  patient tolerated the procedure well with no immediate complications

## 2021-02-18 NOTE — PLAN OF CARE
Problem: Knowledge Deficit  Goal: Verbalizes understanding of labor plan  Description: Assess patient/family/caregiver's baseline knowledge level and ability to understand information  Provide education via patient/family/caregiver's preferred learning method at appropriate level of understanding  1  Provide teaching at level of understanding  2  Provide teaching via preferred learning method(s)  Outcome: Progressing     Problem: Labor & Delivery  Goal: Manages discomfort  Description: Assess and monitor for signs and symptoms of discomfort  Assess patient's pain level regularly and per hospital policy  Administer medications as ordered  Support use of nonpharmacological methods to help control pain such as distraction, imagery, relaxation, and application of heat and cold  Collaborate with interdisciplinary team and patient to determine appropriate pain management plan  1  Include patient in decisions related to comfort  2  Offer non-pharmacological pain management interventions  3  Report ineffective pain management to physician  Outcome: Completed  Goal: Patient vital signs are stable  Description: 1  Assess vital signs - vaginal delivery    Outcome: Completed     Problem: ANTEPARTUM  Goal: Maintain pregnancy as long as maternal and/or fetal condition is stable  Description: INTERVENTIONS:  - Maternal surveillance  - Fetal surveillance  - Monitor uterine activity  - Medications as ordered  - Bedrest  Outcome: Completed     Problem: BIRTH - VAGINAL/ SECTION  Goal: Fetal and maternal status remain reassuring during the birth process  Description: INTERVENTIONS:  - Monitor vital signs  - Monitor fetal heart rate  - Monitor uterine activity  - Monitor labor progression (vaginal delivery)  - DVT prophylaxis  - Antibiotic prophylaxis  Outcome: Completed  Goal: Emotionally satisfying birthing experience for mother/fetus  Description: Interventions:  - Assess, plan, implement and evaluate the nursing care given to the patient in labor  - Advocate the philosophy that each childbirth experience is a unique experience and support the family's chosen level of involvement and control during the labor process   - Actively participate in both the patient's and family's teaching of the birth process  - Consider cultural, Judaism and age-specific factors and plan care for the patient in labor  Outcome: Completed     Problem: POSTPARTUM  Goal: Experiences normal postpartum course  Description: INTERVENTIONS:  - Monitor maternal vital signs  - Assess uterine involution and lochia  Outcome: Progressing  Goal: Appropriate maternal -  bonding  Description: INTERVENTIONS:  - Identify family support  - Assess for appropriate maternal/infant bonding   -Encourage maternal/infant bonding opportunities  - Referral to  or  as needed  Outcome: Progressing  Goal: Establishment of infant feeding pattern  Description: INTERVENTIONS:  - Assess breast/bottle feeding  - Refer to lactation as needed  Outcome: Progressing  Goal: Incision(s), wounds(s) or drain site(s) healing without S/S of infection  Description: INTERVENTIONS  - Assess and document risk factors for skin impairment   - Assess and document dressing, incision, wound bed, drain sites and surrounding tissue  - Consider nutrition services referral as needed  - Oral mucous membranes remain intact  - Provide patient/ family education  Outcome: Progressing

## 2021-02-18 NOTE — QUICK NOTE
Discussed w/ patient that unfortunately at this time she meets criteria for failed induction of labor  She has been ruptured for 13hrs and has been on pitocin during that time  She has been 5cm for 12 hours without any cervical change appreciated  Her tracing has been Category I without decelerations  At this point, recommend proceeding with 1LTCS for failed induction of labor  Explained surgery to patient and partner, including risks such as bleeding, infection and damage to other organs  Discussed possibility of TOLAC in future pregnancies  Patient and partner expressed understanding and agree to proceed w/ 1LTCS  Pitocin discontinued  Ancef 2g IV will be ordered  Anesthesia aware  Dr Esme Ring counseled patient re: above findings        Yue Rosenbaum MD  OB/GYN PGY-3  2/18/2021  11:43 AM

## 2021-02-18 NOTE — OB LABOR/OXYTOCIN SAFETY PROGRESS
Oxytocin Safety Progress Check Note - Agata Og 22 y o  female MRN: [id number]    Unit/Bed#: L&D 325-01 Encounter: [id number]    Dose (genanro-units/min) Oxytocin: 20 gennaro-units/min  Contraction Frequency (minutes): 2-3  Contraction Quality: Moderate, Mild  Tachysystole: No     Cervical Dilation: 5  Cervical Effacement: 70  Fetal Station: -1     Baseline Rate: 140 bpm  Fetal Heart Rate: 140 BPM  FHR Category: Category I    Patient with no change on exam; overall comfortable with ctx s/p epidural   MVUs increasing, now about 180  Plan to obtain adequate MCUs      Vital Signs:   Vitals:    02/18/21 0825   BP:    Pulse:    Resp:    Temp: 97 6 °F (36 4 °C)       D/w Dr Sofia Cortez MD 2/18/2021 8:32 AM

## 2021-02-18 NOTE — DISCHARGE INSTRUCTIONS
Section   WHAT YOU SHOULD KNOW:   A  delivery, or , is abdominal surgery to deliver your baby  There are many reasons you may need a   · A  may be scheduled before labor if you had a  with your last baby  It may be scheduled if your baby is not positioned normally, or you are pregnant with more than 1 baby  · Your caregiver may perform an emergency  during labor to prevent life-threatening complications for you or your baby  A  may be done if your cervix does not dilate after several hours of active labor  · Other reasons for a  include maternal infections and problems with the placenta  AFTER YOU LEAVE:   Medicines:   · Prescription pain medicine  may be given  Ask how to take this medicine safely  · Acetaminophen  decreases pain and fever  It is available without a doctor's order  Ask how much to take and how often to take it  Follow directions  Acetaminophen can cause liver damage if not taken correctly  · NSAIDs  help decrease swelling and pain or fever  This medicine is available with or without a doctor's order  NSAIDs can cause stomach bleeding or kidney problems in certain people  If you take blood thinner medicine, always ask your obstetrician if NSAIDs are safe for you  Always read the medicine label and follow directions  · Take your medicine as directed  Contact your obstetrician (OB) if you think your medicine is not helping or if you have side effects  Tell him if you are allergic to any medicine  Keep a list of the medicines, vitamins, and herbs you take  Include the amounts, and when and why you take them  Bring the list or the pill bottles to follow-up visits  Carry your medicine list with you in case of an emergency  Follow up with your OB as directed: You may need to return to have your stitches or staples removed  Write down your questions so you remember to ask them during your visits    Wound care:  Carefully wash your wound with soap and water every day  Keep your wound clean and dry  Wear loose, comfortable clothes that do not rub against your wound  Ask your OB about bathing and showering  Drink plenty of liquids: You can lower your risk for a blood clot if you drink plenty of liquids  Ask how much liquid to drink each day and which liquids are best for you  Limit activity until you have fully recovered from surgery:   · Ask when it is safe for you to drive, walk up stairs, lift heavy objects, and have sex  · Ask when it is okay to exercise, and what types of exercise to do  Start slowly and do more as you get stronger  Contact your OB if:   · You have heavy vaginal bleeding that fills 1 or more sanitary pads in 1 hour  · You have a fever  · Your incision is swollen, red, or draining pus  · You have questions or concerns about yourself or your baby  Seek care immediately or call 911 if:   · Blood soaks through your bandage  · Your stitches come apart  · You feel lightheaded, short of breath, and have chest pain  · You cough up blood  · Your arm or leg feels warm, tender, and painful  It may look swollen and red  © 2014 [address] is for End User's use only and may not be sold, redistributed or otherwise used for commercial purposes  All illustrations and images included in CareNotes® are the copyrighted property of [employer] M [employer]  or Gene Mohamud  The above information is an  only  It is not intended as medical advice for individual conditions or treatments  Talk to your doctor, nurse or pharmacist before following any medical regimen to see if it is safe and effective for you

## 2021-02-18 NOTE — ANESTHESIA POSTPROCEDURE EVALUATION
Post-Op Assessment Note    CV Status:  Stable  Pain Score: 2    Pain management: adequate     Mental Status:  Alert and awake   Hydration Status:  Euvolemic   PONV Controlled:  Controlled   Airway Patency:  Patent      Post Op Vitals Reviewed: Yes      Staff: Anesthesiologist     Post-op block assessment: no complications      No complications documented      BP      Temp      Pulse     Resp      SpO2

## 2021-02-18 NOTE — PLAN OF CARE
Problem: Knowledge Deficit  Goal: Verbalizes understanding of labor plan  Description: Assess patient/family/caregiver's baseline knowledge level and ability to understand information  Provide education via patient/family/caregiver's preferred learning method at appropriate level of understanding  1  Provide teaching at level of understanding  2  Provide teaching via preferred learning method(s)  Outcome: Progressing     Problem: Labor & Delivery  Goal: Manages discomfort  Description: Assess and monitor for signs and symptoms of discomfort  Assess patient's pain level regularly and per hospital policy  Administer medications as ordered  Support use of nonpharmacological methods to help control pain such as distraction, imagery, relaxation, and application of heat and cold  Collaborate with interdisciplinary team and patient to determine appropriate pain management plan  1  Include patient in decisions related to comfort  2  Offer non-pharmacological pain management interventions  3  Report ineffective pain management to physician  Outcome: Progressing  Goal: Patient vital signs are stable  Description: 1  Assess vital signs - vaginal delivery    Outcome: Progressing     Problem: ANTEPARTUM  Goal: Maintain pregnancy as long as maternal and/or fetal condition is stable  Description: INTERVENTIONS:  - Maternal surveillance  - Fetal surveillance  - Monitor uterine activity  - Medications as ordered  - Bedrest  Outcome: Progressing     Problem: BIRTH - VAGINAL/ SECTION  Goal: Fetal and maternal status remain reassuring during the birth process  Description: INTERVENTIONS:  - Monitor vital signs  - Monitor fetal heart rate  - Monitor uterine activity  - Monitor labor progression (vaginal delivery)  - DVT prophylaxis  - Antibiotic prophylaxis  Outcome: Progressing  Goal: Emotionally satisfying birthing experience for mother/fetus  Description: Interventions:  - Assess, plan, implement and evaluate the nursing care given to the patient in labor  - Advocate the philosophy that each childbirth experience is a unique experience and support the family's chosen level of involvement and control during the labor process   - Actively participate in both the patient's and family's teaching of the birth process  - Consider cultural, Judaism and age-specific factors and plan care for the patient in labor  Outcome: Progressing

## 2021-02-18 NOTE — OP NOTE
OPERATIVE REPORT  PATIENT NAME: Kerrie José    :  1995  MRN: [id number]  Pt Location: [facility] L&D OR ROOM 01    SURGERY DATE: 2021    Surgeon(s) and Role:     * Natalie Tavares MD - Primary     * Debbie Mccullough MD - Luann Godfrey MD - Assisting    Preop Diagnosis:  Patient Active Problem List   Diagnosis    Bipolar affective disorder, currently depressed, moderate ([facility] [address] )    Celiac disease    Diffuse cystic mastopathy    Eating disorder    Osteopenia    Panic disorder with agoraphobia    PTSD (post-traumatic stress disorder)    Slow transit constipation    Seasonal allergies    Migraine    Depression    Anxiety    Bipolar disease during pregnancy in third trimester ([facility] [address] )    Maternal obesity, antepartum, third trimester    High risk pregnancy with high inhibin    39 weeks gestation of pregnancy    Sciatica of right side     Failed induction of labor    Procedure(s) (LRB):   SECTION () (N/A)    Specimen(s):  ID Type Source Tests Collected by Time Destination   A :  Tissue (Placenta on Hold) OB Only Placenta PLACENTA IN STORAGE Natalie Tavares MD 2021 1349    B :  Cord Blood Cord BLOOD GAS, VENOUS, CORD Natalie Tavares MD 2021 1349    C :  Cord Blood Cord BLOOD GAS, ARTERIAL, CORD Natalie Tavares MD 2021 1350        QBL:  648cc    Drains:  Urethral Catheter Double-lumen;Non-latex 15 Fr  (Active)   Goal for Removal Other (Comment) 21   Site Assessment Clean;Skin intact; Patent 21   Collection Container Standard drainage bag 21   Securement Method Securing device (Describe) 21   Output (mL) 800 mL 21 1300   Number of days: 1       Anesthesia Type:   Epidural    Operative Indications:  Failed induction of labor    Operative Findings:  Operative Findings:  1  Viable male  at 80 with APGARs of 9 and 9 at 1 and 5 minutes   Fetus weighted 7lb 10 8oz   2  Normal intact placenta with centrally inserted 3VC  3  Normal uterus, bilateral tubes and ovaries  4  Blood gases:   Arterial pH:  7 337   Arterial base excess:  -3 5   Venous pH:  7 391   Venous base excess:  -1 6    The patient was taken to the operating room  Epidural anesthesia was adequately established and Ancef was given for preoperative prophylaxis  The patient was then placed in the dorsal supine position with a left tilt of the hips  The patient was then prepped with betadine for vaginal prep and chloraprep for abdominal prep and draped in the usual sterile fashion for a Pfannenstiel skin incision  A time out was performed to confirm correct patient and correct procedure  An incision was made in the skin with a surgical scalpel and, with Bovie electrocautery, dissection was carried out over subsequent layers of tissue including the fascia; the Bovie was also used for hemostasis  The fascia was incised at the midline and the fascial incision was extended bilaterally using hemostats and Bovie electrocautery  The superior edge of the fascial incision was grasped with Kocher clamps, tented up and the underlying rectus muscles were dissected off bluntly and sharply using the Bovie  The rectus muscles were then divided at midline and the peritoneum was identified, tented up at its upper margin taking care to avoid the bladder, and then entered  The peritoneal incision was extended superiorly and inferiorly  The bladder blade was inserted and a transverse incision was made in the lower uterine segment using a new surgical blade  The uterine incision was extended cephalad and caudal using blunt dissection  The amniotic sac was entered and the amniotic fluid was noted to be clear  The surgeon's hand was placed into the uterine cavity  The fetal head was identified and elevated through the uterine incision with the assistance of fundal pressure  With gentle traction, the shoulder was delivered, followed by the rest of the fetal body  There was no nuchal cord noted  On delivery the cord was doubly clamped and cut after delayed cord clamping  The infant was then passed off the table to the awaiting  staff  The  was noted to cry spontaneously and moved all extremities  Venous and arterial blood gas, cord blood, and portion of cord was obtained for analysis and routine blood testing  The placenta delivery was then sent to storage  Placenta was noted to be intact with a centrally inserted three-vessel cord  Oxytocin was administered by IV infusion to enhance uterine contraction  The uterus was exteriorized and cleared of all clots and remaining products of conception  There was noted to be a left uterine extension of the incision  The uterine incision was re approximated using 0 Vicryl in a running locked fashion  A second horizontal imbricating stitch with 0 Vicryl was applied  Two figure-of-eight sutures were thrown along the uterine incision; the entire incision was then examined and noted to be hemostatic  The posterior cul-de-sac was cleared of all clots and products of conception  The uterus was replaced into the abdomen and the pericolic gutters were cleared of all clots  The uterine incision was once again reexamined and noted to be hemostatic  The fascia was re approximated using 0 Vicryl in a running nonlocked fashion  The subcutaneous tissue was irrigated and cleared of all clots and debris  Good hemostasis was noted with Bovie electrocautery  The subcutaneous tissue was reapproximated with 2-0 chromic  The skin incision was closed using 4-0 Monocryl  Good hemostasis was noted  Patient tolerated the procedure well  All needle, sponge, and instrument counts were noted to be correct x 2 at the end of the procedure  Patient was transferred to the recovery room in stable condition  Dr Paresh Tamayo was present for the procedure        Complications:   None    Patient Disposition:  PACU     SIGNATURE: Karlene Galvin MD  DATE: February 18, 2021  TIME: 3:13 PM

## 2021-02-18 NOTE — OB LABOR/OXYTOCIN SAFETY PROGRESS
Oxytocin Safety Progress Check Note - Hernán Fofana 22 y o  female MRN: [id number]    Unit/Bed#: L&D 325-01 Encounter: [id number]    Dose (gennaro-units/min) Oxytocin: 12 gennaro-units/min(increased pitocin to 12 per dr Braden Nava verbal orders)  Contraction Frequency (minutes): 2  Contraction Quality: Mild, Moderate  Tachysystole: Yes   Cervical Dilation: 5        Cervical Effacement: 70  Fetal Station: -1  Baseline Rate: 145 bpm  Fetal Heart Rate: 140 BPM  FHR Category: Category I               Vital Signs:   Vitals:    02/17/21 2316   BP: 120/58   Pulse: 96   Resp:    Temp:            Notes/comments:    Pt sleeping comfortably with epidural  Easily aroused  SVE with minimal change noted  IUPC placed for better titration of pitocin      Carlos Gallardo MD 2/18/2021 12:06 AM

## 2021-02-19 LAB
ERYTHROCYTE [DISTWIDTH] IN BLOOD BY AUTOMATED COUNT: 14 % (ref 11.6–15.1)
HCT VFR BLD AUTO: 29.2 % (ref 34.8–46.1)
HGB BLD-MCNC: 9.2 G/DL (ref 11.5–15.4)
MCH RBC QN AUTO: 30.4 PG (ref 26.8–34.3)
MCHC RBC AUTO-ENTMCNC: 31.5 G/DL (ref 31.4–37.4)
MCV RBC AUTO: 96 FL (ref 82–98)
PLATELET # BLD AUTO: 188 THOUSANDS/UL (ref 149–390)
PMV BLD AUTO: 11.3 FL (ref 8.9–12.7)
RBC # BLD AUTO: 3.03 MILLION/UL (ref 3.81–5.12)
WBC # BLD AUTO: 16.29 THOUSAND/UL (ref 4.31–10.16)

## 2021-02-19 PROCEDURE — 85027 COMPLETE CBC AUTOMATED: CPT | Performed by: OBSTETRICS & GYNECOLOGY

## 2021-02-19 PROCEDURE — 99024 POSTOP FOLLOW-UP VISIT: CPT | Performed by: OBSTETRICS & GYNECOLOGY

## 2021-02-19 RX ORDER — SIMETHICONE 80 MG
80 TABLET,CHEWABLE ORAL EVERY 6 HOURS PRN
Status: DISCONTINUED | OUTPATIENT
Start: 2021-02-19 | End: 2021-02-20 | Stop reason: HOSPADM

## 2021-02-19 RX ADMIN — DOCUSATE SODIUM 100 MG: 100 CAPSULE, LIQUID FILLED ORAL at 08:36

## 2021-02-19 RX ADMIN — ALPRAZOLAM 0.5 MG: 0.5 TABLET ORAL at 16:45

## 2021-02-19 RX ADMIN — ENOXAPARIN SODIUM 40 MG: 40 INJECTION SUBCUTANEOUS at 08:36

## 2021-02-19 RX ADMIN — KETOROLAC TROMETHAMINE 30 MG: 30 INJECTION, SOLUTION INTRAMUSCULAR; INTRAVENOUS at 05:31

## 2021-02-19 RX ADMIN — IBUPROFEN 600 MG: 600 TABLET ORAL at 22:29

## 2021-02-19 RX ADMIN — ACETAMINOPHEN 650 MG: 325 TABLET ORAL at 20:17

## 2021-02-19 RX ADMIN — IBUPROFEN 600 MG: 600 TABLET ORAL at 17:30

## 2021-02-19 RX ADMIN — KETOROLAC TROMETHAMINE 30 MG: 30 INJECTION, SOLUTION INTRAMUSCULAR; INTRAVENOUS at 11:28

## 2021-02-19 RX ADMIN — ENOXAPARIN SODIUM 40 MG: 40 INJECTION SUBCUTANEOUS at 20:17

## 2021-02-19 RX ADMIN — DOCUSATE SODIUM 100 MG: 100 CAPSULE, LIQUID FILLED ORAL at 17:30

## 2021-02-19 RX ADMIN — OXYCODONE HYDROCHLORIDE 5 MG: 5 TABLET ORAL at 15:53

## 2021-02-19 RX ADMIN — LURASIDONE HYDROCHLORIDE 20 MG: 20 TABLET, FILM COATED ORAL at 20:17

## 2021-02-19 RX ADMIN — SIMETHICONE 80 MG: 80 TABLET, CHEWABLE ORAL at 08:35

## 2021-02-19 RX ADMIN — OXYCODONE HYDROCHLORIDE AND ACETAMINOPHEN 1 TABLET: 5; 325 TABLET ORAL at 08:35
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
## 2021-02-19 NOTE — PLAN OF CARE
Problem: POSTPARTUM  Goal: Experiences normal postpartum course  Description: INTERVENTIONS:  - Monitor maternal vital signs  - Assess uterine involution and lochia  Outcome: Progressing  Goal: Appropriate maternal -  bonding  Description: INTERVENTIONS:  - Identify family support  - Assess for appropriate maternal/infant bonding   -Encourage maternal/infant bonding opportunities  - Referral to  or  as needed  Outcome: Progressing  Goal: Establishment of infant feeding pattern  Description: INTERVENTIONS:  - Assess breast/bottle feeding  - Refer to lactation as needed  Outcome: Progressing  Goal: Incision(s), wounds(s) or drain site(s) healing without S/S of infection  Description: INTERVENTIONS  - Assess and document risk factors for skin impairment   - Assess and document dressing, incision, wound bed, drain sites and surrounding tissue  - Consider nutrition services referral as needed  - Oral mucous membranes remain intact  - Provide patient/ family education  Outcome: Progressing     Problem: PAIN - ADULT  Goal: Verbalizes/displays adequate comfort level or baseline comfort level  Description: Interventions:  - Encourage patient to monitor pain and request assistance  - Assess pain using appropriate pain scale  - Administer analgesics based on type and severity of pain and evaluate response  - Implement non-pharmacological measures as appropriate and evaluate response  - Consider cultural and social influences on pain and pain management  - Notify physician/advanced practitioner if interventions unsuccessful or patient reports new pain  Outcome: Progressing     Problem: INFECTION - ADULT  Goal: Absence or prevention of progression during hospitalization  Description: INTERVENTIONS:  - Assess and monitor for signs and symptoms of infection  - Monitor lab/diagnostic results  - Monitor all insertion sites, i e  indwelling lines, tubes, and drains  - Monitor endotracheal if appropriate and nasal secretions for changes in amount and color  - [place] appropriate cooling/warming therapies per order  - Administer medications as ordered  - Instruct and encourage patient and family to use good hand hygiene technique  - Identify and instruct in appropriate isolation precautions for identified infection/condition  Outcome: Progressing  Goal: Absence of fever/infection during neutropenic period  Description: INTERVENTIONS:  - Monitor WBC    Outcome: Progressing     Problem: SAFETY ADULT  Goal: Patient will remain free of falls  Description: INTERVENTIONS:  - Assess patient frequently for physical needs  -  Identify cognitive and physical deficits and behaviors that affect risk of falls    -  [place] fall precautions as indicated by assessment   - Educate patient/family on patient safety including physical limitations  - Instruct patient to call for assistance with activity based on assessment  - Modify environment to reduce risk of injury  - Consider OT/PT consult to assist with strengthening/mobility  Outcome: Progressing  Goal: Maintain or return to baseline ADL function  Description: INTERVENTIONS:  -  Assess patient's ability to carry out ADLs; assess patient's baseline for ADL function and identify physical deficits which impact ability to perform ADLs (bathing, care of mouth/teeth, toileting, grooming, dressing, etc )  - Assess/evaluate cause of self-care deficits   - Assess range of motion  - Assess patient's mobility; develop plan if impaired  - Assess patient's need for assistive devices and provide as appropriate  - Encourage maximum independence but intervene and supervise when necessary  - Involve family in performance of ADLs  - Assess for home care needs following discharge   - Consider OT consult to assist with ADL evaluation and planning for discharge  - Provide patient education as appropriate  Outcome: Progressing  Goal: Maintain or return mobility status to optimal level  Description: INTERVENTIONS:  - Assess patient's baseline mobility status (ambulation, transfers, stairs, etc )    - Identify cognitive and physical deficits and behaviors that affect mobility  - Identify mobility aids required to assist with transfers and/or ambulation (gait belt, sit-to-stand, lift, walker, cane, etc )  - [place] fall precautions as indicated by assessment  - Record patient progress and toleration of activity level on Mobility SBAR; progress patient to next Phase/Stage  - Instruct patient to call for assistance with activity based on assessment  - Consider rehabilitation consult to assist with strengthening/weightbearing, etc   Outcome: Progressing     Problem: Knowledge Deficit  Goal: Patient/family/caregiver demonstrates understanding of disease process, treatment plan, medications, and discharge instructions  Description: Complete learning assessment and assess knowledge base    Interventions:  - Provide teaching at level of understanding  - Provide teaching via preferred learning methods  Outcome: Progressing     Problem: DISCHARGE PLANNING  Goal: Discharge to home or other facility with appropriate resources  Description: INTERVENTIONS:  - Identify barriers to discharge w/patient and caregiver  - Arrange for needed discharge resources and transportation as appropriate  - Identify discharge learning needs (meds, wound care, etc )  - Arrange for interpretive services to assist at discharge as needed  - Refer to Case Management Department for coordinating discharge planning if the patient needs post-hospital services based on physician/advanced practitioner order or complex needs related to functional status, cognitive ability, or social support system  Outcome: Progressing

## 2021-02-19 NOTE — LACTATION NOTE
This note was copied from a baby's chart  Mother verbalized breastfeeding is going well at times, and other times he does not want to feed  I reassured her this is normal, but  enc to call for assistance as needed,phone # given

## 2021-02-19 NOTE — LACTATION NOTE
This note was copied from a baby's chart  Mom called for assistance with breastfeeding on the left side  She verb she is having difficulty on this side  Mom verb baby just fed for 20 minutes on the right side  I demo  football hold, how to hand express and how to get a deep latch  Baby fell asleep and only took a few sucks  I enc mom to call me when baby I awake and ready to feed again and we will try the left side again

## 2021-02-19 NOTE — LACTATION NOTE
This note was copied from a baby's chart  Mom called for assistance with breastfeeding on the left side  I demo  football hold, how to compress her breast and how to get a deep latch  Baby latched well

## 2021-02-20 VITALS
WEIGHT: 249 LBS | OXYGEN SATURATION: 100 % | BODY MASS INDEX: 41.48 KG/M2 | DIASTOLIC BLOOD PRESSURE: 83 MMHG | RESPIRATION RATE: 18 BRPM | HEART RATE: 83 BPM | SYSTOLIC BLOOD PRESSURE: 127 MMHG | HEIGHT: 65 IN | TEMPERATURE: 97.4 F

## 2021-02-20 PROCEDURE — 99024 POSTOP FOLLOW-UP VISIT: CPT | Performed by: STUDENT IN AN ORGANIZED HEALTH CARE EDUCATION/TRAINING PROGRAM

## 2021-02-20 RX ORDER — OXYCODONE HYDROCHLORIDE 5 MG/1
5 TABLET ORAL EVERY 4 HOURS PRN
Qty: 10 TABLET | Refills: 0 | Status: SHIPPED | OUTPATIENT
Start: 2021-02-20 | End: 2021-03-02

## 2021-02-20 RX ORDER — DOCUSATE SODIUM 100 MG/1
100 CAPSULE, LIQUID FILLED ORAL 2 TIMES DAILY
Qty: 10 CAPSULE | Refills: 0 | Status: SHIPPED | OUTPATIENT
Start: 2021-02-20 | End: 2021-09-23 | Stop reason: ALTCHOICE

## 2021-02-20 RX ORDER — IBUPROFEN 600 MG/1
600 TABLET ORAL EVERY 6 HOURS
Qty: 120 TABLET | Refills: 0 | Status: SHIPPED | OUTPATIENT
Start: 2021-02-20 | End: 2021-03-22

## 2021-02-20 RX ORDER — ALPRAZOLAM 0.25 MG/1
0.25 TABLET ORAL
Qty: 2 TABLET | Refills: 0 | Status: SHIPPED | OUTPATIENT
Start: 2021-02-20 | End: 2022-02-17

## 2021-02-20 RX ADMIN — ALPRAZOLAM 0.5 MG: 0.5 TABLET ORAL at 01:24

## 2021-02-20 RX ADMIN — OXYCODONE HYDROCHLORIDE 5 MG: 5 TABLET ORAL at 17:17

## 2021-02-20 RX ADMIN — DOCUSATE SODIUM 100 MG: 100 CAPSULE, LIQUID FILLED ORAL at 08:21

## 2021-02-20 RX ADMIN — ALPRAZOLAM 0.5 MG: 0.5 TABLET ORAL at 14:01

## 2021-02-20 RX ADMIN — ENOXAPARIN SODIUM 40 MG: 40 INJECTION SUBCUTANEOUS at 08:21

## 2021-02-20 RX ADMIN — IBUPROFEN 600 MG: 600 TABLET ORAL at 14:01

## 2021-02-20 RX ADMIN — IBUPROFEN 600 MG: 600 TABLET ORAL at 07:48

## 2021-02-20 NOTE — PROGRESS NOTES
Progress Note - OB/GYN   Jac West 22 y o  female MRN: [id number]  Unit/Bed#: L&D 309-01 Encounter: [id number]    ASSESSMENT:  Jac West is a 22 y o   female, POD#2 s/p Primary low transverse  section at 39w2d for failed induction of labor  Recovering well, however patient's anxiety is increased in this immediate postpartum period  She does follow with a psychiatrist outpatient  PLAN:  1  Post Op    - Continue routine post op care  - Pain control: PO meds on board   - BP's WNL  - UOP: voiding   - Hgb: 11 3 --> 9 2   - Rh positive - Rhogam not indicated   - DVT ppx: SCDs, Lovenox 40 mg BID   - Encourage ambulation  - Encourage breastfeeding  - Contraception: to be discussed   - Vaccines: n/a  - Anticipate discharge POD#4     2  Bipolar/Anxiety  - Continue PTA Latuda elena, Xanax BID   - Pt requests Rx for Xanax PRN upon discharge; will discuss with attending   - S/p CM consult     SUBJECTIVE:  Pt feels well  She has no major complaints  Pain: some, responding to PO meds  Tolerating PO: yes  Voiding: yes  Flatus: yes  Bm: no  Ambulating: yes, some  Breastfeeding: yes  Chest pain: no  Shortness of breath: no  Leg pain: no  Lochia: WNL    OBJECTIVE:  Vitals:    21 1900 21 2230 21 0334 21 0748   BP: 141/84 137/84 126/82 131/90   BP Location: Right arm Right arm Left arm Left arm   Pulse: 102 103 95 (!) 108   Resp: 18 18 12 12   Temp: 97 7 °F (36 5 °C) 97 8 °F (36 6 °C) (!) 97 4 °F (36 3 °C) (!) 97 3 °F (36 3 °C)   TempSrc: Temporal Temporal Temporal Temporal   SpO2:   99% 99%   Weight:       Height:         Physical Exam:   Body mass index is 41 44 kg/m²  Constitutional: Well-developed, well-nourished  NAD  HEENT: NC/AT  Conjunctivae normal    Cardiovascular: RRR, S1/S2 normal    Pulmonary: Normal respiratory effort  Lung sounds CTAB  Abdominal: Soft, non-distended, non-tender  Incision c/d/i  Uterus firm below umbilicus  Surgical glue in place    MSK: Normal range of motion  Extremities: non-tender  Neurological: Alert and oriented to person, place, and time  Skin: Skin is warm and dry       I/O       02/18 0701 - 02/19 0700 02/19 0701 - 02/20 0700 02/20 0701 - 02/21 0700    I V  (mL/kg) 900 (8)      Total Intake(mL/kg) 900 (8)      Urine (mL/kg/hr) 2375 (0 9) 1600 (0 6) 450 (1 7)    Blood 648      Total Output 3023 1600 450    Net -2123 -1600 -450               Lab Results   Component Value Date    WBC 16 29 (H) 02/19/2021    HGB 9 2 (L) 02/19/2021    HCT 29 2 (L) 02/19/2021    MCV 96 02/19/2021     02/19/2021       Sofiya Dyer MD  PGY-2, OB/GYN  2/20/2021 9:18 AM

## 2021-02-20 NOTE — PLAN OF CARE
Problem: POSTPARTUM  Goal: Experiences normal postpartum course  Description: INTERVENTIONS:  - Monitor maternal vital signs  - Assess uterine involution and lochia  Outcome: Progressing  Goal: Appropriate maternal -  bonding  Description: INTERVENTIONS:  - Identify family support  - Assess for appropriate maternal/infant bonding   -Encourage maternal/infant bonding opportunities  - Referral to  or  as needed  Outcome: Progressing  Goal: Establishment of infant feeding pattern  Description: INTERVENTIONS:  - Assess breast/bottle feeding  - Refer to lactation as needed  Outcome: Progressing  Goal: Incision(s), wounds(s) or drain site(s) healing without S/S of infection  Description: INTERVENTIONS  - Assess and document risk factors for skin impairment   - Assess and document dressing, incision, wound bed, drain sites and surrounding tissue  - Consider nutrition services referral as needed  - Oral mucous membranes remain intact  - Provide patient/ family education  Outcome: Progressing     Problem: PAIN - ADULT  Goal: Verbalizes/displays adequate comfort level or baseline comfort level  Description: Interventions:  - Encourage patient to monitor pain and request assistance  - Assess pain using appropriate pain scale  - Administer analgesics based on type and severity of pain and evaluate response  - Implement non-pharmacological measures as appropriate and evaluate response  - Consider cultural and social influences on pain and pain management  - Notify physician/advanced practitioner if interventions unsuccessful or patient reports new pain  Outcome: Progressing     Problem: INFECTION - ADULT  Goal: Absence or prevention of progression during hospitalization  Description: INTERVENTIONS:  - Assess and monitor for signs and symptoms of infection  - Monitor lab/diagnostic results  - Monitor all insertion sites, i e  indwelling lines, tubes, and drains  - - [place] appropriate cooling/warming therapies per order  - Administer medications as ordered  - Instruct and encourage patient and family to use good hand hygiene technique  - Identify and instruct in appropriate isolation precautions for identified infection/condition  Outcome: Progressing  Goal: Absence of fever/infection during neutropenic period  Description: INTERVENTIONS:  - Monitor WBC    Outcome: Progressing     Problem: SAFETY ADULT  Goal: Patient will remain free of falls  Description: INTERVENTIONS:  - Assess patient frequently for physical needs  -  Identify cognitive and physical deficits and behaviors that affect risk of falls    -  [place] fall precautions as indicated by assessment   - Educate patient/family on patient safety including physical limitations  - Instruct patient to call for assistance with activity based on assessment  - Modify environment to reduce risk of injury  - Consider OT/PT consult to assist with strengthening/mobility  Outcome: Progressing  Goal: Maintain or return to baseline ADL function  Description: INTERVENTIONS:  -  Assess patient's ability to carry out ADLs; assess patient's baseline for ADL function and identify physical deficits which impact ability to perform ADLs (bathing, care of mouth/teeth, toileting, grooming, dressing, etc )  - Assess/evaluate cause of self-care deficits   - Assess range of motion  - Assess patient's mobility; develop plan if impaired  - Assess patient's need for assistive devices and provide as appropriate  - Encourage maximum independence but intervene and supervise when necessary  - Involve family in performance of ADLs  - Assess for home care needs following discharge   - Consider OT consult to assist with ADL evaluation and planning for discharge  - Provide patient education as appropriate  Outcome: Progressing  Goal: Maintain or return mobility status to optimal level  Description: INTERVENTIONS:  - Assess patient's baseline mobility status (ambulation, transfers, stairs, etc )    - Identify cognitive and physical deficits and behaviors that affect mobility  - Identify mobility aids required to assist with transfers and/or ambulation (gait belt, sit-to-stand, lift, walker, cane, etc )  - [place] fall precautions as indicated by assessment  - Record patient progress and toleration of activity level on Mobility SBAR; progress patient to next Phase/Stage  - Instruct patient to call for assistance with activity based on assessment  - Consider rehabilitation consult to assist with strengthening/weightbearing, etc   Outcome: Progressing     Problem: Knowledge Deficit  Goal: Patient/family/caregiver demonstrates understanding of disease process, treatment plan, medications, and discharge instructions  Description: Complete learning assessment and assess knowledge base    Interventions:  - Provide teaching at level of understanding  - Provide teaching via preferred learning methods  Outcome: Progressing     Problem: DISCHARGE PLANNING  Goal: Discharge to home or other facility with appropriate resources  Description: INTERVENTIONS:  - Identify barriers to discharge w/patient and caregiver  - Arrange for needed discharge resources and transportation as appropriate  - Identify discharge learning needs (meds, wound care, etc )  - Arrange for interpretive services to assist at discharge as needed  - Refer to Case Management Department for coordinating discharge planning if the patient needs post-hospital services based on physician/advanced practitioner order or complex needs related to functional status, cognitive ability, or social support system  Outcome: Progressing

## 2021-02-20 NOTE — LACTATION NOTE
This note was copied from a baby's chart  Met with mom requesting to supplement with formula via bottle  Discussed risks for early supplementation: over feeding, longer digestion times, engorgement for mom, lower milk supply for mom, and nipple confusion  Benefits of breast feeding for infant's intestinal tract, less engorgement for mom, protection from multiple disease processes as infant develops, avoidance of over feeding for infant, less nipple confusion, and increased health benefits for mom  Mom choosing to supplement with formua via bottle after education  Discussed and declined donor milk  [facility] MOB  to call for assistance, questions and concerns  Extension number for inpatient lactation support provided

## 2021-02-20 NOTE — LACTATION NOTE
This note was copied from a baby's chart  Met with mother to go over discharge breastfeeding booklet including the feeding log  Emphasized 8 or more (12) feedings in a 24 hour period, what to expect for the number of diapers per day of life and the progression of properties of the  stooling pattern  Reviewed breastfeeding and your lifestyle, storage and preparation of breast milk, how to keep you breast pump clean, the employed breastfeeding mother and paced bottle feeding handouts  Booklet included Breastfeeding Resources for after discharge including access to the number for the [address]  Mom requesting latch assistance  Baby changed for wet diaper  Assisted mom to place baby skin to skin in cross cradle hold on left breast  Baby with signs of deep latch and mom expressed comfort with latch  Baby able to sustain latch/sucking for 10 minutes  Baby slipped off on his own crying and passing flatus  Encouraged mom to maintain skin to skin while baby is gassy and observe for feeding cues  Early feeding cues reviewed  [facility] MOB  to call for assistance, questions and concerns  Extension number for inpatient lactation support provided

## 2021-02-22 NOTE — UTILIZATION REVIEW
Notification of Maternity/Delivery & [facility] Birth Information for Admission   Notification of Maternity/Delivery for Admission to our facility [facility]  Be advised that this patient was admitted to our facility under Inpatient Status  Contact Vicente Omer at [phone] for additional admission information  [facility] PARENT/CHILD HEALTH [facility] DEPT  DEDICATED [phone]  Mother &  Information   Patient Name: Evin Bose YOB: 1995   Delivering clinician:    OB History        3    Para   1    Term   1            AB   2    Living   1       SAB        TAB   2    Ectopic        Multiple   0    Live Births   1               [facility] Name & MRN:   Information for the patient's :  Puma Crespo [[id number]]     [facility] Delivery Information:  Sex: male  Delivered 2021 1:49 PM by , Low Transverse; Gestational Age: 44w2d    [facility] Measurements:  Weight: 7 lb 10 8 oz (3480 g); Height: 21 5"    APGAR 1 minute 5 minutes 10 minutes   Totals: 9 9      [facility] Birth Information: 22 y o  female MRN: [id number] Unit/Bed#: L&D 309-01 Estimated Date of Delivery: 21  Birthweight: No birth weight on file  Gestational Age: <None> Delivery Type: , Low Transverse          APGARS  One minute Five minutes Ten minutes   Totals:                 [address]   [address]:   [facility]  Tax ID: [id number]  NPI: [id number] Attending Provider/NPI:  Phone:  Address:  Pramod Luna [[id number]]  [phone]  Same as Facility   Place of Service Code: 24 Place of Service Name: [address] [place] [address]   Start Date: 21   Discharge Date & Time: 2021  7:01 PM    Type of Admission: Inpatient Status Discharge Disposition (if discharged): Home/Self Care   Patient Diagnoses:   Encounter for induction of labor [Z34 90]  The primary encounter diagnosis was S/P primary low transverse   Diagnoses of 39 weeks gestation of pregnancy and Anxiety were also pertinent to this visit  1  S/P primary low transverse     2  39 weeks gestation of pregnancy    3  Anxiety       Orders: Admission Orders (From admission, onward)     Ordered        21  INPATIENT ADMISSION  Once                    Assigned Utilization Review Contact: Minnie Crawford  Utilization   Network Utilization Review Department  Phone: [phone]; Fax [phone]  Email: [email or web address]     Initial Clinical Review    Admission: Date/Time/Statement:   Admission Orders (From admission, onward)     Ordered        21  INPATIENT ADMISSION  Once                   Orders Placed This Encounter   Procedures    INPATIENT ADMISSION     Standing Status:   Standing     Number of Occurrences:   1     Order Specific Question:   Level of Care     Answer:   Med Surg [16]     Order Specific Question:   Estimated length of stay     Answer:   More than 2 Midnights     Order Specific Question:   Certification     Answer:   I certify that inpatient services are medically necessary for this patient for a duration of greater than two midnights  See H&P and MD Progress Notes for additional information about the patient's course of treatment  ED Arrival Information     Patient not seen in ED                     Chief Complaint   Patient presents with    Scheduled Induction     Assessment/Plan: ON  26yo  at 39w0d weeks gestation  admitted for an elective IOL  Cervix exam : FT/30/-3  IOL consisted of Cytotec with montoya balloon, Epidural with AROM  Use of IUPC  Patient failed to make change after 13 hours of being ruptured with Pitocin  Decision was mad to proceed with primary low transverse C section on 2021 1349 with viable male  born apgars 9/9    2021 11:30 PM  Cervical Dilation: Fingertip, First dose of vaginal Cytotec     2021 4:20 am  Montoya balloon   2021 4:33 am  Cervical Dilation: 1  2021 12:43 PM Cervical Dilation: 4, Will start pitocin at this time  2021 6:31 PM  14 gennaro-units/min  Cervical Dilation: 5; interested in epidural  Pt will get epidural and when comfortable and will plan AROM  2021 6:54 PM  Epidural Block  2021 9:45 PM AROM for clear fluid, Oxytocin: 10 gennaro-units/min, Cervical Dilation: 4-5    2021 12:06 am  Oxytocin: 12 gennaro-units/min(increased pitocin to 12 per dr Bernard Marvin verbal orders)  Cervical Dilation: 5  2021 4:10 am Patient feeling more uncomfortable and with more pressure  Cervix unchanged  A one hour pitocin break was given and was restarted, Oxytocin: 8 gennaro-units/min  Cervical Dilation: 5  2021 8:32 am  Oxytocin: 20 gennaro-units/min, Cervical Dilation: 5, Cervical Effacement: 70  Patient with no change on exam; overall comfortable with ctx s/p epidural  2021 11:22 am  Discussed w/ patient that unfortunately at this time she meets criteria for failed induction of labor  She has been ruptured for 13hrs and has been on pitocin during that time  She has been 5cm for 12 hours without any cervical change appreciated  Her tracing has been Category I without decelerations  At this point, recommend proceeding with 1LTCS for failed induction of labor  2021 1349 OPERATIVE REPORT  SURGERY DATE: 2021  Procedure(s) (LRB):   SECTION () (N/A)   Anesthesia Type:   Epidural  Operative Indications:  Failed induction of labor  Operative Findings:  Operative Findings:  1   Viable male  at 80 with APGARs of 9 and 9 at 1 and 5 minutes     Triage Vitals   Temperature Pulse Respirations Blood Pressure SpO2   21 --   98 1 °F (36 7 °C) 101 18 117/72       Temp Source Heart Rate Source Patient Position - Orthostatic VS BP Location FiO2 (%)   21 --   Oral Monitor Lying Left arm       Pain Score       02/16/21 2037       No Pain          Wt Readings from Last 1 Encounters:   02/16/21 113 kg (249 lb)     Additional Vital Signs:   Date/Time  Temp  Pulse  Resp  BP  MAP (mmHg)  SpO2  O2 Device  Cardiac (WDL)  Patient Position - Orthostatic VS   02/20/21 1500  97 4 °F (36 3 °C)Abnormal   83  18  127/83  --  --  None (Room air)  WDL  --   02/20/21 1100  97 5 °F (36 4 °C)  96  14  --  --  100 %  None (Room air)  --  Lying   02/20/21 0748  97 3 °F (36 3 °C)Abnormal   108Abnormal   12  131/90  --  99 %  None (Room air)  WDL  Lying   02/20/21 0334  97 4 °F (36 3 °C)Abnormal   95  12  126/82  --  99 %  None (Room air)  --  Lying   02/19/21 2300  --  --  --  --  --  --  None (Room air)  WDL  --   02/19/21 2230  97 8 °F (36 6 °C)  103  18  137/84  --  --  --  --  Sitting   02/19/21 1900  97 7 °F (36 5 °C)  102  18  141/84  --  --  --  --  Sitting   02/19/21 1500  98 °F (36 7 °C)  72  18  124/77  --  98 %  None (Room air)  WDL  Lying - Orthostatic VS   02/19/21 1100  98 °F (36 7 °C)  96  18  118/87  --  98 %  None (Room air)  --  Lying   02/19/21 0742  97 9 °F (36 6 °C)  101  16  128/77  --  --  --  --  Lying   02/19/21 0700  --  --  --  --  --  --  None (Room air)  WDL  --   02/19/21 0335  98 °F (36 7 °C)  100  18  116/76  --  98 %  None (Room air)  --  Lying   02/19/21 0315  --  --  --  --  --  --  None (Room air)  --  --   02/19/21 0037  98 9 °F (37 2 °C)  100  18  118/72  --  99 %  None (Room air)  --  Lying   02/18/21 2300  --  --  --  --  --  --  None (Room air)  WDL  --   02/18/21 1940  98 5 °F (36 9 °C)  110Abnormal   18  128/78  --  98 %  None (Room air)  [facility]  Lying   02/18/21 1840  98 6 °F (37 °C)  100  18  134/78  88  98 %  None (Room air)  [facility]  Lying   02/18/21 1740  99 4 °F (37 4 °C)  97  18  119/73  85  97 %  None (Room air)  [facility]  Lying   02/18/21 1640  98 6 °F (37 °C)  88  18  124/72  --  97 %  None (Room air)  [facility]  Lying   02/18/21 1540  --  101  --  116/53  --  97 %  -- WDL  Lying   02/18/21 1530  --  103  --  125/58  --  97 %  --  WDL  Lying   02/18/21 1520  --  104  --  128/77  --  97 %  None (Room air)  WDL  Lying   02/18/21 1510  98 °F (36 7 °C)  108Abnormal   18  106/51  --  98 %  None (Room air)  WDL  Lying   02/18/21 1300  --  96  --  121/64  --  --  --  --  --   02/18/21 1245  --  89  --  104/55  --  --  --  --  --   02/18/21 1241  97 6 °F (36 4 °C)  --  --  --  --  --           Date/Time  Weight  Height   02/16/21 2037  113 kg (249 lb)  5' 5" (1 651 m)         Pertinent Labs/Diagnostic Test Results:       Results from last 7 days   Lab Units 02/19/21  0449 02/16/21  2059   WBC Thousand/uL 16 29* 17 16*   HEMOGLOBIN g/dL 9 2* 11 3*   HEMATOCRIT % 29 2* 34 6*   PLATELETS Thousands/uL 188 271     ED Treatment:   Medication Administration - No Administrations Displayed (No Start Event Found)     None        Past Medical History:   Diagnosis Date    Celiac disease     Gastroparesis     Marijuana use, continuous 7/10/2018    Last Assessment & Plan:  Justyn Chew reports that she has less ability to use THC due to her step-daughter being in the house  She reports that she has significantly decrease to twice/day  Reviewed risks of routine daily use, she does not feel this is an issue & states "I know my body" & is not willing to quit at this point in time   Nicotine use disorder 7/10/2018    Last Assessment & Plan:  Kirk Vinson continues to smoke 1 cigarette/day  She reports that "it will not be hard for her to stop" & plans on quitting immediately      Personality disorder ([facility] [address] )     Substance abuse ([facility] [address] )     marijuana    Trauma     PTSD    Varicella      Present on Admission:  **None**    Admitting Diagnosis: Encounter for induction of labor [Z34 90]  Age/Sex: 22 y o  female  Admission Orders:  Continuous external uterine contraction & fetal heart rate monitoring   Scheduled Medications:  lurasidone, 20 mg, Oral, HS  sodium chloride, 1,000 mL, Intravenous, Once    Continuous IV Infusions:  lactated ringers, 125 mL/hr, Intravenous, Continuous  oxytocin, 1-30 gennaro-units/min, Intravenous, Titrated  ropivacaine 0 2%, , Epidural, Continuous    PRN Meds:  ALPRAZolam, 0 5 mg, Oral, TID PRN  ondansetron, 4 mg, Intravenous, Q6H PRN        IP CONSULT TO CASE MANAGEMENT    Network Utilization Review Department  ATTENTION: Please call with any questions or concerns to [phone] and carefully listen to the prompts so that you are directed to the right person  All voicemails are confidential   Laney Nicole all requests for admission clinical reviews, approved or denied determinations and any other requests to dedicated fax number below belonging to the campus where the patient is receiving treatment   List of dedicated fax numbers for the Facilities:  [address] DENIALS (Administrative/Medical Necessity) [phone]   [address] (Maternity/NICU/Pediatrics) [phone]   [address] [facility] [address] Renee Kang 3126 ([facility] [address] [place] [address] Christina Logan 1481 [address] [place] [address] [phone]

## 2021-02-26 LAB — PLACENTA IN STORAGE: NORMAL

## 2021-03-01 ENCOUNTER — OFFICE VISIT (OUTPATIENT)
Dept: OBGYN CLINIC | Facility: MEDICAL CENTER | Age: 26
End: 2021-03-01

## 2021-03-01 VITALS — DIASTOLIC BLOOD PRESSURE: 70 MMHG | SYSTOLIC BLOOD PRESSURE: 120 MMHG

## 2021-03-01 DIAGNOSIS — Z98.891 S/P PRIMARY LOW TRANSVERSE C-SECTION: ICD-10-CM

## 2021-03-01 DIAGNOSIS — Z09 POSTOPERATIVE EXAMINATION: Primary | ICD-10-CM

## 2021-03-01 PROCEDURE — 99024 POSTOP FOLLOW-UP VISIT: CPT | Performed by: OBSTETRICS & GYNECOLOGY

## 2021-03-01 NOTE — PROGRESS NOTES
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  She is tearful and emotional  She is taking her medications for bipolar  She lacks support as her boyfriend works during the day  EPDS today is 15    Review of Systems  Pertinent items are noted in HPI  Objective  /70   LMP 2020 (Exact Date)     General:  alert and oriented, in no acute distress   Abdomen: soft, bowel sounds active, non-tender   Incision:   healing well, no drainage, no erythema, no hernia, no seroma, no swelling, well approximated, no dehiscence, incision well approximated         Assessment      Doing well postoperatively  Operative findings again reviewed  Pathology report discussed  Plan     1  Continue any current medications  2  Wound care discussed  3  Activity restrictions: no lifting more than 25 pounds  4   Follow up: 2 weeks

## 2021-03-17 ENCOUNTER — POSTPARTUM VISIT (OUTPATIENT)
Dept: OBGYN CLINIC | Facility: MEDICAL CENTER | Age: 26
End: 2021-03-17

## 2021-03-17 VITALS — BODY MASS INDEX: 37.77 KG/M2 | WEIGHT: 227 LBS

## 2021-03-17 PROCEDURE — 99024 POSTOP FOLLOW-UP VISIT: CPT | Performed by: OBSTETRICS & GYNECOLOGY

## 2021-03-17 NOTE — PROGRESS NOTES
Subjective     Shruti Fox is a 22 y o  [id number] female who presents to the office 4 weeks status post primary  for failed IOL  Eating a regular diet without difficulty  Bowel movements are normal  The patient is not having any pain  The following portions of the patient's history were reviewed and updated as appropriate: allergies, current medications, past family history, past medical history, past social history and past surgical history  Patient is doing well since her last visit  States  EPDS today is 11    Review of Systems  Pertinent items are noted in HPI  Objective  Wt 103 kg (227 lb)   LMP 2020 (Exact Date)   BMI 37 77 kg/m²     General:  alert and oriented, in no acute distress   Abdomen: soft, bowel sounds active, non-tender   Incision:   healing well, no drainage, no erythema, no hernia, no seroma, no swelling, well approximated, no dehiscence, incision well approximated         Assessment      Doing well postoperatively  Operative findings again reviewed  Pathology report discussed  Plan     1  Continue any current medications  2  Wound care discussed  3  Activity restrictions: no lifting more than 25 pounds  4   Follow up: 2 weeks

## 2021-03-22 DIAGNOSIS — Z3A.39 39 WEEKS GESTATION OF PREGNANCY: ICD-10-CM

## 2021-03-22 DIAGNOSIS — Z98.891 S/P PRIMARY LOW TRANSVERSE C-SECTION: ICD-10-CM

## 2021-03-22 RX ORDER — IBUPROFEN 600 MG/1
TABLET ORAL
Qty: 120 TABLET | Refills: 0 | Status: SHIPPED | OUTPATIENT
Start: 2021-03-22 | End: 2021-04-24

## 2021-04-19 ENCOUNTER — POSTPARTUM VISIT (OUTPATIENT)
Dept: OBGYN CLINIC | Facility: MEDICAL CENTER | Age: 26
End: 2021-04-19

## 2021-04-19 VITALS — SYSTOLIC BLOOD PRESSURE: 120 MMHG | BODY MASS INDEX: 38.94 KG/M2 | WEIGHT: 234 LBS | DIASTOLIC BLOOD PRESSURE: 60 MMHG

## 2021-04-19 DIAGNOSIS — Z98.891 S/P PRIMARY LOW TRANSVERSE C-SECTION: ICD-10-CM

## 2021-04-19 DIAGNOSIS — Z78.9 EMERGENCY CONTRACEPTION: ICD-10-CM

## 2021-04-19 PROBLEM — O99.213 MATERNAL OBESITY, ANTEPARTUM, THIRD TRIMESTER: Status: RESOLVED | Noted: 2020-10-07 | Resolved: 2021-04-19

## 2021-04-19 PROBLEM — O28.8 HIGH RISK PREGNANCY WITH HIGH INHIBIN: Status: RESOLVED | Noted: 2020-10-07 | Resolved: 2021-04-19

## 2021-04-19 PROBLEM — F31.9 BIPOLAR DISEASE DURING PREGNANCY IN THIRD TRIMESTER (HCC): Status: RESOLVED | Noted: 2020-08-12 | Resolved: 2021-04-19

## 2021-04-19 PROBLEM — O09.899 HIGH RISK PREGNANCY WITH HIGH INHIBIN: Status: RESOLVED | Noted: 2020-10-07 | Resolved: 2021-04-19

## 2021-04-19 PROBLEM — Z3A.39 39 WEEKS GESTATION OF PREGNANCY: Status: RESOLVED | Noted: 2020-12-29 | Resolved: 2021-04-19

## 2021-04-19 PROBLEM — O99.343 BIPOLAR DISEASE DURING PREGNANCY IN THIRD TRIMESTER (HCC): Status: RESOLVED | Noted: 2020-08-12 | Resolved: 2021-04-19

## 2021-04-19 PROCEDURE — 99024 POSTOP FOLLOW-UP VISIT: CPT | Performed by: OBSTETRICS & GYNECOLOGY

## 2021-04-19 RX ORDER — LEVONORGESTREL 1.5 MG/1
1.5 TABLET ORAL ONCE
Qty: 1 TABLET | Refills: 0 | Status: SHIPPED | OUTPATIENT
Start: 2021-04-19 | End: 2021-04-19

## 2021-04-19 NOTE — PROGRESS NOTES
Subjective     Shruti Reynoso is a 22 y o  [id number] female who presents to the office 6 weeks status post primary  for failed IOL  Eating a regular diet without difficulty  Bowel movements are normal  The patient is not having any pain  The following portions of the patient's history were reviewed and updated as appropriate: allergies, current medications, past family history, past medical history, past social history and past surgical history  Patient is doing well since her last visit  States  EPDS today is 6  Reports feeling well, but spends a lot of time alone  Contraception: reports increase in weight gain in the past   Did not tolerate the Mirena  Will consider the Paraguard  Information provided  States she had unprotected intercourse last night, Plan B prescribed  Review of Systems  Pertinent items are noted in HPI  Objective  /60   Wt 106 kg (234 lb)   LMP  (LMP Unknown)   BMI 38 94 kg/m²     General:  alert and oriented, in no acute distress   Abdomen: soft, bowel sounds active, non-tender   Incision:   healing well, no drainage, no erythema, no hernia, no seroma, no swelling, well approximated, no dehiscence, incision well approximated         Assessment      Doing well postoperatively  Plan     1  Continue any current medications  2  Wound care discussed  3  Activity restrictions: none   4   Follow up: 1 year, prn

## 2021-04-24 DIAGNOSIS — Z98.891 S/P PRIMARY LOW TRANSVERSE C-SECTION: ICD-10-CM

## 2021-04-24 DIAGNOSIS — Z3A.39 39 WEEKS GESTATION OF PREGNANCY: ICD-10-CM

## 2021-04-24 RX ORDER — IBUPROFEN 600 MG/1
TABLET ORAL
Qty: 120 TABLET | Refills: 0 | Status: SHIPPED | OUTPATIENT
Start: 2021-04-24 | End: 2021-05-18

## 2021-05-18 DIAGNOSIS — Z3A.39 39 WEEKS GESTATION OF PREGNANCY: ICD-10-CM

## 2021-05-18 DIAGNOSIS — Z98.891 S/P PRIMARY LOW TRANSVERSE C-SECTION: ICD-10-CM

## 2021-05-18 RX ORDER — IBUPROFEN 600 MG/1
TABLET ORAL
Qty: 120 TABLET | Refills: 0 | Status: SHIPPED | OUTPATIENT
Start: 2021-05-18 | End: 2021-09-23 | Stop reason: ALTCHOICE

## 2021-08-05 ENCOUNTER — OFFICE VISIT (OUTPATIENT)
Dept: URGENT CARE | Facility: MEDICAL CENTER | Age: 26
End: 2021-08-05
Payer: COMMERCIAL

## 2021-08-05 DIAGNOSIS — H60.92 OTITIS EXTERNA OF LEFT EAR, UNSPECIFIED CHRONICITY, UNSPECIFIED TYPE: ICD-10-CM

## 2021-08-05 DIAGNOSIS — J02.9 SORE THROAT: Primary | ICD-10-CM

## 2021-08-05 PROBLEM — H60.512 ACUTE ACTINIC OTITIS EXTERNA OF LEFT EAR: Status: ACTIVE | Noted: 2021-08-05

## 2021-08-05 PROCEDURE — 87070 CULTURE OTHR SPECIMN AEROBIC: CPT | Performed by: FAMILY MEDICINE

## 2021-08-05 PROCEDURE — 99213 OFFICE O/P EST LOW 20 MIN: CPT | Performed by: FAMILY MEDICINE

## 2021-08-05 PROCEDURE — 87880 STREP A ASSAY W/OPTIC: CPT | Performed by: FAMILY MEDICINE

## 2021-08-05 PROCEDURE — U0003 INFECTIOUS AGENT DETECTION BY NUCLEIC ACID (DNA OR RNA); SEVERE ACUTE RESPIRATORY SYNDROME CORONAVIRUS 2 (SARS-COV-2) (CORONAVIRUS DISEASE [COVID-19]), AMPLIFIED PROBE TECHNIQUE, MAKING USE OF HIGH THROUGHPUT TECHNOLOGIES AS DESCRIBED BY CMS-2020-01-R: HCPCS | Performed by: FAMILY MEDICINE

## 2021-08-05 PROCEDURE — U0005 INFEC AGEN DETEC AMPLI PROBE: HCPCS | Performed by: FAMILY MEDICINE

## 2021-08-05 RX ORDER — KETOTIFEN FUMARATE 0.25 MG/ML
SOLUTION/ DROPS OPHTHALMIC
COMMUNITY
Start: 2021-06-19 | End: 2021-09-23 | Stop reason: SDDI

## 2021-08-05 RX ORDER — TOPIRAMATE 50 MG/1
150 TABLET, FILM COATED ORAL
COMMUNITY
Start: 2021-06-07

## 2021-08-05 RX ORDER — NEOMYCIN SULFATE, POLYMYXIN B SULFATE AND HYDROCORTISONE 10; 3.5; 1 MG/ML; MG/ML; [USP'U]/ML
4 SUSPENSION/ DROPS AURICULAR (OTIC) 4 TIMES DAILY
Qty: 10 ML | Refills: 0 | Status: SHIPPED | OUTPATIENT
Start: 2021-08-05 | End: 2021-09-23 | Stop reason: ALTCHOICE

## 2021-08-05 RX ORDER — FLUOCINOLONE ACETONIDE 0.11 MG/ML
OIL TOPICAL
COMMUNITY
Start: 2021-05-14 | End: 2021-09-23 | Stop reason: ALTCHOICE

## 2021-08-05 RX ORDER — DIPHENOXYLATE HYDROCHLORIDE AND ATROPINE SULFATE 2.5; .025 MG/1; MG/1
1 TABLET ORAL DAILY
COMMUNITY

## 2021-08-05 RX ORDER — FEXOFENADINE HCL 180 MG/1
180 TABLET ORAL DAILY PRN
COMMUNITY
Start: 2021-06-19 | End: 2021-09-23 | Stop reason: ALTCHOICE

## 2021-08-05 RX ORDER — CETIRIZINE HYDROCHLORIDE 10 MG/1
10 TABLET ORAL DAILY PRN
COMMUNITY
Start: 2021-07-20

## 2021-08-05 RX ORDER — METFORMIN HYDROCHLORIDE 500 MG/1
TABLET, EXTENDED RELEASE ORAL
COMMUNITY
Start: 2021-08-03

## 2021-08-05 RX ORDER — LIDOCAINE HYDROCHLORIDE 20 MG/ML
15 SOLUTION OROPHARYNGEAL 4 TIMES DAILY PRN
Qty: 100 ML | Refills: 0 | Status: SHIPPED | OUTPATIENT
Start: 2021-08-05 | End: 2021-09-23 | Stop reason: ALTCHOICE

## 2021-08-05 RX ORDER — FLUTICASONE PROPIONATE 50 MCG
SPRAY, SUSPENSION (ML) NASAL
COMMUNITY
Start: 2021-06-19 | End: 2021-09-23 | Stop reason: SDDI

## 2021-08-06 LAB — SARS-COV-2 RNA RESP QL NAA+PROBE: NEGATIVE

## 2021-08-06 NOTE — PATIENT INSTRUCTIONS
Rapid strep test negative  Throat culture performed  COVID-19 test performed  Patient was started empirically on Cortisporin ear drops 4 drops into left ear 4 times a day for 5 days, xylocaine viscous as needed for sore throat  Patient recommended to take Tylenol as needed, resume Flonase nasal spray for congestion  She is to remain in quarantine until test results are back  She expressed understanding  Otitis Externa, Ambulatory Care   GENERAL INFORMATION:   Otitis externa , or swimmer's ear, is an infection in the outer ear canal  This canal goes from the outside of the ear to the eardrum  Common symptoms include the following:   · Ear pain    · Outer ear canal is red and swollen    · Clear fluid or pus is leaking out of your ear    · Outer ear canal is itchy and you see a rash    · Trouble hearing because your ear is plugged    · Feel a bump in your ear canal, called a polyp    · Flakes of skin fall from your ear  Seek immediate care for the following symptoms:   · Fever    · Severe ear pain    · Sudden inability to hear at all    · New swelling in your face, behind your ears, or in your neck    · Sudden inability to move part of your face    · Sudden numbness in your face  Treatment for otitis externa  may include any of the following:  · NSAIDs  help decrease swelling and pain or fever  This medicine is available with or without a doctor's order  NSAIDs can cause stomach bleeding or kidney problems in certain people  If you take blood thinner medicine, always ask your healthcare provider if NSAIDs are safe for you  Always read the medicine label and follow directions  · Ear drops  are a combination of a steroid medicine and an antibiotic  The steroid helps decrease redness, swelling, and pain  The antibiotic helps kill the germs that caused your ear infection       · Ear wicking  may remove fluid or wax from your outer ear canal  Your healthcare provider may insert a small tube, called a wick, into your ear to help drain fluid  A wick also may be used to put medicine into your ear canal if the canal is blocked  Follow the steps below to use eardrops:   · Lie down on your side with your infected ear facing up  · Carefully drip the correct number of eardrops into your ear  Have another person help you if possible  · Gently move the outside part of your ear back and forth to help the medicine reach your ear canal      · Stay lying down in the same position (with your ear facing up) for 3 to 5 minutes  Prevent otitis externa:   · Do not put cotton swabs or foreign objects in your ears  · Wrap a clean moist washcloth around your finger, and use it to clean your outer ear and remove extra ear wax  · Use ear plugs when you swim  Dry your outer ears completely after you swim or bathe  Follow up with your healthcare provider as directed:  Write down your questions so you remember to ask them during your visits  CARE AGREEMENT:   You have the right to help plan your care  Learn about your health condition and how it may be treated  Discuss treatment options with your caregivers to decide what care you want to receive  You always have the right to refuse treatment  The above information is an  only  It is not intended as medical advice for individual conditions or treatments  Talk to your doctor, nurse or pharmacist before following any medical regimen to see if it is safe and effective for you  © 2014 [address] is for End User's use only and may not be sold, redistributed or otherwise used for commercial purposes  All illustrations and images included in CareNotes® are the copyrighted property of [employer]  or Gene Mohamud

## 2021-08-06 NOTE — PROGRESS NOTES
[address]        NAME: Hilario Coley is a 32 y o  female  : 1995    MRN: [id number]  DATE: 2021  TIME: 9:51 PM    Assessment and Plan   Sore throat [J02 9]  1  Sore throat  POCT rapid strepA    Throat culture    Novel Coronavirus (Covid-19),PCR [facility] - Office Collection    neomycin-polymyxin-hydrocortisone (CORTISPORIN) 0 35%-10,000 units/mL-1% otic suspension    Lidocaine Viscous HCl (XYLOCAINE) 2 % mucosal solution   2  Otitis externa of left ear, unspecified chronicity, unspecified type  neomycin-polymyxin-hydrocortisone (CORTISPORIN) 0 35%-10,000 units/mL-1% otic suspension    Lidocaine Viscous HCl (XYLOCAINE) 2 % mucosal solution         Patient Instructions       Follow up with PCP in 3-5 days  Proceed to  ER if symptoms worsen  Chief Complaint     Chief Complaint   Patient presents with    Sore Throat     Patient c/o bilateral ear pain, sore throat, and headache x 3-4 days          History of Present Illness        70-year-old female with complaint of bilateral ear pain for the last 3 or 4 days  Left is worse than right  Pain radiates from the left ear down her throat  Also complaining of sore throat which is painful to swallow  Some headache but denies fever  Refers to nasal congestion but denies postnasal drip  Denies cough shortness of breath  Patient started taking some NyQuil last night for sore throat with minimal improvement  Denies nausea or vomiting  Denies loss of smell loss of taste  She has some diarrhea but attributes that to side effects from metformin she takes for impaired fasting glucose  Denies any recent sick exposure  Denies any known exposure to anyone testing positive for COVID-19  Review of Systems   Review of Systems   Constitutional: Negative  HENT: Positive for ear pain and sore throat  Respiratory: Negative  Gastrointestinal: Positive for diarrhea  Neurological: Positive for headaches           Current Medications       Current Outpatient Medications:     hydrocortisone 2 5 % cream, Apply 1 application topically, Disp: , Rfl:     Alaway 0 025 % ophthalmic solution, 1 DROP PER EYE DAILY AS NEEDED, Disp: , Rfl:     ALPRAZolam (XANAX) 0 25 mg tablet, Take 1 tablet (0 25 mg total) by mouth daily at bedtime as needed for anxiety for up to 10 days, Disp: 2 tablet, Rfl: 0    cetirizine (ZyrTEC) 10 mg tablet, Take 10 mg by mouth daily as needed, Disp: , Rfl:     diphenhydrAMINE (BENADRYL) 25 mg capsule, Take 25 mg by mouth every 6 (six) hours as needed for itching, Disp: , Rfl:     docusate sodium (COLACE) 100 mg capsule, Take 1 capsule (100 mg total) by mouth 2 (two) times a day (Patient not taking: Reported on 3/1/2021), Disp: 10 capsule, Rfl: 0    Doxylamine Succinate, Sleep, (UNISOM PO), Take by mouth, Disp: , Rfl:     Elastic Bandages & Supports (Abdominal Support/L-XL) MISC, Wear pregnancy belt daily, especially during working hours and when standing for prolonged periods   (Patient not taking: Reported on 3/1/2021), Disp: 1 each, Rfl: 0    famotidine (PEPCID) 20 mg tablet, Take 1 tablet (20 mg total) by mouth 2 (two) times a day (Patient not taking: Reported on 1/11/2021), Disp: 60 tablet, Rfl: 6    fexofenadine (ALLEGRA) 180 MG tablet, Take 180 mg by mouth daily as needed, Disp: , Rfl:     Fluocinolone Acetonide Body 0 01 % OIL, 3 DROPS IN EACH EAR THREE NIGHTS PER WEEK, Disp: , Rfl:     fluticasone (FLONASE) 50 mcg/act nasal spray, USE 1 SPRAY PER NOSTRIL DAILY, Disp: , Rfl:     ibuprofen (MOTRIN) 600 mg tablet, TAKE 1 TABLET BY MOUTH EVERY 6 HOURS, Disp: 120 tablet, Rfl: 0    Lidocaine Viscous HCl (XYLOCAINE) 2 % mucosal solution, Swish and spit 15 mL 4 (four) times a day as needed for mouth pain or discomfort, Disp: 100 mL, Rfl: 0    lurasidone (LATUDA) 20 mg tablet, Take 20 mg by mouth, Disp: , Rfl:     metFORMIN (GLUCOPHAGE-XR) 500 mg 24 hr tablet, , Disp: , Rfl:     multivitamin ([place]) TABS, Take 1 tablet by mouth daily, Disp: , Rfl:     neomycin-polymyxin-hydrocortisone (CORTISPORIN) 0 35%-10,000 units/mL-1% otic suspension, Administer 4 drops into the left ear 4 (four) times a day for 5 days, Disp: 10 mL, Rfl: 0    ondansetron (ZOFRAN) 4 mg tablet, Take 1 tablet (4 mg total) by mouth every 6 (six) hours (Patient not taking: Reported on 3/1/2021), Disp: 20 tablet, Rfl: 0    Prenatal Vit-Fe Fumarate-FA (PRENATAL VITAMINS PO), Take 1 tablet by mouth daily, Disp: , Rfl:     topiramate (TOPAMAX) 50 MG tablet, TAKE HALF A PILL AT NIGHT FOR A WEEK AND THEN INCREASE TO A WHOLE PILL EVERY NIGHT, Disp: , Rfl:     Current Allergies     Allergies as of 2021 - Reviewed 2021   Allergen Reaction Noted    Apple - food allergy  2021    Gluten meal - food allergy Vomiting 2021    Other Other (See Comments) 10/24/2014            The following portions of the patient's history were reviewed and updated as appropriate: allergies, current medications, past family history, past medical history, past social history, past surgical history and problem list      Past Medical History:   Diagnosis Date    Celiac disease     Gastroparesis     Marijuana use, continuous 7/10/2018    Last Assessment & Plan:  Leandra Chandler reports that she has less ability to use THC due to her step-daughter being in the house  She reports that she has significantly decrease to twice/day  Reviewed risks of routine daily use, she does not feel this is an issue & states "I know my body" & is not willing to quit at this point in time   Nicotine use disorder 7/10/2018    Last Assessment & Plan:  Red Medrano continues to smoke 1 cigarette/day  She reports that "it will not be hard for her to stop" & plans on quitting immediately      Personality disorder ([facility] [address] )     Substance abuse ([facility] [address] )     marijuana    Trauma     PTSD    Varicella        Past Surgical History:   Procedure Laterality Date    VT  DELIVERY ONLY N/A 2021    Procedure:  SECTION (); Surgeon: Clara Acuña MD;  Location: [facility];  Service: Obstetrics    TONSILLECTOMY         Family History   Problem Relation Age of Onset    Other Mother         mental health issues    Psoriasis Mother     No Known Problems Father     No Known Problems Brother     Heart disease Maternal Grandmother     Thrombophlebitis Maternal Grandmother     Cancer Maternal Grandfather     Stomach cancer Paternal Grandmother     Bone cancer Paternal Grandfather     Breast cancer Neg Hx     Colon cancer Neg Hx     Ovarian cancer Neg Hx          Medications have been verified  Objective   There were no vitals taken for this visit  No LMP recorded  Physical Exam     Physical Exam  Vitals and nursing note reviewed  Constitutional:       Appearance: She is well-developed  HENT:      Right Ear: Tympanic membrane and ear canal normal       Left Ear: Tympanic membrane normal       Ears:      Comments: Left ear canal is erythematous swollen tender to touch     Nose:      Comments: Hypertrophic boggy turbinates bilaterally     Mouth/Throat:      Mouth: Mucous membranes are moist       Pharynx: Posterior oropharyngeal erythema present  Tonsils: 0 on the right  0 on the left  Pulmonary:      Effort: Pulmonary effort is normal       Breath sounds: Normal breath sounds  Musculoskeletal:      Cervical back: Normal range of motion and neck supple  Neurological:      Mental Status: She is alert

## 2021-08-08 LAB — BACTERIA THROAT CULT: NORMAL

## 2021-08-11 ENCOUNTER — OFFICE VISIT (OUTPATIENT)
Dept: URGENT CARE | Age: 26
End: 2021-08-11
Payer: COMMERCIAL

## 2021-08-11 VITALS
TEMPERATURE: 98 F | WEIGHT: 216 LBS | BODY MASS INDEX: 35.99 KG/M2 | OXYGEN SATURATION: 100 % | SYSTOLIC BLOOD PRESSURE: 110 MMHG | HEART RATE: 74 BPM | DIASTOLIC BLOOD PRESSURE: 65 MMHG | RESPIRATION RATE: 22 BRPM | HEIGHT: 65 IN

## 2021-08-11 DIAGNOSIS — J01.10 ACUTE NON-RECURRENT FRONTAL SINUSITIS: Primary | ICD-10-CM

## 2021-08-11 PROCEDURE — 99213 OFFICE O/P EST LOW 20 MIN: CPT | Performed by: FAMILY MEDICINE

## 2021-08-11 RX ORDER — AMOXICILLIN AND CLAVULANATE POTASSIUM 875; 125 MG/1; MG/1
1 TABLET, FILM COATED ORAL EVERY 12 HOURS SCHEDULED
Qty: 20 TABLET | Refills: 0 | Status: SHIPPED | OUTPATIENT
Start: 2021-08-11 | End: 2021-08-21

## 2021-08-11 NOTE — PATIENT INSTRUCTIONS
Sinus massage, as demonstrated, for 2-3 minutes every 2-3 hours  Continue current cough and cold medications  Start the antibiotic today  I would recommend taking a probiotic while you are taking the antibiotic  Follow up with PCP in 3-5 days  Proceed to  ER if symptoms worsen  Sinusitis   AMBULATORY CARE:   Sinusitis  is inflammation or infection of your sinuses  Sinusitis is most often caused by a virus  Acute sinusitis may last up to 12 weeks  Chronic sinusitis lasts longer than 12 weeks  Recurrent sinusitis means you have 4 or more infections in 1 year  Common signs and symptoms:   · Fever    · Pain, pressure, redness, or swelling around the forehead, cheeks, or eyes    · Thick yellow or green discharge from your nose    · Tenderness when you touch your face over your sinuses    · Dry cough that happens mostly at night or when you lie down    · Headache and face pain that is worse when you lean forward    · Tooth pain, or pain when you chew    Seek care immediately if:   · You have trouble breathing or wheezing that is getting worse  · You have a stiff neck, a fever, or a bad headache  · You cannot open your eye  · Your eyeball bulges out or you cannot move your eye  · You are more sleepy than normal, or you notice changes in your ability to think, move, or talk  · You have swelling of your forehead or scalp  Call your doctor if:   · You have vision changes, such as double vision  · Your eye and eyelid are red, swollen, and painful  · Your symptoms do not improve or go away after 10 days  · You have nausea and are vomiting  · Your nose is bleeding  · You have questions or concerns about your condition or care  Medicines: Your symptoms may go away on their own  Your healthcare provider may recommend watchful waiting for up to 10 days before starting antibiotics  You may need any of the following:  · Acetaminophen  decreases pain and fever   It is available without a doctor's order  Ask how much to take and how often to take it  Follow directions  Read the labels of all other medicines you are using to see if they also contain acetaminophen, or ask your doctor or pharmacist  Acetaminophen can cause liver damage if not taken correctly  Do not use more than 4 grams (4,000 milligrams) total of acetaminophen in one day  · NSAIDs , such as ibuprofen, help decrease swelling, pain, and fever  This medicine is available with or without a doctor's order  NSAIDs can cause stomach bleeding or kidney problems in certain people  If you take blood thinner medicine, always ask your healthcare provider if NSAIDs are safe for you  Always read the medicine label and follow directions  · Nasal steroid sprays  may help decrease inflammation in your nose and sinuses  · Decongestants  help reduce swelling and drain mucus in the nose and sinuses  They may help you breathe easier  · Antihistamines  help dry mucus in the nose and relieve sneezing  · Antibiotics  help treat or prevent a bacterial infection  Self-care:   · Rinse your sinuses as directed  Use a sinus rinse device to rinse your nasal passages with a saline (salt water) solution or distilled water  Do not use tap water  This will help thin the mucus in your nose and rinse away pollen and dirt  It will also help reduce swelling so you can breathe normally  · Use a humidifier  to increase air moisture in your home  This may make it easier for you to breathe and help decrease your cough  · Sleep with your head elevated  Place an extra pillow under your head before you go to sleep to help your sinuses drain  · Drink liquids as directed  Ask your healthcare provider how much liquid to drink each day and which liquids are best for you  Liquids will thin the mucus in your nose and help it drain  Avoid drinks that contain alcohol or caffeine  · Do not smoke, and avoid secondhand smoke    Nicotine and other chemicals in cigarettes and cigars can make your symptoms worse  Ask your healthcare provider for information if you currently smoke and need help to quit  E-cigarettes or smokeless tobacco still contain nicotine  Talk to your healthcare provider before you use these products  Prevent the spread of germs:   · Wash your hands often with soap and water  Wash your hands after you use the bathroom, change a child's diaper, or sneeze  Wash your hands before you prepare or eat food  · Stay away from people who are sick  Some germs spread easily and quickly through contact  Follow up with your doctor as directed: You may be referred to an ear, nose, and throat specialist  Write down your questions so you remember to ask them during your visits  © Copyright [address] 2021 Information is for End User's use only and may not be sold, redistributed or otherwise used for commercial purposes  All illustrations and images included in CareNotes® are the copyrighted property of [employer]  or [facility] Nuria Pope   The above information is an  only  It is not intended as medical advice for individual conditions or treatments  Talk to your doctor, nurse or pharmacist before following any medical regimen to see if it is safe and effective for you

## 2021-08-11 NOTE — PROGRESS NOTES
[facility]        NAME: Yohan Bustamante is a 32 y o  female  : 1995    MRN: [id number]  DATE: 2021  TIME: 2:30 PM    Assessment and Plan   Acute non-recurrent frontal sinusitis [J01 10]  1  Acute non-recurrent frontal sinusitis  amoxicillin-clavulanate (AUGMENTIN) 875-125 mg per tablet         Patient Instructions       Sinus massage, as demonstrated, for 2-3 minutes every 2-3 hours  Continue current cough and cold medications  Start the antibiotic today  I would recommend taking a probiotic while you are taking the antibiotic  Follow up with PCP in 3-5 days  Proceed to  ER if symptoms worsen  Chief Complaint     Chief Complaint   Patient presents with    Earache     pt has bilateral ear pain x1 week  pt states she was tested for covid and strep and both came back negative  History of Present Illness       Earache (pt has bilateral ear pain x1 week  pt states she was tested for covid and strep and both came back negative  )      Earache   There is pain in the left ear  This is a new problem  The current episode started yesterday  Associated symptoms include rhinorrhea and a sore throat  URI   This is a new problem  The current episode started 1 to 4 weeks ago  The problem has been gradually worsening  There has been no fever  Associated symptoms include congestion, ear pain, rhinorrhea, sinus pain and a sore throat  She has tried decongestant for the symptoms  The treatment provided mild relief  Review of Systems   Review of Systems   Constitutional: Negative  HENT: Positive for congestion, ear pain, rhinorrhea, sinus pain and sore throat  Respiratory: Negative  Cardiovascular: Negative            Current Medications       Current Outpatient Medications:     metFORMIN (GLUCOPHAGE-XR) 500 mg 24 hr tablet, , Disp: , Rfl:     multivitamin (THERAGRAN) TABS, Take 1 tablet by mouth daily, Disp: , Rfl:     topiramate (TOPAMAX) 50 MG tablet, TAKE HALF A PILL AT NIGHT FOR A WEEK AND THEN INCREASE TO A WHOLE PILL EVERY NIGHT, Disp: , Rfl:     Alaway 0 025 % ophthalmic solution, 1 DROP PER EYE DAILY AS NEEDED (Patient not taking: Reported on 8/11/2021), Disp: , Rfl:     ALPRAZolam (XANAX) 0 25 mg tablet, Take 1 tablet (0 25 mg total) by mouth daily at bedtime as needed for anxiety for up to 10 days, Disp: 2 tablet, Rfl: 0    amoxicillin-clavulanate (AUGMENTIN) 875-125 mg per tablet, Take 1 tablet by mouth every 12 (twelve) hours for 10 days, Disp: 20 tablet, Rfl: 0    cetirizine (ZyrTEC) 10 mg tablet, Take 10 mg by mouth daily as needed, Disp: , Rfl:     diphenhydrAMINE (BENADRYL) 25 mg capsule, Take 25 mg by mouth every 6 (six) hours as needed for itching (Patient not taking: Reported on 8/11/2021), Disp: , Rfl:     docusate sodium (COLACE) 100 mg capsule, Take 1 capsule (100 mg total) by mouth 2 (two) times a day (Patient not taking: Reported on 3/1/2021), Disp: 10 capsule, Rfl: 0    Doxylamine Succinate, Sleep, (UNISOM PO), Take by mouth (Patient not taking: Reported on 8/11/2021), Disp: , Rfl:     Elastic Bandages & Supports (Abdominal Support/L-XL) MISC, Wear pregnancy belt daily, especially during working hours and when standing for prolonged periods   (Patient not taking: Reported on 3/1/2021), Disp: 1 each, Rfl: 0    famotidine (PEPCID) 20 mg tablet, Take 1 tablet (20 mg total) by mouth 2 (two) times a day (Patient not taking: Reported on 1/11/2021), Disp: 60 tablet, Rfl: 6    fexofenadine (ALLEGRA) 180 MG tablet, Take 180 mg by mouth daily as needed (Patient not taking: Reported on 8/11/2021), Disp: , Rfl:     Fluocinolone Acetonide Body 0 01 % OIL, 3 DROPS IN EACH EAR THREE NIGHTS PER WEEK (Patient not taking: Reported on 8/11/2021), Disp: , Rfl:     fluticasone (FLONASE) 50 mcg/act nasal spray, USE 1 SPRAY PER NOSTRIL DAILY (Patient not taking: Reported on 8/11/2021), Disp: , Rfl:     hydrocortisone 2 5 % cream, Apply 1 application topically (Patient not taking: Reported on 8/11/2021), Disp: , Rfl:     ibuprofen (MOTRIN) 600 mg tablet, TAKE 1 TABLET BY MOUTH EVERY 6 HOURS (Patient not taking: Reported on 8/11/2021), Disp: 120 tablet, Rfl: 0    Lidocaine Viscous HCl (XYLOCAINE) 2 % mucosal solution, Swish and spit 15 mL 4 (four) times a day as needed for mouth pain or discomfort (Patient not taking: Reported on 8/11/2021), Disp: 100 mL, Rfl: 0    lurasidone (LATUDA) 20 mg tablet, Take 20 mg by mouth (Patient not taking: Reported on 8/11/2021), Disp: , Rfl:     neomycin-polymyxin-hydrocortisone (CORTISPORIN) 0 35%-10,000 units/mL-1% otic suspension, Administer 4 drops into the left ear 4 (four) times a day for 5 days, Disp: 10 mL, Rfl: 0    ondansetron (ZOFRAN) 4 mg tablet, Take 1 tablet (4 mg total) by mouth every 6 (six) hours (Patient not taking: Reported on 3/1/2021), Disp: 20 tablet, Rfl: 0    Prenatal Vit-Fe Fumarate-FA (PRENATAL VITAMINS PO), Take 1 tablet by mouth daily (Patient not taking: Reported on 8/11/2021), Disp: , Rfl:     Current Allergies     Allergies as of 08/11/2021 - Reviewed 08/11/2021   Allergen Reaction Noted    Apple - food allergy  01/11/2021    Gluten meal - food allergy Vomiting 01/18/2021    Other Other (See Comments) 10/24/2014            The following portions of the patient's history were reviewed and updated as appropriate: allergies, current medications, past family history, past medical history, past social history, past surgical history and problem list      Past Medical History:   Diagnosis Date    Celiac disease     Gastroparesis     Marijuana use, continuous 7/10/2018    Last Assessment & Plan:  Monika Palafox reports that she has less ability to use THC due to her step-daughter being in the house  She reports that she has significantly decrease to twice/day    Reviewed risks of routine daily use, she does not feel this is an issue & states "I know my body" & is not willing to quit at this point in time     Nicotine use disorder 7/10/2018    Last Assessment & Plan:  Clari Steinberg continues to smoke 1 cigarette/day  She reports that "it will not be hard for her to stop" & plans on quitting immediately   Person[facility] disorder ([facility] [address] [facility]     Substance abuse ([facility] [address] [facility]     marijuana    Trauma     PTSD    Varicella        Past Surgical History:   Procedure Laterality Date    [place]  DELIVERY ONLY N/A 2021    Procedure:  SECTION (); Surgeon: Nahun Ely MD;  Location[facility];  Service: Obstetrics    TONSILLECTOMY         Family History   Problem Relation Age of Onset    Other Mother         mental health issues    Psoriasis Mother     No Known Problems Father     No Known Problems Brother     Heart disease Maternal Grandmother     Thrombophlebitis Maternal Grandmother     Cancer Maternal Grandfather     Stomach cancer Paternal Grandmother     Bone cancer Paternal Grandfather     Breast cancer Neg Hx     Colon cancer Neg Hx     Ovarian cancer Neg Hx          Medications have been verified  Objective   /65   Pulse 74   Temp 98 °F (36 7 °C)   Resp 22   Ht 5' 5" (1 651 m)   Wt 98 kg (216 lb)   SpO2 100%   BMI 35 94 kg/m²   No LMP recorded  Physical Exam     Physical Exam  Vitals and nursing note reviewed  Constitutional:       Appearance: Normal appearance  She is well-developed  HENT:      Right Ear: Tympanic membrane and external ear normal       Left Ear: Tympanic membrane and external ear normal       Nose:      Right Sinus: Frontal sinus tenderness present  Left Sinus: Frontal sinus tenderness present  Mouth/Throat:      Pharynx: No oropharyngeal exudate  Eyes:      Conjunctiva/sclera: Conjunctivae normal    Cardiovascular:      Rate and Rhythm: Normal rate and regular rhythm  Heart sounds: Normal heart sounds  No murmur heard  Pulmonary:      Effort: Pulmonary effort is normal  No respiratory distress        Breath sounds: Normal breath sounds  No wheezing or rales  Chest:      Chest wall: No tenderness  Abdominal:      Palpations: Abdomen is soft  Musculoskeletal:         General: Normal range of motion  Cervical back: Normal range of motion and neck supple  Lymphadenopathy:      Cervical: No cervical adenopathy  Skin:     General: Skin is warm  Findings: No erythema or rash  Neurological:      Mental Status: She is alert and oriented to person, place, and time

## 2021-08-12 LAB — S PYO AG THROAT QL: NEGATIVE

## 2021-09-12 ENCOUNTER — OFFICE VISIT (OUTPATIENT)
Dept: URGENT CARE | Age: 26
End: 2021-09-12
Payer: COMMERCIAL

## 2021-09-12 VITALS
HEART RATE: 98 BPM | WEIGHT: 210 LBS | BODY MASS INDEX: 35.85 KG/M2 | TEMPERATURE: 97.6 F | RESPIRATION RATE: 17 BRPM | OXYGEN SATURATION: 96 % | HEIGHT: 64 IN

## 2021-09-12 DIAGNOSIS — J06.9 ACUTE UPPER RESPIRATORY INFECTION: Primary | ICD-10-CM

## 2021-09-12 DIAGNOSIS — R05.9 COUGH: ICD-10-CM

## 2021-09-12 PROCEDURE — U0003 INFECTIOUS AGENT DETECTION BY NUCLEIC ACID (DNA OR RNA); SEVERE ACUTE RESPIRATORY SYNDROME CORONAVIRUS 2 (SARS-COV-2) (CORONAVIRUS DISEASE [COVID-19]), AMPLIFIED PROBE TECHNIQUE, MAKING USE OF HIGH THROUGHPUT TECHNOLOGIES AS DESCRIBED BY CMS-2020-01-R: HCPCS | Performed by: NURSE PRACTITIONER

## 2021-09-12 PROCEDURE — U0005 INFEC AGEN DETEC AMPLI PROBE: HCPCS | Performed by: NURSE PRACTITIONER

## 2021-09-12 PROCEDURE — 99213 OFFICE O/P EST LOW 20 MIN: CPT | Performed by: NURSE PRACTITIONER
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
## 2021-09-12 NOTE — PATIENT INSTRUCTIONS
You are to take robitussin for cough  Follow up with your PCP  You have a covid test pending - you are to check [facility] mychart in 24-72 hours for results  You will be notified if they are +  You are to go to the eD if symptoms worsen                       Quarantine until results are back    Upper Respiratory Infection   WHAT YOU NEED TO KNOW:   An upper respiratory infection is also called a cold  It can affect your nose, throat, ears, and sinuses  Cold symptoms are usually worst for the first 3 to 5 days  Most people get better in 7 to 14 days  You may continue to cough for 2 to 3 weeks  Colds are caused by viruses and do not get better with antibiotics  DISCHARGE INSTRUCTIONS:   Call your local emergency number (911 in the [address]) if:   · You have chest pain or trouble breathing  Return to the emergency department if:   · You have a fever over 102ºF (39ºC)  Call your doctor if:   · You have a low fever  · Your sore throat gets worse or you see white or yellow spots in your throat  · Your symptoms get worse after 3 to 5 days or are not better in 14 days  · You have a rash anywhere on your skin  · You have large, tender lumps in your neck  · You have thick, green, or yellow drainage from your nose  · You cough up thick yellow, green, or bloody mucus  · You have a bad earache  · You have questions or concerns about your condition or care  Medicines: You may need any of the following:  · Decongestants  help reduce nasal congestion and help you breathe more easily  If you take decongestant pills, they may make you feel restless or cause problems with your sleep  Do not use decongestant sprays for more than a few days  · Cough suppressants  help reduce coughing  Ask your healthcare provider which type of cough medicine is best for you  · NSAIDs , such as ibuprofen, help decrease swelling, pain, and fever  NSAIDs can cause stomach bleeding or kidney problems in certain people   If you take blood thinner medicine, always ask your healthcare provider if NSAIDs are safe for you  Always read the medicine label and follow directions  · Acetaminophen  decreases pain and fever  It is available without a doctor's order  Ask how much to take and how often to take it  Follow directions  Read the labels of all other medicines you are using to see if they also contain acetaminophen, or ask your doctor or pharmacist  Acetaminophen can cause liver damage if not taken correctly  Do not use more than 4 grams (4,000 milligrams) total of acetaminophen in one day  · Take your medicine as directed  Contact your healthcare provider if you think your medicine is not helping or if you have side effects  Tell him or her if you are allergic to any medicine  Keep a list of the medicines, vitamins, and herbs you take  Include the amounts, and when and why you take them  Bring the list or the pill bottles to follow-up visits  Carry your medicine list with you in case of an emergency  Self-care:   · Rest as much as possible  Slowly start to do more each day  · Drink more liquids as directed  Liquids will help thin and loosen mucus so you can cough it up  Liquids will also help prevent dehydration  Liquids that help prevent dehydration include water, fruit juice, and broth  Do not drink liquids that contain caffeine  Caffeine can increase your risk for dehydration  Ask your healthcare provider how much liquid to drink each day  · Soothe a sore throat  Gargle with warm salt water  Make salt water by dissolving ¼ teaspoon salt in 1 cup warm water  You may also suck on hard candy or throat lozenges  You may use a sore throat spray  · Use a humidifier or vaporizer  Use a cool mist humidifier or a vaporizer to increase air moisture in your home  This may make it easier for you to breathe and help decrease your cough  · Use saline nasal drops as directed  These help relieve congestion      · Apply petroleum-based jelly around the outside of your nostrils  This can decrease irritation from blowing your nose  · Do not smoke  Nicotine and other chemicals in cigarettes and cigars can make your symptoms worse  They can also cause infections such as bronchitis or pneumonia  Ask your healthcare provider for information if you currently smoke and need help to quit  E-cigarettes or smokeless tobacco still contain nicotine  Talk to your healthcare provider before you use these products  Prevent a cold:   · Wash your hands often  Use soap and water every time you wash your hands  Rub your soapy hands together, lacing your fingers  Use the fingers of one hand to scrub under the nails of the other hand  Wash for at least 20 seconds  Rinse with warm, running water for several seconds  Then dry your hands  Use germ-killing gel if soap and water are not available  Do not touch your eyes or mouth without washing your hands first          · Cover a sneeze or cough  Use a tissue that covers your mouth and nose  Put the used tissue in the trash right away  Use the bend of your arm if a tissue is not available  Wash your hands well with soap and water or use a hand   Do not stand close to anyone who is sneezing or coughing  · Try to stay away from others while you are sick  This is especially important during the first 2 to 3 days when the virus is more easily spread  Wait until a fever, cough, or other symptoms are gone before you return to work or other regular activities  · Do not share items while you are sick  This includes food, drinks, eating utensils, and dishes  Follow up with your doctor as directed:  Write down your questions so you remember to ask them during your visits  © Copyright [employer] 2021 Information is for End User's use only and may not be sold, redistributed or otherwise used for commercial purposes   All illustrations and images included in CareNotes® are the copyrighted property of A D A M , Inc  or [facility] Nuria Pope   The above information is an  only  It is not intended as medical advice for individual conditions or treatments  Talk to your doctor, nurse or pharmacist before following any medical regimen to see if it is safe and effective for you

## 2021-09-12 NOTE — LETTER
September 12, 2021     Patient: Zohreh Mir   YOB: 1995   Date of Visit: 9/12/2021       To Whom It May Concern: It is my medical opinion that [place] no work until [employer] are back  If you have any questions or concerns, please don't hesitate to call           Sincerely,        SIMONE Dewey    CC: No Recipients

## 2021-09-13 LAB — SARS-COV-2 RNA RESP QL NAA+PROBE: NEGATIVE

## 2021-09-23 ENCOUNTER — OFFICE VISIT (OUTPATIENT)
Dept: FAMILY MEDICINE CLINIC | Facility: CLINIC | Age: 26
End: 2021-09-23
Payer: COMMERCIAL

## 2021-09-23 VITALS
TEMPERATURE: 98.2 F | DIASTOLIC BLOOD PRESSURE: 80 MMHG | SYSTOLIC BLOOD PRESSURE: 110 MMHG | BODY MASS INDEX: 36.01 KG/M2 | WEIGHT: 209.8 LBS

## 2021-09-23 DIAGNOSIS — K59.01 SLOW TRANSIT CONSTIPATION: Primary | ICD-10-CM

## 2021-09-23 DIAGNOSIS — K90.0 CELIAC DISEASE: ICD-10-CM

## 2021-09-23 DIAGNOSIS — H60.502 ACUTE OTITIS EXTERNA OF LEFT EAR, UNSPECIFIED TYPE: ICD-10-CM

## 2021-09-23 DIAGNOSIS — K21.9 GASTROESOPHAGEAL REFLUX DISEASE, UNSPECIFIED WHETHER ESOPHAGITIS PRESENT: ICD-10-CM

## 2021-09-23 PROBLEM — H60.512 ACUTE ACTINIC OTITIS EXTERNA OF LEFT EAR: Status: RESOLVED | Noted: 2021-08-05 | Resolved: 2021-09-23

## 2021-09-23 PROCEDURE — 99213 OFFICE O/P EST LOW 20 MIN: CPT | Performed by: FAMILY MEDICINE

## 2021-09-23 RX ORDER — CIPROFLOXACIN AND DEXAMETHASONE 3; 1 MG/ML; MG/ML
4 SUSPENSION/ DROPS AURICULAR (OTIC) 2 TIMES DAILY
Qty: 7.5 ML | Refills: 0 | Status: SHIPPED | OUTPATIENT
Start: 2021-09-23 | End: 2021-09-24 | Stop reason: SDUPTHER

## 2021-09-23 RX ORDER — POLYETHYLENE GLYCOL 3350 17 G/17G
17 POWDER, FOR SOLUTION ORAL DAILY
Qty: 30 EACH | Refills: 2 | Status: SHIPPED | OUTPATIENT
Start: 2021-09-23 | End: 2021-11-02

## 2021-09-23 RX ORDER — FAMOTIDINE 20 MG/1
20 TABLET, FILM COATED ORAL DAILY
Qty: 30 TABLET | Refills: 2 | Status: SHIPPED | OUTPATIENT
Start: 2021-09-23 | End: 2022-03-10

## 2021-09-23 NOTE — PROGRESS NOTES
Assessment/Plan:    Slow transit constipation  - At this time will recommend an increase fluid intake, increase soluble fiber in the diet, encourage regular defecation attempts after eating and regular exercise  I will also trial patient on Miralax  - Patient advised to return should symptoms fail to resolve with these first line agents  - Ambulatory referral to gastroenterology placed per patient request       Gastroesophageal reflux disease  - Discussed importance of diet and lifestyle modifications to control GERD symptoms     - Avoid things which worsen heartburn such as caffeine, tomato based products and spicy foods  - Discussed importance of eating small, frequent meals instead of large meals  - Elevate head of the bed and do not lay down 2-3 hours following a meal   - Patient was previously on Pepcid during her pregnancy which helped with her symptoms so will refill this prescription  Acute otitis externa of left ear  - I have prescribed a 7 day course of Ciprodex; patient advised to return should symptoms fail to resolve or worsen  Diagnoses and all orders for this visit:    Slow transit constipation  -     polyethylene glycol (MIRALAX) 17 g packet; Take 17 g by mouth daily  -     Ambulatory referral to Gastroenterology; Future    Celiac disease  -     Ambulatory referral to Gastroenterology; Future    Gastroesophageal reflux disease, unspecified whether esophagitis present  -     famotidine (PEPCID) 20 mg tablet; Take 1 tablet (20 mg total) by mouth daily    Acute otitis externa of left ear, unspecified type  -     ciprofloxacin-dexamethasone (CIPRODEX) otic suspension; Administer 4 drops into the left ear 2 (two) times a day for 7 days          Subjective:      Patient ID: Antonio Bejarano is a 32 y o  female  Constipation  This is a new problem  The current episode started more than 1 month ago  Stool frequency: 3-4 times a week  Stool description: Mucus   The patient is not on a high fiber diet  She does not exercise regularly  There has been adequate water intake  Associated symptoms include abdominal pain and nausea  Risk factors include obesity and stress  She has tried laxatives and stool softeners for the symptoms  The treatment provided mild relief  Review of Systems   Constitutional: Negative  HENT: Positive for ear pain  Respiratory: Negative  Cardiovascular: Negative  Gastrointestinal: Positive for abdominal pain, constipation and nausea  Genitourinary: Negative  Musculoskeletal: Negative  Objective:      /80   Temp 98 2 °F (36 8 °C)   Wt 95 2 kg (209 lb 12 8 oz)   LMP 08/29/2021   BMI 36 01 kg/m²          Physical Exam  Constitutional:       General: She is not in acute distress  Appearance: Normal appearance  She is obese  HENT:      Head: Normocephalic and atraumatic  Ears:      Comments: Cerumen noted in the right ear  Erythematous left external ear canal with mastoid tenderness   Eyes:      General:         Right eye: No discharge  Left eye: No discharge  Extraocular Movements: Extraocular movements intact  Cardiovascular:      Rate and Rhythm: Normal rate  Pulmonary:      Effort: Pulmonary effort is normal  No respiratory distress  Breath sounds: No wheezing  Abdominal:      General: Bowel sounds are normal  There is no distension  Palpations: Abdomen is soft  There is no mass  Tenderness: There is no abdominal tenderness  There is no guarding  Musculoskeletal:      Right lower leg: No edema  Left lower leg: No edema  Neurological:      General: No focal deficit present  Mental Status: She is alert     Psychiatric:         Mood and Affect: Mood normal          Behavior: Behavior normal

## 2021-09-23 NOTE — PATIENT INSTRUCTIONS
Constipation   WHAT YOU NEED TO KNOW:   Constipation means you have hard, dry bowel movements, or you go longer than usual between bowel movements  DISCHARGE INSTRUCTIONS:   Call your doctor if:   · You have blood in your bowel movements  · You have a fever and abdominal pain with the constipation  · Your constipation gets worse  · You start to vomit  · You have questions or concerns about your condition or care  Medicines:   · Medicine  such as a laxative may help relax and loosen your intestines to help you have a bowel movement  Your provider may recommend you only use laxatives for a short time  Long-term use may make your bowels dependent on the medicine  · Take your medicine as directed  Contact your healthcare provider if you think your medicine is not helping or if you have side effects  Tell him of her if you are allergic to any medicine  Keep a list of the medicines, vitamins, and herbs you take  Include the amounts, and when and why you take them  Bring the list or the pill bottles to follow-up visits  Carry your medicine list with you in case of an emergency  Relieve constipation:   · A suppository  may be used to help soften your bowel movements  This may make them easier to pass  A suppository is guided into your rectum through your anus  · An enema  is liquid medicine used to clear bowel movement from your rectum  The medicine is put into your rectum through your anus  Prevent constipation:   · Drink liquids as directed  You may need to drink extra liquids to help soften and move your bowels  Ask how much liquid to drink each day and which liquids are best for you  · Eat high-fiber foods  This may help decrease constipation by adding bulk to your bowel movements  High-fiber foods include fruit, vegetables, whole-grain breads and cereals, and beans  Your healthcare provider or dietitian can help you create a high-fiber meal plan   Your provider may also recommend a fiber supplement if you cannot get enough fiber from food  · Exercise regularly  Regular physical activity can help stimulate your intestines  Walking is a good exercise to prevent or relieve constipation  Ask which exercises are best for you  · Schedule a time each day to have a bowel movement  This may help train your body to have regular bowel movements  Bend forward while you are on the toilet to help move the bowel movement out  Sit on the toilet for at least 10 minutes, even if you do not have a bowel movement  · Talk to your healthcare provider about your medicines  Certain medicines, such as opioids, can cause constipation  Your provider may be able to make medicine changes  For example, he or she may change the kind of medicine, or change when you take it  Follow up with your healthcare provider as directed:  Write down your questions so you remember to ask them during your visits  © Copyright [employer] 2021 Information is for End User's use only and may not be sold, redistributed or otherwise used for commercial purposes  All illustrations and images included in CareNotes® are the copyrighted property of [employer]  or Antonio Pope   The above information is an  only  It is not intended as medical advice for individual conditions or treatments  Talk to your doctor, nurse or pharmacist before following any medical regimen to see if it is safe and effective for you

## 2021-09-23 NOTE — ASSESSMENT & PLAN NOTE
- I have prescribed a 7 day course of Ciprodex; patient advised to return should symptoms fail to resolve or worsen

## 2021-09-23 NOTE — ASSESSMENT & PLAN NOTE
- Discussed importance of diet and lifestyle modifications to control GERD symptoms     - Avoid things which worsen heartburn such as caffeine, tomato based products and spicy foods  - Discussed importance of eating small, frequent meals instead of large meals  - Elevate head of the bed and do not lay down 2-3 hours following a meal   - Patient was previously on Pepcid during her pregnancy which helped with her symptoms so will refill this prescription

## 2021-09-23 NOTE — ASSESSMENT & PLAN NOTE
- At this time will recommend an increase fluid intake, increase soluble fiber in the diet, encourage regular defecation attempts after eating and regular exercise  I will also trial patient on Miralax  - Patient advised to return should symptoms fail to resolve with these first line agents    - Ambulatory referral to gastroenterology placed per patient request

## 2021-09-24 ENCOUNTER — TELEPHONE (OUTPATIENT)
Dept: FAMILY MEDICINE CLINIC | Facility: CLINIC | Age: 26
End: 2021-09-24

## 2021-09-24 DIAGNOSIS — J02.9 SORE THROAT: ICD-10-CM

## 2021-09-24 DIAGNOSIS — H60.502 ACUTE OTITIS EXTERNA OF LEFT EAR, UNSPECIFIED TYPE: ICD-10-CM

## 2021-09-24 DIAGNOSIS — Z11.52 ENCOUNTER FOR SCREENING FOR COVID-19: Primary | ICD-10-CM

## 2021-09-24 PROCEDURE — U0005 INFEC AGEN DETEC AMPLI PROBE: HCPCS | Performed by: FAMILY MEDICINE

## 2021-09-24 PROCEDURE — U0003 INFECTIOUS AGENT DETECTION BY NUCLEIC ACID (DNA OR RNA); SEVERE ACUTE RESPIRATORY SYNDROME CORONAVIRUS 2 (SARS-COV-2) (CORONAVIRUS DISEASE [COVID-19]), AMPLIFIED PROBE TECHNIQUE, MAKING USE OF HIGH THROUGHPUT TECHNOLOGIES AS DESCRIBED BY CMS-2020-01-R: HCPCS | Performed by: FAMILY MEDICINE

## 2021-09-24 RX ORDER — CIPROFLOXACIN AND DEXAMETHASONE 3; 1 MG/ML; MG/ML
4 SUSPENSION/ DROPS AURICULAR (OTIC) 2 TIMES DAILY
Qty: 7.5 ML | Refills: 0 | Status: SHIPPED | OUTPATIENT
Start: 2021-09-24 | End: 2021-11-02

## 2021-09-24 NOTE — TELEPHONE ENCOUNTER
Called and spoke with patient who is having worsening sore throat, difficulty swallowing she is concerned with strep throat and possible covid  I have asked her to come to the office for testing and to call once she gets here so I can go to the car to perform the testing   She stated that she will be here around 4pm

## 2021-09-24 NOTE — TELEPHONE ENCOUNTER
Pt was seen yesterday and she isn't feeling well still she is wondering if she can be seen today and if we can test her for strep and covid as well

## 2021-10-01 ENCOUNTER — OFFICE VISIT (OUTPATIENT)
Dept: OBGYN CLINIC | Facility: MEDICAL CENTER | Age: 26
End: 2021-10-01
Payer: COMMERCIAL

## 2021-10-01 VITALS
HEIGHT: 64 IN | BODY MASS INDEX: 36.19 KG/M2 | SYSTOLIC BLOOD PRESSURE: 110 MMHG | WEIGHT: 212 LBS | DIASTOLIC BLOOD PRESSURE: 70 MMHG

## 2021-10-01 DIAGNOSIS — Z30.09 GENERAL COUNSELING FOR INITIATION OF OTHER CONTRACEPTIVE MEASURES: ICD-10-CM

## 2021-10-01 DIAGNOSIS — B37.3 VAGINAL CANDIDIASIS: Primary | ICD-10-CM

## 2021-10-01 DIAGNOSIS — R39.9 UTI SYMPTOMS: ICD-10-CM

## 2021-10-01 LAB
BV WHIFF TEST VAG QL: NEGATIVE
CLUE CELLS SPEC QL WET PREP: ABNORMAL
PH SMN: NORMAL [PH]
SL AMB  POCT GLUCOSE, UA: NEGATIVE
SL AMB LEUKOCYTE ESTERASE,UA: NEGATIVE
SL AMB POCT BILIRUBIN,UA: NEGATIVE
SL AMB POCT BLOOD,UA: NEGATIVE
SL AMB POCT CLARITY,UA: ABNORMAL
SL AMB POCT COLOR,UA: YELLOW
SL AMB POCT KETONES,UA: 5
SL AMB POCT NITRITE,UA: NEGATIVE
SL AMB POCT PH,UA: 5
SL AMB POCT SPECIFIC GRAVITY,UA: 1.03
SL AMB POCT URINE PROTEIN: 15
SL AMB POCT UROBILINOGEN: 0.2
SL AMB POCT WET MOUNT: ABNORMAL
T VAGINALIS VAG QL WET PREP: ABNORMAL
YEAST VAG QL WET PREP: PRESENT

## 2021-10-01 PROCEDURE — 99213 OFFICE O/P EST LOW 20 MIN: CPT | Performed by: STUDENT IN AN ORGANIZED HEALTH CARE EDUCATION/TRAINING PROGRAM

## 2021-10-01 PROCEDURE — 87210 SMEAR WET MOUNT SALINE/INK: CPT | Performed by: STUDENT IN AN ORGANIZED HEALTH CARE EDUCATION/TRAINING PROGRAM

## 2021-10-01 PROCEDURE — 81002 URINALYSIS NONAUTO W/O SCOPE: CPT | Performed by: STUDENT IN AN ORGANIZED HEALTH CARE EDUCATION/TRAINING PROGRAM

## 2021-10-01 RX ORDER — FLUCONAZOLE 150 MG/1
150 TABLET ORAL
Qty: 2 TABLET | Refills: 0 | Status: SHIPPED | OUTPATIENT
Start: 2021-10-01 | End: 2021-10-05

## 2021-11-02 ENCOUNTER — OFFICE VISIT (OUTPATIENT)
Dept: OBGYN CLINIC | Facility: MEDICAL CENTER | Age: 26
End: 2021-11-02
Payer: COMMERCIAL

## 2021-11-02 VITALS
SYSTOLIC BLOOD PRESSURE: 116 MMHG | DIASTOLIC BLOOD PRESSURE: 70 MMHG | HEIGHT: 64 IN | BODY MASS INDEX: 35.51 KG/M2 | WEIGHT: 208 LBS

## 2021-11-02 DIAGNOSIS — N89.8 VAGINAL DISCHARGE: Primary | ICD-10-CM

## 2021-11-02 DIAGNOSIS — B37.3 VAGINAL YEAST INFECTION: ICD-10-CM

## 2021-11-02 LAB
BV WHIFF TEST VAG QL: ABNORMAL
CLUE CELLS SPEC QL WET PREP: ABNORMAL
PH SMN: 4.5 [PH]
SL AMB POCT WET MOUNT: ABNORMAL
T VAGINALIS VAG QL WET PREP: ABNORMAL
YEAST VAG QL WET PREP: ABNORMAL

## 2021-11-02 PROCEDURE — 87210 SMEAR WET MOUNT SALINE/INK: CPT | Performed by: NURSE PRACTITIONER

## 2021-11-02 PROCEDURE — 99213 OFFICE O/P EST LOW 20 MIN: CPT | Performed by: NURSE PRACTITIONER

## 2021-11-03 ENCOUNTER — OFFICE VISIT (OUTPATIENT)
Dept: FAMILY MEDICINE CLINIC | Facility: CLINIC | Age: 26
End: 2021-11-03
Payer: COMMERCIAL

## 2021-11-03 ENCOUNTER — TELEPHONE (OUTPATIENT)
Dept: OTHER | Facility: OTHER | Age: 26
End: 2021-11-03

## 2021-11-03 VITALS
HEIGHT: 64 IN | OXYGEN SATURATION: 98 % | BODY MASS INDEX: 35.17 KG/M2 | WEIGHT: 206 LBS | SYSTOLIC BLOOD PRESSURE: 116 MMHG | DIASTOLIC BLOOD PRESSURE: 80 MMHG | TEMPERATURE: 98.2 F | HEART RATE: 119 BPM

## 2021-11-03 DIAGNOSIS — B37.9 YEAST INFECTION: ICD-10-CM

## 2021-11-03 DIAGNOSIS — Z20.822 EXPOSURE TO COVID-19 VIRUS: ICD-10-CM

## 2021-11-03 DIAGNOSIS — Z20.7 EXPOSURE TO HEAD LICE: Primary | ICD-10-CM

## 2021-11-03 PROCEDURE — U0003 INFECTIOUS AGENT DETECTION BY NUCLEIC ACID (DNA OR RNA); SEVERE ACUTE RESPIRATORY SYNDROME CORONAVIRUS 2 (SARS-COV-2) (CORONAVIRUS DISEASE [COVID-19]), AMPLIFIED PROBE TECHNIQUE, MAKING USE OF HIGH THROUGHPUT TECHNOLOGIES AS DESCRIBED BY CMS-2020-01-R: HCPCS | Performed by: FAMILY MEDICINE

## 2021-11-03 PROCEDURE — 99213 OFFICE O/P EST LOW 20 MIN: CPT | Performed by: FAMILY MEDICINE

## 2021-11-03 PROCEDURE — U0005 INFEC AGEN DETEC AMPLI PROBE: HCPCS | Performed by: FAMILY MEDICINE

## 2021-11-03 RX ORDER — PERMETHRIN 50 MG/G
CREAM TOPICAL ONCE
Qty: 60 G | Refills: 1 | Status: SHIPPED | OUTPATIENT
Start: 2021-11-03 | End: 2021-11-03 | Stop reason: DRUGHIGH

## 2021-11-03 RX ORDER — FLUCONAZOLE 150 MG/1
TABLET ORAL
Qty: 2 TABLET | Refills: 0 | Status: SHIPPED | OUTPATIENT
Start: 2021-11-03 | End: 2021-11-05

## 2021-11-04 LAB — SARS-COV-2 RNA RESP QL NAA+PROBE: NEGATIVE

## 2021-11-08 ENCOUNTER — TELEPHONE (OUTPATIENT)
Dept: OBGYN CLINIC | Facility: MEDICAL CENTER | Age: 26
End: 2021-11-08

## 2021-11-15 ENCOUNTER — TELEPHONE (OUTPATIENT)
Dept: OBGYN CLINIC | Facility: MEDICAL CENTER | Age: 26
End: 2021-11-15

## 2021-12-28 ENCOUNTER — TELEPHONE (OUTPATIENT)
Dept: OBGYN CLINIC | Facility: MEDICAL CENTER | Age: 26
End: 2021-12-28

## 2021-12-29 ENCOUNTER — ANNUAL EXAM (OUTPATIENT)
Dept: OBGYN CLINIC | Facility: MEDICAL CENTER | Age: 26
End: 2021-12-29
Payer: COMMERCIAL

## 2021-12-29 VITALS
HEIGHT: 64 IN | SYSTOLIC BLOOD PRESSURE: 108 MMHG | BODY MASS INDEX: 34.83 KG/M2 | WEIGHT: 204 LBS | DIASTOLIC BLOOD PRESSURE: 60 MMHG

## 2021-12-29 DIAGNOSIS — Z01.419 WELL WOMAN EXAM WITH ROUTINE GYNECOLOGICAL EXAM: Primary | ICD-10-CM

## 2021-12-29 DIAGNOSIS — B37.3 VAGINAL YEAST INFECTION: ICD-10-CM

## 2021-12-29 PROCEDURE — 99395 PREV VISIT EST AGE 18-39: CPT | Performed by: NURSE PRACTITIONER

## 2021-12-29 PROCEDURE — 0503F POSTPARTUM CARE VISIT: CPT | Performed by: NURSE PRACTITIONER

## 2021-12-29 PROCEDURE — 3725F SCREEN DEPRESSION PERFORMED: CPT | Performed by: NURSE PRACTITIONER

## 2022-01-19 ENCOUNTER — TELEPHONE (OUTPATIENT)
Dept: OBGYN CLINIC | Facility: MEDICAL CENTER | Age: 27
End: 2022-01-19

## 2022-01-19 NOTE — TELEPHONE ENCOUNTER
Pt states if there is anyway we can send in some type of oral medication for her to take  She finished all the terconazole applicators but didn't take them consistently for the 5 days so she doesn't think it worked  Still having the yeast symptoms  Thank you!

## 2022-01-20 ENCOUNTER — TELEPHONE (OUTPATIENT)
Dept: OBGYN CLINIC | Facility: MEDICAL CENTER | Age: 27
End: 2022-01-20

## 2022-01-21 ENCOUNTER — TELEPHONE (OUTPATIENT)
Dept: OBGYN CLINIC | Facility: MEDICAL CENTER | Age: 27
End: 2022-01-21

## 2022-01-21 NOTE — TELEPHONE ENCOUNTER
Patient would like to speak to a nurse about her medication(diflucan)Please review and contact patient when you get a chance

## 2022-01-21 NOTE — TELEPHONE ENCOUNTER
Called pt and lvm for pt to call office in regards to a message that was left previously about medication

## 2022-01-21 NOTE — TELEPHONE ENCOUNTER
Wanted to know if she should continue creams, I told her to stop and just  the Diflucan at the pharmacy, [place] and will [place] them [place] today

## 2022-02-17 ENCOUNTER — OFFICE VISIT (OUTPATIENT)
Dept: OBGYN CLINIC | Facility: MEDICAL CENTER | Age: 27
End: 2022-02-17
Payer: COMMERCIAL

## 2022-02-17 VITALS
DIASTOLIC BLOOD PRESSURE: 80 MMHG | HEIGHT: 64 IN | WEIGHT: 203 LBS | BODY MASS INDEX: 34.66 KG/M2 | SYSTOLIC BLOOD PRESSURE: 120 MMHG

## 2022-02-17 DIAGNOSIS — N76.0 RECURRENT VAGINITIS: Primary | ICD-10-CM

## 2022-02-17 DIAGNOSIS — N89.8 VAGINAL DISCHARGE: ICD-10-CM

## 2022-02-17 PROCEDURE — 99213 OFFICE O/P EST LOW 20 MIN: CPT | Performed by: STUDENT IN AN ORGANIZED HEALTH CARE EDUCATION/TRAINING PROGRAM

## 2022-02-17 PROCEDURE — 81514 NFCT DS BV&VAGINITIS DNA ALG: CPT | Performed by: STUDENT IN AN ORGANIZED HEALTH CARE EDUCATION/TRAINING PROGRAM

## 2022-02-17 RX ORDER — FLUCONAZOLE 150 MG/1
150 TABLET ORAL
Qty: 3 TABLET | Refills: 0 | Status: SHIPPED | OUTPATIENT
Start: 2022-02-17 | End: 2022-02-24

## 2022-02-17 NOTE — PROGRESS NOTES
[facility] [address] [place] [address]    Assessment/Plan:  Daniella Davidson is a 32 y o  [id number] who presents with recurrent yeast infections  No problem-specific Assessment & Plan notes found for this encounter  Diagnoses and all orders for this visit:    Recurrent vaginitis  -     Molecular Vaginal Panel  -     fluconazole (DIFLUCAN) 150 mg tablet; Take 1 tablet (150 mg total) by mouth every third day for 3 doses    Vaginal discharge  -     Molecular Vaginal Panel          Subjective:   Daniella Davidson is a 32 y o  [id number] female  CC: Recurrent yeast infections    HPI: Kaden Brown presents with 3 yeast infections in the last 6 months  ROS: Review of Systems   Constitutional: Negative for chills and fever  Respiratory: Negative for cough and shortness of breath  Cardiovascular: Negative for chest pain and leg swelling  Gastrointestinal: Negative for abdominal pain, nausea and vomiting  Genitourinary: Positive for vaginal discharge  Negative for dysuria, frequency and urgency  Neurological: Negative for dizziness, light-headedness and headaches  PFSH: The following portions of the patient's history were reviewed and updated as appropriate: allergies, current medications, past family history, past medical history, obstetric history, gynecologic history, past social history, past surgical history and problem list        Objective:  /80   Ht 5' 4" (1 626 m)   Wt 92 1 kg (203 lb)   BMI 34 84 kg/m²    Physical Exam  Constitutional:       Appearance: Normal appearance  HENT:      Head: Normocephalic and atraumatic  Cardiovascular:      Rate and Rhythm: Normal rate  Pulmonary:      Effort: Pulmonary effort is normal    Abdominal:      General: There is no distension  Tenderness: There is no abdominal tenderness  There is no guarding  Genitourinary:     Exam position: Lithotomy position  Pubic Area: No rash  Labia:         Right: No rash, tenderness or lesion  Left: No rash, tenderness or lesion  Urethra: No prolapse, urethral swelling or urethral lesion  Vagina: Vaginal discharge (No clue cells  +Hyphae on wet mount ) present  No erythema, tenderness, bleeding or lesions  Cervix: No cervical motion tenderness, discharge, friability or erythema  Lymphadenopathy:      Lower Body: No right inguinal adenopathy  No left inguinal adenopathy  Neurological:      Mental Status: She is alert             Ilene Prescott MD  [address] [place] [address]  2/17/2022 2:12 PM

## 2022-02-18 LAB
C GLABRATA DNA VAG QL NAA+PROBE: NEGATIVE
C KRUSEI DNA VAG QL NAA+PROBE: NEGATIVE
CANDIDA SP 6 PNL VAG NAA+PROBE: NEGATIVE
T VAGINALIS DNA VAG QL NAA+PROBE: NEGATIVE
VAGINOSIS/ITIS DNA PNL VAG PROBE+SIG AMP: NEGATIVE

## 2022-03-03 ENCOUNTER — APPOINTMENT (OUTPATIENT)
Dept: LAB | Facility: HOSPITAL | Age: 27
End: 2022-03-03
Payer: COMMERCIAL

## 2022-03-03 DIAGNOSIS — R19.4 CHANGE IN BOWEL HABITS: ICD-10-CM

## 2022-03-03 PROCEDURE — 82653 EL-1 FECAL QUANTITATIVE: CPT

## 2022-03-03 PROCEDURE — 87505 NFCT AGENT DETECTION GI: CPT

## 2022-03-04 LAB
CAMPYLOBACTER DNA SPEC NAA+PROBE: NORMAL
SALMONELLA DNA SPEC QL NAA+PROBE: NORMAL
SHIGA TOXIN STX GENE SPEC NAA+PROBE: NORMAL
SHIGELLA DNA SPEC QL NAA+PROBE: NORMAL

## 2022-03-05 LAB — G LAMBLIA AG STL QL IA: NEGATIVE

## 2022-03-08 LAB — ELASTASE PANC STL-MCNT: 454 UG ELAST./G

## 2022-03-10 DIAGNOSIS — K21.9 GASTROESOPHAGEAL REFLUX DISEASE, UNSPECIFIED WHETHER ESOPHAGITIS PRESENT: ICD-10-CM

## 2022-03-10 RX ORDER — FAMOTIDINE 20 MG/1
TABLET, FILM COATED ORAL
Qty: 30 TABLET | Refills: 2 | Status: SHIPPED | OUTPATIENT
Start: 2022-03-10

## 2022-04-28 ENCOUNTER — OFFICE VISIT (OUTPATIENT)
Dept: OBGYN CLINIC | Facility: MEDICAL CENTER | Age: 27
End: 2022-04-28

## 2022-04-28 VITALS
SYSTOLIC BLOOD PRESSURE: 100 MMHG | DIASTOLIC BLOOD PRESSURE: 60 MMHG | WEIGHT: 205 LBS | BODY MASS INDEX: 35 KG/M2 | HEIGHT: 64 IN

## 2022-04-28 DIAGNOSIS — N89.8 VAGINAL DISCHARGE: ICD-10-CM

## 2022-04-28 DIAGNOSIS — N92.6 ABNORMAL MENSES: ICD-10-CM

## 2022-04-28 DIAGNOSIS — N76.0 RECURRENT VAGINITIS: Primary | ICD-10-CM

## 2022-04-28 LAB
AMORPH URATE CRY URNS QL MICRO: ABNORMAL
BACTERIA UR QL AUTO: ABNORMAL /HPF
BILIRUB UR QL STRIP: NEGATIVE
BV WHIFF TEST VAG QL: NEGATIVE
CLARITY UR: CLEAR
CLUE CELLS SPEC QL WET PREP: NEGATIVE
COLOR UR: ABNORMAL
GLUCOSE UR STRIP-MCNC: NEGATIVE MG/DL
HGB UR QL STRIP.AUTO: NEGATIVE
KETONES UR STRIP-MCNC: NEGATIVE MG/DL
LEUKOCYTE ESTERASE UR QL STRIP: NEGATIVE
MUCOUS THREADS UR QL AUTO: ABNORMAL
NITRITE UR QL STRIP: NEGATIVE
NON-SQ EPI CELLS URNS QL MICRO: ABNORMAL /HPF
PH SMN: 4.5 [PH]
PH UR STRIP.AUTO: 7.5 [PH]
PROT UR STRIP-MCNC: NEGATIVE MG/DL
RBC #/AREA URNS AUTO: ABNORMAL /HPF
SP GR UR STRIP.AUTO: 1.01 (ref 1–1.03)
T VAGINALIS VAG QL WET PREP: NEGATIVE
UROBILINOGEN UR STRIP-ACNC: <2 MG/DL
WBC #/AREA URNS AUTO: ABNORMAL /HPF
YEAST VAG QL WET PREP: ABNORMAL

## 2022-04-28 PROCEDURE — 87210 SMEAR WET MOUNT SALINE/INK: CPT | Performed by: OBSTETRICS & GYNECOLOGY

## 2022-04-28 PROCEDURE — 87086 URINE CULTURE/COLONY COUNT: CPT | Performed by: OBSTETRICS & GYNECOLOGY

## 2022-04-28 PROCEDURE — 99213 OFFICE O/P EST LOW 20 MIN: CPT | Performed by: OBSTETRICS & GYNECOLOGY

## 2022-04-28 PROCEDURE — 81001 URINALYSIS AUTO W/SCOPE: CPT | Performed by: OBSTETRICS & GYNECOLOGY

## 2022-04-28 RX ORDER — OLOPATADINE HYDROCHLORIDE 2 MG/ML
1 SOLUTION/ DROPS OPHTHALMIC DAILY
COMMUNITY
Start: 2022-04-26

## 2022-04-28 RX ORDER — ALPRAZOLAM 0.5 MG/1
TABLET ORAL
COMMUNITY
Start: 2022-04-13

## 2022-04-28 RX ORDER — TRAZODONE HYDROCHLORIDE 50 MG/1
TABLET ORAL
COMMUNITY
Start: 2022-04-12

## 2022-04-28 RX ORDER — AZELASTINE 1 MG/ML
2 SPRAY, METERED NASAL
COMMUNITY
Start: 2022-04-26 | End: 2023-04-26

## 2022-04-28 RX ORDER — ONDANSETRON 4 MG/1
TABLET, FILM COATED ORAL
COMMUNITY
Start: 2022-04-26

## 2022-04-28 RX ORDER — FLUTICASONE PROPIONATE 50 MCG
SPRAY, SUSPENSION (ML) NASAL
COMMUNITY
Start: 2022-04-26

## 2022-04-28 RX ORDER — FLUOCINOLONE ACETONIDE 0.11 MG/ML
OIL AURICULAR (OTIC)
COMMUNITY
Start: 2022-04-26

## 2022-04-28 RX ORDER — MONTELUKAST SODIUM 10 MG/1
TABLET ORAL
COMMUNITY
Start: 2022-04-26

## 2022-04-28 RX ORDER — TRIAMCINOLONE ACETONIDE 1 MG/G
CREAM TOPICAL
COMMUNITY
Start: 2022-02-07

## 2022-04-28 RX ORDER — LORATADINE 10 MG/1
1 TABLET ORAL EVERY MORNING
COMMUNITY
Start: 2022-04-26 | End: 2023-04-26

## 2022-04-28 NOTE — PROGRESS NOTES
Assessment:  32 y o  [id number] who presents with concern for recurrent yeast infection  Plan:  Diagnoses and all orders for this visit:    Recurrent vaginitis  Vaginal discharge  -     Microscopy with light yeast  Recommend awaiting typing for better directed treatment  - Urine culture  -     Urinalysis with microscopic  -     Culture, Yeast, W/Identification    Abnormal menses  -     hCG, quantitative; Future        __________________________________________________________________    Subjective   Verlinda Zane is a 32 y o  [id number] who presents with concern for yeast infection  Has been having similar x6mo  Reports she currently has a stringy, clear discharge  heavier than her typical  She does endorse the heaviness occurs cyclically before ovulation  No odor or color  Reports burning around the vaginal area without rash/lesions  Has tried vagasil, helped with symptoms but didn't resolve the problem  Current symptoms present 3 days  Feels she does get relief after treatment, including treatment she received in Feb  But the symptoms tend to come back within a few weeks  Denies urinary complaints  Reports currently being treated for a few different things, incl paranoia  Reports has been having unusually light menses and bloating as well, which makes her believe she may be pregnant  Menses coming regularly, but unusually light and last only 2 days  Took preg test within the month (incl many others before) but all negative  The following portions of the patient's history were reviewed and updated as appropriate: allergies, current medications, past medical history, past social history, past surgical history and problem list     Review of Systems  Review of Systems   Constitutional: Negative for chills, fatigue and fever  Respiratory: Negative for cough and shortness of breath  Cardiovascular: Negative for palpitations  Gastrointestinal: Positive for abdominal distention   Negative for abdominal pain, constipation, diarrhea and nausea  Genitourinary: Positive for menstrual problem, vaginal discharge and vaginal pain  Negative for dysuria, flank pain, frequency, genital sores, pelvic pain, urgency and vaginal bleeding  Skin: Negative for rash and wound  Neurological: Negative for dizziness  Psychiatric/Behavioral: Positive for behavioral problems (paranoia)  The patient is nervous/anxious  Objective  /60   Ht 5' 4" (1 626 m)   Wt 93 kg (205 lb)   LMP 04/16/2022 (Exact Date)   Breastfeeding No   BMI 35 19 kg/m²      Physical Exam:  Physical Exam  Exam conducted with a chaperone present  Constitutional:       General: She is not in acute distress  Appearance: Normal appearance  She is not ill-appearing, toxic-appearing or diaphoretic  Eyes:      General: No scleral icterus  Right eye: No discharge  Left eye: No discharge  Conjunctiva/sclera: Conjunctivae normal    Cardiovascular:      Rate and Rhythm: Normal rate  Pulmonary:      Effort: Pulmonary effort is normal  No respiratory distress  Abdominal:      General: There is no distension  Palpations: There is no mass  Tenderness: There is no abdominal tenderness  There is no guarding or rebound  Hernia: No hernia is present  Genitourinary:     General: Normal vulva  Exam position: Lithotomy position  Labia:         Right: No rash, tenderness or lesion  Left: No rash, tenderness or lesion  Urethra: No prolapse, urethral swelling or urethral lesion  Vagina: No signs of injury  Vaginal discharge (clear, moderate amount of discharge) present  No erythema, tenderness or bleeding  Cervix: No cervical motion tenderness, friability, lesion or erythema  Musculoskeletal:         General: No swelling  Skin:     General: Skin is warm and dry  Coloration: Skin is not jaundiced or pale  Findings: No bruising or erythema  Neurological:      Mental Status: She is alert  Psychiatric:         Mood and Affect: Mood normal          Behavior: Behavior normal          Thought Content:  Thought content normal          Judgment: Judgment normal          Microscopy with rare yeast    Reviewed:  A1C (4/7/22)  Vaginal molecular panel (2/17/22)

## 2022-04-29 LAB — BACTERIA UR CULT: NORMAL

## 2022-05-09 DIAGNOSIS — R82.90 ABNORMAL URINE: Primary | ICD-10-CM

## 2022-05-26 ENCOUNTER — TELEPHONE (OUTPATIENT)
Dept: OBGYN CLINIC | Facility: MEDICAL CENTER | Age: 27
End: 2022-05-26

## 2022-05-26 DIAGNOSIS — R82.90 ABNORMAL URINE: Primary | ICD-10-CM

## 2022-05-26 DIAGNOSIS — R82.998 URINARY CRYSTALS: ICD-10-CM

## 2022-06-03 ENCOUNTER — OFFICE VISIT (OUTPATIENT)
Dept: OBGYN CLINIC | Facility: CLINIC | Age: 27
End: 2022-06-03
Payer: COMMERCIAL

## 2022-06-03 VITALS
BODY MASS INDEX: 33.57 KG/M2 | SYSTOLIC BLOOD PRESSURE: 108 MMHG | HEIGHT: 64 IN | WEIGHT: 196.6 LBS | DIASTOLIC BLOOD PRESSURE: 70 MMHG

## 2022-06-03 DIAGNOSIS — N92.6 IRREGULAR MENSES: Primary | ICD-10-CM

## 2022-06-03 DIAGNOSIS — R10.2 PELVIC PAIN: ICD-10-CM

## 2022-06-03 DIAGNOSIS — R35.0 URINARY FREQUENCY: ICD-10-CM

## 2022-06-03 PROCEDURE — 99213 OFFICE O/P EST LOW 20 MIN: CPT | Performed by: ADVANCED PRACTICE MIDWIFE

## 2022-06-03 NOTE — PROGRESS NOTES
[facility] [address], [place]    Assessment/Plan:  No problem-specific Assessment & Plan notes found for this encounter  Diagnoses and all orders for this visit:    Irregular menses  -     US pelvis complete w transvaginal; Future    Pelvic pain  -     US pelvis complete w transvaginal; Future    Urinary frequency    Other orders  -     Prenatal Vit-Fe Fumarate-FA (PRENATAL VITAMINS PO); Take by mouth  - continue to monitor cycle  - reviewed foods that are bladder irritants and can create bladder pain, pressure and frequency  - will follow-up ultrasound when available    -  RTO PRN      Subjective:   Ysabel Peña is a 32 y o  [id number] female  CC: irregular bleeding    HPI: States that she has been having bleeding in between period  States that menses has been only 2 days,  since September  States that she has lost weight and at night has intense cramping similar to labor cramping  States that this month had bleeding 5/25/22 had bleeding for almost 2 weeks and it is pink and brownish in color  Had menses 5/16/22  States that she has minimal intercourse due to  working long hours  She has ultrasound scheduled for kidney and bladder  Has diarrhea and is being sent to GI for work-up  Minimal soda intake, drinks mostly water to lose weight- no carbonated dominguez  Occasional caffeine, nothing daily  She has no taste or smell since having Covid  She is having bloating with intake of water  Skips breakfast  Usually has peanut butter, jelly, pastas, mash potatoes, steak  Follows a gluten free diet  ROS: Review of Systems   Constitutional: Negative for activity change, chills and fever  Respiratory: Negative for cough and shortness of breath  Cardiovascular: Negative for chest pain, palpitations and leg swelling  Gastrointestinal: Positive for diarrhea and vomiting     Genitourinary: Positive for difficulty urinating, frequency, menstrual problem, pelvic pain and urgency  Negative for vaginal discharge and vaginal pain  PFSH: The following portions of the patient's history were reviewed and updated as appropriate: allergies, current medications, past family history, past medical history, obstetric history, gynecologic history, past social history, past surgical history and problem list        Objective:  /70   Ht 5' 4" (1 626 m)   Wt 89 2 kg (196 lb 9 6 oz)   LMP 05/16/2022   BMI 33 75 kg/m²    Physical Exam  Vitals reviewed  Constitutional:       Appearance: Normal appearance  Cardiovascular:      Rate and Rhythm: Normal rate and regular rhythm  Heart sounds: Normal heart sounds  Pulmonary:      Effort: Pulmonary effort is normal       Breath sounds: Normal breath sounds  Musculoskeletal:         General: Normal range of motion  Cervical back: Normal range of motion  Neurological:      Mental Status: She is alert and oriented to person, place, and time     Psychiatric:         Mood and Affect: Mood normal          Behavior: Behavior normal          Judgment: Judgment normal

## 2022-06-08 ENCOUNTER — HOSPITAL ENCOUNTER (OUTPATIENT)
Dept: ULTRASOUND IMAGING | Facility: HOSPITAL | Age: 27
Discharge: HOME | End: 2022-06-08
Attending: OBSTETRICS & GYNECOLOGY
Payer: COMMERCIAL

## 2022-06-08 ENCOUNTER — HOSPITAL ENCOUNTER (OUTPATIENT)
Dept: ULTRASOUND IMAGING | Facility: HOSPITAL | Age: 27
Discharge: HOME | End: 2022-06-08
Payer: COMMERCIAL

## 2022-06-08 DIAGNOSIS — R82.90 ABNORMAL URINE: ICD-10-CM

## 2022-06-08 DIAGNOSIS — R10.2 PELVIC PAIN: ICD-10-CM

## 2022-06-08 DIAGNOSIS — R82.998 URINARY CRYSTALS: ICD-10-CM

## 2022-06-08 DIAGNOSIS — N92.6 IRREGULAR MENSES: ICD-10-CM

## 2022-06-08 PROCEDURE — 76830 TRANSVAGINAL US NON-OB: CPT

## 2022-06-08 PROCEDURE — 76856 US EXAM PELVIC COMPLETE: CPT

## 2022-06-08 PROCEDURE — 76770 US EXAM ABDO BACK WALL COMP: CPT

## 2022-06-15 ENCOUNTER — TELEPHONE (OUTPATIENT)
Dept: OBGYN CLINIC | Facility: MEDICAL CENTER | Age: 27
End: 2022-06-15

## 2022-07-21 ENCOUNTER — TELEPHONE (OUTPATIENT)
Dept: OBGYN CLINIC | Facility: MEDICAL CENTER | Age: 27
End: 2022-07-21

## 2022-07-21 DIAGNOSIS — Z30.8 ENCOUNTER FOR OTHER CONTRACEPTIVE MANAGEMENT: Primary | ICD-10-CM

## 2022-07-21 RX ORDER — LEVONORGESTREL 1.5 MG/1
1.5 TABLET ORAL ONCE
Qty: 1 TABLET | Refills: 0 | Status: SHIPPED | OUTPATIENT
Start: 2022-07-21 | End: 2022-07-29

## 2022-07-21 NOTE — TELEPHONE ENCOUNTER
Pt called into office requesting refill of Plan B pill  Pt stated that she was prescribed this previously during her last visits and is requesting it again, please review  Thank you!

## 2022-07-28 DIAGNOSIS — Z30.8 ENCOUNTER FOR OTHER CONTRACEPTIVE MANAGEMENT: ICD-10-CM

## 2022-07-29 RX ORDER — LEVONORGESTREL 1.5 MG/1
TABLET ORAL
Qty: 1 TABLET | Refills: 0 | Status: SHIPPED | OUTPATIENT
Start: 2022-07-29 | End: 2022-08-25 | Stop reason: RX

## 2022-08-19 ENCOUNTER — OFFICE VISIT (OUTPATIENT)
Dept: URGENT CARE | Facility: CLINIC | Age: 27
End: 2022-08-19
Payer: COMMERCIAL

## 2022-08-19 VITALS
OXYGEN SATURATION: 98 % | BODY MASS INDEX: 33.64 KG/M2 | TEMPERATURE: 97.9 F | HEART RATE: 94 BPM | WEIGHT: 196 LBS | RESPIRATION RATE: 18 BRPM

## 2022-08-19 DIAGNOSIS — B85.0 HEAD LICE: ICD-10-CM

## 2022-08-19 DIAGNOSIS — R30.0 DYSURIA: Primary | ICD-10-CM

## 2022-08-19 LAB
SL AMB  POCT GLUCOSE, UA: NORMAL
SL AMB LEUKOCYTE ESTERASE,UA: NORMAL
SL AMB POCT BILIRUBIN,UA: NORMAL
SL AMB POCT BLOOD,UA: NORMAL
SL AMB POCT CLARITY,UA: NORMAL
SL AMB POCT COLOR,UA: YELLOW
SL AMB POCT KETONES,UA: NORMAL
SL AMB POCT NITRITE,UA: NORMAL
SL AMB POCT PH,UA: 5
SL AMB POCT SPECIFIC GRAVITY,UA: 1.01
SL AMB POCT URINE HCG: NORMAL
SL AMB POCT URINE PROTEIN: NORMAL
SL AMB POCT UROBILINOGEN: 0.2

## 2022-08-19 PROCEDURE — 87491 CHLMYD TRACH DNA AMP PROBE: CPT | Performed by: PHYSICIAN ASSISTANT

## 2022-08-19 PROCEDURE — 81025 URINE PREGNANCY TEST: CPT | Performed by: PHYSICIAN ASSISTANT

## 2022-08-19 PROCEDURE — 87591 N.GONORRHOEAE DNA AMP PROB: CPT | Performed by: PHYSICIAN ASSISTANT

## 2022-08-19 PROCEDURE — 99213 OFFICE O/P EST LOW 20 MIN: CPT | Performed by: PHYSICIAN ASSISTANT

## 2022-08-19 PROCEDURE — 87086 URINE CULTURE/COLONY COUNT: CPT | Performed by: PHYSICIAN ASSISTANT

## 2022-08-19 RX ORDER — DULOXETIN HYDROCHLORIDE 30 MG/1
30 CAPSULE, DELAYED RELEASE ORAL EVERY MORNING
COMMUNITY
Start: 2022-08-10

## 2022-08-19 RX ORDER — MULTIVITAMIN/IRON/FOLIC ACID 18MG-0.4MG
250 TABLET ORAL DAILY
COMMUNITY
Start: 2022-08-17 | End: 2022-09-16

## 2022-08-19 RX ORDER — METRONIDAZOLE 500 MG/1
500 TABLET ORAL EVERY 12 HOURS SCHEDULED
Qty: 14 TABLET | Refills: 0 | Status: SHIPPED | OUTPATIENT
Start: 2022-08-19 | End: 2022-08-26

## 2022-08-19 RX ORDER — D-METHORPHAN/PE/ACETAMINOPHEN 5-325MG/15
LIQUID (ML) ORAL ONCE
Qty: 150 ML | Refills: 0 | Status: SHIPPED | OUTPATIENT
Start: 2022-08-19 | End: 2022-08-19

## 2022-08-19 RX ORDER — TOPIRAMATE 100 MG/1
150 TABLET, FILM COATED ORAL
COMMUNITY
Start: 2022-07-01

## 2022-08-19 RX ORDER — SUMATRIPTAN 50 MG/1
TABLET, FILM COATED ORAL
COMMUNITY
Start: 2022-08-18

## 2022-08-19 RX ORDER — FLUVOXAMINE MALEATE 50 MG/1
TABLET, COATED ORAL
COMMUNITY
Start: 2022-08-16

## 2022-08-19 NOTE — PROGRESS NOTES
[address] [facility]    NAME: Aquiles Fernandez is a 32 y o  female  : 1995    MRN: [id number]  DATE: 2022  TIME: 1:26 PM    Assessment and Plan   Dysuria [R30 0]  1  Dysuria  POCT urine dip auto non-scope    Urine culture    metroNIDAZOLE (FLAGYL) 500 mg tablet    POCT urine HCG    Chlamydia/GC amplified DNA by PCR   2  Head lice  pyrethrins-piperonyl butoxide 0 33-4 % shampoo    permethrin home spray (Bedding Spray Lice Treatment) 0 5 % bottle       Patient Instructions   Patient Instructions   Will treat for possible BV  If not improving with treatment, follow up with gyn  Chief Complaint     Chief Complaint   Patient presents with    Possible UTI    Head Lice     X 3 weeks       History of Present Illness   32year old female here with complaint of burning with urination and stringy vaginal discharge for a couple days  No fever, chills  No urinary frequency  Notes that her step daughter has lice and she noticed lice in her hair last night  She treated herself with over the counter treatment  Review of Systems   Review of Systems   Constitutional: Negative for activity change, appetite change, chills, fatigue and fever  Respiratory: Negative for cough  Cardiovascular: Negative for chest pain  Gastrointestinal: Negative for abdominal pain, constipation, diarrhea, nausea and vomiting  Genitourinary: Positive for dysuria and vaginal discharge  Negative for difficulty urinating, flank pain, frequency, hematuria and urgency  Musculoskeletal: Negative for back pain and myalgias  Skin:        Head lice   All other systems reviewed and are negative        Current Medications     Current Outpatient Medications:     ALPRAZolam (XANAX) 0 5 mg tablet, TAKE 1 TABLET (0 5 MG TOTAL) BY MOUTH 2 (TWO) TIMES A DAY AS NEEDED FOR ANXIETY , Disp: , Rfl:     azelastine (ASTELIN) 0 1 % nasal spray, 2 sprays into each nostril, Disp: , Rfl:     cariprazine (VRAYLAR) 1 5 MG capsule, Take 1 5 mg by mouth daily, Disp: , Rfl:     cetirizine (ZyrTEC) 10 mg tablet, Take 10 mg by mouth daily as needed, Disp: , Rfl:     famotidine (PEPCID) 20 mg tablet, TAKE 1 TABLET BY MOUTH EVERY DAY, Disp: 30 tablet, Rfl: 2    fluocinolone acetonide (DERMOTIC) 0 01 % otic oil, 3 drops in both ears three times per week , Disp: , Rfl:     fluvoxaMINE (LUVOX) 50 mg tablet, , Disp: , Rfl:     loratadine (CLARITIN) 10 mg tablet, Take 1 tablet by mouth every morning, Disp: , Rfl:     Magnesium Oxide 250 MG TABS, Take 250 mg by mouth daily, Disp: , Rfl:     metroNIDAZOLE (FLAGYL) 500 mg tablet, Take 1 tablet (500 mg total) by mouth every 12 (twelve) hours for 7 days, Disp: 14 tablet, Rfl: 0    montelukast (SINGULAIR) 10 mg tablet, , Disp: , Rfl:     olopatadine HCl (PATADAY) 0 2 % opth drops, Apply 1 drop to eye daily, Disp: , Rfl:     ondansetron (ZOFRAN) 4 mg tablet, , Disp: , Rfl:     permethrin home spray (Bedding Spray Lice Treatment) 0 5 % bottle, Use once for 1 dose, Disp: 150 mL, Rfl: 0    pyrethrins-piperonyl butoxide 0 33-4 % shampoo, Apply topically once for 1 dose, Disp: 60 mL, Rfl: 0    Riboflavin (Vitamin B-2) 100 MG TABS, Take 400 mg by mouth daily, Disp: , Rfl:     SUMAtriptan (IMITREX) 50 mg tablet, , Disp: , Rfl:     topiramate (TOPAMAX) 100 mg tablet, Take 150 mg by mouth daily at bedtime, Disp: , Rfl:     topiramate (TOPAMAX) 50 MG tablet, 150 mg, Disp: , Rfl:     traZODone (DESYREL) 50 mg tablet, TAKE 1 TABLET BY MOUTH EVERY DAY AT NIGHT, Disp: , Rfl:     ALPRAZolam (XANAX) 0 25 mg tablet, Take 1 tablet (0 25 mg total) by mouth daily at bedtime as needed for anxiety for up to 10 days, Disp: 2 tablet, Rfl: 0    DULoxetine (CYMBALTA) 30 mg delayed release capsule, Take 30 mg by mouth every morning (Patient not taking: Reported on 8/19/2022), Disp: , Rfl:     fluticasone (FLONASE) 50 mcg/act nasal spray, , Disp: , Rfl:     hydrocortisone 2 5 % cream, , Disp: , Rfl:    metFORMIN (GLUCOPHAGE-XR) 500 mg 24 hr tablet, , Disp: , Rfl:     multivitamin (THERAGRAN) TABS, Take 1 tablet by mouth daily (Patient not taking: No sig reported), Disp: , Rfl:     nystatin (MYCOSTATIN) 500,000 units/5 mL suspension, TAKE 5 ML (500,000 UNITS) BY MOUTH 4 TIMES A DAY FOR 10 DAYS  (Patient not taking: No sig reported), Disp: , Rfl:     Plan B One-Step tablet, TAKE 1 TABLET BY MOUTH ONCE FOR 1 DOSE  (Patient not taking: Reported on 8/19/2022), Disp: 1 tablet, Rfl: 0    Prenatal Vit-Fe Fumarate-FA (PRENATAL VITAMINS PO), Take by mouth (Patient not taking: Reported on 8/19/2022), Disp: , Rfl:     triamcinolone (KENALOG) 0 1 % cream, PLEASE SEE ATTACHED FOR DETAILED DIRECTIONS (Patient not taking: Reported on 8/19/2022), Disp: , Rfl:     Current Allergies     Allergies as of 08/19/2022 - Reviewed 08/19/2022   Allergen Reaction Noted    Apple - food allergy  01/11/2021    Gluten meal - food allergy Vomiting 01/18/2021    Other Other (See Comments) 10/24/2014          The following portions of the patient's history were reviewed and updated as appropriate: allergies, current medications, past family history, past medical history, past social history, past surgical history and problem list    Past Medical History:   Diagnosis Date    Celiac disease     Gastroparesis     Marijuana use, continuous 7/10/2018    Last Assessment & Plan:  Kathy Serrano reports that she has less ability to use THC due to her step-daughter being in the house  She reports that she has significantly decrease to twice/day  Reviewed risks of routine daily use, she does not feel this is an issue & states "I know my body" & is not willing to quit at this point in time   Nicotine use disorder 7/10/2018    Last Assessment & Plan:  Edwina Farooq continues to smoke 1 cigarette/day  She reports that "it will not be hard for her to stop" & plans on quitting immediately      Person[facility] disorder ([facility] )     [facility] marijuana    Trauma     PTSD    Varicella      Past Surgical History:   Procedure Laterality Date    [place]  DELIVERY ONLY N/A 2021    Procedure:  SECTION (); Surgeon:  Kristen Jimenez MD;  [facility];  Service: Obstetrics    TONSILLECTOMY       Family History   Problem Relation Age of Onset    Other Mother         mental health issues    Psoriasis Mother     Heart disease Mother     No Known Problems Father     No Known Problems Brother     Heart disease Maternal Grandmother     Thrombophlebitis Maternal Grandmother     Heart disease Maternal Grandfather     Heart attack Maternal Grandfather     Obesity Maternal Grandfather     Stomach cancer Paternal Grandmother     Bone cancer Paternal Grandfather     No Known Problems Son     Breast cancer Neg Hx     Colon cancer Neg Hx     Ovarian cancer Neg Hx      Social History     Socioeconomic History    Marital status: Single     Spouse name: Not on file    Number of children: Not on file    Years of education: Not on file    Highest education level: Not on file   Occupational History    Not on file   Tobacco Use    Smoking status: Former Smoker     Packs/day: 0 25     Years: 8 00     Pack years: 2 00     Quit date: 2020     Years since quittin 2    Smokeless tobacco: Never Used    Tobacco comment: quit with knowledge of pregnancy   Vaping Use    Vaping Use: Never used   Substance and Sexual Activity    Alcohol use: Yes     Comment: rare    Drug use: Yes     Types: Marijuana     Comment: medical marijuana    Sexual activity: Yes     Partners: Male     Birth control/protection: None   Other Topics Concern    Not on file   Social History Narrative    Not on file     Social Determinants of Health     Financial Resource Strain: Not on file   Food Insecurity: Not on file   Transportation Needs: Not on file   Physical Activity: Not on file   Stress: Not on file   Social Connections: Not on file   Intimate Partner Violence: Not on file   Housing Stability: Not on file     Medications have been verified  Objective   Pulse 94   Temp 97 9 °F (36 6 °C)   Resp 18   Wt 88 9 kg (196 lb)   SpO2 98%   BMI 33 64 kg/m²      Physical Exam   Physical Exam  Vitals and nursing note reviewed  Constitutional:       General: She is not in acute distress  Appearance: Normal appearance  She is well-developed  HENT:      Head: Normocephalic and atraumatic  Cardiovascular:      Rate and Rhythm: Normal rate and regular rhythm  Heart sounds: Normal heart sounds  No murmur heard  Pulmonary:      Effort: Pulmonary effort is normal  No respiratory distress  Breath sounds: Normal breath sounds  Abdominal:      General: Bowel sounds are normal       Tenderness: There is no abdominal tenderness  Skin:     Comments: No lice or nits noted

## 2022-08-20 LAB
BACTERIA UR CULT: NORMAL
C TRACH DNA SPEC QL NAA+PROBE: NEGATIVE
N GONORRHOEA DNA SPEC QL NAA+PROBE: NEGATIVE

## 2022-08-25 ENCOUNTER — TELEPHONE (OUTPATIENT)
Dept: OBGYN CLINIC | Facility: MEDICAL CENTER | Age: 27
End: 2022-08-25

## 2022-08-25 DIAGNOSIS — Z30.8 ENCOUNTER FOR OTHER CONTRACEPTIVE MANAGEMENT: Primary | ICD-10-CM

## 2022-08-25 RX ORDER — LEVONORGESTREL 1.5 MG/1
1.5 TABLET ORAL ONCE
Qty: 1 TABLET | Refills: 0 | Status: SHIPPED | OUTPATIENT
Start: 2022-08-25 | End: 2022-09-08

## 2022-08-25 NOTE — TELEPHONE ENCOUNTER
Pt called in would Plan B One Step sent to [address]  Pt states she never picked up the orignal that was sent on 7/21 to the [facility] pharmacy  Please review thank you

## 2022-09-08 DIAGNOSIS — Z30.8 ENCOUNTER FOR OTHER CONTRACEPTIVE MANAGEMENT: ICD-10-CM

## 2022-09-08 RX ORDER — LEVONORGESTREL 1.5 MG/1
TABLET ORAL
Qty: 1 TABLET | Refills: 0 | Status: SHIPPED | OUTPATIENT
Start: 2022-09-08 | End: 2022-09-15

## 2022-09-15 DIAGNOSIS — Z30.8 ENCOUNTER FOR OTHER CONTRACEPTIVE MANAGEMENT: ICD-10-CM

## 2022-09-15 RX ORDER — LEVONORGESTREL 1.5 MG/1
TABLET ORAL
Qty: 1 TABLET | Refills: 0 | Status: SHIPPED | OUTPATIENT
Start: 2022-09-15

## 2022-09-15 NOTE — TELEPHONE ENCOUNTER
Please contact patient  I will approve this time, but she needs to see one of the providers for a visit and discuss birth control options  She can see any of the providers

## 2022-09-28 ENCOUNTER — OFFICE VISIT (OUTPATIENT)
Dept: URGENT CARE | Facility: CLINIC | Age: 27
End: 2022-09-28
Payer: COMMERCIAL

## 2022-09-28 VITALS
HEIGHT: 65 IN | WEIGHT: 180 LBS | OXYGEN SATURATION: 99 % | BODY MASS INDEX: 29.99 KG/M2 | RESPIRATION RATE: 18 BRPM | TEMPERATURE: 97.8 F | HEART RATE: 89 BPM

## 2022-09-28 DIAGNOSIS — Z32.00 POSSIBLE PREGNANCY: ICD-10-CM

## 2022-09-28 DIAGNOSIS — R35.0 URINARY FREQUENCY: Primary | ICD-10-CM

## 2022-09-28 DIAGNOSIS — J02.9 SORE THROAT: ICD-10-CM

## 2022-09-28 LAB
S PYO AG THROAT QL: NEGATIVE
SARS-COV-2 AG UPPER RESP QL IA: NEGATIVE
SL AMB  POCT GLUCOSE, UA: NEGATIVE
SL AMB LEUKOCYTE ESTERASE,UA: NEGATIVE
SL AMB POCT BILIRUBIN,UA: NEGATIVE
SL AMB POCT BLOOD,UA: NEGATIVE
SL AMB POCT CLARITY,UA: CLEAR
SL AMB POCT COLOR,UA: YELLOW
SL AMB POCT KETONES,UA: NEGATIVE
SL AMB POCT NITRITE,UA: NEGATIVE
SL AMB POCT PH,UA: 5
SL AMB POCT SPECIFIC GRAVITY,UA: 1.01
SL AMB POCT URINE HCG: NEGATIVE
SL AMB POCT URINE PROTEIN: NEGATIVE
SL AMB POCT UROBILINOGEN: 0.2
VALID CONTROL: NORMAL

## 2022-09-28 PROCEDURE — 81025 URINE PREGNANCY TEST: CPT

## 2022-09-28 PROCEDURE — 87880 STREP A ASSAY W/OPTIC: CPT

## 2022-09-28 PROCEDURE — 99213 OFFICE O/P EST LOW 20 MIN: CPT

## 2022-09-28 PROCEDURE — 87070 CULTURE OTHR SPECIMN AEROBIC: CPT

## 2022-09-28 PROCEDURE — 87086 URINE CULTURE/COLONY COUNT: CPT

## 2022-09-28 PROCEDURE — 87811 SARS-COV-2 COVID19 W/OPTIC: CPT

## 2022-09-28 PROCEDURE — 81002 URINALYSIS NONAUTO W/O SCOPE: CPT

## 2022-09-28 NOTE — PROGRESS NOTES
[address] [facility]        NAME: Viridiana Baker is a 32 y o  female  : 1995    MRN: [id number]  DATE: 2022  TIME: 10:46 AM      Assessment and Plan     Urinary frequency [R35 0]  1  Urinary frequency  POCT urine dip   2  Sore throat  POCT rapid strepA    Poct Covid 19 Rapid Antigen Test    Throat culture   3  Possible pregnancy  POCT urine HCG     poct strep negative  poct covid negative  POCT hcg negative  poct urine dip WNL  Urine culture sent  Patient aware to follow-up with PCP and OBGYN to obtain blood hCG is patient is concern for pregnancy  Patient Instructions     Recommend throat lozenges and gargling warm salt water for throat irritation  Throat culture sent  Urine culture sent  Your results will appear in your MyChart  Recommend a humidifier  Follow-up with your OBGYN  Follow-up with your PCP in 3-5 days  Proceed to the ER if symptoms worsen  Chief Complaint     Chief Complaint   Patient presents with    Cough    Sore Throat    Earache     Both ears started Friday not feeling better     Possible Pregnancy     States 2 days late on period           History of Present Illness     Patient is a 49-year-old female who presents with sore throat, cough, congestion, and ear pain for 5 days  Reports headache  Reports nausea this morning and sensitivity to smells  Reports low back pain  Reports urinary frequency  Reports chills, no fever  States she did have unprotected sex 1 day after she ovulated  States she took the morning after pill but is still 2 days late on her period  Reports increase clear vaginal discharge  Denies abdominal pain  Denies chance of STI  States she has been with the same partner  Review of Systems     Review of Systems   Constitutional: Positive for chills  Negative for fever  HENT: Positive for congestion, ear pain and sore throat  Respiratory: Positive for cough  Gastrointestinal: Positive for nausea  Negative for abdominal pain and vomiting  Genitourinary: Positive for frequency and vaginal discharge (clear)  Negative for flank pain, pelvic pain and vaginal bleeding  Musculoskeletal: Positive for back pain  Neurological: Positive for headaches  All other systems reviewed and are negative          Current Medications       Current Outpatient Medications:     ALPRAZolam (XANAX) 0 25 mg tablet, Take 1 tablet (0 25 mg total) by mouth daily at bedtime as needed for anxiety for up to 10 days, Disp: 2 tablet, Rfl: 0    ALPRAZolam (XANAX) 0 5 mg tablet, TAKE 1 TABLET (0 5 MG TOTAL) BY MOUTH 2 (TWO) TIMES A DAY AS NEEDED FOR ANXIETY , Disp: , Rfl:     azelastine (ASTELIN) 0 1 % nasal spray, 2 sprays into each nostril, Disp: , Rfl:     cariprazine (VRAYLAR) 1 5 MG capsule, Take 1 5 mg by mouth daily, Disp: , Rfl:     cetirizine (ZyrTEC) 10 mg tablet, Take 10 mg by mouth daily as needed, Disp: , Rfl:     DULoxetine (CYMBALTA) 30 mg delayed release capsule, Take 30 mg by mouth every morning (Patient not taking: Reported on 8/19/2022), Disp: , Rfl:     famotidine (PEPCID) 20 mg tablet, TAKE 1 TABLET BY MOUTH EVERY DAY, Disp: 30 tablet, Rfl: 2    fluocinolone acetonide (DERMOTIC) 0 01 % otic oil, 3 drops in both ears three times per week , Disp: , Rfl:     fluticasone (FLONASE) 50 mcg/act nasal spray, , Disp: , Rfl:     fluvoxaMINE (LUVOX) 50 mg tablet, , Disp: , Rfl:     hydrocortisone 2 5 % cream, , Disp: , Rfl:     loratadine (CLARITIN) 10 mg tablet, Take 1 tablet by mouth every morning, Disp: , Rfl:     metFORMIN (GLUCOPHAGE-XR) 500 mg 24 hr tablet, , Disp: , Rfl:     montelukast (SINGULAIR) 10 mg tablet, , Disp: , Rfl:     multivitamin (THERAGRAN) TABS, Take 1 tablet by mouth daily (Patient not taking: No sig reported), Disp: , Rfl:     nystatin (MYCOSTATIN) 500,000 units/5 mL suspension, TAKE 5 ML (500,000 UNITS) BY MOUTH 4 TIMES A DAY FOR 10 DAYS  (Patient not taking: No sig reported), Disp: , Rfl:     olopatadine HCl (PATADAY) 0 2 % opth drops, Apply 1 drop to eye daily, Disp: , Rfl:     ondansetron (ZOFRAN) 4 mg tablet, , Disp: , Rfl:     Option 2 tablet, take 1 tablet by mouth ONCE, Disp: 1 tablet, Rfl: 0    Prenatal Vit-Fe Fumarate-FA (PRENATAL VITAMINS PO), Take by mouth (Patient not taking: Reported on 8/19/2022), Disp: , Rfl:     Riboflavin (Vitamin B-2) 100 MG TABS, Take 400 mg by mouth daily, Disp: , Rfl:     SUMAtriptan (IMITREX) 50 mg tablet, , Disp: , Rfl:     topiramate (TOPAMAX) 100 mg tablet, Take 150 mg by mouth daily at bedtime, Disp: , Rfl:     topiramate (TOPAMAX) 50 MG tablet, 150 mg, Disp: , Rfl:     traZODone (DESYREL) 50 mg tablet, TAKE 1 TABLET BY MOUTH EVERY DAY AT NIGHT, Disp: , Rfl:     triamcinolone (KENALOG) 0 1 % cream, PLEASE SEE ATTACHED FOR DETAILED DIRECTIONS (Patient not taking: Reported on 8/19/2022), Disp: , Rfl:     Current Allergies     Allergies as of 09/28/2022 - Reviewed 09/28/2022   Allergen Reaction Noted    Apple - food allergy  01/11/2021    Gluten meal - food allergy Vomiting 01/18/2021    Other Other (See Comments) 10/24/2014              The following portions of the patient's history were reviewed and updated as appropriate: allergies, current medications, past family history, past medical history, past social history, past surgical history and problem list      Past Medical History:   Diagnosis Date    Celiac disease     Gastroparesis     Marijuana use, continuous 7/10/2018    Last Assessment & Plan:  Coco Jacobo reports that she has less ability to use THC due to her step-daughter being in the house  She reports that she has significantly decrease to twice/day  Reviewed risks of routine daily use, she does not feel this is an issue & states "I know my body" & is not willing to quit at this point in time   Nicotine use disorder 7/10/2018    Last Assessment & Plan:  Synetta Boast continues to smoke 1 cigarette/day    She reports that "it will not be hard for her to stop" & plans on quitting immediately   [facility] disorder [facility]     Substance abuse ([facility] )     marijuana    Trauma     PTSD    Varicella        Past Surgical History:   Procedure Laterality Date    [place]  DELIVERY ONLY N/A 2021    Procedure:  SECTION (); Surgeon: Akilah Lee MD;  [facility];  Service: Obstetrics    TONSILLECTOMY         Family History   Problem Relation Age of Onset    Other Mother         mental health issues    Psoriasis Mother     Heart disease Mother     No Known Problems Father     No Known Problems Brother     Heart disease Maternal Grandmother     Thrombophlebitis Maternal Grandmother     Heart disease Maternal Grandfather     Heart attack Maternal Grandfather     Obesity Maternal Grandfather     Stomach cancer Paternal Grandmother     Bone cancer Paternal Grandfather     No Known Problems Son     Breast cancer Neg Hx     Colon cancer Neg Hx     Ovarian cancer Neg Hx          Medications have been verified  Objective     Pulse 89   Temp 97 8 °F (36 6 °C)   Resp 18   Ht 5' 4 5" (1 638 m)   Wt 81 6 kg (180 lb)   SpO2 99%   BMI 30 42 kg/m²   No LMP recorded  Physical Exam     Physical Exam  Vitals and nursing note reviewed  Constitutional:       General: She is awake  She is not in acute distress  Appearance: Normal appearance  She is well-developed  She is not ill-appearing, toxic-appearing or diaphoretic  HENT:      Right Ear: Tympanic membrane, ear canal and external ear normal       Left Ear: Tympanic membrane, ear canal and external ear normal       Nose: Congestion present  Mouth/Throat:      Lips: Pink  Mouth: Mucous membranes are moist       Pharynx: Oropharynx is clear  Uvula midline  No posterior oropharyngeal erythema  Cardiovascular:      Rate and Rhythm: Normal rate  Pulses: Normal pulses        Heart sounds: Normal heart sounds, S1 normal and S2 normal    Pulmonary:      Effort: Pulmonary effort is normal       Breath sounds: Normal breath sounds and air entry  No stridor, decreased air movement or transmitted upper airway sounds  No decreased breath sounds, wheezing, rhonchi or rales  Abdominal:      Tenderness: There is no right CVA tenderness or left CVA tenderness  Musculoskeletal:      Cervical back: Neck supple  Lymphadenopathy:      Cervical: No cervical adenopathy  Skin:     General: Skin is warm  Capillary Refill: Capillary refill takes less than 2 seconds  Neurological:      Mental Status: She is alert  Psychiatric:         Mood and Affect: Mood normal          Behavior: Behavior normal          Thought Content:  Thought content normal          Judgment: Judgment normal

## 2022-09-28 NOTE — LETTER
September 28, 2022     Patient: Comfort Ulloa   YOB: 1995   Date of Visit: 9/28/2022       To Whom It May Concern: It is my medical opinion that Sulema Vital may return to work on 9/30/2022  If you have any questions or concerns, please don't hesitate to call           Sincerely,        SIMONE Benjamin    CC: No Recipients

## 2022-09-29 LAB — BACTERIA UR CULT: NORMAL

## 2022-09-30 LAB — BACTERIA THROAT CULT: NORMAL

## 2022-10-10 ENCOUNTER — TELEPHONE (OUTPATIENT)
Dept: OBGYN CLINIC | Facility: MEDICAL CENTER | Age: 27
End: 2022-10-10

## 2022-10-10 NOTE — TELEPHONE ENCOUNTER
Patient called into office to schedule an appointment  Left voice message for patient to call office back

## 2022-10-12 PROBLEM — H60.502 ACUTE OTITIS EXTERNA OF LEFT EAR: Status: RESOLVED | Noted: 2021-09-23 | Resolved: 2022-10-12

## 2022-10-18 ENCOUNTER — OFFICE VISIT (OUTPATIENT)
Dept: URGENT CARE | Facility: CLINIC | Age: 27
End: 2022-10-18
Payer: COMMERCIAL

## 2022-10-18 VITALS
WEIGHT: 183 LBS | OXYGEN SATURATION: 99 % | RESPIRATION RATE: 18 BRPM | TEMPERATURE: 98.3 F | BODY MASS INDEX: 31.24 KG/M2 | SYSTOLIC BLOOD PRESSURE: 118 MMHG | HEIGHT: 64 IN | DIASTOLIC BLOOD PRESSURE: 58 MMHG | HEART RATE: 72 BPM

## 2022-10-18 DIAGNOSIS — J06.9 ACUTE URI: ICD-10-CM

## 2022-10-18 DIAGNOSIS — S46.912A STRAIN OF LEFT SHOULDER, INITIAL ENCOUNTER: Primary | ICD-10-CM

## 2022-10-18 DIAGNOSIS — S76.011A HIP STRAIN, RIGHT, INITIAL ENCOUNTER: ICD-10-CM

## 2022-10-18 PROCEDURE — 99213 OFFICE O/P EST LOW 20 MIN: CPT | Performed by: PHYSICIAN ASSISTANT

## 2022-10-18 RX ORDER — PREDNISONE 10 MG/1
TABLET ORAL
Qty: 26 TABLET | Refills: 0 | Status: SHIPPED | OUTPATIENT
Start: 2022-10-18

## 2022-10-18 RX ORDER — TOPIRAMATE 150 MG/1
150 CAPSULE, EXTENDED RELEASE ORAL
COMMUNITY
Start: 2022-09-20 | End: 2022-10-18

## 2022-10-18 NOTE — PROGRESS NOTES
[address]    NAME: Amos Escobar is a 32 y o  female  : 1995    MRN: [id number]  DATE: 2022  TIME: 4:08 PM    Assessment and Plan   Strain of left shoulder, initial encounter [T86 [id number]]  1  Strain of left shoulder, initial encounter  predniSONE 10 mg tablet    Ambulatory Referral to Sports Medicine   2  Hip strain, right, initial encounter     3  Acute URI         Patient Instructions     Patient Instructions   Prednisone as directed  Follow up with sports medicine if not improving  Chief Complaint     Chief Complaint   Patient presents with   • Hip Pain     Right  Injured it    Still hurting  • Shoulder Pain     Left  Injured herself in   Hurting still    • Cough     Started Thursday  No runny nose  History of Present Illness   45-year-old female here with complaint of left shoulder pain in the back of her shoulder  It is like a burning sensation  Has had it on and off for the last 2 years  States she also injured her right hip around the same time at work and it is still hurting and pops when she is going up and down the steps  She is able to bear weight she is able to walk on it fine  She also states she has a slight cough and runny nose with enlarged lymph nodes on the left side of her neck  No ear pain  Review of Systems   Review of Systems   Constitutional: Negative for appetite change, chills and fever  HENT: Positive for sore throat  Negative for congestion, ear discharge, ear pain, facial swelling, postnasal drip, sinus pressure and sneezing  Respiratory: Positive for cough  Negative for shortness of breath and wheezing  Musculoskeletal:        Left shoulder pain and right hip pain   Neurological: Negative for headaches  Hematological: Positive for adenopathy  All other systems reviewed and are negative        Current Medications     Current Outpatient Medications:   •  ALPRAZolam (XANAX) 0 5 mg tablet, TAKE 1 TABLET (0 5 MG TOTAL) BY MOUTH 2 (TWO) TIMES A DAY AS NEEDED FOR ANXIETY , Disp: , Rfl:   •  azelastine (ASTELIN) 0 1 % nasal spray, 2 sprays into each nostril, Disp: , Rfl:   •  cariprazine (VRAYLAR) 1 5 MG capsule, Take 1 5 mg by mouth daily, Disp: , Rfl:   •  cetirizine (ZyrTEC) 10 mg tablet, Take 10 mg by mouth daily as needed, Disp: , Rfl:   •  fluocinolone acetonide (DERMOTIC) 0 01 % otic oil, 3 drops in both ears three times per week , Disp: , Rfl:   •  fluticasone (FLONASE) 50 mcg/act nasal spray, , Disp: , Rfl:   •  hydrocortisone 2 5 % cream, , Disp: , Rfl:   •  loratadine (CLARITIN) 10 mg tablet, Take 1 tablet by mouth every morning, Disp: , Rfl:   •  nystatin (MYCOSTATIN) 500,000 units/5 mL suspension, TAKE 5 ML (500,000 UNITS) BY MOUTH 4 TIMES A DAY FOR 10 DAYS , Disp: , Rfl:   •  olopatadine HCl (PATADAY) 0 2 % opth drops, Apply 1 drop to eye daily, Disp: , Rfl:   •  ondansetron (ZOFRAN) 4 mg tablet, , Disp: , Rfl:   •  Option 2 tablet, take 1 tablet by mouth ONCE, Disp: 1 tablet, Rfl: 0  •  predniSONE 10 mg tablet, Take 3 tabs BID X 2 days, 2 tabs BID X 2 days, 1 tab BID X 2 days, 1 tab daily X 2 days, Disp: 26 tablet, Rfl: 0  •  topiramate (TOPAMAX) 100 mg tablet, Take 150 mg by mouth daily at bedtime, Disp: , Rfl:   •  traZODone (DESYREL) 50 mg tablet, TAKE 1 TABLET BY MOUTH EVERY DAY AT NIGHT, Disp: , Rfl:   •  ALPRAZolam (XANAX) 0 25 mg tablet, Take 1 tablet (0 25 mg total) by mouth daily at bedtime as needed for anxiety for up to 10 days, Disp: 2 tablet, Rfl: 0  •  DULoxetine (CYMBALTA) 30 mg delayed release capsule, Take 30 mg by mouth every morning (Patient not taking: No sig reported), Disp: , Rfl:   •  famotidine (PEPCID) 20 mg tablet, TAKE 1 TABLET BY MOUTH EVERY DAY (Patient not taking: Reported on 10/18/2022), Disp: 30 tablet, Rfl: 2  •  fluvoxaMINE (LUVOX) 50 mg tablet, , Disp: , Rfl:   •  metFORMIN (GLUCOPHAGE-XR) 500 mg 24 hr tablet, , Disp: , Rfl:   •  montelukast (SINGULAIR) 10 mg tablet, , Disp: , Rfl:   •  multivitamin (THERAGRAN) TABS, Take 1 tablet by mouth daily (Patient not taking: No sig reported), Disp: , Rfl:   •  Prenatal Vit-Fe Fumarate-FA (PRENATAL VITAMINS PO), Take by mouth (Patient not taking: No sig reported), Disp: , Rfl:   •  Riboflavin (Vitamin B-2) 100 MG TABS, Take 400 mg by mouth daily (Patient not taking: Reported on 10/18/2022), Disp: , Rfl:   •  SUMAtriptan (IMITREX) 50 mg tablet, , Disp: , Rfl:   •  triamcinolone (KENALOG) 0 1 % cream, PLEASE SEE ATTACHED FOR DETAILED DIRECTIONS (Patient not taking: No sig reported), Disp: , Rfl:     Current Allergies     Allergies as of 10/18/2022 - Reviewed 10/18/2022   Allergen Reaction Noted   • Apple - food allergy  2021   • Gluten meal - food allergy Vomiting 2021   • Other Other (See Comments) 10/24/2014          The following portions of the patient's history were reviewed and updated as appropriate: allergies, current medications, past family history, past medical history, past social history, past surgical history and problem list    Past Medical History:   Diagnosis Date   • Celiac disease    • Gastroparesis    • Marijuana use, continuous 7/10/2018    Last Assessment & Plan:  Kaden Brown reports that she has less ability to use THC due to her step-daughter being in the house  She reports that she has significantly decrease to twice/day  Reviewed risks of routine daily use, she does not feel this is an issue & states "I know my body" & is not willing to quit at this point in time  • Nicotine use disorder 7/10/2018    Last Assessment & Plan:  Merrilee Cabot continues to smoke 1 cigarette/day  She reports that "it will not be hard for her to stop" & plans on quitting immediately     • Personality disorder ([facility] )    • Substance abuse ([facility] )     marijuana   • Trauma     PTSD   • Varicella      Past Surgical History:   Procedure Laterality Date   • CO  DELIVERY ONLY N/A 2021    Procedure:  SECTION (); Surgeon: Tyra Stockton MD;  [facility]: [facility];  Service: Obstetrics   • TONSILLECTOMY       Family History   Problem Relation Age of Onset   • Other Mother         mental health issues   • Psoriasis Mother    • Heart disease Mother    • No Known Problems Father    • No Known Problems Brother    • Heart disease Maternal Grandmother    • Thrombophlebitis Maternal Grandmother    • Heart disease Maternal Grandfather    • Heart attack Maternal Grandfather    • Obesity Maternal Grandfather    • Stomach cancer Paternal Grandmother    • Bone cancer Paternal Grandfather    • No Known Problems Son    • Breast cancer Neg Hx    • Colon cancer Neg Hx    • Ovarian cancer Neg Hx      Social History     Socioeconomic History   • Marital status: Single     Spouse name: Not on file   • Number of children: Not on file   • Years of education: Not on file   • Highest education level: Not on file   Occupational History   • Not on file   Tobacco Use   • Smoking status: Former Smoker     Packs/day: 0 25     Years: 8 00     Pack years: 2 00     Types: Cigarettes     Quit date: 2020     Years since quittin 3   • Smokeless tobacco: Never Used   • Tobacco comment: quit with knowledge of pregnancy   Vaping Use   • Vaping Use: Never used   Substance and Sexual Activity   • Alcohol use: Yes     Comment: rare   • Drug use: Yes     Types: Marijuana     Comment: medical marijuana   • Sexual activity: Yes     Partners: Male     Birth control/protection: None   Other Topics Concern   • Not on file   Social History Narrative   • Not on file     Social Determinants of Health     Financial Resource Strain: Not on file   Food Insecurity: Not on file   Transportation Needs: Not on file   Physical Activity: Not on file   Stress: Not on file   Social Connections: Not on file   Intimate Partner Violence: Not on file   Housing Stability: Not on file     Medications have been verified      Objective   /58   Pulse 72   Temp 98 3 °F (36 8 °C) Resp 18   Ht 5' 4" (1 626 m)   Wt 83 kg (183 lb)   SpO2 99%   BMI 31 41 kg/m²      Physical Exam   Physical Exam  Vitals and nursing note reviewed  Constitutional:       General: She is not in acute distress  Appearance: Normal appearance  She is well-developed  HENT:      Head: Normocephalic and atraumatic  Right Ear: Tympanic membrane normal       Left Ear: Tympanic membrane normal       Nose: Nose normal  No mucosal edema or rhinorrhea  Right Sinus: No maxillary sinus tenderness or frontal sinus tenderness  Left Sinus: No maxillary sinus tenderness or frontal sinus tenderness  Mouth/Throat:      Pharynx: No oropharyngeal exudate or posterior oropharyngeal erythema  Eyes:      Conjunctiva/sclera: Conjunctivae normal    Cardiovascular:      Rate and Rhythm: Normal rate and regular rhythm  Pulses: Normal pulses  Heart sounds: Normal heart sounds  No murmur heard  Pulmonary:      Effort: Pulmonary effort is normal    Musculoskeletal:      Left shoulder: No swelling  Normal range of motion  Arms:       Cervical back: Normal range of motion  Left hip: No tenderness, bony tenderness or crepitus  Normal range of motion  Normal strength  Lymphadenopathy:      Cervical: Cervical adenopathy (left anterior, TTP) present  Neurological:      Mental Status: She is alert

## 2022-10-24 ENCOUNTER — ANNUAL EXAM (OUTPATIENT)
Dept: OBGYN CLINIC | Facility: CLINIC | Age: 27
End: 2022-10-24
Payer: COMMERCIAL

## 2022-10-24 VITALS
DIASTOLIC BLOOD PRESSURE: 62 MMHG | HEIGHT: 64 IN | WEIGHT: 188 LBS | BODY MASS INDEX: 32.1 KG/M2 | SYSTOLIC BLOOD PRESSURE: 120 MMHG

## 2022-10-24 DIAGNOSIS — Z30.09 COUNSELING FOR BIRTH CONTROL, INTRAUTERINE DEVICE: ICD-10-CM

## 2022-10-24 DIAGNOSIS — N89.8 VAGINAL DISCHARGE: Primary | ICD-10-CM

## 2022-10-24 DIAGNOSIS — Z01.419 ENCOUNTER FOR GYNECOLOGICAL EXAMINATION (GENERAL) (ROUTINE) WITHOUT ABNORMAL FINDINGS: ICD-10-CM

## 2022-10-24 LAB
SL AMB  POCT GLUCOSE, UA: NEGATIVE
SL AMB LEUKOCYTE ESTERASE,UA: NEGATIVE
SL AMB POCT BILIRUBIN,UA: NEGATIVE
SL AMB POCT BLOOD,UA: NEGATIVE
SL AMB POCT CLARITY,UA: CLEAR
SL AMB POCT COLOR,UA: YELLOW
SL AMB POCT KETONES,UA: NEGATIVE
SL AMB POCT NITRITE,UA: NEGATIVE
SL AMB POCT PH,UA: NEGATIVE
SL AMB POCT SPECIFIC GRAVITY,UA: NEGATIVE
SL AMB POCT URINE HCG: NEGATIVE
SL AMB POCT URINE PROTEIN: NEGATIVE
SL AMB POCT UROBILINOGEN: NEGATIVE

## 2022-10-24 PROCEDURE — 81002 URINALYSIS NONAUTO W/O SCOPE: CPT | Performed by: OBSTETRICS & GYNECOLOGY

## 2022-10-24 PROCEDURE — 99395 PREV VISIT EST AGE 18-39: CPT | Performed by: OBSTETRICS & GYNECOLOGY

## 2022-10-24 PROCEDURE — 81514 NFCT DS BV&VAGINITIS DNA ALG: CPT | Performed by: OBSTETRICS & GYNECOLOGY

## 2022-10-24 PROCEDURE — 81025 URINE PREGNANCY TEST: CPT | Performed by: OBSTETRICS & GYNECOLOGY

## 2022-10-24 PROCEDURE — 99213 OFFICE O/P EST LOW 20 MIN: CPT | Performed by: OBSTETRICS & GYNECOLOGY

## 2022-10-24 NOTE — PATIENT INSTRUCTIONS
Intrauterine Device   WHAT YOU NEED TO KNOW:   What is an intrauterine device (IUD)? An IUD is a type of birth control that is inserted into your uterus  It is a small, flexible piece of plastic with a string on the end  It is inserted and removed by your healthcare provider  IUDs prevent sperm from reaching or fertilizing an egg  IUDs also prevent a fertilized egg from attaching to the uterus and developing into a fetus  What are the most common types of IUDs? Your healthcare provider will recommend the type of IUD that is right for you  This is based on your age and if you have had a child  If you have not had a child, a smaller IUD will be used  A copper IUD  slowly releases a small amount of copper into your uterus  This IUD can remain in place for up to 10 years  A hormone-releasing IUD  slowly releases a small amount of progesterone into your uterus  Progesterone is a hormone that is made by your body to help control your periods  This IUD can remain in place for 3 to 5 years  What are the advantages of an IUD? An IUD is 98% to 99% effective in preventing pregnancy  The IUD can be removed by your healthcare provider if you decide to have a baby  You may be able to get pregnant as soon as the IUD is removed  An IUD protects you from pregnancy right after it is inserted  You do not have to stop sexual activity to insert it  You do not have to remember to take your birth control pill  Copper IUDs are safer for some women than oral birth control pills  Examples include women who smoke or have a history of blood clots  Hormone-releasing IUDs may decrease certain health problems  Examples include bleeding and cramping that happen with your monthly period  What are the disadvantages of an IUD? There is a small chance that you could get pregnant  Sometimes the IUD cannot be removed after you get pregnant  This increases your risk of a miscarriage or an ectopic pregnancy   Ectopic pregnancy is when the fertilized egg starts to grow somewhere other than your uterus  An IUD does not protect you from sexually transmitted infections  You may have cramps during the first weeks after you get the IUD  A copper IUD may cause your monthly period to be heavier or more painful  This is more common within the first 3 months after you get the IUD  You may need to have your IUD removed if your bleeding or pain becomes severe  You may have spotting between periods  There is a small risk of an infection within the first 20 days after the IUD is placed  Infection can lead to pelvic inflammatory disease  This can cause infertility  Your uterus may tear when the IUD is inserted  The IUD may slip part or all of the way out of your uterus  How is the IUD inserted? The IUD is usually inserted during your monthly period  This may help decrease the amount of discomfort you have during the procedure  It also helps make sure that you are not pregnant  You will be asked to lie down and place your feet in stirrups  Your healthcare provider will gently insert a speculum into your vagina  This is the same tool used during a Pap smear  The speculum allows your healthcare provider to see inside your vagina to your cervix  The cervix is the opening of your uterus  Your healthcare provider will clean your cervix with an antiseptic solution to prevent infection  You may be given numbing medicine  A long plastic tube is gently passed through your cervix and into your uterus  This tube has the IUD inside of it  The IUD is pushed out of the tube and into your uterus  You may have cramps as the IUD is inserted  The tube is removed after the IUD is in place  How can I make sure my IUD is still in place? An IUD has a string made of plastic thread  One to 2 inches of this string hangs into your vagina  You cannot see this string, and it should not cause problems when you have sex   Check your IUD string every 3 days for the first 3 months after it is inserted  After that, check the string after each monthly period  Do the following to check the placement of your IUD:  Wash your hands with soap and warm water  Dry them with a clean towel  Bend your knees and squat low to the ground  Gently put your index finger inside your vagina  The cervix is at the top of the vagina and feels like the tip of your nose  Feel for the IUD string  Do not pull on the string  You should not be able to feel the firm plastic of the IUD itself  Wash your hands after you check your IUD string  Where can I find more information? [facility] [address] [place]  Phone: [phone]  Web Address: https://[employer]/  [employer]    When should I seek immediate care? You have severe pain or bleeding during your period  You have a fever and severe abdominal pain  When should I call my doctor? You think you are pregnant  The IUD has come out  You have bleeding from your vagina after you have sex, and it is not your period  You have pain during sex  You cannot feel the IUD string, the string feels longer, or you feel the plastic of the IUD itself  You have vaginal discharge that is green, yellow, or has a foul odor  You have questions or concerns about your condition or care  CARE AGREEMENT:   You have the right to help plan your care  Learn about your health condition and how it may be treated  Discuss treatment options with your healthcare providers to decide what care you want to receive  You always have the right to refuse treatment  The above information is an  only  It is not intended as medical advice for individual conditions or treatments  Talk to your doctor, nurse or pharmacist before following any medical regimen to see if it is safe and effective for you    © Copyright [employer] 2022 Information is for End User's use only and may not be sold, redistributed or otherwise used for commercial purposes  All illustrations and images included in CareNotes® are the copyrighted property of [employer] A M , Inc  or [facility] Nuria Pope   Thank you for visiting [facility]  You may be invited to complete a survey about you visit  Your responses will help us improve care we provide  A 10 means the care you received at your visit met your expectations  If you are unable to give a 10 please list reasons so we can work on improving your patient experience

## 2022-10-24 NOTE — PROGRESS NOTES
ASSESSMENT & PLAN: Diagnoses and all orders for this visit:    Vaginal discharge  -     Molecular Vaginal Panel    Encounter for gynecological examination (general) (routine) without abnormal findings    Counseling for birth control, intrauterine device         1  Routine well woman exam done today  2  Pap:  The patient's pap is up to date  Pap was not done today  Current ASCCP Guidelines reviewed  3   STD testing was not done  4   Patient has had her Gardasil vaccination  Recommendations reviewed  5  The following were reviewed in today's visit: adequate intake of calcium and vitamin D, exercise and healthy diet  6  F/u in 1 year for next routine gyn exam   7  Dysuria/ VD:  Urine dip negative for nitrates and leukocyte esterase  Molecular vaginal panel taken today  8  Contraception:  Discussed LARC's  Also recommended patient to consider the ParaGard IUD since it does not have any hormones  Patient very concerned about hormonal interactions  Patient may also request Cytotec prior to the procedure to help with the placement  9  Urine hCG was negative today  CC:  Annual Gynecologic Examination    HPI: Danny Robertson is a 32 y o  [id number] who presents for annual gynecologic examination  She has the following concerns: Thinks she has a yeast or UTI  Pt reports dysuria  Feels wet  Has back pain  Unsure if it is a yeast infection, has issues with recurrent infections  Pt reports h/o recurrent BV prior to pregnancy  Menses are short and only last 2 days  Were 4-5 days long before last pregnancy  Has lost a lot of weight but recently gained weight  Had the Mirena IUD in the past and reports significant pain with placement  Pt reports she had bleeding issues with it  Pt requesting urine pregnancy test     Health Maintenance:    Patient describes her health as fair  The last health maintenance visit was 1 years ago  Patient does have weight concerns  She exercises with walking  She does wear her seatbelt routinely  She does not perform regular monthly self breast exams  She does feels safe at home  Patients does follow a healthy, gluten free diet  Patients gets 1 servings of dairy or calcium rich foods a day  Last pap: 2 years ago     Patient Active Problem List   Diagnosis   • Bipolar affective disorder, currently depressed, moderate (HCC)   • Celiac disease   • Diffuse cystic mastopathy   • Eating disorder   • Osteopenia   • Panic disorder with agoraphobia   • PTSD (post-traumatic stress disorder)   • Slow transit constipation   • Seasonal allergies   • Migraine   • Depression   • Anxiety   • Sciatica of right side   • S/P primary low transverse    • Gastroesophageal reflux disease   • General counseling for initiation of other contraceptive measures   • Exposure to head lice   • Yeast infection   • Exposure to COVID-19 virus   • Recurrent vaginitis       Past Medical History:   Diagnosis Date   • Celiac disease    • Gastroparesis    • Marijuana use, continuous 7/10/2018    Last Assessment & Plan:  Abdi Savage reports that she has less ability to use THC due to her step-daughter being in the house  She reports that she has significantly decrease to twice/day  Reviewed risks of routine daily use, she does not feel this is an issue & states "I know my body" & is not willing to quit at this point in time  • Nicotine use disorder 7/10/2018    Last Assessment & Plan:  Bradley Vazquez continues to smoke 1 cigarette/day  She reports that "it will not be hard for her to stop" & plans on quitting immediately  • Personality disorder ([facility] [address] )    • Substance abuse ([facility] [address] )     marijuana   • Trauma     PTSD   • Varicella        Past Surgical History:   Procedure Laterality Date   • [place]  DELIVERY ONLY N/A 2021    Procedure:  SECTION (); Surgeon:  Alfonso Gutierrez MD;  Location: [facility];  Service: Obstetrics   • TONSILLECTOMY         Past OB/Gyn History:  Pt does not have menstrual issues  As above  History of sexually transmitted infection: No   History of abnormal pap smears: No     Patient is currently sexually active  heterosexual  The current method of family planning is none      Family History   Problem Relation Age of Onset   • Other Mother         mental health issues   • Psoriasis Mother    • Heart disease Mother    • No Known Problems Father    • No Known Problems Brother    • Heart disease Maternal Grandmother    • Thrombophlebitis Maternal Grandmother    • Heart disease Maternal Grandfather    • Heart attack Maternal Grandfather    • Obesity Maternal Grandfather    • Stomach cancer Paternal Grandmother    • Bone cancer Paternal Grandfather    • No Known Problems Son    • Breast cancer Neg Hx    • Colon cancer Neg Hx    • Ovarian cancer Neg Hx        Social History:  Social History     Socioeconomic History   • Marital status: Single     Spouse name: Not on file   • Number of children: Not on file   • Years of education: Not on file   • Highest education level: Not on file   Occupational History   • Not on file   Tobacco Use   • Smoking status: Former Smoker     Packs/day: 0 25     Years: 8 00     Pack years: 2 00     Types: Cigarettes     Quit date: 2020     Years since quittin 3   • Smokeless tobacco: Never Used   • Tobacco comment: quit with knowledge of pregnancy   Vaping Use   • Vaping Use: Never used   Substance and Sexual Activity   • Alcohol use: Yes     Comment: rare   • Drug use: Yes     Types: Marijuana     Comment: medical marijuana   • Sexual activity: Yes     Partners: Male     Birth control/protection: None   Other Topics Concern   • Not on file   Social History Narrative   • Not on file     Social Determinants of Health     Financial Resource Strain: Not on file   Food Insecurity: Not on file   Transportation Needs: Not on file   Physical Activity: Not on file   Stress: Not on file   Social Connections: Not on file Intimate Partner Violence: Not on file   Housing Stability: Not on file     Presently lives with partner and son  Patient is currently employed works for [employer]      Allergies   Allergen Reactions   • Apple - Food Allergy    • Gluten Meal - Food Allergy Vomiting   • Other Other (See Comments)     Enviromental allergies  apples         Current Outpatient Medications:   •  ALPRAZolam (XANAX) 0 5 mg tablet, TAKE 1 TABLET (0 5 MG TOTAL) BY MOUTH 2 (TWO) TIMES A DAY AS NEEDED FOR ANXIETY , Disp: , Rfl:   •  azelastine (ASTELIN) 0 1 % nasal spray, 2 sprays into each nostril, Disp: , Rfl:   •  cariprazine (VRAYLAR) 1 5 MG capsule, Take 1 5 mg by mouth daily, Disp: , Rfl:   •  cetirizine (ZyrTEC) 10 mg tablet, Take 10 mg by mouth daily as needed, Disp: , Rfl:   •  fluocinolone acetonide (DERMOTIC) 0 01 % otic oil, 3 drops in both ears three times per week , Disp: , Rfl:   •  fluticasone (FLONASE) 50 mcg/act nasal spray, , Disp: , Rfl:   •  hydrocortisone 2 5 % cream, , Disp: , Rfl:   •  loratadine (CLARITIN) 10 mg tablet, Take 1 tablet by mouth every morning, Disp: , Rfl:   •  montelukast (SINGULAIR) 10 mg tablet, , Disp: , Rfl:   •  olopatadine HCl (PATADAY) 0 2 % opth drops, Apply 1 drop to eye daily, Disp: , Rfl:   •  ondansetron (ZOFRAN) 4 mg tablet, , Disp: , Rfl:   •  Option 2 tablet, take 1 tablet by mouth ONCE, Disp: 1 tablet, Rfl: 0  •  predniSONE 10 mg tablet, Take 3 tabs BID X 2 days, 2 tabs BID X 2 days, 1 tab BID X 2 days, 1 tab daily X 2 days, Disp: 26 tablet, Rfl: 0  •  topiramate (TOPAMAX) 100 mg tablet, Take 150 mg by mouth daily at bedtime, Disp: , Rfl:   •  traZODone (DESYREL) 50 mg tablet, TAKE 1 TABLET BY MOUTH EVERY DAY AT NIGHT, Disp: , Rfl:   •  triamcinolone (KENALOG) 0 1 % cream, PLEASE SEE ATTACHED FOR DETAILED DIRECTIONS, Disp: , Rfl:   •  ALPRAZolam (XANAX) 0 25 mg tablet, Take 1 tablet (0 25 mg total) by mouth daily at bedtime as needed for anxiety for up to 10 days, Disp: 2 tablet, Rfl: 0  •  DULoxetine (CYMBALTA) 30 mg delayed release capsule, Take 30 mg by mouth every morning (Patient not taking: No sig reported), Disp: , Rfl:   •  famotidine (PEPCID) 20 mg tablet, TAKE 1 TABLET BY MOUTH EVERY DAY (Patient not taking: No sig reported), Disp: 30 tablet, Rfl: 2  •  fluvoxaMINE (LUVOX) 50 mg tablet, , Disp: , Rfl:   •  metFORMIN (GLUCOPHAGE-XR) 500 mg 24 hr tablet, , Disp: , Rfl:   •  multivitamin (THERAGRAN) TABS, Take 1 tablet by mouth daily (Patient not taking: No sig reported), Disp: , Rfl:   •  nystatin (MYCOSTATIN) 500,000 units/5 mL suspension, TAKE 5 ML (500,000 UNITS) BY MOUTH 4 TIMES A DAY FOR 10 DAYS  (Patient not taking: Reported on 10/24/2022), Disp: , Rfl:   •  Prenatal Vit-Fe Fumarate-FA (PRENATAL VITAMINS PO), Take by mouth (Patient not taking: No sig reported), Disp: , Rfl:   •  Riboflavin (Vitamin B-2) 100 MG TABS, Take 400 mg by mouth daily (Patient not taking: Reported on 10/18/2022), Disp: , Rfl:   •  SUMAtriptan (IMITREX) 50 mg tablet, , Disp: , Rfl:       Review of Systems  Constitutional :no fever, feels well, no tiredness, no recent weight gain or loss  ENT: no ear ache, no loss of hearing, no nosebleeds or nasal discharge, no sore throat or hoarseness  Cardiovascular: no complaints of slow or fast heart beat, no chest pain, no palpitations, no leg claudication or lower extremity edema  Respiratory: no complaints of shortness of shortness of breath, no ZAVALA  Breasts:no complaints of breast pain, breast lump, or nipple discharge  Gastrointestinal: no complaints of abdominal pain, constipation, nausea, vomiting, or diarrhea or bloody stools  Genitourinary : + complaints of dysuria, no incontinence, pelvic pain, no dysmenorrhea, +vaginal discharge or abnormal vaginal bleeding and as noted in HPI  Musculoskeletal: no complaints of arthralgia, no myalgia, no joint swelling or stiffness, no limb pain or swelling    Integumentary: no complaints of skin rash or lesion, itching or dry skin  Neurological: no complaints of headache, no confusion, no numbness or tingling, no dizziness or fainting      Physical Exam:   /62   Ht 5' 4" (1 626 m)   Wt 85 3 kg (188 lb)   LMP 09/29/2022   BMI 32 27 kg/m²     General: appears stated age, cooperative, alert normal mood and affect   Psychiatric oriented to person, place and time  Mood and affect normal   Neck: normal, supple,trachea midline, no masses  Thyroid: normal, no thyromegaly   Heart: regular rate and rhythm, S1, S2 normal, no murmur, click, rub or gallop   Lungs: clear to auscultation bilaterally, no increased work of breathing or signs of respiratory distress   Breasts: normal, no dimpling or skin changes noted   Abdomen: soft, non-tender, without masses or organomegaly   Vulva: normal , no lesions   Vagina: normal , no lesions or dryness, sm amt of whitish discharge noted   Urethra: normal   Urethal meatus normal   Bladder Normal, soft, non-tender and no prolapse or masses appreciated   Cervix: normal, no palpable masses    Uterus: normal , non-tender, not enlarged, no palpable masses   Adnexa: normal, non-tender without fullness or masses   Lymphatic Palpation of lymph nodes in neck, axilla, groin and/or other locations: no lymphadenopathy or masses noted   Skin Normal skin turgor and no rashes    Palpation of skin and subcutaneous tissue normal

## 2022-10-25 DIAGNOSIS — B37.31 YEAST VAGINITIS: Primary | ICD-10-CM

## 2022-10-25 LAB
C GLABRATA DNA VAG QL NAA+PROBE: NEGATIVE
C KRUSEI DNA VAG QL NAA+PROBE: NEGATIVE
CANDIDA SP 6 PNL VAG NAA+PROBE: POSITIVE
T VAGINALIS DNA VAG QL NAA+PROBE: NEGATIVE
VAGINOSIS/ITIS DNA PNL VAG PROBE+SIG AMP: NEGATIVE

## 2022-10-25 RX ORDER — FLUCONAZOLE 150 MG/1
TABLET ORAL
Qty: 3 TABLET | Refills: 0 | Status: SHIPPED | OUTPATIENT
Start: 2022-10-25 | End: 2022-10-31

## 2022-10-25 NOTE — RESULT ENCOUNTER NOTE
Please notify pt of abnormal result: + yeast   Will refill diflucan and pt can take it as it was prescribed in the past: every 3 days for 3 doses  Rx sent to pt's pharmacy

## 2022-11-04 ENCOUNTER — OFFICE VISIT (OUTPATIENT)
Dept: URGENT CARE | Facility: CLINIC | Age: 27
End: 2022-11-04

## 2022-11-04 VITALS — TEMPERATURE: 98.1 F | OXYGEN SATURATION: 99 % | RESPIRATION RATE: 18 BRPM | HEART RATE: 92 BPM

## 2022-11-04 DIAGNOSIS — J20.9 ACUTE BRONCHITIS, UNSPECIFIED ORGANISM: Primary | ICD-10-CM

## 2022-11-04 RX ORDER — BENZONATATE 100 MG/1
100 CAPSULE ORAL 3 TIMES DAILY PRN
Qty: 15 CAPSULE | Refills: 0 | Status: SHIPPED | OUTPATIENT
Start: 2022-11-04 | End: 2022-11-09

## 2022-11-04 RX ORDER — BROMPHENIRAMINE MALEATE, PSEUDOEPHEDRINE HYDROCHLORIDE, AND DEXTROMETHORPHAN HYDROBROMIDE 2; 30; 10 MG/5ML; MG/5ML; MG/5ML
5 SYRUP ORAL 4 TIMES DAILY PRN
Qty: 120 ML | Refills: 0 | Status: SHIPPED | OUTPATIENT
Start: 2022-11-04

## 2022-11-04 RX ORDER — ALBUTEROL SULFATE 90 UG/1
2 AEROSOL, METERED RESPIRATORY (INHALATION) EVERY 6 HOURS PRN
Qty: 8.5 G | Refills: 0 | Status: SHIPPED | OUTPATIENT
Start: 2022-11-04

## 2022-11-04 NOTE — PATIENT INSTRUCTIONS
Hydration and rest  Start albuterol, benzonatate, bromfed  Nasal saline spray  Humidifier at night  Note for work  Follow up with Primary Care Physician  Return to clinic or go to the nearest Emergency Department with new or worsening symptoms as discussed  Acute Bronchitis   WHAT YOU NEED TO KNOW:   Acute bronchitis is swelling and irritation in your lungs  It is usually caused by a virus and most often happens in the winter  Bronchitis may also be caused by bacteria or by a chemical irritant, such as smoke  DISCHARGE INSTRUCTIONS:   Return to the emergency department if:   You cough up blood  Your lips or fingernails turn blue  You feel like you are not getting enough air when you breathe  Call your doctor if:   Your symptoms do not go away or get worse, even after treatment  Your cough does not get better within 4 weeks  You have questions or concerns about your condition or care  Medicines: You may  need any of the following:  Cough suppressants  decrease your urge to cough  Decongestants  help loosen mucus in your lungs and make it easier to cough up  This can help you breathe easier  Inhalers  may be given  Your healthcare provider may give you one or more inhalers to help you breathe easier and cough less  An inhaler gives your medicine to open your airways  Ask your healthcare provider to show you how to use your inhaler correctly  Antibiotics  may be given for up to 5 days if your bronchitis is caused by bacteria  Acetaminophen  decreases pain and fever  It is available without a doctor's order  Ask how much to take and how often to take it  Follow directions  Read the labels of all other medicines you are using to see if they also contain acetaminophen, or ask your doctor or pharmacist  Acetaminophen can cause liver damage if not taken correctly  Do not use more than 4 grams (4,000 milligrams) total of acetaminophen in one day       NSAIDs  help decrease swelling and pain or fever  This medicine is available with or without a doctor's order  NSAIDs can cause stomach bleeding or kidney problems in certain people  If you take blood thinner medicine, always ask your healthcare provider if NSAIDs are safe for you  Always read the medicine label and follow directions  Take your medicine as directed  Contact your healthcare provider if you think your medicine is not helping or if you have side effects  Tell him of her if you are allergic to any medicine  Keep a list of the medicines, vitamins, and herbs you take  Include the amounts, and when and why you take them  Bring the list or the pill bottles to follow-up visits  Carry your medicine list with you in case of an emergency  Self-care:   Drink liquids as directed  You may need to drink more liquids than usual to stay hydrated  Ask how much liquid to drink each day and which liquids are best for you  Use a cool mist humidifier  to increase air moisture in your home  This may make it easier for you to breathe and help decrease your cough  Get more rest   Rest helps your body to heal  Slowly start to do more each day  Rest when you feel it is needed  Avoid irritants in the air  Avoid chemicals, fumes, and dust  Wear a face mask if you must work around dust or fumes  Stay inside on days when air pollution levels are high  If you have allergies, stay inside when pollen counts are high  Do not use aerosol products, such as spray-on deodorant, bug spray, and hair spray  Do not smoke or be around others who are smoking  Nicotine and other chemicals in cigarettes and cigars can cause lung damage  Ask your healthcare provider for information if you currently smoke and need help to quit  E-cigarettes or smokeless tobacco still contain nicotine  Talk to your healthcare provider before you use these products  Prevent acute bronchitis:       Ask about vaccines you may need    Get a flu vaccine each year as soon as recommended, usually in September or October  Ask your healthcare provider if you should also get a pneumonia or COVID-19 vaccine  Your healthcare provider can tell you if you should also get other vaccines, and when to get them  Prevent the spread of germs  You can decrease your risk for acute bronchitis and other illnesses by doing the following:     Wash your hands often with soap and water  Carry germ-killing hand lotion or gel with you  You can use the lotion or gel to clean your hands when soap and water are not available  Do not touch your eyes, nose, or mouth unless you have washed your hands first     Always cover your mouth when you cough to prevent the spread of germs  It is best to cough into a tissue or your shirt sleeve instead of into your hand  Ask those around you to cover their mouths when they cough  Try to avoid people who have a cold or the flu  If you are sick, stay away from others as much as possible  Follow up with your doctor as directed:  Write down questions you have so you will remember to ask them during your follow-up visits  © Copyright [employer] 2022 Information is for End User's use only and may not be sold, redistributed or otherwise used for commercial purposes  All illustrations and images included in CareNotes® are the copyrighted property of A [employer] M [employer] Inc  or Antonio Pope   The above information is an  only  It is not intended as medical advice for individual conditions or treatments  Talk to your doctor, nurse or pharmacist before following any medical regimen to see if it is safe and effective for you

## 2022-11-04 NOTE — LETTER
November 4, 2022     Patient: Yudi Aguilera   YOB: 1995   Date of Visit: 11/4/2022       To Whom it May Concern:    Dontrell Christian was seen in my clinic on 11/4/2022  She may return to work on 11/05/2022  If you have any questions or concerns, please don't hesitate to call           Sincerely,          Shantel Stevens PA-C        CC: No Recipients

## 2022-11-04 NOTE — PROGRESS NOTES
[facility]        NAME: Momo Woods is a 32 y o  female  : 1995    MRN: [id number]  DATE: 2022  TIME: 1:13 PM    Assessment and Plan   Acute bronchitis, unspecified organism [J20 9]  1  Acute bronchitis, unspecified organism  albuterol (ProAir HFA) 90 mcg/act inhaler    brompheniramine-pseudoephedrine-DM 30-2-10 MG/5ML syrup    benzonatate (TESSALON PERLES) 100 mg capsule         Patient Instructions     Patient Instructions     Hydration and rest  Start albuterol, benzonatate, bromfed  Nasal saline spray  Humidifier at night  Note for work  Follow up with Primary Care Physician  Return to clinic or go to the nearest Emergency Department with new or worsening symptoms as discussed  Acute Bronchitis   WHAT YOU NEED TO KNOW:   Acute bronchitis is swelling and irritation in your lungs  It is usually caused by a virus and most often happens in the winter  Bronchitis may also be caused by bacteria or by a chemical irritant, such as smoke  DISCHARGE INSTRUCTIONS:   Return to the emergency department if:   · You cough up blood  · Your lips or fingernails turn blue  · You feel like you are not getting enough air when you breathe  Call your doctor if:   · Your symptoms do not go away or get worse, even after treatment  · Your cough does not get better within 4 weeks  · You have questions or concerns about your condition or care  Medicines: You may  need any of the following:  · Cough suppressants  decrease your urge to cough  · Decongestants  help loosen mucus in your lungs and make it easier to cough up  This can help you breathe easier  · Inhalers  may be given  Your healthcare provider may give you one or more inhalers to help you breathe easier and cough less  An inhaler gives your medicine to open your airways  Ask your healthcare provider to show you how to use your inhaler correctly           · Antibiotics  may be given for up to 5 days if your bronchitis is caused by bacteria  · Acetaminophen  decreases pain and fever  It is available without a doctor's order  Ask how much to take and how often to take it  Follow directions  Read the labels of all other medicines you are using to see if they also contain acetaminophen, or ask your doctor or pharmacist  Acetaminophen can cause liver damage if not taken correctly  Do not use more than 4 grams (4,000 milligrams) total of acetaminophen in one day  · NSAIDs  help decrease swelling and pain or fever  This medicine is available with or without a doctor's order  NSAIDs can cause stomach bleeding or kidney problems in certain people  If you take blood thinner medicine, always ask your healthcare provider if NSAIDs are safe for you  Always read the medicine label and follow directions  · Take your medicine as directed  Contact your healthcare provider if you think your medicine is not helping or if you have side effects  Tell him of her if you are allergic to any medicine  Keep a list of the medicines, vitamins, and herbs you take  Include the amounts, and when and why you take them  Bring the list or the pill bottles to follow-up visits  Carry your medicine list with you in case of an emergency  Self-care:   · Drink liquids as directed  You may need to drink more liquids than usual to stay hydrated  Ask how much liquid to drink each day and which liquids are best for you  · Use a cool mist humidifier  to increase air moisture in your home  This may make it easier for you to breathe and help decrease your cough  · Get more rest   Rest helps your body to heal  Slowly start to do more each day  Rest when you feel it is needed  · Avoid irritants in the air  Avoid chemicals, fumes, and dust  Wear a face mask if you must work around dust or fumes  Stay inside on days when air pollution levels are high  If you have allergies, stay inside when pollen counts are high   Do not use aerosol products, such as spray-on deodorant, bug spray, and hair spray  · Do not smoke or be around others who are smoking  Nicotine and other chemicals in cigarettes and cigars can cause lung damage  Ask your healthcare provider for information if you currently smoke and need help to quit  E-cigarettes or smokeless tobacco still contain nicotine  Talk to your healthcare provider before you use these products  Prevent acute bronchitis:       1  Ask about vaccines you may need  Get a flu vaccine each year as soon as recommended, usually in September or October  Ask your healthcare provider if you should also get a pneumonia or COVID-19 vaccine  Your healthcare provider can tell you if you should also get other vaccines, and when to get them  2  Prevent the spread of germs  You can decrease your risk for acute bronchitis and other illnesses by doing the following:     ? Wash your hands often with soap and water  Carry germ-killing hand lotion or gel with you  You can use the lotion or gel to clean your hands when soap and water are not available  ? Do not touch your eyes, nose, or mouth unless you have washed your hands first     ? Always cover your mouth when you cough to prevent the spread of germs  It is best to cough into a tissue or your shirt sleeve instead of into your hand  Ask those around you to cover their mouths when they cough  ? Try to avoid people who have a cold or the flu  If you are sick, stay away from others as much as possible  Follow up with your doctor as directed:  Write down questions you have so you will remember to ask them during your follow-up visits  © Copyright [employer] 2022 Information is for End User's use only and may not be sold, redistributed or otherwise used for commercial purposes  All illustrations and images included in CareNotes® are the copyrighted property of [employer]  or Antonio Bowles  The above information is an  only   It is not intended as medical advice for individual conditions or treatments  Talk to your doctor, nurse or pharmacist before following any medical regimen to see if it is safe and effective for you  **Portions of the record may have been created with voice recognition software  Occasional wrong word or "sound a like" substitutions may have occurred due to the inherent limitations of voice recognition software  Read the chart carefully and recognize, using context, where substitutions have occurred  **     Chief Complaint     Chief Complaint   Patient presents with   • Cough     Cough, congestion and hot flashes for two weeks  History of Present Illness     Shruti Hsu is a 32 y o  female presents to clinic today with complaints of cough and congestion x 2 weeks  Cough is sometimes productive and wors eat night  No improvement with nyquil  She is finishing a course of amoxicillin without any relief  Denies fever, difficulty breathing, chest pain  No sick contacts  Review of Systems     Review of Systems   Constitutional: Negative for chills, fatigue and fever  HENT: Positive for congestion and postnasal drip  Negative for ear pain, rhinorrhea, sinus pressure, sore throat and voice change  Eyes: Negative for discharge and redness  Respiratory: Positive for cough  Negative for shortness of breath and wheezing  Cardiovascular: Negative for chest pain  Gastrointestinal: Negative for diarrhea, nausea and vomiting  Musculoskeletal: Positive for back pain  Negative for myalgias  Neurological: Negative for dizziness and headaches  Hematological: Negative for adenopathy           Current Medications     Current Outpatient Medications:   •  albuterol (ProAir HFA) 90 mcg/act inhaler, Inhale 2 puffs every 6 (six) hours as needed for wheezing, Disp: 8 5 g, Rfl: 0  •  benzonatate (TESSALON PERLES) 100 mg capsule, Take 1 capsule (100 mg total) by mouth 3 (three) times a day as needed for cough for up to 5 days, Disp: 15 capsule, Rfl: 0  •  brompheniramine-pseudoephedrine-DM 30-2-10 MG/5ML syrup, Take 5 mL by mouth 4 (four) times a day as needed for cough, Disp: 120 mL, Rfl: 0  •  ALPRAZolam (XANAX) 0 25 mg tablet, Take 1 tablet (0 25 mg total) by mouth daily at bedtime as needed for anxiety for up to 10 days, Disp: 2 tablet, Rfl: 0  •  ALPRAZolam (XANAX) 0 5 mg tablet, TAKE 1 TABLET (0 5 MG TOTAL) BY MOUTH 2 (TWO) TIMES A DAY AS NEEDED FOR ANXIETY , Disp: , Rfl:   •  azelastine (ASTELIN) 0 1 % nasal spray, 2 sprays into each nostril, Disp: , Rfl:   •  cariprazine (VRAYLAR) 1 5 MG capsule, Take 1 5 mg by mouth daily, Disp: , Rfl:   •  cetirizine (ZyrTEC) 10 mg tablet, Take 10 mg by mouth daily as needed, Disp: , Rfl:   •  DULoxetine (CYMBALTA) 30 mg delayed release capsule, Take 30 mg by mouth every morning (Patient not taking: No sig reported), Disp: , Rfl:   •  famotidine (PEPCID) 20 mg tablet, TAKE 1 TABLET BY MOUTH EVERY DAY (Patient not taking: No sig reported), Disp: 30 tablet, Rfl: 2  •  fluocinolone acetonide (DERMOTIC) 0 01 % otic oil, 3 drops in both ears three times per week , Disp: , Rfl:   •  fluticasone (FLONASE) 50 mcg/act nasal spray, , Disp: , Rfl:   •  fluvoxaMINE (LUVOX) 50 mg tablet, , Disp: , Rfl:   •  hydrocortisone 2 5 % cream, , Disp: , Rfl:   •  loratadine (CLARITIN) 10 mg tablet, Take 1 tablet by mouth every morning, Disp: , Rfl:   •  metFORMIN (GLUCOPHAGE-XR) 500 mg 24 hr tablet, , Disp: , Rfl:   •  montelukast (SINGULAIR) 10 mg tablet, , Disp: , Rfl:   •  multivitamin (THERAGRAN) TABS, Take 1 tablet by mouth daily (Patient not taking: No sig reported), Disp: , Rfl:   •  nystatin (MYCOSTATIN) 500,000 units/5 mL suspension, TAKE 5 ML (500,000 UNITS) BY MOUTH 4 TIMES A DAY FOR 10 DAYS  (Patient not taking: Reported on 10/24/2022), Disp: , Rfl:   •  olopatadine HCl (PATADAY) 0 2 % opth drops, Apply 1 drop to eye daily, Disp: , Rfl:   •  ondansetron (ZOFRAN) 4 mg tablet, , Disp: , Rfl:   •  Option 2 tablet, take 1 tablet by mouth ONCE, Disp: 1 tablet, Rfl: 0  •  predniSONE 10 mg tablet, Take 3 tabs BID X 2 days, 2 tabs BID X 2 days, 1 tab BID X 2 days, 1 tab daily X 2 days (Patient not taking: Reported on 11/4/2022), Disp: 26 tablet, Rfl: 0  •  Prenatal Vit-Fe Fumarate-FA (PRENATAL VITAMINS PO), Take by mouth (Patient not taking: No sig reported), Disp: , Rfl:   •  Riboflavin (Vitamin B-2) 100 MG TABS, Take 400 mg by mouth daily (Patient not taking: Reported on 10/18/2022), Disp: , Rfl:   •  SUMAtriptan (IMITREX) 50 mg tablet, , Disp: , Rfl:   •  topiramate (TOPAMAX) 100 mg tablet, Take 150 mg by mouth daily at bedtime, Disp: , Rfl:   •  traZODone (DESYREL) 50 mg tablet, TAKE 1 TABLET BY MOUTH EVERY DAY AT NIGHT, Disp: , Rfl:   •  triamcinolone (KENALOG) 0 1 % cream, PLEASE SEE ATTACHED FOR DETAILED DIRECTIONS, Disp: , Rfl:     Current Allergies     Allergies as of 11/04/2022 - Reviewed 11/04/2022   Allergen Reaction Noted   • Apple - food allergy  01/11/2021   • Gluten meal - food allergy Vomiting 01/18/2021   • Other Other (See Comments) 10/24/2014            The following portions of the patient's history were reviewed and updated as appropriate: allergies, current medications, past family history, past medical history, past social history, past surgical history and problem list      Past Medical History:   Diagnosis Date   • Celiac disease    • Gastroparesis    • Marijuana use, continuous 7/10/2018    Last Assessment & Plan:  July Parr reports that she has less ability to use THC due to her step-daughter being in the house  She reports that she has significantly decrease to twice/day  Reviewed risks of routine daily use, she does not feel this is an issue & states "I know my body" & is not willing to quit at this point in time  • Nicotine use disorder 7/10/2018    Last Assessment & Plan:  Fransisco Kendrick continues to smoke 1 cigarette/day    She reports that "it will not be hard for her to stop" & plans on quitting immediately  • Personality disorder ([facility] [address] )    • Substance abuse ([facility] [address] )     marijuana   • Trauma     PTSD   • Varicella        Past Surgical History:   Procedure Laterality Date   • MA  DELIVERY ONLY N/A 2021    Procedure:  SECTION (); Surgeon: Shanel Alvarez MD;  Location: [facility];  Service: Obstetrics   • TONSILLECTOMY         Family History   Problem Relation Age of Onset   • Other Mother         mental health issues   • Psoriasis Mother    • Heart disease Mother    • No Known Problems Father    • No Known Problems Brother    • Heart disease Maternal Grandmother    • Thrombophlebitis Maternal Grandmother    • Heart disease Maternal Grandfather    • Heart attack Maternal Grandfather    • Obesity Maternal Grandfather    • Stomach cancer Paternal Grandmother    • Bone cancer Paternal Grandfather    • No Known Problems Son    • Breast cancer Neg Hx    • Colon cancer Neg Hx    • Ovarian cancer Neg Hx          Medications have been verified  Objective     Pulse 92   Temp 98 1 °F (36 7 °C)   Resp 18   SpO2 99%        Physical Exam     Physical Exam  Vitals and nursing note reviewed  Constitutional:       General: She is not in acute distress  Appearance: Normal appearance  She is not ill-appearing  HENT:      Head: Normocephalic and atraumatic  Right Ear: Tympanic membrane normal       Left Ear: Tympanic membrane normal       Nose: Congestion present  No rhinorrhea  Mouth/Throat:      Mouth: Mucous membranes are moist       Pharynx: Oropharynx is clear  No posterior oropharyngeal erythema  Cardiovascular:      Rate and Rhythm: Normal rate and regular rhythm  Pulses: Normal pulses  Heart sounds: Normal heart sounds  Pulmonary:      Effort: Pulmonary effort is normal       Breath sounds: Wheezing present  Lymphadenopathy:      Cervical: No cervical adenopathy  Skin:     General: Skin is warm and dry  Findings: No rash  Neurological:      Mental Status: She is alert

## 2022-11-16 ENCOUNTER — APPOINTMENT (OUTPATIENT)
Dept: PHYSICAL THERAPY | Facility: CLINIC | Age: 27
End: 2022-11-16

## 2022-12-01 ENCOUNTER — OFFICE VISIT (OUTPATIENT)
Dept: OBGYN CLINIC | Facility: CLINIC | Age: 27
End: 2022-12-01

## 2022-12-01 ENCOUNTER — APPOINTMENT (OUTPATIENT)
Dept: RADIOLOGY | Facility: CLINIC | Age: 27
End: 2022-12-01

## 2022-12-01 VITALS
SYSTOLIC BLOOD PRESSURE: 110 MMHG | HEART RATE: 73 BPM | BODY MASS INDEX: 32.44 KG/M2 | DIASTOLIC BLOOD PRESSURE: 72 MMHG | HEIGHT: 64 IN | WEIGHT: 190 LBS

## 2022-12-01 DIAGNOSIS — M25.512 CHRONIC LEFT SHOULDER PAIN: Primary | ICD-10-CM

## 2022-12-01 DIAGNOSIS — G89.29 CHRONIC LEFT SHOULDER PAIN: Primary | ICD-10-CM

## 2022-12-01 DIAGNOSIS — M25.512 CHRONIC LEFT SHOULDER PAIN: ICD-10-CM

## 2022-12-01 DIAGNOSIS — G89.29 CHRONIC LEFT SHOULDER PAIN: ICD-10-CM

## 2022-12-01 NOTE — PATIENT INSTRUCTIONS
F/u 6 wks  Begin physical therapy  Icing/heat/OTC pain meds as needed  Home exercises  Consider MR arthrogram if no improvement- possible labral teat vs chronic tendonitis

## 2022-12-01 NOTE — LETTER
December 1, 2022     Athena Mail, [address] [facility]    Patient: Akira Urena   YOB: 1995   Date of Visit: 12/1/2022       Dear Dr Martha Bradshaw: Thank you for referring Ana Maria Tong to me for evaluation  Below are my notes for this consultation  If you have questions, please do not hesitate to call me  I look forward to following your patient along with you  Sincerely,        Vito Craig MD        CC: MD Vtio Correa MD  12/1/2022  1:41 PM  Signed  [facility] [place]  [address]  [facility] [id number] [phone]      Chief Complaint:  Chief Complaint   Patient presents with   • Left Shoulder - Pain     burns   • Right Hip - Dislocation     popping       Vitals:  /72 (BP Location: Left arm, Patient Position: Sitting, Cuff Size: Standard)   Pulse 73   Ht 5' 4" (1 626 m)   Wt 86 2 kg (190 lb)   BMI 32 61 kg/m²     The following portions of the patient's history were reviewed and updated as appropriate: allergies, current medications, past family history, past medical history, past social history, past surgical history, and problem list       Subjective:   Patient ID: Akira Urena is a 32 y o  female  Here c/o L shoulder pain  Pain started about 2 years ago while working in [employer]  She was loading large boxes and started having pain  Saw PCP  No XR or PT done  Chronic pain for  2 years  Worse with pregnancy  Worse with prolonged sitting/burning sensation  Denies neck pain  Sometimes hurts to reach up/back  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  Icy hot/tylenol/motrin PRN      Review of Systems   Constitutional: Negative for fatigue and fever  Respiratory: Negative for shortness of breath  Cardiovascular: Negative for chest pain  Gastrointestinal: Negative for abdominal pain and nausea     Genitourinary: Negative for dysuria  Musculoskeletal: Positive for arthralgias  Skin: Negative for rash and wound  Neurological: Negative for weakness and headaches  Objective:  Back Exam     Tenderness   The patient is experiencing no tenderness  Range of Motion   The patient has normal back ROM  Comments:  Pos spurling      Left Shoulder Exam     Tenderness   The patient is experiencing no tenderness  Range of Motion   The patient has normal left shoulder ROM  Muscle Strength   The patient has normal left shoulder strength  Tests   Hyde test: positive  Impingement: negative    Comments:  Pos obriens            Physical Exam  Vitals and nursing note reviewed  Constitutional:       Appearance: Normal appearance  She is well-developed  HENT:      Head: Normocephalic  Mouth/Throat:      Mouth: Mucous membranes are moist    Eyes:      Extraocular Movements: Extraocular movements intact  Cardiovascular:      Rate and Rhythm: Normal rate and regular rhythm  Heart sounds: Normal heart sounds  Pulmonary:      Effort: Pulmonary effort is normal       Breath sounds: Normal breath sounds  Abdominal:      General: Bowel sounds are normal       Palpations: Abdomen is soft  Musculoskeletal:         General: No tenderness  Cervical back: Normal range of motion  Skin:     General: Skin is warm and dry  Neurological:      General: No focal deficit present  Mental Status: She is alert and oriented to person, place, and time  Psychiatric:         Mood and Affect: Mood normal          Behavior: Behavior normal          Thought Content: Thought content normal          I have personally reviewed pertinent films in PACS and my interpretation is XR-  L shoulder nml study  Assessment/Plan:  Assessment/Plan   Diagnoses and all orders for this visit:    Chronic left shoulder pain  -     XR shoulder 2+ vw left; Future        Return in about 6 weeks (around 1/12/2023) for Recheck       Brandon Obando Jose Carlos Weiner MD

## 2022-12-01 NOTE — PROGRESS NOTES
[facility] SPECIALISTS [place]  [address]  [facility] [id number] [phone]      Chief Complaint:  Chief Complaint   Patient presents with   • Left Shoulder - Pain     burns   • Right Hip - Dislocation     popping       Vitals:  /72 (BP Location: Left arm, Patient Position: Sitting, Cuff Size: Standard)   Pulse 73   Ht 5' 4" (1 626 m)   Wt 86 2 kg (190 lb)   BMI 32 61 kg/m²     The following portions of the patient's history were reviewed and updated as appropriate: allergies, current medications, past family history, past medical history, past social history, past surgical history, and problem list       Subjective:   Patient ID: Lili Wyatt is a 32 y o  female  Here c/o L shoulder pain  Pain started about 2 years ago while working in [employer]  She was loading large boxes and started having pain  Saw PCP  No XR or PT done  Chronic pain for  2 years  Worse with pregnancy  Worse with prolonged sitting/burning sensation  Denies neck pain  Sometimes hurts to reach up/back  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  Icy hot/tylenol/motrin PRN      Review of Systems   Constitutional: Negative for fatigue and fever  Respiratory: Negative for shortness of breath  Cardiovascular: Negative for chest pain  Gastrointestinal: Negative for abdominal pain and nausea  Genitourinary: Negative for dysuria  Musculoskeletal: Positive for arthralgias  Skin: Negative for rash and wound  Neurological: Negative for weakness and headaches  Objective:  Back Exam     Tenderness   The patient is experiencing no tenderness  Range of Motion   The patient has normal back ROM  Comments:  Pos spurling      Left Shoulder Exam     Tenderness   The patient is experiencing no tenderness  Range of Motion   The patient has normal left shoulder ROM  Muscle Strength   The patient has normal left shoulder strength      Tests   Perla Blair test: positive  Impingement: negative    Comments:  Pos obriens            Physical Exam  Vitals and nursing note reviewed  Constitutional:       Appearance: Normal appearance  She is well-developed  HENT:      Head: Normocephalic  Mouth/Throat:      Mouth: Mucous membranes are moist    Eyes:      Extraocular Movements: Extraocular movements intact  Cardiovascular:      Rate and Rhythm: Normal rate and regular rhythm  Heart sounds: Normal heart sounds  Pulmonary:      Effort: Pulmonary effort is normal       Breath sounds: Normal breath sounds  Abdominal:      General: Bowel sounds are normal       Palpations: Abdomen is soft  Musculoskeletal:         General: No tenderness  Cervical back: Normal range of motion  Skin:     General: Skin is warm and dry  Neurological:      General: No focal deficit present  Mental Status: She is alert and oriented to person, place, and time  Psychiatric:         Mood and Affect: Mood normal          Behavior: Behavior normal          Thought Content: Thought content normal          I have personally reviewed pertinent films in PACS and my interpretation is XR-  L shoulder nml study  Assessment/Plan:  Assessment/Plan   Diagnoses and all orders for this visit:    Chronic left shoulder pain  -     XR shoulder 2+ vw left; Future        Return in about 6 weeks (around 1/12/2023) for Recheck       Nany Wright MD

## 2022-12-23 ENCOUNTER — TELEPHONE (OUTPATIENT)
Dept: PSYCHIATRY | Facility: CLINIC | Age: 27
End: 2022-12-23

## 2022-12-23 ENCOUNTER — OFFICE VISIT (OUTPATIENT)
Dept: OBGYN CLINIC | Facility: CLINIC | Age: 27
End: 2022-12-23

## 2022-12-23 ENCOUNTER — APPOINTMENT (OUTPATIENT)
Dept: LAB | Facility: CLINIC | Age: 27
End: 2022-12-23

## 2022-12-23 VITALS
BODY MASS INDEX: 33.63 KG/M2 | SYSTOLIC BLOOD PRESSURE: 100 MMHG | WEIGHT: 197 LBS | HEIGHT: 64 IN | DIASTOLIC BLOOD PRESSURE: 62 MMHG

## 2022-12-23 DIAGNOSIS — N92.6 IRREGULAR MENSES: ICD-10-CM

## 2022-12-23 DIAGNOSIS — N92.6 IRREGULAR MENSES: Primary | ICD-10-CM

## 2022-12-23 DIAGNOSIS — N92.6 ABNORMAL MENSES: ICD-10-CM

## 2022-12-23 DIAGNOSIS — R63.5 WEIGHT GAIN, ABNORMAL: ICD-10-CM

## 2022-12-23 DIAGNOSIS — R82.90 ABNORMAL URINE: ICD-10-CM

## 2022-12-23 DIAGNOSIS — R53.82 CHRONIC FATIGUE: ICD-10-CM

## 2022-12-23 LAB
ALBUMIN SERPL BCP-MCNC: 3.8 G/DL (ref 3.5–5)
ALP SERPL-CCNC: 69 U/L (ref 46–116)
ALT SERPL W P-5'-P-CCNC: 30 U/L (ref 12–78)
ANION GAP SERPL CALCULATED.3IONS-SCNC: 4 MMOL/L (ref 4–13)
AST SERPL W P-5'-P-CCNC: 15 U/L (ref 5–45)
B-HCG SERPL-ACNC: <2 MIU/ML
BASOPHILS # BLD AUTO: 0.07 THOUSANDS/ÂΜL (ref 0–0.1)
BASOPHILS NFR BLD AUTO: 1 % (ref 0–1)
BILIRUB SERPL-MCNC: 0.44 MG/DL (ref 0.2–1)
BUN SERPL-MCNC: 8 MG/DL (ref 5–25)
CALCIUM SERPL-MCNC: 8.7 MG/DL (ref 8.3–10.1)
CHLORIDE SERPL-SCNC: 107 MMOL/L (ref 96–108)
CO2 SERPL-SCNC: 27 MMOL/L (ref 21–32)
CREAT SERPL-MCNC: 0.6 MG/DL (ref 0.6–1.3)
EOSINOPHIL # BLD AUTO: 0.11 THOUSAND/ÂΜL (ref 0–0.61)
EOSINOPHIL NFR BLD AUTO: 1 % (ref 0–6)
ERYTHROCYTE [DISTWIDTH] IN BLOOD BY AUTOMATED COUNT: 12.3 % (ref 11.6–15.1)
EST. AVERAGE GLUCOSE BLD GHB EST-MCNC: 94 MG/DL
GFR SERPL CREATININE-BSD FRML MDRD: 125 ML/MIN/1.73SQ M
GLUCOSE P FAST SERPL-MCNC: 85 MG/DL (ref 65–99)
HBA1C MFR BLD: 4.9 %
HCT VFR BLD AUTO: 38.2 % (ref 34.8–46.1)
HGB BLD-MCNC: 12.6 G/DL (ref 11.5–15.4)
IMM GRANULOCYTES # BLD AUTO: 0.02 THOUSAND/UL (ref 0–0.2)
IMM GRANULOCYTES NFR BLD AUTO: 0 % (ref 0–2)
LYMPHOCYTES # BLD AUTO: 3.39 THOUSANDS/ÂΜL (ref 0.6–4.47)
LYMPHOCYTES NFR BLD AUTO: 38 % (ref 14–44)
MCH RBC QN AUTO: 30.5 PG (ref 26.8–34.3)
MCHC RBC AUTO-ENTMCNC: 33 G/DL (ref 31.4–37.4)
MCV RBC AUTO: 93 FL (ref 82–98)
MONOCYTES # BLD AUTO: 0.54 THOUSAND/ÂΜL (ref 0.17–1.22)
MONOCYTES NFR BLD AUTO: 6 % (ref 4–12)
NEUTROPHILS # BLD AUTO: 4.74 THOUSANDS/ÂΜL (ref 1.85–7.62)
NEUTS SEG NFR BLD AUTO: 54 % (ref 43–75)
NRBC BLD AUTO-RTO: 0 /100 WBCS
PLATELET # BLD AUTO: 304 THOUSANDS/UL (ref 149–390)
PMV BLD AUTO: 10.5 FL (ref 8.9–12.7)
POTASSIUM SERPL-SCNC: 4 MMOL/L (ref 3.5–5.3)
PROT SERPL-MCNC: 6.9 G/DL (ref 6.4–8.4)
RBC # BLD AUTO: 4.13 MILLION/UL (ref 3.81–5.12)
SL AMB POCT URINE HCG: NEGATIVE
SODIUM SERPL-SCNC: 138 MMOL/L (ref 135–147)
TSH SERPL DL<=0.05 MIU/L-ACNC: 1.36 UIU/ML (ref 0.45–4.5)
WBC # BLD AUTO: 8.87 THOUSAND/UL (ref 4.31–10.16)

## 2022-12-23 NOTE — PROGRESS NOTES
[facility] [address], [place]    Assessment/Plan:  No problem-specific Assessment & Plan notes found for this encounter  Diagnoses and all orders for this visit:    Irregular menses  -     POCT urine HCG  -     TSH, 3rd generation with Free T4 reflex; Future    Weight gain, abnormal  -     Comprehensive metabolic panel; Future  -     HEMOGLOBIN A1C W/ EAG ESTIMATION; Future    Chronic fatigue  -     Comprehensive metabolic panel; Future  -     CBC and differential; Future  -     HEMOGLOBIN A1C W/ EAG ESTIMATION; Future    - will follow-up labs  - to call if decides to proceed with ParaGard      Subjective:   Zeynep Montiel is a 32 y o  [id number] female  CC: irregular cycles    HPI: Deyvi Camacho states that she is gaining weight, last period was on 11/25/22 and it only lasted 1 day and was brownish in color  Multiple negative urine tests at home and test was negative today  Notes that on 12/12/22 had Covid vaccine and was having a lot of back pain and discomfort for 3 days  States that she does not due well with birth control and reviewed options with Dr Krishna Harrison, but still undecided  She has had change in the medications she takes for depression and panic disorder  Has not been able to take care of her needs, states son has been ill and she is sole care provider  Notes that she had sex on Monday  Does not want IUD, Paraguard, hormones, and is trying to adjust lifestyle  Denies harm to self or anyone around her  States that she is having irregular eating habits, some days not eating and then other days thinks about food first thing in the morning  She has a very active 2 yr old  She is trying to do gluten free, but is frustrated  ROS: Review of Systems   Constitutional: Positive for fatigue  Negative for chills and fever  Genitourinary: Positive for menstrual problem   Negative for difficulty urinating, flank pain, frequency, pelvic pain, urgency, vaginal bleeding, vaginal discharge and vaginal pain  Psychiatric/Behavioral: The patient is nervous/anxious  PFSH: The following portions of the patient's history were reviewed and updated as appropriate: allergies, current medications, past family history, past medical history, obstetric history, gynecologic history, past social history, past surgical history and problem list        Objective:  /62   Ht 5' 4" (1 626 m)   Wt 89 4 kg (197 lb)   LMP 11/25/2022 (Exact Date)   BMI 33 81 kg/m²    Physical Exam  Constitutional:       Appearance: Normal appearance  Cardiovascular:      Rate and Rhythm: Normal rate  Pulmonary:      Effort: Pulmonary effort is normal    Genitourinary:     General: Normal vulva  Exam position: Lithotomy position  Vagina: No vaginal discharge, erythema, tenderness, bleeding or lesions  Cervix: No friability or lesion  Skin:     General: Skin is warm  Neurological:      Mental Status: She is alert and oriented to person, place, and time     Psychiatric:         Mood and Affect: Mood normal          Behavior: Behavior normal

## 2022-12-23 NOTE — TELEPHONE ENCOUNTER
Pt called in to make an appt as a np  Writer explained that there is a wait list and there is no exact time frame for when the pt would get a call about an appt due to the length of the list  Pt declined to be placed on the list as this time

## 2022-12-27 ENCOUNTER — TELEPHONE (OUTPATIENT)
Dept: OBGYN CLINIC | Facility: MEDICAL CENTER | Age: 27
End: 2022-12-27

## 2022-12-27 NOTE — TELEPHONE ENCOUNTER
Patient called into office in regards to reviewing lab results  Patient would also like to start medication that was mentioned at last visit to induce her period since she still has not had her menstrual cycle  Please review  Thank you!

## 2022-12-28 ENCOUNTER — TELEPHONE (OUTPATIENT)
Dept: OBGYN CLINIC | Facility: MEDICAL CENTER | Age: 27
End: 2022-12-28

## 2022-12-28 DIAGNOSIS — N91.4 SECONDARY OLIGOMENORRHEA: Primary | ICD-10-CM

## 2022-12-28 RX ORDER — MEDROXYPROGESTERONE ACETATE 10 MG/1
10 TABLET ORAL DAILY
Qty: 5 TABLET | Refills: 0 | Status: SHIPPED | OUTPATIENT
Start: 2022-12-28 | End: 2023-02-02

## 2022-12-28 NOTE — TELEPHONE ENCOUNTER
Pt notified of Rx that was filled and to abstain from sex until after menses start, pt also told that onset of menses can take 7-11 days

## 2022-12-28 NOTE — TELEPHONE ENCOUNTER
Please let her know that a script was sent to pharmacy  It is 1 tablet daily for 5 days and it may take 7-11 days for onset of menses  No sex until after menses

## 2023-02-02 ENCOUNTER — OFFICE VISIT (OUTPATIENT)
Dept: OBGYN CLINIC | Facility: CLINIC | Age: 28
End: 2023-02-02

## 2023-02-02 VITALS
SYSTOLIC BLOOD PRESSURE: 100 MMHG | BODY MASS INDEX: 33.02 KG/M2 | DIASTOLIC BLOOD PRESSURE: 62 MMHG | WEIGHT: 193.4 LBS | HEIGHT: 64 IN

## 2023-02-02 DIAGNOSIS — N91.1 SECONDARY AMENORRHEA: Primary | ICD-10-CM

## 2023-02-02 DIAGNOSIS — F33.1 MODERATE EPISODE OF RECURRENT MAJOR DEPRESSIVE DISORDER (HCC): ICD-10-CM

## 2023-02-02 NOTE — PROGRESS NOTES
Assessment:  32 y o  [id number] who presents as a follow up of secondary amenorrhea  Appropriate response to progestin  Plan:  Diagnoses and all orders for this visit:    Secondary amenorrhea  - Discussed expectations with prolonged flow  - Encouraged to monitor cycles for recurrent amenorrhea  If recurrent, consider expanding testing  - Return for yearly    Postpartum depression  Moderate episode of recurrent major depressive disorder ([facility] [address] )  -     Ambulatory Referral to [facility]; Future  - Discussed inc risk of recurrent PPD and management strategies for this   - Discussed benzodiazepines not preferred mgmt strategy if plans to attempt pregnancy  No plans presently        __________________________________________________________________    Subjective   Shruti Nelly Degroot is a 32 y o  [id number] who presents as a follow up after progestin withdraw for secondary amenorrhea  Took provera 1/19-24  Menses started on last day of med  Started brown x2 days, Corky Martinis actively like a menses for 4-5days  Never heavy enough to entirely saturate a tampon  Bleeding persists now as well, very light and largely pink discharge now  Feels like theres an odor to the discharge  Also having some hot/cold flashes  Patient reporting social stressors and hx depression  Reports didn't report how severe her PPD was, but was very bad in the weeks postpartum  Started out as a disappointing birth experience and progressed to self blame  Improved some since then  Following with a psychiatrist, but does not feel like she gets on well with them  Has not found a regular therapist either, but open to referral for same  The following portions of the patient's history were reviewed and updated as appropriate: allergies, current medications, past medical history, past social history, past surgical history and problem list     Review of Systems  Review of Systems   Constitutional: Negative for chills, fatigue and fever  Respiratory: Negative for shortness of breath  Cardiovascular: Negative for chest pain and palpitations  Gastrointestinal: Negative for abdominal distention, abdominal pain, constipation, diarrhea, nausea and vomiting  Endocrine: Positive for cold intolerance and heat intolerance  Genitourinary: Positive for menstrual problem and vaginal bleeding  Negative for dysuria, flank pain, frequency, hematuria, pelvic pain, vaginal discharge and vaginal pain  Skin: Negative for rash and wound  Neurological: Negative for dizziness and light-headedness  Psychiatric/Behavioral: Positive for dysphoric mood  Negative for agitation, self-injury and suicidal ideas  The patient is nervous/anxious  Objective  /62   Ht 5' 4" (1 626 m)   Wt 87 7 kg (193 lb 6 4 oz)   LMP 01/24/2023 (Exact Date)   BMI 33 20 kg/m²      Physical Exam:  Physical Exam  Vitals reviewed  Constitutional:       General: She is not in acute distress  Appearance: Normal appearance  She is not ill-appearing, toxic-appearing or diaphoretic  HENT:      Head: Normocephalic and atraumatic  Eyes:      General: No scleral icterus  Conjunctiva/sclera: Conjunctivae normal    Pulmonary:      Effort: Pulmonary effort is normal  No respiratory distress  Musculoskeletal:         General: No deformity or signs of injury  Skin:     Coloration: Skin is not jaundiced or pale  Findings: No bruising or erythema  Comments: Limited to exposed skin   Neurological:      Mental Status: She is alert  Mental status is at baseline  Psychiatric:         Mood and Affect: Mood normal          Behavior: Behavior normal          Thought Content:  Thought content normal          Judgment: Judgment normal            Reviewed  TSH, CBC, CMP, A1c 12/23/22

## 2023-02-07 ENCOUNTER — TELEMEDICINE (OUTPATIENT)
Dept: BEHAVIORAL/MENTAL HEALTH CLINIC | Facility: CLINIC | Age: 28
End: 2023-02-07

## 2023-02-07 DIAGNOSIS — F33.1 MDD (MAJOR DEPRESSIVE DISORDER), RECURRENT EPISODE, MODERATE (HCC): ICD-10-CM

## 2023-02-07 DIAGNOSIS — F41.9 ANXIETY: Primary | ICD-10-CM

## 2023-02-07 NOTE — PSYCH
Virtual Regular Visit    Verification of patient location:    Patient is located in the following state in which I hold an active license [place]      Assessment/Plan:    Problem List Items Addressed This Visit        Other    Anxiety - Primary   Other Visit Diagnoses     MDD (major depressive disorder), recurrent episode, moderate ([facility] [address] )              Goals addressed in session: Goal 1 : Obtain background information         Reason for visit is No chief complaint on file  Encounter provider Burak Hodges LCSW    Provider located at [address] [facility] [address]   [address] [facility] [id number] [phone]      Recent Visits  No visits were found meeting these conditions  Showing recent visits within past 7 days and meeting all other requirements  Today's Visits  Date Type Provider Dept   02/07/23 Telemedicine Burak Hodges LCSW [facility]   Showing today's visits and meeting all other requirements  Future Appointments  No visits were found meeting these conditions  Showing future appointments within next 150 days and meeting all other requirements       The patient was identified by name and date of birth  Carrie Key was informed that this is a telemedicine visit and [facility] the [facility]  She agrees to proceed     My office door was closed  No one else was in the room  She acknowledged consent and understanding of privacy and security of the video platform  The patient has agreed to participate and understands they can discontinue the visit at any time  Patient is aware this is a billable service  Subjective  Shruti Martinez is a 32 y o  female who presents for initial therapy session  Pt has a long history of therapy and psychiatry    She most recently went through 4 therapists in a very short period of time (she kept getting a new therapist after one left the practice)  Pt decided to seek therapy elsewhere  Pt grew up in [place] with both parents until they  when she was 8yo  Pt then grew up with her mother and brother  Pt does not have a good relationship with either parent  She has an "okay" relationship with her brother  Pt reports a significant hx of MH/D&A on both sides of the family  Pt herself has a long history of MDD  She is currently prescribed Topomax, Xanax and Trazadone  Pt has no friends and her supports are extremely limited  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  She had to take some time off and will be returning to work at the end of the month  Pt enjoys going for walks, doing projects and taking her son to the park  She is in college presently  Pt resides with her partner, Joelle Jaimes who is 8years older than her  They have been together for 4 years  Pt is feeling lonely and has lost her sense of self  She works, cares for her son and takes care of the household with very limited support from Joelle Theodore  They argue often as well  Pt reports weight loss of 15lbs since New Year's  She becomes easily frustrated with Joelle Assay[employer]smildred and has difficulty letting things go  Pt admits she tends to hold grudges  In speaking about her parents she reports they both "walk all over me" and take advantage of her  Pt reports that they are both currently homeless  Pt would like to work on her irritability and reactions towards others  HPI     Past Medical History:   Diagnosis Date   • Celiac disease    • Gastroparesis    • Marijuana use, continuous 7/10/2018    Last Assessment & Plan:  Hudson Meyers reports that she has less ability to use THC due to her step-daughter being in the house  She reports that she has significantly decrease to twice/day  Reviewed risks of routine daily use, she does not feel this is an issue & states "I know my body" & is not willing to quit at this point in time     • Nicotine use disorder 7/10/2018    Last Assessment & Plan:  Karen Salcedo continues to smoke 1 cigarette/day  She reports that "it will not be hard for her to stop" & plans on quitting immediately  • Personality disorder ([facility] [address] )    • Substance abuse ([facility] [address] )     marijuana   • Trauma     PTSD   • Varicella        Past Surgical History:   Procedure Laterality Date   • [place]  DELIVERY ONLY N/A 2021    Procedure:  SECTION (); Surgeon: Antwan Pulido MD;  [facility]: [facility];  Service: Obstetrics   • TONSILLECTOMY         Current Outpatient Medications   Medication Sig Dispense Refill   • albuterol (ProAir HFA) 90 mcg/act inhaler Inhale 2 puffs every 6 (six) hours as needed for wheezing 8 5 g 0   • ALPRAZolam (XANAX) 0 5 mg tablet TAKE 1 TABLET (0 5 MG TOTAL) BY MOUTH 2 (TWO) TIMES A DAY AS NEEDED FOR ANXIETY  • azelastine (ASTELIN) 0 1 % nasal spray 2 sprays into each nostril     • cetirizine (ZyrTEC) 10 mg tablet Take 10 mg by mouth daily as needed     • fluticasone (FLONASE) 50 mcg/act nasal spray      • hydrocortisone 2 5 % cream      • loratadine (CLARITIN) 10 mg tablet Take 1 tablet by mouth every morning     • ondansetron (ZOFRAN) 4 mg tablet      • topiramate (TOPAMAX) 100 mg tablet Take 150 mg by mouth daily at bedtime     • traZODone (DESYREL) 50 mg tablet TAKE 1 TABLET BY MOUTH EVERY DAY AT NIGHT       No current facility-administered medications for this visit  Allergies   Allergen Reactions   • Apple - Food Allergy    • Gluten Meal - Food Allergy Vomiting   • Other Other (See Comments)     Enviromental allergies  apples   • Vaccinium Angustifolium Itching       Review of Systems    Video Exam    There were no vitals filed for this visit  Physical Exam     Assessment/Plan:      Diagnoses and all orders for this visit:    Anxiety    MDD (major depressive disorder), recurrent episode, moderate ([facility] [address] )          Subjective:      Patient ID: Paul Candelaria is a 32 y o  female      HPI: Pre-morbid level of function and History of Present Illness: Recurrent MDD for several years; PPD s/p delivery 2 yrs ago  Previous Psychiatric/psychological treatment/year: Several in the last 4 months (they all moved on to other jobs)  Current Psychiatrist/Therapist: Dr Bala Hyatt (psychiatrist)  Outpatient and/or Partial and Other Community Resources Used (CTT, ICM, VNA): na      Problem Assessment:     SOCIAL/VOCATION:  Family Constellation (include parents, relationship with each and pertinent Psych/Medical History):     Family History   Problem Relation Age of Onset   • Other Mother         mental health issues   • Psoriasis Mother    • Heart disease Mother    • No Known Problems Father    • No Known Problems Brother    • Heart disease Maternal Grandmother    • Thrombophlebitis Maternal Grandmother    • Heart disease Maternal Grandfather    • Heart attack Maternal Grandfather    • Obesity Maternal Grandfather    • Stomach cancer Paternal Grandmother    • Bone cancer Paternal Grandfather    • No Known Problems Son    • Breast cancer Neg Hx    • Colon cancer Neg Hx    • Ovarian cancer Neg Hx        Mother: Undiagnosed  Spouse: NA   Father: Undiagnosed   Children: NA   Sibling: NA   Sibling: NA   Children: NA   Other: Pt reports significant amount of MH/D&A on both sides of the family    Flaca Matias relates best to her partner  she lives with her partner and son  she does not live alone  Domestic Violence: No past history of domestic violence    Additional Comments related to family/relationships/peer support: Very limited support  School or Work History (strengths/limitations/needs): Pt will return to work at the end of the month    Her highest grade level achieved was HS; currently in [employer] history includes NA    Financial status includes Stephen [address]ler[address] 13 (Include past and present hobbies/interests and level of involvement (Ex: Group/Club Affiliations):  Walks, projects at home, taking her son to the park  her primary language is [place]  Preferred language is [place]  Ethnic considerations are na  Religions affiliations and level of involvement none   Does spirituality help you cope? No    FUNCTIONAL STATUS: There has been a recent change in [address] [place] [address] ability to do the following: na    Level of Assistance Needed/By Whom?: Diomedes Eng learns best by  na    SUBSTANCE ABUSE ASSESSMENT: no substance abuse    Substance/Route/Age/Amount/Frequency/Last Use: na    DETOX HISTORY: na    Previous detox/rehab treatment: na    HEALTH ASSESSMENT: PCP not notified     LEGAL: No Mental Health Advance Directive or Power of  on file    Prenatal History: uneventful pregnancy    Delivery History: N/A    Developmental Milestones: N/A  Temperament as an infant was N/A  Temperament as a toddler was N/A  Temperament at school age was N/A  Temperament as a teenager was N/A  Risk Assessment:   The following ratings are based on my observation of this patient over the last sessions    Risk of Harm to Self:   Demographic risk factors include   Historical Risk Factors include na  Recent Specific Risk Factors include diagnosis of depression   Additional Factors for a Child or Adolescent gender: female (more likely to attempt)    Risk of Harm to Others:   Demographic Risk Factors include na  Historical Risk Factors include na  Recent Specific Risk Factors include multiple stressors    Access to Weapons:   [address] [place] [address] has access to the following weapons: none   The following steps have been taken to ensure weapons are properly secured: na    Based on the above information, the client presents the following risk of harm to self or others:  low    The following interventions are recommended:   no intervention changes    Notes regarding this Risk Assessment: na        Review Of Systems:     Mood Anxiety and Depression   Behavior Normal    Thought Content Normal   General Relationship Problems and Emotional Problems   Personality Change in Personality   Other Psych Symptoms Normal   Constitutional Normal   ENT Normal   Cardiovascular Normal    Respiratory Normal    Gastrointestinal Normal   Genitourinary Normal    Musculoskeletal Negative   Integumentary Normal    Neurological Normal    Endocrine Normal          Mental status:  Appearance calm and cooperative    Mood mood appropriate   Affect affect appropriate    Speech a normal rate   Thought Processes normal thought processes   Hallucinations no hallucinations present    Thought Content no delusions   Abnormal Thoughts no suicidal thoughts  and no homicidal thoughts    Orientation  oriented to person and place and time   Remote Memory short term memory intact and long term memory intact   Attention Span concentration intact   Intellect Appears to be of Average Intelligence   Fund of Knowledge displays adequate knowledge of current events   Insight Insight intact   Judgement judgment was intact   Muscle Strength Muscle strength and tone were normal   Language na   Pain none   Pain Scale 0     Visit start and stop times:    02/07/23  Start Time: 1400  Stop Time: 1447  Total Visit Time: 47 minutes

## 2023-02-10 ENCOUNTER — TELEPHONE (OUTPATIENT)
Dept: PSYCHIATRY | Facility: CLINIC | Age: 28
End: 2023-02-10

## 2023-02-10 NOTE — TELEPHONE ENCOUNTER
Reached out to pt in regards to referral, pt is interested in med mgmt in [facility] pass   Sending information over and adding pt to the proper waitlist

## 2023-02-13 ENCOUNTER — TELEPHONE (OUTPATIENT)
Dept: OBGYN CLINIC | Facility: CLINIC | Age: 28
End: 2023-02-13

## 2023-02-13 NOTE — TELEPHONE ENCOUNTER
----- Message from Lillie Lew sent at 2/11/2023  1:27 PM EST -----  Regarding: Questions about PostPartum   Contact: [phone]  So  I have noticed  Ever since I have had my son on 2/18/2021  Then about 11 months PP I had covid  I know both of those cause hair loss  But now Im a year past covid and 2 years PP but my hair still falls out alot and its SO THIN compared to 3 years ago when I got pregnant  Now I know stress has alot to do with it and Im sure my diet  But would it really cause it to be as drastically thin as it is now  Like I dont have bald spots anything  Which is GREAT im so grateful for that but Raffaele marquez sad that my hair is just thin and frizzy now  I dont know what to do  Bianca tried hair treatments new shampoos washing my hair less and I went to my pcp  But he didnt wabt to send me to a dermotologist  Im only 27

## 2023-02-16 NOTE — TELEPHONE ENCOUNTER
Behavioral Health Outpatient Intake Questions    Referred By : matthew    Please advise interviewee that they need to answer all questions truthfully to allow for best care, and any misrepresentations of information may affect their ability to be seen at this clinic   => Was this discussed? Yes     If Minor Child (under age 25)    Who is/are the legal guardian(s) of the child? Is there a custody agreement? No     • If "YES"- Custody orders must be obtained prior to scheduling the first appointment  • In addition, Consent to Treatment must be signed by all legal guardians prior to scheduling the first appointment    • If "NO"- Consent to Treatment must be signed by all legal guardians prior to scheduling the first appointment    [address] History -     Presenting Problem (in patient's own words): anxiety, depression,    Are there any communication barriers for this patient? Yes                                               If yes, please describe barriers: ADHD  • If there is a unique situation, please refer to [facility] for final determination  Are you taking any psychiatric medications? Yes   •   If "YES" -What are they Trazodone, Topamax, Xanax   •   If "YES" -Who prescribes? [facility] dr Dylon Herrera     Has the Patient previously received outpatient Talk Therapy or Medication Management from [facility]  No     •    If "YES"- When, Where and with Whom? •    If "NO" -Has Patient received these services elsewhere? •   If "YES" -When, Where, and with Whom? o [facility]: talk therapy and Medication (Sept2022-current)    Has the Patient abused alcohol or other substances in the last 6 months ? No  No concerns of substance abuse are reported  •  If "YES" -What substance, How much, How often? Medical marijuana card used for sleep       •  If illegal substance: Refer to [facility] (for ENRIQUE) or [facility]    •  If Alcohol in excess of 10 drinks per week:  [facility]    Legal History-     Is this treatment court ordered? No   • If "Yes"- refer to [address] [place] [address] for final determination  Has the Patient been convicted of a felony? No  •  If "Yes" -When, What? • Talk Therapy : Send to [address] [place] [address] for final determination   • Med Management: Send to Dr Jai Lucia for final determination     ACCEPTED as a patient Yes  • If "Yes" Appointment Date: Clementina Lundberg 3/20 @      Referred Elsewhere? No  • If “Yes” - (Where? Ex: [facility], [facility], [address] [place] [address], etc )       Name of Insurance Co: [address] [place] [address]#[id number]  Insurance Phone #  If ins is primary or secondary? If patient is a minor, parents information such as Name, D  O B of guarantor

## 2023-07-16 ENCOUNTER — OFFICE VISIT (OUTPATIENT)
Dept: URGENT CARE | Facility: CLINIC | Age: 28
End: 2023-07-16
Payer: COMMERCIAL

## 2023-07-16 VITALS
OXYGEN SATURATION: 99 % | SYSTOLIC BLOOD PRESSURE: 102 MMHG | HEART RATE: 71 BPM | TEMPERATURE: 98.3 F | DIASTOLIC BLOOD PRESSURE: 56 MMHG | RESPIRATION RATE: 18 BRPM

## 2023-07-16 DIAGNOSIS — J01.90 ACUTE SINUSITIS, RECURRENCE NOT SPECIFIED, UNSPECIFIED LOCATION: Primary | ICD-10-CM

## 2023-07-16 DIAGNOSIS — O21.9 NAUSEA AND VOMITING IN PREGNANCY: ICD-10-CM

## 2023-07-16 LAB
SARS-COV-2 AG UPPER RESP QL IA: NEGATIVE
VALID CONTROL: NORMAL

## 2023-07-16 PROCEDURE — 99213 OFFICE O/P EST LOW 20 MIN: CPT | Performed by: PHYSICIAN ASSISTANT

## 2023-07-16 PROCEDURE — 87811 SARS-COV-2 COVID19 W/OPTIC: CPT | Performed by: PHYSICIAN ASSISTANT

## 2023-07-16 NOTE — PROGRESS NOTES
[facility]        NAME: Yfn Bull is a 29 y.o. female  : 1995    MRN: [id number]  DATE: 2023  TIME: 7:16 PM    Assessment and Plan   Acute sinusitis, recurrence not specified, unspecified location [J01.90]  1. Acute sinusitis, recurrence not specified, unspecified location  Poct Covid 19 Rapid Antigen Test      2. Nausea and vomiting in pregnancy              Patient Instructions       Follow up with OBGYN this week. Increase PO intake. Continue to take Keflex and previously prescribed. Proceed to  ER if symptoms worsen. Chief Complaint     Chief Complaint   Patient presents with   • Cold Like Symptoms     Cold/flu like symptoms   • Rash     On face since Monday    • Vomiting     Vomiting the past week , fatigued, lightheaded and dizzy    • Earache         History of Present Illness       Patient is a 29 y/o/f presenting to [facility] with vomiting, ear pain, rash and cold/flu like symptoms. Patient symptoms began 1 week ago. Patient feels lightheaded/dizzy at times. Pt is currently approximately 9 weeks pregnant. Pt is currently taking Keflex for a UTI. Patient denies  vaginal bleeding. Pt was just evaluated in ED for hyperemesis a few days ago. Sinusitis  This is a new problem. The current episode started in the past 7 days. The problem has been gradually worsening since onset. There has been no fever. Associated symptoms include congestion, coughing and sinus pressure. Pertinent negatives include no sore throat. Past treatments include acetaminophen and nasal decongestants. Review of Systems   Review of Systems   Constitutional: Positive for fatigue. HENT: Positive for congestion, postnasal drip, rhinorrhea and sinus pressure. Negative for sore throat. Eyes: Negative for pain and visual disturbance. Respiratory: Positive for cough. Cardiovascular: Negative for palpitations. Gastrointestinal: Positive for nausea and vomiting.    Genitourinary: Negative for dysuria and hematuria. Musculoskeletal: Negative for back pain. Skin: Negative for color change. Neurological: Positive for light-headedness. Negative for seizures and syncope. All other systems reviewed and are negative.         Current Medications       Current Outpatient Medications:   •  ondansetron (ZOFRAN) 4 mg tablet, , Disp: , Rfl:   •  albuterol (ProAir HFA) 90 mcg/act inhaler, Inhale 2 puffs every 6 (six) hours as needed for wheezing (Patient not taking: Reported on 7/16/2023), Disp: 8.5 g, Rfl: 0  •  ALPRAZolam (XANAX) 0.5 mg tablet, TAKE 1 TABLET (0.5 MG TOTAL) BY MOUTH 2 (TWO) TIMES A DAY AS NEEDED FOR ANXIETY. (Patient not taking: Reported on 7/16/2023), Disp: , Rfl:   •  azelastine (ASTELIN) 0.1 % nasal spray, 2 sprays into each nostril, Disp: , Rfl:   •  cetirizine (ZyrTEC) 10 mg tablet, Take 10 mg by mouth daily as needed (Patient not taking: Reported on 7/16/2023), Disp: , Rfl:   •  fluticasone (FLONASE) 50 mcg/act nasal spray, , Disp: , Rfl:   •  hydrocortisone 2.5 % cream, , Disp: , Rfl:   •  loratadine (CLARITIN) 10 mg tablet, Take 1 tablet by mouth every morning, Disp: , Rfl:   •  topiramate (TOPAMAX) 100 mg tablet, Take 150 mg by mouth daily at bedtime (Patient not taking: Reported on 7/16/2023), Disp: , Rfl:   •  traZODone (DESYREL) 50 mg tablet, TAKE 1 TABLET BY MOUTH EVERY DAY AT NIGHT (Patient not taking: Reported on 7/16/2023), Disp: , Rfl:     Current Allergies     Allergies as of 07/16/2023 - Reviewed 07/16/2023   Allergen Reaction Noted   • Apple - food allergy  01/11/2021   • Gluten meal - food allergy Vomiting 01/18/2021   • Other Other (See Comments) 10/24/2014   • Vaccinium angustifolium Itching 08/17/2022            The following portions of the patient's history were reviewed and updated as appropriate: allergies, current medications, past family history, past medical history, past social history, past surgical history and problem list.     Past Medical History:   Diagnosis Date   • Celiac disease    • Gastroparesis    • Marijuana use, continuous 7/10/2018    Last Assessment & Plan:  Zara Rodriguez reports that she has less ability to use THC due to her step-daughter being in the house. She reports that she has significantly decrease to twice/day. Reviewed risks of routine daily use, she does not feel this is an issue & states "I know my body" & is not willing to quit at this point in time. • Nicotine use disorder 7/10/2018    Last Assessment & Plan:  Kathe Barrera continues to smoke 1 cigarette/day. She reports that "it will not be hard for her to stop" & plans on quitting immediately. • Personality disorder ([address])    • Substance abuse ([address])     marijuana   • Trauma     PTSD   • Varicella        Past Surgical History:   Procedure Laterality Date   • [place]  DELIVERY ONLY N/A 2021    Procedure:  SECTION (); Surgeon: Anthony Aldana MD;  Location: [facility];  Service: Obstetrics   • TONSILLECTOMY         Family History   Problem Relation Age of Onset   • Other Mother         mental health issues   • Psoriasis Mother    • Heart disease Mother    • No Known Problems Father    • No Known Problems Brother    • Heart disease Maternal Grandmother    • Thrombophlebitis Maternal Grandmother    • Heart disease Maternal Grandfather    • Heart attack Maternal Grandfather    • Obesity Maternal Grandfather    • Stomach cancer Paternal Grandmother    • Bone cancer Paternal Grandfather    • No Known Problems Son    • Breast cancer Neg Hx    • Colon cancer Neg Hx    • Ovarian cancer Neg Hx          Medications have been verified. Objective   /56   Pulse 71   Temp 98.3 °F (36.8 °C)   Resp 18   LMP 2023 (Exact Date)   SpO2 99%   Patient's last menstrual period was 2023 (exact date). Physical Exam     Physical Exam  Constitutional:       Appearance: Normal appearance. HENT:      Head: Normocephalic and atraumatic. Nose: Congestion present. Mouth/Throat:      Mouth: Mucous membranes are moist.   Eyes:      Extraocular Movements: Extraocular movements intact. Conjunctiva/sclera: Conjunctivae normal.      Pupils: Pupils are equal, round, and reactive to light. Cardiovascular:      Rate and Rhythm: Normal rate and regular rhythm. Heart sounds: No murmur heard. No friction rub. No gallop. Pulmonary:      Effort: Pulmonary effort is normal.      Breath sounds: No wheezing, rhonchi or rales. Musculoskeletal:         General: Normal range of motion. Cervical back: Normal range of motion and neck supple. Skin:     General: Skin is warm and dry. Capillary Refill: Capillary refill takes less than 2 seconds. Neurological:      General: No focal deficit present. Mental Status: She is alert and oriented to person, place, and time.    Psychiatric:         Mood and Affect: Mood normal.         Behavior: Behavior normal.

## 2023-07-16 NOTE — LETTER
July 16, 2023     Patient: Eris Alcantar   YOB: 1995   Date of Visit: 7/16/2023       To Whom It May Concern: It is my medical opinion that Tayo Saleh may return to work on 07/19/2023. If you have any questions or concerns, please don't hesitate to call.          Sincerely,        Cindi Amezquita PA-C    CC: No Recipients

## 2023-07-26 ENCOUNTER — OFFICE VISIT (OUTPATIENT)
Dept: URGENT CARE | Facility: CLINIC | Age: 28
End: 2023-07-26
Payer: COMMERCIAL

## 2023-07-26 VITALS
OXYGEN SATURATION: 99 % | DIASTOLIC BLOOD PRESSURE: 60 MMHG | SYSTOLIC BLOOD PRESSURE: 126 MMHG | RESPIRATION RATE: 18 BRPM | TEMPERATURE: 98.1 F | HEART RATE: 101 BPM

## 2023-07-26 DIAGNOSIS — R11.0 NAUSEA: ICD-10-CM

## 2023-07-26 DIAGNOSIS — R42 DIZZINESS AND GIDDINESS: Primary | ICD-10-CM

## 2023-07-26 DIAGNOSIS — Z3A.09 9 WEEKS GESTATION OF PREGNANCY: ICD-10-CM

## 2023-07-26 PROCEDURE — 99213 OFFICE O/P EST LOW 20 MIN: CPT | Performed by: PHYSICIAN ASSISTANT

## 2023-07-26 NOTE — PROGRESS NOTES
[facility]    NAME: Eris Alcantar is a 29 y.o. female  : 1995    MRN: [id number]  DATE: 2023  TIME: 4:46 PM     Assessment and Plan   Dizziness and giddiness [R42]  1. Dizziness and giddiness        2. Nausea        3. 9 weeks gestation of pregnancy            Patient Instructions     Patient Instructions   Recommend evaluation in the emergency room. Chief Complaint     Chief Complaint   Patient presents with   • Headache     9 weeks pregnant dizzy        History of Present Illness   Shruti Michelle is a 29 y.o. female [id number] who is approximately 9 weeks pregnant is presenting to the clinic with c/o ongoing intermittent dizziness and vision changes x 2 weeks. Pt was seen in ED 2 weeks ago with hyperemesis gravidarum and found to be dehydrated with elevated white count and infection in urine. Treated and discharged home however she reports sxs have not improved. She reports daily nausea and multiple episodes of vomiting per day. She states she will get up from sitting and become faint and her vision goes white and she sweat profusely. She mentions a R sided low abdominal / groin pain that has also been ongoing since visit to ED. Pain is low, R sided and occasionally radiates around to the back. Unsure of fevers. She has been tolerating some fluids. Review of Systems   Review of Systems   Constitutional: Positive for appetite change, diaphoresis and fatigue. Gastrointestinal: Positive for abdominal pain, nausea and vomiting. Genitourinary: Negative for difficulty urinating, dysuria and hematuria. Skin: Negative for rash and wound. Neurological: Positive for dizziness and light-headedness. Negative for syncope.        Current Medications     Current Outpatient Medications:   •  albuterol (ProAir HFA) 90 mcg/act inhaler, Inhale 2 puffs every 6 (six) hours as needed for wheezing (Patient not taking: Reported on 2023), Disp: 8.5 g, Rfl: 0  •  ALPRAZolam (XANAX) 0.5 mg tablet, TAKE 1 TABLET (0.5 MG TOTAL) BY MOUTH 2 (TWO) TIMES A DAY AS NEEDED FOR ANXIETY. (Patient not taking: Reported on 7/16/2023), Disp: , Rfl:   •  azelastine (ASTELIN) 0.1 % nasal spray, 2 sprays into each nostril, Disp: , Rfl:   •  cetirizine (ZyrTEC) 10 mg tablet, Take 10 mg by mouth daily as needed (Patient not taking: Reported on 7/16/2023), Disp: , Rfl:   •  fluticasone (FLONASE) 50 mcg/act nasal spray, , Disp: , Rfl:   •  hydrocortisone 2.5 % cream, , Disp: , Rfl:   •  loratadine (CLARITIN) 10 mg tablet, Take 1 tablet by mouth every morning, Disp: , Rfl:   •  ondansetron (ZOFRAN) 4 mg tablet, , Disp: , Rfl:   •  topiramate (TOPAMAX) 100 mg tablet, Take 150 mg by mouth daily at bedtime (Patient not taking: Reported on 7/16/2023), Disp: , Rfl:   •  traZODone (DESYREL) 50 mg tablet, TAKE 1 TABLET BY MOUTH EVERY DAY AT NIGHT (Patient not taking: Reported on 7/16/2023), Disp: , Rfl:     Current Allergies     Allergies as of 07/26/2023 - Reviewed 07/16/2023   Allergen Reaction Noted   • Apple - food allergy  01/11/2021   • Gluten meal - food allergy Vomiting 01/18/2021   • Other Other (See Comments) 10/24/2014   • Vaccinium angustifolium Itching 08/17/2022          The following portions of the patient's history were reviewed and updated as appropriate: allergies, current medications, past family history, past medical history, past social history, past surgical history and problem list.   Past Medical History:   Diagnosis Date   • Celiac disease    • Gastroparesis    • Marijuana use, continuous 7/10/2018    Last Assessment & Plan:  Aileen Toledo reports that she has less ability to use THC due to her step-daughter being in the house. She reports that she has significantly decrease to twice/day. Reviewed risks of routine daily use, she does not feel this is an issue & states "I know my body" & is not willing to quit at this point in time.    • Nicotine use disorder 7/10/2018    Last Assessment & Plan: Pedro Barahona continues to smoke 1 cigarette/day. She reports that "it will not be hard for her to stop" & plans on quitting immediately. • Personality disorder ([address])    • Substance abuse ([address])     marijuana   • Trauma     PTSD   • Varicella      Past Surgical History:   Procedure Laterality Date   • [place]  DELIVERY ONLY N/A 2021    Procedure:  SECTION (); Surgeon:  Martha Altamirano MD;  Location: [facility];  Service: Obstetrics   • TONSILLECTOMY       Family History   Problem Relation Age of Onset   • Other Mother         mental health issues   • Psoriasis Mother    • Heart disease Mother    • No Known Problems Father    • No Known Problems Brother    • Heart disease Maternal Grandmother    • Thrombophlebitis Maternal Grandmother    • Heart disease Maternal Grandfather    • Heart attack Maternal Grandfather    • Obesity Maternal Grandfather    • Stomach cancer Paternal Grandmother    • Bone cancer Paternal Grandfather    • No Known Problems Son    • Breast cancer Neg Hx    • Colon cancer Neg Hx    • Ovarian cancer Neg Hx      Social History     Socioeconomic History   • Marital status: Single     Spouse name: Not on file   • Number of children: Not on file   • Years of education: Not on file   • Highest education level: Not on file   Occupational History   • Not on file   Tobacco Use   • Smoking status: Former     Packs/day: 0.25     Years: 8.00     Total pack years: 2.00     Types: Cigarettes     Quit date: 2020     Years since quitting: 3.1   • Smokeless tobacco: Never   • Tobacco comments:     quit with knowledge of pregnancy   Vaping Use   • Vaping Use: Never used   Substance and Sexual Activity   • Alcohol use: Not Currently     Comment: rare   • Drug use: Yes     Types: Marijuana     Comment: medical marijuana   • Sexual activity: Not Currently     Partners: Male     Birth control/protection: None   Other Topics Concern   • Not on file   Social History Narrative   • Not on file Social Determinants of Health     Financial Resource Strain: Not on file   Food Insecurity: Not on file   Transportation Needs: Not on file   Physical Activity: Not on file   Stress: Not on file   Social Connections: Not on file   Intimate Partner Violence: Not on file   Housing Stability: Not on file     Medications have been verified. Objective   /60 Comment: standing  Pulse 101   Temp 98.1 °F (36.7 °C)   Resp 18   LMP 01/24/2023 (Exact Date)   SpO2 99%      Physical Exam   Physical Exam  Vitals and nursing note reviewed. Constitutional:       General: She is not in acute distress. Appearance: Normal appearance. She is normal weight. She is not ill-appearing or toxic-appearing. HENT:      Head: Normocephalic. Nose: Nose normal.      Mouth/Throat:      Mouth: Mucous membranes are moist.      Pharynx: Oropharynx is clear. Eyes:      Conjunctiva/sclera: Conjunctivae normal.   Cardiovascular:      Rate and Rhythm: Normal rate and regular rhythm. Pulmonary:      Effort: Pulmonary effort is normal. No respiratory distress. Breath sounds: Normal breath sounds. No stridor. No wheezing, rhonchi or rales. Abdominal:      General: Abdomen is flat. Tenderness: There is abdominal tenderness in the right lower quadrant. There is no guarding or rebound. Musculoskeletal:         General: Normal range of motion. Cervical back: Normal range of motion and neck supple. Skin:     General: Skin is warm and dry. Neurological:      General: No focal deficit present. Mental Status: She is alert.

## 2023-10-03 ENCOUNTER — OFFICE VISIT (OUTPATIENT)
Dept: URGENT CARE | Facility: CLINIC | Age: 28
End: 2023-10-03
Payer: COMMERCIAL

## 2023-10-03 VITALS
OXYGEN SATURATION: 98 % | HEART RATE: 77 BPM | RESPIRATION RATE: 18 BRPM | HEIGHT: 64 IN | WEIGHT: 203 LBS | TEMPERATURE: 98.1 F | SYSTOLIC BLOOD PRESSURE: 106 MMHG | BODY MASS INDEX: 34.66 KG/M2 | DIASTOLIC BLOOD PRESSURE: 58 MMHG

## 2023-10-03 DIAGNOSIS — J01.90 ACUTE SINUSITIS, RECURRENCE NOT SPECIFIED, UNSPECIFIED LOCATION: Primary | ICD-10-CM

## 2023-10-03 DIAGNOSIS — R51.9 ACUTE NONINTRACTABLE HEADACHE, UNSPECIFIED HEADACHE TYPE: ICD-10-CM

## 2023-10-03 LAB
SL AMB  POCT GLUCOSE, UA: ABNORMAL
SL AMB LEUKOCYTE ESTERASE,UA: ABNORMAL
SL AMB POCT BILIRUBIN,UA: ABNORMAL
SL AMB POCT BLOOD,UA: ABNORMAL
SL AMB POCT CLARITY,UA: ABNORMAL
SL AMB POCT COLOR,UA: YELLOW
SL AMB POCT KETONES,UA: ABNORMAL
SL AMB POCT NITRITE,UA: ABNORMAL
SL AMB POCT PH,UA: 5
SL AMB POCT SPECIFIC GRAVITY,UA: 1.02
SL AMB POCT URINE PROTEIN: ABNORMAL
SL AMB POCT UROBILINOGEN: 0.2

## 2023-10-03 PROCEDURE — 99213 OFFICE O/P EST LOW 20 MIN: CPT | Performed by: PHYSICIAN ASSISTANT

## 2023-10-03 PROCEDURE — 81002 URINALYSIS NONAUTO W/O SCOPE: CPT | Performed by: PHYSICIAN ASSISTANT

## 2023-10-03 PROCEDURE — 87086 URINE CULTURE/COLONY COUNT: CPT | Performed by: PHYSICIAN ASSISTANT

## 2023-10-03 RX ORDER — DIPHENHYDRAMINE HYDROCHLORIDE 25 MG/1
CAPSULE ORAL
COMMUNITY
Start: 2023-09-22

## 2023-10-03 RX ORDER — PNV NO.95/FERROUS FUM/FOLIC AC 28MG-0.8MG
1 TABLET ORAL DAILY
COMMUNITY
Start: 2023-07-07

## 2023-10-03 RX ORDER — AMOXICILLIN 875 MG/1
875 TABLET, COATED ORAL 2 TIMES DAILY
Qty: 20 TABLET | Refills: 0 | Status: SHIPPED | OUTPATIENT
Start: 2023-10-03 | End: 2023-10-13

## 2023-10-03 RX ORDER — DIPHENHYDRAMINE HCL 25 MG
CAPSULE ORAL
COMMUNITY
Start: 2023-08-18

## 2023-10-03 NOTE — PROGRESS NOTES
[facility]    NAME: Afshan Bar is a 29 y.o. female  : 1995    MRN: [id number]  DATE: October 3, 2023  TIME: 6:28 PM    Assessment and Plan   Acute sinusitis, recurrence not specified, unspecified location [J01.90]  1. Acute sinusitis, recurrence not specified, unspecified location        2. Acute nonintractable headache, unspecified headache type  amoxicillin (AMOXIL) 875 mg tablet    Urine culture    POCT urine dip          Patient Instructions   Patient Instructions   Antibiotic as directed. Tylenol as needed. Continue magnesium. Follow up with OB      Chief Complaint     Chief Complaint   Patient presents with   • Migraine     Started Friday taking Tylenol which helps a little. 23 weeks pregnant        History of Present Illness   29year old female here with complaint of migraine headache for the past 5 days. Getting worse. Discussed with OB and told to take magnesium and tylenol. States it is not helping. Has pressure. Facial pressure with bending forward. Has nasal congestion. Thick green nasal mucous in the am.  No fever, chills. Currently 19 weeks pregnant. No RUQ pain. No visual changes. Review of Systems   Review of Systems   Constitutional: Positive for fatigue. Negative for activity change, appetite change, chills and fever. HENT: Positive for congestion and sinus pressure. Negative for ear discharge, ear pain, facial swelling, postnasal drip, sneezing and sore throat. Respiratory: Negative for cough, shortness of breath and wheezing. Cardiovascular: Negative for chest pain. Gastrointestinal: Positive for nausea. Negative for abdominal pain, constipation, diarrhea and vomiting. Genitourinary: Negative for difficulty urinating, dysuria, flank pain, frequency, hematuria and urgency. Musculoskeletal: Negative for back pain and myalgias. Neurological: Positive for headaches. All other systems reviewed and are negative.       Current Medications     Current Outpatient Medications:   •  amoxicillin (AMOXIL) 875 mg tablet, Take 1 tablet (875 mg total) by mouth 2 (two) times a day for 10 days, Disp: 20 tablet, Rfl: 0  •  cetirizine (ZyrTEC) 10 mg tablet, Take 10 mg by mouth daily as needed, Disp: , Rfl:   •  hydrocortisone 2.5 % cream, , Disp: , Rfl:   •  ondansetron (ZOFRAN) 4 mg tablet, , Disp: , Rfl:   •  Prenatal Vit-Fe Fumarate-FA (Prenatal Vitamin) 27-0.8 MG TABS, Take 1 tablet by mouth daily, Disp: , Rfl:   •  albuterol (ProAir HFA) 90 mcg/act inhaler, Inhale 2 puffs every 6 (six) hours as needed for wheezing (Patient not taking: Reported on 7/16/2023), Disp: 8.5 g, Rfl: 0  •  ALPRAZolam (XANAX) 0.5 mg tablet, TAKE 1 TABLET (0.5 MG TOTAL) BY MOUTH 2 (TWO) TIMES A DAY AS NEEDED FOR ANXIETY. (Patient not taking: Reported on 7/16/2023), Disp: , Rfl:   •  azelastine (ASTELIN) 0.1 % nasal spray, 2 sprays into each nostril, Disp: , Rfl:   •  diphenhydrAMINE (BENADRYL) 25 mg capsule, , Disp: , Rfl:   •  fluticasone (FLONASE) 50 mcg/act nasal spray, , Disp: , Rfl:   •  loratadine (CLARITIN) 10 mg tablet, Take 1 tablet by mouth every morning, Disp: , Rfl:   •  Pyridoxine HCl (vitamin B-6) 25 MG tablet, , Disp: , Rfl:   •  SALINE MIST 0.65 % nasal spray, instill 1 spray into each nostril if needed for congestion, Disp: , Rfl:   •  topiramate (TOPAMAX) 100 mg tablet, Take 150 mg by mouth daily at bedtime (Patient not taking: Reported on 7/16/2023), Disp: , Rfl:   •  traZODone (DESYREL) 50 mg tablet, TAKE 1 TABLET BY MOUTH EVERY DAY AT NIGHT (Patient not taking: Reported on 7/16/2023), Disp: , Rfl:     Current Allergies     Allergies as of 10/03/2023 - Reviewed 10/03/2023   Allergen Reaction Noted   • Apple - food allergy  01/11/2021   • Gluten meal - food allergy Vomiting 01/18/2021   • Other Other (See Comments) 10/24/2014   • Vaccinium angustifolium Itching 08/17/2022          The following portions of the patient's history were reviewed and updated as appropriate: allergies, current medications, past family history, past medical history, past social history, past surgical history and problem list.   Past Medical History:   Diagnosis Date   • Celiac disease    • Gastroparesis    • Marijuana use, continuous 7/10/2018    Last Assessment & Plan:  Sarahi New reports that she has less ability to use THC due to her step-daughter being in the house. She reports that she has significantly decrease to twice/day. Reviewed risks of routine daily use, she does not feel this is an issue & states "I know my body" & is not willing to quit at this point in time. • Nicotine use disorder 7/10/2018    Last Assessment & Plan:  Alberta Singh continues to smoke 1 cigarette/day. She reports that "it will not be hard for her to stop" & plans on quitting immediately. • Personality disorder ([address])    • Substance abuse ([address])     marijuana   • Trauma     PTSD   • Varicella      Past Surgical History:   Procedure Laterality Date   • [place]  DELIVERY ONLY N/A 2021    Procedure:  SECTION (); Surgeon:  Jim Burton MD;  Location: [facility];  Service: Obstetrics   • TONSILLECTOMY       Family History   Problem Relation Age of Onset   • Other Mother         mental health issues   • Psoriasis Mother    • Heart disease Mother    • No Known Problems Father    • No Known Problems Brother    • Heart disease Maternal Grandmother    • Thrombophlebitis Maternal Grandmother    • Heart disease Maternal Grandfather    • Heart attack Maternal Grandfather    • Obesity Maternal Grandfather    • Stomach cancer Paternal Grandmother    • Bone cancer Paternal Grandfather    • No Known Problems Son    • Breast cancer Neg Hx    • Colon cancer Neg Hx    • Ovarian cancer Neg Hx      Social History     Socioeconomic History   • Marital status: Single     Spouse name: Not on file   • Number of children: Not on file   • Years of education: Not on file   • Highest education level: Not on file   Occupational History   • Not on file   Tobacco Use   • Smoking status: Former     Packs/day: 0.25     Years: 8.00     Total pack years: 2.00     Types: Cigarettes     Quit date: 6/1/2020     Years since quitting: 3.3   • Smokeless tobacco: Never   • Tobacco comments:     quit with knowledge of pregnancy   Vaping Use   • Vaping Use: Never used   Substance and Sexual Activity   • Alcohol use: Not Currently     Comment: rare   • Drug use: Yes     Types: Marijuana     Comment: medical marijuana   • Sexual activity: Not Currently     Partners: Male     Birth control/protection: None   Other Topics Concern   • Not on file   Social History Narrative   • Not on file     Social Determinants of Health     Financial Resource Strain: Not on file   Food Insecurity: Not on file   Transportation Needs: Not on file   Physical Activity: Not on file   Stress: Not on file   Social Connections: Not on file   Intimate Partner Violence: Not on file   Housing Stability: Not on file     Medications have been verified. Objective   /58   Pulse 77   Temp 98.1 °F (36.7 °C)   Resp 18   Ht 5' 4" (1.626 m)   Wt 92.1 kg (203 lb)   LMP 01/24/2023 (Exact Date)   SpO2 98%   BMI 34.84 kg/m²      Physical Exam   Physical Exam  Vitals and nursing note reviewed. Constitutional:       General: She is not in acute distress. Appearance: She is well-developed. HENT:      Head: Normocephalic and atraumatic. Right Ear: Tympanic membrane normal.      Left Ear: Tympanic membrane normal.      Nose: Congestion present. No mucosal edema. Right Sinus: No maxillary sinus tenderness or frontal sinus tenderness. Left Sinus: No maxillary sinus tenderness or frontal sinus tenderness. Mouth/Throat:      Pharynx: No oropharyngeal exudate or posterior oropharyngeal erythema. Eyes:      Conjunctiva/sclera: Conjunctivae normal.   Cardiovascular:      Rate and Rhythm: Normal rate and regular rhythm.       Heart sounds: Normal heart sounds. No murmur heard. Pulmonary:      Effort: Pulmonary effort is normal.      Breath sounds: Normal breath sounds. Abdominal:      Tenderness: There is no abdominal tenderness.       Comments: Gravid uterus

## 2023-10-03 NOTE — LETTER
October 3, 2023     Patient: Anastacio Mireles   YOB: 1995   Date of Visit: 10/3/2023       To Whom It May Concern: It is my medical opinion that Kimberly Hayward may return to work on 10/5/23. If you have any questions or concerns, please don't hesitate to call.          Sincerely,        Ayesha Runner, PA-C    CC: No Recipients

## 2023-10-05 LAB — BACTERIA UR CULT: NORMAL

## 2023-11-07 ENCOUNTER — TELEPHONE (OUTPATIENT)
Dept: OBGYN CLINIC | Facility: MEDICAL CENTER | Age: 28
End: 2023-11-07

## 2023-11-07 NOTE — TELEPHONE ENCOUNTER
Pt called office is currently 24 weeks is receiving prenatal care at [facility] wants transfer back to [facility] she wants her daughter  to be born in the same hospital she was born.  Please review

## 2023-11-14 ENCOUNTER — INITIAL PRENATAL (OUTPATIENT)
Dept: OBGYN CLINIC | Facility: CLINIC | Age: 28
End: 2023-11-14
Payer: COMMERCIAL

## 2023-11-14 VITALS
WEIGHT: 216 LBS | SYSTOLIC BLOOD PRESSURE: 116 MMHG | BODY MASS INDEX: 35.99 KG/M2 | DIASTOLIC BLOOD PRESSURE: 68 MMHG | HEIGHT: 65 IN

## 2023-11-14 DIAGNOSIS — Z34.92 SECOND TRIMESTER PREGNANCY: Primary | ICD-10-CM

## 2023-11-14 DIAGNOSIS — Z98.891 S/P PRIMARY LOW TRANSVERSE C-SECTION: ICD-10-CM

## 2023-11-14 DIAGNOSIS — O26.893 PREGNANCY HEADACHE IN THIRD TRIMESTER: Primary | ICD-10-CM

## 2023-11-14 DIAGNOSIS — R51.9 PREGNANCY HEADACHE IN THIRD TRIMESTER: Primary | ICD-10-CM

## 2023-11-14 PROCEDURE — T1001 NURSING ASSESSMENT/EVALUATN: HCPCS

## 2023-11-14 PROCEDURE — 99214 OFFICE O/P EST MOD 30 MIN: CPT | Performed by: STUDENT IN AN ORGANIZED HEALTH CARE EDUCATION/TRAINING PROGRAM

## 2023-11-14 RX ORDER — METOCLOPRAMIDE 10 MG/1
10 TABLET ORAL 3 TIMES DAILY PRN
Qty: 30 TABLET | Refills: 2 | Status: SHIPPED | OUTPATIENT
Start: 2023-11-14

## 2023-11-14 NOTE — PROGRESS NOTES
[id number]  OB History    Para Term  AB Living   4 1 1   2 1   SAB IAB Ectopic Multiple Live Births     2     1      # Outcome Date GA Lbr Chris/2nd Weight Sex Delivery Anes PTL Lv   4 Current            3 Term 21 39w2d  3480 g (7 lb 10.8 oz) M CS-LTranv EPI  MELISSA      Complications: Failure to Progress in First Stage   2 IAB 2017     TAB      1 IAB 2015 9w0d    TAB         Birth Comments: medically induced          * Pt presents for OB intake  *[id number]  *Pt's LMP was Patient's last menstrual period was 2023. *Ultrasound date:2023   7weeks 3days  *Estimated date of delivery: 2024   * confirmed by ultrasound    *Signs/Symptoms of Pregnancy   *no Constipation    *yes Headaches   *no Cramping  *no Spotting   *yes Nausea     Diabetes  If any of the following answers are  yes, please order 1 hour GTT, 50g   *no Hx of GDM    *no BMI >35    *no First degree relative with type 2 diabetes    *no Hx of PCOS   *no Current metformin use    *no Prior hx of LGA/macrosomia    *no AMA with other risk factors    Hypertension-    *no Hx of chronic HTN    *no Hx of gestational HTN   *no hx of preeclampsia, eclampsia, or HELLP syndrome     *Infection Screening   *no Does the pt have a hx of MRSA? *if yes- follow MRSA protocol and obtain a nasal swab for MRSA culture   *no History of herpes?      *Immunizations:   *yes Discussed influenza vaccine     undecided   *yes Discussed TDaP vaccine   *yes COVID Vaccine x2    SOCIOECONOMIC:  Substance Use Disorder   ETOH   *no    OPIOD   *no     THC *yes      - has medical marijuana card    [address]  Depression *yes   Anxiety*yes  Medications*no  - follows up with [facility]    *Interview education   *Handouts given:    *Baby and Me support center     *Dorothy sign up instructions    *Lab Locations    *[facility] Pediatricians List/Choosing Pediatrician Sheet    * [facility]ing Classes    *Schedule for Prenatal Visits    *Pregnancy Warning Signs Reviewed    *Safe Medications During Pregnancy    *SMA and CF Testing information sheet    *CPT Code Sheet/[facility] 13week NT pamphlet    *[employer]      SBIRT Screen:  Depression Screening Follow-up Plan: Patient's depression screening was positive with an Zara Bill score of 11  - patient follows up with outpatient provider at [facility]   *yes discussed genetic testing- pt interested     Patient has been informed of basic prenatal advice such as avoiding alcohol, drugs, and smoking. She should remain hydrated and take daily prenatal vitamins. Patient should avoid caffeine, raw sprouts, high mercury fish, undercooked fish, raw eggs, organ meat, unwashed produce, and unpasteurized cheeses, milk, and fruit juice and undercooked meats. She has been informed about toxoplasmosis and to avoid cat feces. *Details that I feel the provider should be aware of:  Joe Rothman presented today as a transfer patient from [facility]. She is currently 25w3d. T's 149. She has complaints of nausea which she uses her [facility] card. Reviewed medications she can take as well as warning signs and when to contact the office. She has no other complaints at this time. 28 week labs ordered, [facility] referral placed. PN1 visit scheduled for *11/14/2023. The patient was oriented to our practice and all questions were answered.     Interviewed by:  Monica Hartman RN

## 2023-11-14 NOTE — PROGRESS NOTES
Routine Prenatal Visit  [facility] [address] [place] [address]    Assessment/Plan:  Darien Melendez is a 29y.o. year old  at 25w3d who presents for routine prenatal visit. 1. Pregnancy headache in third trimester  -     metoclopramide (Reglan) 10 mg tablet; Take 1 tablet (10 mg total) by mouth 3 (three) times a day as needed (headache)    2. S/P primary low transverse   Assessment & Plan:  - For failed induction  - We discussed the risks and benefits of trial of labor after  (TOLAC) versus planned repeat  delivery. - The patient does not have any contraindications to TOLAC, (e.g. no prior uterine rupture, myomectomy, transfundal surgery, classical hysterotomy, or other contraindcation to vaginal delivery). - We discussed the risks of TOLAC including uterine rupture (1 in 200) and the risk for intrapartum  section which carries a high risk of complications including hemorrhage, infection, surgical injury and hysterectomy. We discussed the risk of planned repeat  delivery in the context of her plans for future fertility. We discussed the added risk of surgical injury, adhesions, and abnormal placentation with each subsequent  delivery. - At this time, the patient feels well counseled on the risks and benefits of each option and opts to proceed with . Subjective:     CC: Prenatal care    Kaylah Arambula is a 29 y.o. [id number] female who presents for routine prenatal care at 25w3d. Pregnancy ROS: Denies leakage of fluid, pelvic pain, or vaginal bleeding. Reports normal fetal movement.     The following portions of the patient's history were reviewed and updated as appropriate: allergies, current medications, past family history, past medical history, obstetric history, gynecologic history, past social history, past surgical history and problem list.      Objective:  LMP 2023   Pregravid Weight/BMI: Pregravid weight not on file (BMI Could not be calculated)  Current Weight:     Total Weight Gain: Not found. General: Well appearing, no distress  Respiratory: Unlabored breathing  Cardiovascular: Regular rate. Abdomen: Soft, gravid, nontender  Fundal Height: Appropriate for gestational age. Extremities: Warm and well perfused. Non tender.

## 2023-11-14 NOTE — ASSESSMENT & PLAN NOTE
- For failed induction  - We discussed the risks and benefits of trial of labor after  (TOLAC) versus planned repeat  delivery. - The patient does not have any contraindications to TOLAC, (e.g. no prior uterine rupture, myomectomy, transfundal surgery, classical hysterotomy, or other contraindcation to vaginal delivery). - We discussed the risks of TOLAC including uterine rupture (1 in 200) and the risk for intrapartum  section which carries a high risk of complications including hemorrhage, infection, surgical injury and hysterectomy. We discussed the risk of planned repeat  delivery in the context of her plans for future fertility. We discussed the added risk of surgical injury, adhesions, and abnormal placentation with each subsequent  delivery. - At this time, the patient feels well counseled on the risks and benefits of each option and opts to proceed with .

## 2023-11-22 ENCOUNTER — OFFICE VISIT (OUTPATIENT)
Dept: URGENT CARE | Facility: CLINIC | Age: 28
End: 2023-11-22
Payer: COMMERCIAL

## 2023-11-22 VITALS
OXYGEN SATURATION: 99 % | SYSTOLIC BLOOD PRESSURE: 115 MMHG | HEART RATE: 96 BPM | DIASTOLIC BLOOD PRESSURE: 61 MMHG | TEMPERATURE: 98.4 F | RESPIRATION RATE: 20 BRPM

## 2023-11-22 DIAGNOSIS — B96.89 BACTERIAL SINUSITIS: Primary | ICD-10-CM

## 2023-11-22 DIAGNOSIS — J32.9 BACTERIAL SINUSITIS: Primary | ICD-10-CM

## 2023-11-22 PROCEDURE — 99213 OFFICE O/P EST LOW 20 MIN: CPT | Performed by: PHYSICIAN ASSISTANT

## 2023-11-22 RX ORDER — AMOXICILLIN 500 MG/1
500 TABLET, FILM COATED ORAL 2 TIMES DAILY
Qty: 14 TABLET | Refills: 0 | Status: SHIPPED | OUTPATIENT
Start: 2023-11-22 | End: 2023-12-01

## 2023-11-22 RX ORDER — PRENATAL WITH FERROUS FUM AND FOLIC ACID 3080; 920; 120; 400; 22; 1.84; 3; 20; 10; 1; 12; 200; 27; 25; 2 [IU]/1; [IU]/1; MG/1; [IU]/1; MG/1; MG/1; MG/1; MG/1; MG/1; MG/1; UG/1; MG/1; MG/1; MG/1; MG/1
TABLET ORAL
COMMUNITY
Start: 2023-11-21 | End: 2023-12-01

## 2023-11-22 NOTE — PROGRESS NOTES
[facility]        NAME: Beau Matthews is a 29 y.o. female  : 1995    MRN: [id number]  DATE: 2023  TIME: 4:47 PM    Assessment and Plan   Bacterial sinusitis [J32.9, B96.89]  1. Bacterial sinusitis  amoxicillin (AMOXIL) 500 MG tablet    sodium chloride (OCEAN) 0.65 % nasal spray            Patient Instructions     Take medicine as prescribed  Follow up with PCP in 3-5 days. Proceed to  ER if symptoms worsen. Chief Complaint     Chief Complaint   Patient presents with    Sinusitis     Nasal congestion/pressure since Sat with headaches. Is pregnant, so taking only Tylenol         History of Present Illness       Sinusitis  This is a new problem. Episode onset: 5 days ago. The problem has been gradually worsening since onset. There has been no fever. Associated symptoms include congestion, coughing, headaches (left frontal), sinus pressure and a sore throat (hoarse voice). Pertinent negatives include no ear pain or shortness of breath. Past treatments include acetaminophen. The treatment provided mild relief. Patient is 27 weeks pregnant. Review of Systems   Review of Systems   Constitutional:  Negative for fatigue and fever. HENT:  Positive for congestion, sinus pressure and sore throat (hoarse voice). Negative for ear pain. Respiratory:  Positive for cough. Negative for chest tightness and shortness of breath. Cardiovascular:  Negative for chest pain. Neurological:  Positive for headaches (left frontal).          Current Medications       Current Outpatient Medications:     amoxicillin (AMOXIL) 500 MG tablet, Take 1 tablet (500 mg total) by mouth 2 (two) times a day for 7 days, Disp: 14 tablet, Rfl: 0    Prenatal 27-1 MG TABS, , Disp: , Rfl:     Prenatal Vit-Fe Fumarate-FA (Prenatal Vitamin) 27-0.8 MG TABS, Take 1 tablet by mouth daily, Disp: , Rfl:     sodium chloride (OCEAN) 0.65 % nasal spray, 1 spray into each nostril as needed for congestion or rhinitis, Disp: 104 mL, Rfl: 2    albuterol (ProAir HFA) 90 mcg/act inhaler, Inhale 2 puffs every 6 (six) hours as needed for wheezing (Patient not taking: Reported on 7/16/2023), Disp: 8.5 g, Rfl: 0    ALPRAZolam (XANAX) 0.5 mg tablet, TAKE 1 TABLET (0.5 MG TOTAL) BY MOUTH 2 (TWO) TIMES A DAY AS NEEDED FOR ANXIETY. (Patient not taking: Reported on 7/16/2023), Disp: , Rfl:     azelastine (ASTELIN) 0.1 % nasal spray, 2 sprays into each nostril, Disp: , Rfl:     cetirizine (ZyrTEC) 10 mg tablet, Take 10 mg by mouth daily as needed (Patient not taking: Reported on 11/14/2023), Disp: , Rfl:     diphenhydrAMINE (BENADRYL) 25 mg capsule, , Disp: , Rfl:     fluticasone (FLONASE) 50 mcg/act nasal spray, , Disp: , Rfl:     hydrocortisone 2.5 % cream, , Disp: , Rfl:     loratadine (CLARITIN) 10 mg tablet, Take 1 tablet by mouth every morning, Disp: , Rfl:     metoclopramide (Reglan) 10 mg tablet, Take 1 tablet (10 mg total) by mouth 3 (three) times a day as needed (headache) (Patient not taking: Reported on 11/22/2023), Disp: 30 tablet, Rfl: 2    ondansetron (ZOFRAN) 4 mg tablet, , Disp: , Rfl:     Pyridoxine HCl (vitamin B-6) 25 MG tablet, , Disp: , Rfl:     SALINE MIST 0.65 % nasal spray, instill 1 spray into each nostril if needed for congestion (Patient not taking: Reported on 11/14/2023), Disp: , Rfl:     topiramate (TOPAMAX) 100 mg tablet, Take 150 mg by mouth daily at bedtime (Patient not taking: Reported on 7/16/2023), Disp: , Rfl:     traZODone (DESYREL) 50 mg tablet, TAKE 1 TABLET BY MOUTH EVERY DAY AT NIGHT (Patient not taking: Reported on 7/16/2023), Disp: , Rfl:     Current Allergies     Allergies as of 11/22/2023 - Reviewed 11/22/2023   Allergen Reaction Noted    Apple - food allergy  01/11/2021    Gluten meal - food allergy Vomiting 01/18/2021    Other Other (See Comments) 10/24/2014    Vaccinium angustifolium Itching 08/17/2022            The following portions of the patient's history were reviewed and updated as appropriate: allergies, current medications, past family history, past medical history, past social history, past surgical history and problem list.     Past Medical History:   Diagnosis Date    Anxiety     Celiac disease     Depression     Gastroparesis     Marijuana use, continuous 07/10/2018    Last Assessment & Plan:  Oscar Khan reports that she has less ability to use THC due to her step-daughter being in the house. She reports that she has significantly decrease to twice/day. Reviewed risks of routine daily use, she does not feel this is an issue & states "I know my body" & is not willing to quit at this point in time. Nicotine use disorder 07/10/2018    Last Assessment & Plan:  Jessica Nielson continues to smoke 1 cigarette/day. She reports that "it will not be hard for her to stop" & plans on quitting immediately. Personality disorder ([facility])     Substance abuse ([facility])     marijuana    Trauma     PTSD    Varicella     vaccine       Past Surgical History:   Procedure Laterality Date    [place]  DELIVERY ONLY N/A 2021    Procedure:  SECTION (); Surgeon: Nehal Sanchez MD;  Location: [facility];  Service: Obstetrics    TONSILLECTOMY         Family History   Problem Relation Age of Onset    Other Mother         mental health issues    Psoriasis Mother     Heart disease Mother     No Known Problems Father     No Known Problems Brother     Heart disease Maternal Grandmother     Thrombophlebitis Maternal Grandmother     Heart disease Maternal Grandfather     Heart attack Maternal Grandfather     Obesity Maternal Grandfather     Stomach cancer Paternal Grandmother     Bone cancer Paternal Grandfather     No Known Problems Son     Breast cancer Neg Hx     Colon cancer Neg Hx     Ovarian cancer Neg Hx          Medications have been verified.         Objective   /61   Pulse 96   Temp 98.4 °F (36.9 °C)   Resp 20   LMP 2023   SpO2 99%   Patient's last menstrual period was 05/13/2023. Physical Exam     Physical Exam  Constitutional:       Appearance: She is well-developed. HENT:      Head: Normocephalic. Right Ear: Hearing, tympanic membrane, ear canal and external ear normal. There is no impacted cerumen. Left Ear: Hearing, tympanic membrane, ear canal and external ear normal. There is no impacted cerumen. Nose: Mucosal edema, congestion and rhinorrhea present. Rhinorrhea is purulent. Left Sinus: Maxillary sinus tenderness and frontal sinus tenderness present. Mouth/Throat:      Mouth: Mucous membranes are moist.      Pharynx: Posterior oropharyngeal erythema present. No oropharyngeal exudate. Tonsils: No tonsillar exudate. Cardiovascular:      Rate and Rhythm: Normal rate and regular rhythm. Heart sounds: Normal heart sounds. No murmur heard. No friction rub. No gallop. Pulmonary:      Effort: Pulmonary effort is normal. No respiratory distress. Breath sounds: Normal breath sounds. No stridor. No decreased breath sounds, wheezing, rhonchi or rales. Abdominal:      General: Bowel sounds are normal. There is no distension. Palpations: Abdomen is soft. There is no mass. Tenderness: There is no abdominal tenderness. There is no guarding or rebound. Lymphadenopathy:      Cervical: Cervical adenopathy present. Right cervical: Superficial cervical adenopathy present. Left cervical: Superficial cervical adenopathy present.

## 2023-11-24 ENCOUNTER — TELEPHONE (OUTPATIENT)
Facility: HOSPITAL | Age: 28
End: 2023-11-24

## 2023-11-24 NOTE — TELEPHONE ENCOUNTER
Lvm for patient in regard to [facility]. Provided phone number for patient to call back and reschedule.

## 2023-12-01 ENCOUNTER — ROUTINE PRENATAL (OUTPATIENT)
Dept: PERINATAL CARE | Facility: OTHER | Age: 28
End: 2023-12-01
Payer: COMMERCIAL

## 2023-12-01 VITALS
HEART RATE: 96 BPM | BODY MASS INDEX: 37.69 KG/M2 | SYSTOLIC BLOOD PRESSURE: 106 MMHG | DIASTOLIC BLOOD PRESSURE: 60 MMHG | WEIGHT: 220.8 LBS | HEIGHT: 64 IN

## 2023-12-01 DIAGNOSIS — Z98.891 S/P PRIMARY LOW TRANSVERSE C-SECTION: Primary | ICD-10-CM

## 2023-12-01 DIAGNOSIS — O99.212 OBESITY DURING PREGNANCY IN SECOND TRIMESTER: ICD-10-CM

## 2023-12-01 DIAGNOSIS — Z34.92 SECOND TRIMESTER PREGNANCY: ICD-10-CM

## 2023-12-01 DIAGNOSIS — Z3A.27 27 WEEKS GESTATION OF PREGNANCY: ICD-10-CM

## 2023-12-01 PROBLEM — O09.819 PREGNANCY RESULTING FROM ASSISTED REPRODUCTIVE TECHNOLOGY: Status: ACTIVE | Noted: 2023-07-14

## 2023-12-01 PROBLEM — Z79.899 MEDICAL MARIJUANA USE: Status: ACTIVE | Noted: 2023-07-14

## 2023-12-01 PROBLEM — Z78.9 NONIMMUNE TO HEPATITIS B VIRUS: Status: ACTIVE | Noted: 2023-07-20

## 2023-12-01 PROBLEM — E28.2 PCOS (POLYCYSTIC OVARIAN SYNDROME): Status: ACTIVE | Noted: 2023-05-30

## 2023-12-01 PROBLEM — O99.213 OBESITY DURING PREGNANCY IN THIRD TRIMESTER: Status: ACTIVE | Noted: 2023-12-01

## 2023-12-01 PROCEDURE — 99244 OFF/OP CNSLTJ NEW/EST MOD 40: CPT | Performed by: OBSTETRICS & GYNECOLOGY

## 2023-12-01 PROCEDURE — 76816 OB US FOLLOW-UP PER FETUS: CPT | Performed by: OBSTETRICS & GYNECOLOGY

## 2023-12-01 NOTE — LETTER
2023     [address] [facility] Eddie Tipton MD   [facility]    Patient: Kelly Villareal   YOB: 1995   Date of Visit: 2023       Dear Dr. Eddie Tipton: Thank you for referring Anjum Beck to me for evaluation. Below are my notes for this consultation. If you have questions, please do not hesitate to call me. I look forward to following your patient along with you. Sincerely,        Chi Rothman MD        CC: No Recipients    Chi Rothman MD  2023  8:15 PM  Sign when Signing Visit  Kelly Villareal complains of carpal tunnel pain of her upper extremities with the right being greater than the left. She reports she does have hand splints which she is not wearing today but she states she does wear them to bed. She reports regular fetal movements and questions whether or not she has had intermittent leaking of fluid at times. Her last episode was 4 days ago. She is here today at 27w6d for an ultrasound for fetal growth. She is a recent transfer of care from [facility].  There she had a dating scan, a nuchal translucency scan and a level 2 scan that was complete. NIPT was low probability. MSAFP was normal    Problem list:  Obesity based on a pregravid BMI of 34.5. An early Glucola was 80  History of a prior  for failed induction  A Fibroid in the left uterine fundus was seen measuring 3.5 x 2.6 x 3.1 cm noted at 7 weeks. I suspect that this was just a uterine contraction as this was not reported on any of her further scans at [facility]. Hepatitis B surface antibody is not immune and she declined vaccination at [facility] per their records. Dior Bledsoe reports that she is on prenatal vitamins daily and vitamin B6,  Benadryl and Zyrtec as needed. Dior Bledsoe has a history of 2 terminations and 1 term pregnancy delivered by  section for a failed induction.   Her son weighed 7 pounds 11 ounces at birth. She reports a history of reflux, depression, celiac disease, anxiety on her problem list that does not require medical management. She reports her mother developed a blood clot. Her mother also has other risk factors for DVT with her varicose veins, heart disease current smoking history. Shruti's records suggest she is a former smoker. She does admit to using marijuana during pregnancy on her questionnaire that we did not discuss today. Ultrasound findings: The ultrasound today shows normal interval fetal growth and fluid. Pregnancy ultrasound has limitations and is unable to detect all forms of fetal congenital abnormalities. The inaccuracy in the EFW can be off by 1 lb either way in the third trimester. Specific counseling was provided on the following problems:  1. Pregnant women with a BMI>30 ideally should aim for maximum weight gain of 11-20 pounds ideally via healthy diet and regular exercise. Maternal BMI above 30 in pregnancy confers increased risks of preeclampsia, GDM, cardiac dysfunction, sleep apnea,  delivery, and VTE, and fetal risks include miscarriage, fetal anomalies (along with reduced detection), and stillbirth, macrosomia and impaired growth. Follow up recommended:   Another growth assessment is advised in the third trimester at 34 weeks. For women with prepregnancy BMI greater than 35, we recommend weekly antepartum fetal surveillance beginning at 37 weeks to continue until delivery. I thought her pregravid bmi was over 35 while she was in the office but now realize it was less then 35. Since her BMI was less then 35 in early pregnancy she does not require testing but can do if she wants to have it done similar to her prior pregnancy.    She did not have to time to go to labor and delivery today to be assessed for P PROM but is aware that should she have any further leaking she should contact her OB office and then present to labor and delivery. She has a visit with her OB office on the seventh. Recommend she wear her wrist splints daily and at nighttime to see if this helps with her carpal tunnel and if not her OB office can refer her to a hand surgeon in the rare chance that they have other suggestions to offer. .   She reports she is going to complete her diabetes testing next week. Pre visit time reviewing her records   10 minutes  Face to face time 20 minutes  Post visit time on documentation of note, updating her problem list, adding orders and prescriptions 15 minutes. Procedures that were completed today were charged separately. The level of decision making was moderate complexity.     Justino Carbone MD

## 2023-12-01 NOTE — PROGRESS NOTES
Meir Salcedo complains of carpal tunnel pain of her upper extremities with the right being greater than the left. She reports she does have hand splints which she is not wearing today but she states she does wear them to bed. She reports regular fetal movements and questions whether or not she has had intermittent leaking of fluid at times. Her last episode was 4 days ago. She is here today at 27w6d for an ultrasound for fetal growth. She is a recent transfer of care from [facility].  There she had a dating scan, a nuchal translucency scan and a level 2 scan that was complete. NIPT was low probability. MSAFP was normal    Problem list:  Obesity based on a pregravid BMI of 34.5. An early Glucola was 80  History of a prior  for failed induction  A Fibroid in the left uterine fundus was seen measuring 3.5 x 2.6 x 3.1 cm noted at 7 weeks. I suspect that this was just a uterine contraction as this was not reported on any of her further scans at [facility]. Hepatitis B surface antibody is not immune and she declined vaccination at [facility] per their records. Keshawn Soriano reports that she is on prenatal vitamins daily and vitamin B6,  Benadryl and Zyrtec as needed. Keshawn Soriano has a history of 2 terminations and 1 term pregnancy delivered by  section for a failed induction. Her son weighed 7 pounds 11 ounces at birth. She reports a history of reflux, depression, celiac disease, anxiety on her problem list that does not require medical management. She reports her mother developed a blood clot. Her mother also has other risk factors for DVT with her varicose veins, heart disease current smoking history. Shruti's records suggest she is a former smoker. She does admit to using marijuana during pregnancy on her questionnaire that we did not discuss today. Ultrasound findings: The ultrasound today shows normal interval fetal growth and fluid.      Pregnancy ultrasound has limitations and is unable to detect all forms of fetal congenital abnormalities. The inaccuracy in the EFW can be off by 1 lb either way in the third trimester. Specific counseling was provided on the following problems:  1. Pregnant women with a BMI>30 ideally should aim for maximum weight gain of 11-20 pounds ideally via healthy diet and regular exercise. Maternal BMI above 30 in pregnancy confers increased risks of preeclampsia, GDM, cardiac dysfunction, sleep apnea,  delivery, and VTE, and fetal risks include miscarriage, fetal anomalies (along with reduced detection), and stillbirth, macrosomia and impaired growth. Follow up recommended:   Another growth assessment is advised in the third trimester at 34 weeks. For women with prepregnancy BMI greater than 35, we recommend weekly antepartum fetal surveillance beginning at 37 weeks to continue until delivery. I thought her pregravid bmi was over 35 while she was in the office but now realize it was less then 35. Since her BMI was less then 35 in early pregnancy she does not require testing but can do if she wants to have it done similar to her prior pregnancy. She did not have to time to go to labor and delivery today to be assessed for P PROM but is aware that should she have any further leaking she should contact her OB office and then present to labor and delivery. She has a visit with her OB office on the seventh. Recommend she wear her wrist splints daily and at nighttime to see if this helps with her carpal tunnel and if not her OB office can refer her to a hand surgeon in the rare chance that they have other suggestions to offer. .   She reports she is going to complete her diabetes testing next week. Pre visit time reviewing her records   10 minutes  Face to face time 20 minutes  Post visit time on documentation of note, updating her problem list, adding orders and prescriptions 15 minutes.   Procedures that were completed today were charged separately. The level of decision making was moderate complexity.     Stephanie Dove MD

## 2023-12-01 NOTE — LETTER
2023     [address] [facility] Sun Orozco MD   [facility]    Patient: Kaylah Arambula   YOB: 1995   Date of Visit: 2023       Dear Dr. Sun Orozco: Thank you for referring Lynette Simmons to me for evaluation. Below are my notes for this consultation. If you have questions, please do not hesitate to call me. I look forward to following your patient along with you. Sincerely,        Leena Howard MD        CC: No Recipients    Leena Howard MD  2023  8:08 PM  Sign when Signing Visit  Kaylah Arambula complains of carpal tunnel pain of her upper extremities with the right being greater than the left. She reports she does have hand splints which she is not wearing today but she states she does wear them to bed. She reports regular fetal movements and questions whether or not she has had intermittent leaking of fluid at times. Her last episode was 4 days ago. She is here today at 27w6d for an ultrasound for fetal growth. She is a recent transfer of care from [facility].  There she had a dating scan, a nuchal translucency scan and a level 2 scan that was complete. NIPT was low probability. MSAFP was normal    Problem list:  Obesity based on a pregravid BMI of 34.5. An early Glucola was 80  History of a prior  for failed induction  A Fibroid in the left uterine fundus was seen measuring 3.5 x 2.6 x 3.1 cm noted at 7 weeks. I suspect that this was just a uterine contraction as this was not reported on any of her further scans at [facility]. Hepatitis B surface antibody is not immune and she declined vaccination at [facility] per their records. Darien Melendez reports that she is on prenatal vitamins daily and vitamin B6,  Benadryl and Zyrtec as needed. Darien Melendez has a history of 2 terminations and 1 term pregnancy delivered by  section for a failed induction.   Her son weighed 7 pounds 11 ounces at birth. She reports a history of reflux, depression, celiac disease, anxiety on her problem list that does not require medical management. She reports her mother developed a blood clot. Her mother also has other risk factors for DVT with her varicose veins, heart disease current smoking history. Shruti's records suggest she is a former smoker. She does admit to using marijuana during pregnancy on her questionnaire that we did not discuss today. Ultrasound findings: The ultrasound today shows normal interval fetal growth and fluid. Pregnancy ultrasound has limitations and is unable to detect all forms of fetal congenital abnormalities. The inaccuracy in the EFW can be off by 1 lb either way in the third trimester. Specific counseling was provided on the following problems:  1. Pregnant women with a BMI>30 ideally should aim for maximum weight gain of 11-20 pounds ideally via healthy diet and regular exercise. Maternal BMI above 30 in pregnancy confers increased risks of preeclampsia, GDM, cardiac dysfunction, sleep apnea,  delivery, and VTE, and fetal risks include miscarriage, fetal anomalies (along with reduced detection), and stillbirth, macrosomia and impaired growth. Follow up recommended:   Another growth assessment is advised in the third trimester at 34 weeks. For women with prepregnancy BMI greater than 35, we recommend weekly antepartum fetal surveillance beginning at 37 weeks to continue until delivery. She was on testing in her last pregnancy so will continue with her same plan this pregnancy as she is near a bmi of 28. She did not have to time to go to labor and delivery today to be assessed for P PROM but is aware that should she have any further leaking she should contact her OB office and then present to labor and delivery. She has a visit with her OB office on the seventh.   Recommend she wear her wrist splints daily and at nighttime to see if this helps with her carpal tunnel and if not her OB office can refer her to a hand surgeon in the rare chance that they have other suggestions to offer. .   She reports she is going to complete her diabetes testing next week. Pre visit time reviewing her records   10 minutes  Face to face time 20 minutes  Post visit time on documentation of note, updating her problem list, adding orders and prescriptions 15 minutes. Procedures that were completed today were charged separately. The level of decision making was moderate complexity.     Donna Paniagua MD

## 2023-12-06 ENCOUNTER — ROUTINE PRENATAL (OUTPATIENT)
Dept: OBGYN CLINIC | Facility: CLINIC | Age: 28
End: 2023-12-06
Payer: COMMERCIAL

## 2023-12-06 VITALS — WEIGHT: 222.6 LBS | SYSTOLIC BLOOD PRESSURE: 104 MMHG | BODY MASS INDEX: 38.21 KG/M2 | DIASTOLIC BLOOD PRESSURE: 64 MMHG

## 2023-12-06 DIAGNOSIS — Z23 NEED FOR INFLUENZA VACCINATION: ICD-10-CM

## 2023-12-06 DIAGNOSIS — R10.2 PELVIC PAIN AFFECTING PREGNANCY IN THIRD TRIMESTER, ANTEPARTUM: ICD-10-CM

## 2023-12-06 DIAGNOSIS — O09.813 PREGNANCY RESULTING FROM ASSISTED REPRODUCTIVE TECHNOLOGY IN THIRD TRIMESTER: ICD-10-CM

## 2023-12-06 DIAGNOSIS — Z23 ENCOUNTER FOR IMMUNIZATION: ICD-10-CM

## 2023-12-06 DIAGNOSIS — Z34.93 THIRD TRIMESTER PREGNANCY: Primary | ICD-10-CM

## 2023-12-06 DIAGNOSIS — O26.893 PELVIC PAIN AFFECTING PREGNANCY IN THIRD TRIMESTER, ANTEPARTUM: ICD-10-CM

## 2023-12-06 DIAGNOSIS — Z3A.28 28 WEEKS GESTATION OF PREGNANCY: ICD-10-CM

## 2023-12-06 DIAGNOSIS — Z98.891 S/P PRIMARY LOW TRANSVERSE C-SECTION: ICD-10-CM

## 2023-12-06 PROCEDURE — 90686 IIV4 VACC NO PRSV 0.5 ML IM: CPT

## 2023-12-06 PROCEDURE — 99214 OFFICE O/P EST MOD 30 MIN: CPT | Performed by: OBSTETRICS & GYNECOLOGY

## 2023-12-06 PROCEDURE — 90715 TDAP VACCINE 7 YRS/> IM: CPT

## 2023-12-06 PROCEDURE — 90471 IMMUNIZATION ADMIN: CPT

## 2023-12-06 PROCEDURE — 90472 IMMUNIZATION ADMIN EACH ADD: CPT

## 2023-12-06 NOTE — PROGRESS NOTES
Assessment  29 y.o. [id number] at 28w4d presenting for routine prenatal visit. Plan  Diagnoses and all orders for this visit:    Third trimester pregnancy  28 weeks gestation of pregnancy  - PTL precautions  - [address] teaching  - Birth plan given, peds list given, birth consent signed  - 28 wk labs reviewed  - Tdap given, recommendations reviewed for family vaccination  - Rhogam not indicated  - Return in 2wk for PN    S/P primary low transverse     Pregnancy resulting from assisted reproductive technology in third trimester  - Follows with MFM    Pelvic pain affecting pregnancy in third trimester, antepartum  -     Elastic Bandages & Supports (Abdominal Support/L-XL) MISC; Wear abd support during the day, take off at night  -     Ambulatory Referral to Physical Therapy; Future    Encounter for immunization  -     Tdap Vaccine greater than or equal to 8yo    Need for influenza vaccination  -     FLUZONE: influenza vaccine, quadrivalent, 0.5 mL    Other orders  -     Home Breast Pump      ____________________________________________________________        Subjective    rBad Cuevas is a 29 y.o. [id number] at 28w4d who presents for routine prenatal visit. She is noting increasing pelvic pressure and discomfort. Leaking urine occasionally. No dysuria or frequency. Denies contractions, loss of fluid, or vaginal bleeding. She feels regular fetal movements.      Pregnancy Problems:  Patient Active Problem List   Diagnosis    Celiac disease    Diffuse cystic mastopathy    Eating disorder    Osteopenia    Panic disorder with agoraphobia    PTSD (post-traumatic stress disorder)    Migraine    Depression    Anxiety    Sciatica of right side    S/P primary low transverse     Gastroesophageal reflux disease    Nonimmune to hepatitis B virus    PCOS (polycystic ovarian syndrome)    Pregnancy resulting from assisted reproductive technology    Medical marijuana use    Obesity during pregnancy in third trimester         Objective  /64   Wt 101 kg (222 lb 9.6 oz)   LMP 05/13/2023   BMI 38.21 kg/m²     FHT: 135 BPM   Uterine Size: 28 cm     Physical Exam:  Physical Exam  Constitutional:       General: She is not in acute distress. Appearance: Normal appearance. She is well-developed. She is not ill-appearing, toxic-appearing or diaphoretic. HENT:      Head: Normocephalic and atraumatic. Eyes:      General: No scleral icterus. Right eye: No discharge. Left eye: No discharge. Conjunctiva/sclera: Conjunctivae normal.   Pulmonary:      Effort: Pulmonary effort is normal. No accessory muscle usage or respiratory distress. Abdominal:      General: There is distension (gravid). Tenderness: There is no abdominal tenderness. There is no guarding or rebound. Skin:     General: Skin is warm and dry. Coloration: Skin is not jaundiced. Findings: No bruising, erythema or rash. Neurological:      Mental Status: She is alert. Psychiatric:         Mood and Affect: Mood normal.         Behavior: Behavior normal.         Thought Content:  Thought content normal.         Judgment: Judgment normal.

## 2023-12-08 LAB
DME PARACHUTE DELIVERY DATE REQUESTED: NORMAL
DME PARACHUTE ITEM DESCRIPTION: NORMAL
DME PARACHUTE ORDER STATUS: NORMAL
DME PARACHUTE SUPPLIER NAME: NORMAL
DME PARACHUTE SUPPLIER PHONE: NORMAL

## 2023-12-11 ENCOUNTER — TELEPHONE (OUTPATIENT)
Dept: OBGYN CLINIC | Facility: MEDICAL CENTER | Age: 28
End: 2023-12-11

## 2023-12-11 NOTE — TELEPHONE ENCOUNTER
Patient aware list sent to e-mail. Patient aware she can discuss hand discomfort at her appointment on 12/18 for provider to further evaluate and see what is needed at this time.

## 2023-12-11 NOTE — TELEPHONE ENCOUNTER
Pt called has covid pregnant 32 weeks. need medication list of what to take. E mail her at  [email or web address] also need braces for hands because they are hurting n swollen. Please review.

## 2023-12-18 PROBLEM — Z3A.30 30 WEEKS GESTATION OF PREGNANCY: Status: ACTIVE | Noted: 2023-12-18

## 2023-12-18 PROBLEM — K63.5 HYPERPLASTIC POLYP OF SIGMOID COLON: Status: ACTIVE | Noted: 2022-07-27

## 2023-12-26 ENCOUNTER — HOSPITAL ENCOUNTER (EMERGENCY)
Facility: HOSPITAL | Age: 28
Discharge: HOME | End: 2023-12-26
Attending: EMERGENCY MEDICINE
Payer: COMMERCIAL

## 2023-12-26 ENCOUNTER — ROUTINE PRENATAL (OUTPATIENT)
Dept: OBGYN CLINIC | Facility: CLINIC | Age: 28
End: 2023-12-26
Payer: COMMERCIAL

## 2023-12-26 VITALS — WEIGHT: 221.2 LBS | BODY MASS INDEX: 37.97 KG/M2 | DIASTOLIC BLOOD PRESSURE: 80 MMHG | SYSTOLIC BLOOD PRESSURE: 120 MMHG

## 2023-12-26 VITALS
SYSTOLIC BLOOD PRESSURE: 113 MMHG | RESPIRATION RATE: 18 BRPM | TEMPERATURE: 98 F | OXYGEN SATURATION: 98 % | DIASTOLIC BLOOD PRESSURE: 55 MMHG | HEART RATE: 88 BPM

## 2023-12-26 DIAGNOSIS — J98.01 BRONCHOSPASM: ICD-10-CM

## 2023-12-26 DIAGNOSIS — J06.9 URI (UPPER RESPIRATORY INFECTION): Primary | ICD-10-CM

## 2023-12-26 DIAGNOSIS — J20.9 ACUTE BRONCHITIS, UNSPECIFIED ORGANISM: ICD-10-CM

## 2023-12-26 DIAGNOSIS — Z79.899 MEDICAL MARIJUANA USE: ICD-10-CM

## 2023-12-26 DIAGNOSIS — Z34.93 THIRD TRIMESTER PREGNANCY: ICD-10-CM

## 2023-12-26 DIAGNOSIS — O98.513 COVID-19 AFFECTING PREGNANCY IN THIRD TRIMESTER: ICD-10-CM

## 2023-12-26 DIAGNOSIS — U07.1 COVID-19 AFFECTING PREGNANCY IN THIRD TRIMESTER: ICD-10-CM

## 2023-12-26 DIAGNOSIS — O99.213 OBESITY DURING PREGNANCY IN THIRD TRIMESTER: ICD-10-CM

## 2023-12-26 DIAGNOSIS — F41.9 ANXIETY: ICD-10-CM

## 2023-12-26 DIAGNOSIS — Z34.90 PREGNANCY: ICD-10-CM

## 2023-12-26 DIAGNOSIS — O09.813 PREGNANCY RESULTING FROM ASSISTED REPRODUCTIVE TECHNOLOGY IN THIRD TRIMESTER: Primary | ICD-10-CM

## 2023-12-26 DIAGNOSIS — Z3A.31 31 WEEKS GESTATION OF PREGNANCY: ICD-10-CM

## 2023-12-26 DIAGNOSIS — Z98.891 HISTORY OF C-SECTION: ICD-10-CM

## 2023-12-26 LAB
ALBUMIN SERPL BCP-MCNC: 3.5 G/DL (ref 3.5–5)
ALP SERPL-CCNC: 73 U/L (ref 34–104)
ALT SERPL W P-5'-P-CCNC: 12 U/L (ref 7–52)
ANION GAP SERPL CALCULATED.3IONS-SCNC: 8 MMOL/L
AST SERPL W P-5'-P-CCNC: 16 U/L (ref 13–39)
BASOPHILS # BLD AUTO: 0.03 THOUSANDS/ÂΜL (ref 0–0.1)
BASOPHILS NFR BLD AUTO: 0 % (ref 0–1)
BILIRUB SERPL-MCNC: 0.5 MG/DL (ref 0.2–1)
BUN SERPL-MCNC: 5 MG/DL (ref 5–25)
CALCIUM SERPL-MCNC: 8.7 MG/DL (ref 8.4–10.2)
CARDIAC TROPONIN I PNL SERPL HS: 4 NG/L
CHLORIDE SERPL-SCNC: 104 MMOL/L (ref 96–108)
CO2 SERPL-SCNC: 22 MMOL/L (ref 21–32)
CREAT SERPL-MCNC: 0.44 MG/DL (ref 0.6–1.3)
EOSINOPHIL # BLD AUTO: 0.1 THOUSAND/ÂΜL (ref 0–0.61)
EOSINOPHIL NFR BLD AUTO: 1 % (ref 0–6)
ERYTHROCYTE [DISTWIDTH] IN BLOOD BY AUTOMATED COUNT: 13.1 % (ref 11.6–15.1)
FLUAV RNA RESP QL NAA+PROBE: NEGATIVE
FLUBV RNA RESP QL NAA+PROBE: NEGATIVE
GFR SERPL CREATININE-BSD FRML MDRD: 137 ML/MIN/1.73SQ M
GLUCOSE SERPL-MCNC: 84 MG/DL (ref 65–140)
HCT VFR BLD AUTO: 35.7 % (ref 34.8–46.1)
HGB BLD-MCNC: 11.9 G/DL (ref 11.5–15.4)
IMM GRANULOCYTES # BLD AUTO: 0.15 THOUSAND/UL (ref 0–0.2)
IMM GRANULOCYTES NFR BLD AUTO: 1 % (ref 0–2)
LYMPHOCYTES # BLD AUTO: 3.36 THOUSANDS/ÂΜL (ref 0.6–4.47)
LYMPHOCYTES NFR BLD AUTO: 25 % (ref 14–44)
MCH RBC QN AUTO: 31.2 PG (ref 26.8–34.3)
MCHC RBC AUTO-ENTMCNC: 33.3 G/DL (ref 31.4–37.4)
MCV RBC AUTO: 94 FL (ref 82–98)
MONOCYTES # BLD AUTO: 0.86 THOUSAND/ÂΜL (ref 0.17–1.22)
MONOCYTES NFR BLD AUTO: 6 % (ref 4–12)
NEUTROPHILS # BLD AUTO: 9.24 THOUSANDS/ÂΜL (ref 1.85–7.62)
NEUTS SEG NFR BLD AUTO: 67 % (ref 43–75)
NRBC BLD AUTO-RTO: 0 /100 WBCS
PLATELET # BLD AUTO: 260 THOUSANDS/UL (ref 149–390)
PMV BLD AUTO: 10.3 FL (ref 8.9–12.7)
POTASSIUM SERPL-SCNC: 3.4 MMOL/L (ref 3.5–5.3)
PROT SERPL-MCNC: 6.5 G/DL (ref 6.4–8.4)
RBC # BLD AUTO: 3.82 MILLION/UL (ref 3.81–5.12)
RSV RNA RESP QL NAA+PROBE: NEGATIVE
SARS-COV-2 RNA RESP QL NAA+PROBE: NEGATIVE
SODIUM SERPL-SCNC: 134 MMOL/L (ref 135–147)
WBC # BLD AUTO: 13.74 THOUSAND/UL (ref 4.31–10.16)

## 2023-12-26 PROCEDURE — 84484 ASSAY OF TROPONIN QUANT: CPT | Performed by: EMERGENCY MEDICINE

## 2023-12-26 PROCEDURE — 0241U HB NFCT DS VIR RESP RNA 4 TRGT: CPT | Performed by: EMERGENCY MEDICINE

## 2023-12-26 PROCEDURE — 93005 ELECTROCARDIOGRAM TRACING: CPT

## 2023-12-26 PROCEDURE — 99284 EMERGENCY DEPT VISIT MOD MDM: CPT | Performed by: EMERGENCY MEDICINE

## 2023-12-26 PROCEDURE — 36415 COLL VENOUS BLD VENIPUNCTURE: CPT

## 2023-12-26 PROCEDURE — 96361 HYDRATE IV INFUSION ADD-ON: CPT

## 2023-12-26 PROCEDURE — 85025 COMPLETE CBC W/AUTO DIFF WBC: CPT | Performed by: EMERGENCY MEDICINE

## 2023-12-26 PROCEDURE — 99285 EMERGENCY DEPT VISIT HI MDM: CPT

## 2023-12-26 PROCEDURE — 94640 AIRWAY INHALATION TREATMENT: CPT

## 2023-12-26 PROCEDURE — 96360 HYDRATION IV INFUSION INIT: CPT

## 2023-12-26 PROCEDURE — 80053 COMPREHEN METABOLIC PANEL: CPT | Performed by: EMERGENCY MEDICINE

## 2023-12-26 PROCEDURE — 99213 OFFICE O/P EST LOW 20 MIN: CPT | Performed by: ADVANCED PRACTICE MIDWIFE

## 2023-12-26 RX ORDER — ALBUTEROL SULFATE 90 UG/1
2 AEROSOL, METERED RESPIRATORY (INHALATION) EVERY 6 HOURS PRN
Qty: 8.5 G | Refills: 0 | Status: SHIPPED | OUTPATIENT
Start: 2023-12-26

## 2023-12-26 RX ORDER — ALBUTEROL SULFATE 2.5 MG/3ML
2.5 SOLUTION RESPIRATORY (INHALATION) ONCE
Status: COMPLETED | OUTPATIENT
Start: 2023-12-26 | End: 2023-12-26

## 2023-12-26 RX ORDER — ONDANSETRON 4 MG/1
4 TABLET, ORALLY DISINTEGRATING ORAL EVERY 8 HOURS PRN
COMMUNITY
Start: 2023-12-19

## 2023-12-26 RX ORDER — ACETAMINOPHEN 325 MG/1
975 TABLET ORAL ONCE
Status: COMPLETED | OUTPATIENT
Start: 2023-12-26 | End: 2023-12-26

## 2023-12-26 RX ADMIN — ALBUTEROL SULFATE 2.5 MG: 2.5 SOLUTION RESPIRATORY (INHALATION) at 21:34

## 2023-12-26 RX ADMIN — SODIUM CHLORIDE 1000 ML: 0.9 INJECTION, SOLUTION INTRAVENOUS at 21:35

## 2023-12-26 RX ADMIN — ACETAMINOPHEN 975 MG: 325 TABLET ORAL at 21:34

## 2023-12-26 NOTE — ASSESSMENT & PLAN NOTE
- Reviewed s/s PTL, [facility]  -Needs to complete 28 week labs  - Follow-up growth 1/12/2023  - NIPT and AFP low risk  - Desires TOLAC  - next visit 2 weeks

## 2023-12-26 NOTE — ASSESSMENT & PLAN NOTE
- positive Covid earlier this month- was seen in [facility] ED and given IV fluids on 12/18/23    Covid symptoms- have not been improving.

## 2023-12-27 LAB
ATRIAL RATE: 93 BPM
P AXIS: 46 DEGREES
PR INTERVAL: 138 MS
QRS AXIS: 59 DEGREES
QRSD INTERVAL: 72 MS
QT INTERVAL: 356 MS
QTC INTERVAL: 442 MS
T WAVE AXIS: 20 DEGREES
VENTRICULAR RATE: 93 BPM

## 2023-12-27 NOTE — ED PROVIDER NOTES
History  Chief Complaint   Patient presents with    Shortness of Breath     Was diagnosed with covid 16 days ago; continuing having symptoms; vomiting, cough, diarrhea, headache, wheezing, 31 weeks pregnant     28-year-old female who is a 31-week pregnant female, on her second pregnancy, presents emergency department due to headache fatigue wheezing and cough.  Patient saw her OB/GYN and was recommended to come to the ER for IV fluids.  The patient notes that she was prescribed an albuterol inhaler but fell asleep at home and the pharmacy is now closed.  Patient denies any abdominal pain or cramps.  The patient had an evaluation of her pregnancy earlier today and was found to be without issue.  Patient notes that in the morning she vomits denies any leakage of fluid vaginally.        Prior to Admission Medications   Prescriptions Last Dose Informant Patient Reported? Taking?   Elastic Bandages & Supports (Abdominal Support/L-XL) MISC   No No   Sig: Wear abd support during the day, take off at night   Prenatal Vit-Fe Fumarate-FA (Prenatal Vitamin) 27-0.8 MG TABS   Yes No   Sig: Take 1 tablet by mouth daily   Pyridoxine HCl (vitamin B-6) 25 MG tablet   Yes No   Patient not taking: Reported on 12/6/2023   albuterol (ProAir HFA) 90 mcg/act inhaler   No No   Sig: Inhale 2 puffs every 6 (six) hours as needed for wheezing   cetirizine (ZyrTEC) 10 mg tablet   Yes No   Sig: Take 10 mg by mouth daily as needed   diphenhydrAMINE (BENADRYL) 25 mg capsule   Yes No   ondansetron (ZOFRAN-ODT) 4 mg disintegrating tablet   Yes No   Sig: Apply 4 mg to cheek every 8 (eight) hours as needed      Facility-Administered Medications: None       Past Medical History:   Diagnosis Date    Anxiety     Celiac disease     Depression     Gastroparesis     Marijuana use, continuous 07/10/2018    Last Assessment & Plan:  Shruti reports that she has less ability to use THC due to her step-daughter being in the house.  She reports that she has  "significantly decrease to twice/day.  Reviewed risks of routine daily use, she does not feel this is an issue & states \"I know my body\" & is not willing to quit at this point in time.    Nicotine use disorder 07/10/2018    Last Assessment & Plan:  Shruti Vee continues to smoke 1 cigarette/day.  She reports that \"it will not be hard for her to stop\" & plans on quitting immediately.    Personality disorder (HCC)     Substance abuse (HCC)     marijuana    Trauma     PTSD    Varicella     vaccine       Past Surgical History:   Procedure Laterality Date    [place]  DELIVERY ONLY N/A 2021    Procedure:  SECTION ();  Surgeon: Charlie Hinojosa MD;  Location: [facility];  Service: Obstetrics    TONSILLECTOMY         Family History   Problem Relation Age of Onset    Other Mother         mental health issues    Psoriasis Mother     Heart disease Mother     No Known Problems Father     No Known Problems Brother     Heart disease Maternal Grandmother     Thrombophlebitis Maternal Grandmother     Heart disease Maternal Grandfather     Heart attack Maternal Grandfather     Obesity Maternal Grandfather     Stomach cancer Paternal Grandmother     Bone cancer Paternal Grandfather     No Known Problems Son     Breast cancer Neg Hx     Colon cancer Neg Hx     Ovarian cancer Neg Hx      I have reviewed and agree with the history as documented.    E-Cigarette/Vaping    E-Cigarette Use Never User      E-Cigarette/Vaping Substances    Nicotine No     THC No     CBD No     Flavoring No     Other No     Unknown No      Social History     Tobacco Use    Smoking status: Former     Current packs/day: 0.00     Average packs/day: 0.3 packs/day for 8.0 years (2.0 ttl pk-yrs)     Types: Cigarettes     Start date: 2012     Quit date: 2020     Years since quitting: 3.5    Smokeless tobacco: Never    Tobacco comments:     quit with knowledge of pregnancy   Vaping Use    Vaping status: Never Used   Substance Use Topics "    Alcohol use: Not Currently     Comment: rare    Drug use: Yes     Types: Marijuana     Comment: medical marijuana as needed       Review of Systems   Constitutional:  Positive for activity change. Negative for chills and fever.   HENT:  Positive for congestion. Negative for ear pain and sore throat.    Eyes:  Negative for pain and visual disturbance.   Respiratory:  Positive for cough. Negative for shortness of breath.    Cardiovascular:  Negative for chest pain and palpitations.   Gastrointestinal:  Positive for nausea and vomiting. Negative for abdominal pain.   Genitourinary:  Negative for dysuria and hematuria.   Musculoskeletal:  Negative for arthralgias and back pain.   Skin:  Negative for color change and rash.   Neurological:  Positive for weakness and headaches. Negative for dizziness, seizures and syncope.   All other systems reviewed and are negative.      Physical Exam  Physical Exam  Vitals and nursing note reviewed.   Constitutional:       General: She is not in acute distress.     Appearance: Normal appearance. She is well-developed.   HENT:      Head: Normocephalic and atraumatic.      Right Ear: External ear normal.      Left Ear: External ear normal.      Nose: Nose normal.      Mouth/Throat:      Mouth: Mucous membranes are moist.   Eyes:      Conjunctiva/sclera: Conjunctivae normal.   Cardiovascular:      Rate and Rhythm: Normal rate and regular rhythm.      Pulses: Normal pulses.      Heart sounds: Normal heart sounds. No murmur heard.  Pulmonary:      Effort: Pulmonary effort is normal. No respiratory distress.      Breath sounds: Examination of the right-lower field reveals rhonchi. Examination of the left-lower field reveals rhonchi. Wheezing and rhonchi present. No rales.   Chest:      Chest wall: No tenderness.   Abdominal:      General: Bowel sounds are normal.      Palpations: Abdomen is soft.      Tenderness: There is no abdominal tenderness.      Comments: Abdomen gravid.    Musculoskeletal:         General: No swelling or deformity.      Cervical back: Normal range of motion and neck supple.      Comments: Trace lower extremity and bilaterally   Skin:     General: Skin is warm and dry.      Capillary Refill: Capillary refill takes less than 2 seconds.   Neurological:      General: No focal deficit present.      Mental Status: She is alert and oriented to person, place, and time. Mental status is at baseline.         Vital Signs  ED Triage Vitals [12/26/23 2032]   Temperature Pulse Respirations Blood Pressure SpO2   98 °F (36.7 °C) 85 18 116/71 97 %      Temp src Heart Rate Source Patient Position - Orthostatic VS BP Location FiO2 (%)   -- Monitor -- Left arm --      Pain Score       --           Vitals:    12/26/23 2032 12/26/23 2200 12/26/23 2330   BP: 116/71 107/62 113/55   Pulse: 85 95 88         Visual Acuity      ED Medications  Medications   albuterol inhalation solution 2.5 mg (2.5 mg Nebulization Given 12/26/23 2134)   acetaminophen (TYLENOL) tablet 975 mg (975 mg Oral Given 12/26/23 2134)   sodium chloride 0.9 % bolus 1,000 mL (0 mL Intravenous Stopped 12/26/23 2330)       Diagnostic Studies  Results Reviewed       Procedure Component Value Units Date/Time    HS Troponin I 4hr [[id number]]     Lab Status: No result Specimen: Blood     FLU/RSV/COVID - if FLU/RSV clinically relevant [[id number]]  (Normal) Collected: 12/26/23 2138    Lab Status: Final result Specimen: Nares from Nose Updated: 12/26/23 2217     SARS-CoV-2 Negative     INFLUENZA A PCR Negative     INFLUENZA B PCR Negative     RSV PCR Negative    Narrative:      FOR PEDIATRIC PATIENTS - copy/paste COVID Guidelines URL to browser: [email or web address]    SARS-CoV-2 assay is a Nucleic Acid Amplification assay intended for the  qualitative detection of nucleic acid from SARS-CoV-2 in nasopharyngeal  swabs. Results are for the presumptive identification of SARS-CoV-2  RNA.    Positive results are indicative of infection with SARS-CoV-2, the virus  causing COVID-19, but do not rule out bacterial infection or co-infection  with other viruses. Laboratories within the [place] and its  territories are required to report all positive results to the appropriate  public health authorities. Negative results do not preclude SARS-CoV-2  infection and should not be used as the sole basis for treatment or other  patient management decisions. Negative results must be combined with  clinical observations, patient history, and epidemiological information.  This test has not been FDA cleared or approved.    This test has been authorized by FDA under an Emergency Use Authorization  (EUA). This test is only authorized for the duration of time the  declaration that circumstances exist justifying the authorization of the  emergency use of an in vitro diagnostic tests for detection of SARS-CoV-2  virus and/or diagnosis of COVID-19 infection under section 564(b)(1) of  the Act, 21 U.S.C. 360bbb-3(b)(1), unless the authorization is terminated  or revoked sooner. The test has been validated but independent review by FDA  and CLIA is pending.    Test performed using [employer] GeneXpert: This RT-PCR assay targets N2,  a region unique to SARS-CoV-2. A conserved region in the E-gene was chosen  for pan-Sarbecovirus detection which includes SARS-CoV-2.    According to CMS-2020-01-R, this platform meets the definition of high-throughput technology.    HS Troponin I 2hr [[id number]]     Lab Status: No result Specimen: Blood     HS Troponin 0hr (reflex protocol) [[id number]]  (Normal) Collected: 12/26/23 2051    Lab Status: Final result Specimen: Blood from Arm, Right Updated: 12/26/23 2121     hs TnI 0hr 4 ng/L     Comprehensive metabolic panel [[id number]]  (Abnormal) Collected: 12/26/23 2051    Lab Status: Final result Specimen: Blood from Arm, Right Updated: 12/26/23 2113     Sodium 134 mmol/L       Potassium 3.4 mmol/L      Chloride 104 mmol/L      CO2 22 mmol/L      ANION GAP 8 mmol/L      BUN 5 mg/dL      Creatinine 0.44 mg/dL      Glucose 84 mg/dL      Calcium 8.7 mg/dL      AST 16 U/L      ALT 12 U/L      Alkaline Phosphatase 73 U/L      Total Protein 6.5 g/dL      Albumin 3.5 g/dL      Total Bilirubin 0.50 mg/dL      eGFR 137 ml/min/1.73sq m     Narrative:      National Kidney Disease Foundation guidelines for Chronic Kidney Disease (CKD):     Stage 1 with normal or high GFR (GFR > 90 mL/min/1.73 square meters)    Stage 2 Mild CKD (GFR = 60-89 mL/min/1.73 square meters)    Stage 3A Moderate CKD (GFR = 45-59 mL/min/1.73 square meters)    Stage 3B Moderate CKD (GFR = 30-44 mL/min/1.73 square meters)    Stage 4 Severe CKD (GFR = 15-29 mL/min/1.73 square meters)    Stage 5 End Stage CKD (GFR <15 mL/min/1.73 square meters)  Note: GFR calculation is accurate only with a steady state creatinine    CBC and differential [[id number]]  (Abnormal) Collected: 12/26/23 2051    Lab Status: Final result Specimen: Blood from Arm, Right Updated: 12/26/23 2057     WBC 13.74 Thousand/uL      RBC 3.82 Million/uL      Hemoglobin 11.9 g/dL      Hematocrit 35.7 %      MCV 94 fL      MCH 31.2 pg      MCHC 33.3 g/dL      RDW 13.1 %      MPV 10.3 fL      Platelets 260 Thousands/uL      nRBC 0 /100 WBCs      Neutrophils Relative 67 %      Immat GRANS % 1 %      Lymphocytes Relative 25 %      Monocytes Relative 6 %      Eosinophils Relative 1 %      Basophils Relative 0 %      Neutrophils Absolute 9.24 Thousands/µL      Immature Grans Absolute 0.15 Thousand/uL      Lymphocytes Absolute 3.36 Thousands/µL      Monocytes Absolute 0.86 Thousand/µL      Eosinophils Absolute 0.10 Thousand/µL      Basophils Absolute 0.03 Thousands/µL                    No orders to display              Procedures  ECG 12 Lead Documentation Only    Date/Time: 12/26/2023 9:43 PM    Performed by: Javan Roberts Jr., DO  Authorized by: Javan Roberts  , DO    ECG reviewed by me, the ED Provider: yes    Patient location:  ED  Comments:      EKG shows a sinus rhythm at 93 beats a minute there is a normal axis.  There is no definitive acute ST or T wave changes present.           ED Course  ED Course as of 12/27/23 0106   Tue Dec 26, 2023   2120 WBC(!): 13.74   2316 Reevaluated, patient notes she feels improved after IV fluids and Tylenol.   2354 Fetal heart tones 144.                               SBIRT 20yo+      Flowsheet Row Most Recent Value   Initial Alcohol Screen: US AUDIT-C     1. How often do you have a drink containing alcohol? 0 Filed at: 12/26/2023 2041   2. How many drinks containing alcohol do you have on a typical day you are drinking?  0 Filed at: 12/26/2023 2041   3a. Male UNDER 65: How often do you have five or more drinks on one occasion? 0 Filed at: 12/26/2023 2041   3b. FEMALE Any Age, or MALE 65+: How often do you have 4 or more drinks on one occassion? 0 Filed at: 12/26/2023 2041   Audit-C Score 0 Filed at: 12/26/2023 2041   ARIES: How many times in the past year have you...    Used an illegal drug or used a prescription medication for non-medical reasons? Never Filed at: 12/26/2023 2041                      Medical Decision Making  28-year-old female, on her second pregnancy presents emergency department complaining of congestion wheezing fatigue and headache.  Patient talk to her OB/GYN and had her pregnancy evaluated today and was told to go to the hospital for fluids.  Patient has been having some nausea and vomiting in the morning but is but not vomiting in the later aspect of the day.  Patient is feeling her child moving his in no acute distress with abdominal discomfort or the passage of fluid.  Patient was given Tylenol, a liter of IV fluids and had repeat viral studies done.  The patient was COVID-positive on 1218 and although negative today, this is likely the remnants of that.  The patient has a white count of 13,000 however no  other acute findings on testing.  The patient notes that she felt much better after Tylenol and IV fluids as well as a nebulizer treatment.  The patient had albuterol called in by her OB/GYN and she still needs to pick this up.  The patient was told to talk to her OB/GYN about the potential of steroids for bronchitis type symptoms however with the potential of the medicine causing early labor, we will leave that decision up to her OB/GYN.  The patient was told to return if worse and was discharged.    Amount and/or Complexity of Data Reviewed  Labs: ordered. Decision-making details documented in ED Course.    Risk  OTC drugs.  Prescription drug management.             Disposition  Final diagnoses:   URI (upper respiratory infection)   Pregnancy   Bronchospasm     Time reflects when diagnosis was documented in both MDM as applicable and the Disposition within this note       Time User Action Codes Description Comment    12/26/2023 11:54 PM Javan Roberts [J06.9] URI (upper respiratory infection)     12/26/2023 11:54 PM Javan Roberts [Z34.90] Pregnancy     12/26/2023 11:54 PM Javan Roberts [J98.01] Bronchospasm           ED Disposition       ED Disposition   Discharge    Condition   Stable    Date/Time   Tue Dec 26, 2023 8688    Comment   Shruti Camilo discharge to home/self care.                   Follow-up Information    None         Discharge Medication List as of 12/26/2023 11:55 PM        CONTINUE these medications which have NOT CHANGED    Details   albuterol (ProAir HFA) 90 mcg/act inhaler Inhale 2 puffs every 6 (six) hours as needed for wheezing, Starting Tue 12/26/2023, Normal      cetirizine (ZyrTEC) 10 mg tablet Take 10 mg by mouth daily as needed, Starting Tue 7/20/2021, Historical Med      diphenhydrAMINE (BENADRYL) 25 mg capsule Starting Fri 8/18/2023, Historical Med      Elastic Bandages & Supports (Abdominal Support/L-XL) MISC Wear abd support during the day, take off at  night, Print      ondansetron (ZOFRAN-ODT) 4 mg disintegrating tablet Apply 4 mg to cheek every 8 (eight) hours as needed, Starting Tue 12/19/2023, Historical Med      Prenatal Vit-Fe Fumarate-FA (Prenatal Vitamin) 27-0.8 MG TABS Take 1 tablet by mouth daily, Starting Fri 7/7/2023, Historical Med      Pyridoxine HCl (vitamin B-6) 25 MG tablet Starting Fri 9/22/2023, Historical Med             No discharge procedures on file.    PDMP Review       None            ED Provider  Electronically Signed by             Javan Roberts Jr., DO  12/27/23 0106       Javan Roberts Jr., DO  12/27/23 0107

## 2023-12-27 NOTE — DISCHARGE INSTRUCTIONS
Utilize Tylenol as needed for pain.  Increase fluids is much as possible over the next few days.    Drink less amounts frequently instead of large amounts and frequently over the next few days.    Use albuterol 2 puffs every 6 hours for the next 3 to 4 days.  Afterwards, you may use it every 6 hours only as needed.    Contact your OB/GYN doctor about considering steroids.  This is sometimes controversial due to your pregnancy.  It may be worthwhile to talk to your doctor about it.

## 2024-01-05 ENCOUNTER — TELEPHONE (OUTPATIENT)
Dept: OBGYN CLINIC | Facility: MEDICAL CENTER | Age: 29
End: 2024-01-05

## 2024-01-05 NOTE — TELEPHONE ENCOUNTER
Lvm to remind Shruti of her 28 week labs that need to be done before her appointment on Monday at 1330.

## 2024-01-05 NOTE — TELEPHONE ENCOUNTER
----- Message from SIMONE Restrepo sent at 1/5/2024  2:19 PM EST -----  Can we please call and remind patient to get her 28-week labs drawn prior to her appointment on Monday.  Thank you

## 2024-01-06 ENCOUNTER — APPOINTMENT (OUTPATIENT)
Dept: LAB | Facility: CLINIC | Age: 29
End: 2024-01-06
Payer: COMMERCIAL

## 2024-01-06 DIAGNOSIS — Z34.92 SECOND TRIMESTER PREGNANCY: ICD-10-CM

## 2024-01-06 LAB
AMPHETAMINES SERPL QL SCN: NEGATIVE
BARBITURATES UR QL: NEGATIVE
BASOPHILS # BLD AUTO: 0.03 THOUSANDS/ÂΜL (ref 0–0.1)
BASOPHILS NFR BLD AUTO: 0 % (ref 0–1)
BENZODIAZ UR QL: NEGATIVE
COCAINE UR QL: NEGATIVE
EOSINOPHIL # BLD AUTO: 0.09 THOUSAND/ÂΜL (ref 0–0.61)
EOSINOPHIL NFR BLD AUTO: 1 % (ref 0–6)
ERYTHROCYTE [DISTWIDTH] IN BLOOD BY AUTOMATED COUNT: 13.7 % (ref 11.6–15.1)
HCT VFR BLD AUTO: 35.1 % (ref 34.8–46.1)
HGB BLD-MCNC: 11.8 G/DL (ref 11.5–15.4)
IMM GRANULOCYTES # BLD AUTO: 0.15 THOUSAND/UL (ref 0–0.2)
IMM GRANULOCYTES NFR BLD AUTO: 1 % (ref 0–2)
LYMPHOCYTES # BLD AUTO: 2.33 THOUSANDS/ÂΜL (ref 0.6–4.47)
LYMPHOCYTES NFR BLD AUTO: 18 % (ref 14–44)
MCH RBC QN AUTO: 31.6 PG (ref 26.8–34.3)
MCHC RBC AUTO-ENTMCNC: 33.6 G/DL (ref 31.4–37.4)
MCV RBC AUTO: 94 FL (ref 82–98)
METHADONE UR QL: NEGATIVE
MONOCYTES # BLD AUTO: 0.7 THOUSAND/ÂΜL (ref 0.17–1.22)
MONOCYTES NFR BLD AUTO: 5 % (ref 4–12)
NEUTROPHILS # BLD AUTO: 9.79 THOUSANDS/ÂΜL (ref 1.85–7.62)
NEUTS SEG NFR BLD AUTO: 75 % (ref 43–75)
NRBC BLD AUTO-RTO: 0 /100 WBCS
OPIATES UR QL SCN: NEGATIVE
OXYCODONE+OXYMORPHONE UR QL SCN: NEGATIVE
PCP UR QL: NEGATIVE
PLATELET # BLD AUTO: 241 THOUSANDS/UL (ref 149–390)
PMV BLD AUTO: 11.7 FL (ref 8.9–12.7)
RBC # BLD AUTO: 3.73 MILLION/UL (ref 3.81–5.12)
THC UR QL: POSITIVE
WBC # BLD AUTO: 13.09 THOUSAND/UL (ref 4.31–10.16)

## 2024-01-06 PROCEDURE — 80307 DRUG TEST PRSMV CHEM ANLYZR: CPT

## 2024-01-06 PROCEDURE — 85025 COMPLETE CBC W/AUTO DIFF WBC: CPT

## 2024-01-06 PROCEDURE — 86705 HEP B CORE ANTIBODY IGM: CPT

## 2024-01-06 PROCEDURE — 87340 HEPATITIS B SURFACE AG IA: CPT

## 2024-01-06 PROCEDURE — 86803 HEPATITIS C AB TEST: CPT

## 2024-01-06 PROCEDURE — 36415 COLL VENOUS BLD VENIPUNCTURE: CPT

## 2024-01-08 ENCOUNTER — ROUTINE PRENATAL (OUTPATIENT)
Dept: OBGYN CLINIC | Facility: MEDICAL CENTER | Age: 29
End: 2024-01-08
Payer: COMMERCIAL

## 2024-01-08 ENCOUNTER — APPOINTMENT (OUTPATIENT)
Dept: LAB | Facility: CLINIC | Age: 29
End: 2024-01-08
Payer: COMMERCIAL

## 2024-01-08 VITALS — WEIGHT: 226.2 LBS | DIASTOLIC BLOOD PRESSURE: 72 MMHG | SYSTOLIC BLOOD PRESSURE: 116 MMHG | BODY MASS INDEX: 38.83 KG/M2

## 2024-01-08 DIAGNOSIS — O99.213 OBESITY DURING PREGNANCY IN THIRD TRIMESTER: ICD-10-CM

## 2024-01-08 DIAGNOSIS — O09.813 PREGNANCY RESULTING FROM ASSISTED REPRODUCTIVE TECHNOLOGY IN THIRD TRIMESTER: Primary | ICD-10-CM

## 2024-01-08 DIAGNOSIS — Z3A.33 33 WEEKS GESTATION OF PREGNANCY: ICD-10-CM

## 2024-01-08 DIAGNOSIS — Z34.92 SECOND TRIMESTER PREGNANCY: ICD-10-CM

## 2024-01-08 LAB
GLUCOSE 1H P 50 G GLC PO SERPL-MCNC: 96 MG/DL (ref 40–134)
HBV CORE IGM SER QL: NORMAL
HBV SURFACE AG SER QL: NORMAL
HCV AB SER QL: NORMAL

## 2024-01-08 PROCEDURE — 36415 COLL VENOUS BLD VENIPUNCTURE: CPT

## 2024-01-08 PROCEDURE — 90471 IMMUNIZATION ADMIN: CPT | Performed by: NURSE PRACTITIONER

## 2024-01-08 PROCEDURE — 99215 OFFICE O/P EST HI 40 MIN: CPT | Performed by: NURSE PRACTITIONER

## 2024-01-08 PROCEDURE — 82950 GLUCOSE TEST: CPT

## 2024-01-08 PROCEDURE — 90678 RSV VACC PREF BIVALENT IM: CPT | Performed by: NURSE PRACTITIONER

## 2024-01-08 NOTE — PROGRESS NOTES
Denies loss of fluid, vaginal bleeding and abdominal pain.  Confirms frequent fetal movement.  Doing fetal kick counts.  Reviewed recommendation for RSV vaccine, patient is agreeable to administration at today's visit.  Uses marijuana approximately once per day.  Reviewed labs reviewed-hepatitis B surface antigen, hepatitis B core antibody, hepatitis C negative.  CBC no signs of anemia.  1 hour glucose pending at time of today's visit  BP: 116/72 weight: +25lbs  Plan  -Continue prenatal vitamins daily  -Continue fetal kick counts daily  -Follow-up with  center scheduled for 24  -Reviewed with patient no known safe amount of marijuana during pregnancy.  Patient verbalized understanding  -RSV vaccine today  -Common discomforts of pregnancy and precautions including  labor reviewed.  Signs and symptoms to report reviewed.  RTO 2 weeks

## 2024-01-12 ENCOUNTER — ULTRASOUND (OUTPATIENT)
Dept: PERINATAL CARE | Facility: OTHER | Age: 29
End: 2024-01-12
Payer: COMMERCIAL

## 2024-01-12 VITALS
HEIGHT: 65 IN | SYSTOLIC BLOOD PRESSURE: 100 MMHG | DIASTOLIC BLOOD PRESSURE: 60 MMHG | HEART RATE: 98 BPM | WEIGHT: 226 LBS | BODY MASS INDEX: 37.65 KG/M2

## 2024-01-12 DIAGNOSIS — O98.513 COVID-19 AFFECTING PREGNANCY IN THIRD TRIMESTER: ICD-10-CM

## 2024-01-12 DIAGNOSIS — O99.213 OBESITY DURING PREGNANCY IN THIRD TRIMESTER: Primary | ICD-10-CM

## 2024-01-12 DIAGNOSIS — O09.813 PREGNANCY RESULTING FROM ASSISTED REPRODUCTIVE TECHNOLOGY IN THIRD TRIMESTER: ICD-10-CM

## 2024-01-12 DIAGNOSIS — Z36.89 ENCOUNTER FOR ULTRASOUND TO CHECK FETAL GROWTH: ICD-10-CM

## 2024-01-12 DIAGNOSIS — U07.1 COVID-19 AFFECTING PREGNANCY IN THIRD TRIMESTER: ICD-10-CM

## 2024-01-12 DIAGNOSIS — Z3A.33 33 WEEKS GESTATION OF PREGNANCY: ICD-10-CM

## 2024-01-12 PROCEDURE — 76816 OB US FOLLOW-UP PER FETUS: CPT | Performed by: OBSTETRICS & GYNECOLOGY

## 2024-01-12 PROCEDURE — 99212 OFFICE O/P EST SF 10 MIN: CPT | Performed by: OBSTETRICS & GYNECOLOGY

## 2024-01-12 NOTE — PROGRESS NOTES
"[facility]: Shruti Camilo was seen today for fetal growth assessment ultrasound.  See ultrasound report under \"OB Procedures\" tab.      MDM:   I. Diagnoses/Problems addressed:  Growth screening  II.  Data: I reviewed 1 hr GTT lab tests ordered by another provider.  III.  Risk of morbidity: Low    Please don't hesitate to contact our office with any concerns or questions.  -Felicia Deleon MD    "

## 2024-01-15 ENCOUNTER — PATIENT MESSAGE (OUTPATIENT)
Dept: OBGYN CLINIC | Facility: MEDICAL CENTER | Age: 29
End: 2024-01-15

## 2024-01-15 ENCOUNTER — EVALUATION (OUTPATIENT)
Dept: PHYSICAL THERAPY | Facility: HOME HEALTHCARE | Age: 29
End: 2024-01-15
Payer: COMMERCIAL

## 2024-01-15 DIAGNOSIS — N39.3 SUI (STRESS URINARY INCONTINENCE, FEMALE): Primary | ICD-10-CM

## 2024-01-15 DIAGNOSIS — R51.9 HEADACHE IN PREGNANCY, ANTEPARTUM, THIRD TRIMESTER: Primary | ICD-10-CM

## 2024-01-15 DIAGNOSIS — O26.893 HEADACHE IN PREGNANCY, ANTEPARTUM, THIRD TRIMESTER: Primary | ICD-10-CM

## 2024-01-15 DIAGNOSIS — O26.893 PELVIC PAIN AFFECTING PREGNANCY IN THIRD TRIMESTER, ANTEPARTUM: ICD-10-CM

## 2024-01-15 DIAGNOSIS — R10.2 PELVIC PAIN AFFECTING PREGNANCY IN THIRD TRIMESTER, ANTEPARTUM: ICD-10-CM

## 2024-01-15 PROCEDURE — 97112 NEUROMUSCULAR REEDUCATION: CPT | Performed by: PHYSICAL THERAPIST

## 2024-01-15 PROCEDURE — 97530 THERAPEUTIC ACTIVITIES: CPT | Performed by: PHYSICAL THERAPIST

## 2024-01-15 PROCEDURE — 97162 PT EVAL MOD COMPLEX 30 MIN: CPT | Performed by: PHYSICAL THERAPIST

## 2024-01-15 NOTE — PROGRESS NOTES
PT Evaluation     Today's date: 1/15/2024  Patient name: Shruti Camilo  : 1995  MRN: [id number]  Referring provider: Teresa Hadley MD  Dx:   Encounter Diagnosis     ICD-10-CM    1. ALIE (stress urinary incontinence, female)  N39.3       2. Pelvic pain affecting pregnancy in third trimester, antepartum  O26.893 Ambulatory Referral to Physical Therapy    R10.2                      Assessment  Assessment details: Pt Shruti Camilo is a 28 y.o. who presents to OPPT at 34 week pregnant with ALIE and pelvic pain. Pt did get SI stability belt but notes that belt is too small and uncomfortable. She has constant lower abdominal/pelvic pressure and notes that incontinence happens with coughing, sneezing, or positional changes. She has intermittent episodes of urge incontinence due to pressure on the bladder.  PT will address the noted impairments by performing pelvic floor and hip strengthening, stretching, breathing exercises, biofeedback, functional activities and manual techniques to allow the patient to return to her PLOF.     Impairments: abnormal gait, abnormal or restricted ROM, activity intolerance, impaired physical strength, lacks appropriate home exercise program, pain with function and poor posture   Other impairment: pelvic floor dysfunction  Functional limitations: ALIE; pelvic pain  Goals  *Pt to be indeSTGs to be met in 4 weeks:  * Pt will demonstrate correct isolation, contraction, and relaxation of pelvic floor without overflow from global stabilizers.   * Pt will demonstrate correct diaphragmatic breathing in order to decrease muscle over-activity and improve intra-abdominal pressure gradients.   * Pt will report a decrease in urinary leakage by 25% for improved tolerance to daily activities .   LTGs to be met by discharge:  * Normalize findings on sEMG to indicate PFM strength average of at least 12uV and resting average < 2.5uV.  * Implements relaxation strategies on a daily basis.  *  Patient will report reduction in pelvic pain by > 50%   * Patient will be compliant with comprehensive home exercise program for self management of condition       Plan  Patient would benefit from: women's health eval  Planned modality interventions: biofeedback  Planned therapy interventions: abdominal trunk stabilization, manual therapy, neuromuscular re-education, therapeutic activities, therapeutic exercise, postural training, patient education, breathing training, gait training, functional ROM exercises and flexibility  Frequency: 2x week  Duration in weeks: 8  Plan of Care beginning date: 1/15/2024  Plan of Care expiration date: 2024  Treatment plan discussed with: referring physician and patient        PT Pelvic Floor Subjective:   History of Present Illness:   Pt reporting that she is currently 34 weeks pregnant with her 2nd child. She has prior pregnancy with 3 year old son who was delivered via . Pt notes that she began to have pelvic pain about 2 months ago, as well as ALIE.  She was told to get a belly band which she did but feels it is too tight and not providing any relief. MD referral to OPPT for conservative management of s/s.   Quality of life: good    Social Support:     Lives with:  Significant otherPronouns: she/her  OB/ gyn History    Gestational History:     Number of prior pregnancies: 4    Number of term pregnancies: 2    Delivery Type:  section      Menstrual History:      Menstrual irregularities irregular menses  Bladder Function:      Voiding Difficulties comments:     Urinary leakage: urine leakage    Urinary leakage aggravated by: coughing, sneezing, bending and standing up    Nocturia (episodes per night): 3 and 4    Painful urination: No      Fluid Intake Type:  Water and tea  Incontinence Management:     Pads/Diaper Use:  24 hours  Sexual Function:     Pain during intercourse: No  Sexual function: vaginal pain  Pain:     At best pain ratin    At worst pain  ratin  Treatments:     Current treatment: physical therapy    Patient Goals:     Patient goals for therapy:  Improved pain management, improved quality of life, improved sleep, relaxation, improved comfort, improved bladder or bowel function, fully empty bladder or bowels and decreased pain      Objective     Postural Observations    Additional Postural Observation Details  Increased lumbar lordosis 2* pregnancy     Neurological Testing     Sensation     Lumbar   Left   Intact: light touch    Right   Intact: light touch    Active Range of Motion     Lumbar   Flexion:  Restriction level: moderate  Extension:  Restriction level: moderate  Left lateral flexion:  Restriction level: minimal  Right lateral flexion:  Restriction level: minimal             Precautions: pelvic pain; ALIE        Manuals 1/15       Pelvic exam                                 Neuro Re-Ed         DB  Reviewed exhale and contract        PF  Intro for HEP        TA admin  Intro for HEP                                        Ther Ex        SLR        Bridges         Hip add        S/L SLR        Clamshells                                 Ther Activity        Pelvic floor  Anatomy and function; exam technique and rational       Healthy bladder         Urge deferral                 Gait Training                        Modalities

## 2024-01-16 ENCOUNTER — HOSPITAL ENCOUNTER (OUTPATIENT)
Facility: HOSPITAL | Age: 29
Discharge: HOME | End: 2024-01-16
Attending: OBSTETRICS & GYNECOLOGY | Admitting: OBSTETRICS & GYNECOLOGY
Payer: COMMERCIAL

## 2024-01-16 ENCOUNTER — PATIENT MESSAGE (OUTPATIENT)
Dept: OBGYN CLINIC | Facility: MEDICAL CENTER | Age: 29
End: 2024-01-16

## 2024-01-16 VITALS
SYSTOLIC BLOOD PRESSURE: 119 MMHG | DIASTOLIC BLOOD PRESSURE: 66 MMHG | TEMPERATURE: 98.1 F | HEART RATE: 95 BPM | RESPIRATION RATE: 18 BRPM

## 2024-01-16 PROBLEM — R51.9 ACUTE INTRACTABLE HEADACHE: Status: ACTIVE | Noted: 2024-01-16

## 2024-01-16 LAB
ALBUMIN SERPL BCP-MCNC: 3.3 G/DL (ref 3.5–5)
ALP SERPL-CCNC: 85 U/L (ref 34–104)
ALT SERPL W P-5'-P-CCNC: 13 U/L (ref 7–52)
ANION GAP SERPL CALCULATED.3IONS-SCNC: 6 MMOL/L
AST SERPL W P-5'-P-CCNC: 15 U/L (ref 13–39)
BILIRUB SERPL-MCNC: 0.27 MG/DL (ref 0.2–1)
BUN SERPL-MCNC: 5 MG/DL (ref 5–25)
CALCIUM ALBUM COR SERPL-MCNC: 9 MG/DL (ref 8.3–10.1)
CALCIUM SERPL-MCNC: 8.4 MG/DL (ref 8.4–10.2)
CHLORIDE SERPL-SCNC: 105 MMOL/L (ref 96–108)
CO2 SERPL-SCNC: 26 MMOL/L (ref 21–32)
CREAT SERPL-MCNC: 0.53 MG/DL (ref 0.6–1.3)
CREAT UR-MCNC: 188.5 MG/DL
ERYTHROCYTE [DISTWIDTH] IN BLOOD BY AUTOMATED COUNT: 13.8 % (ref 11.6–15.1)
GFR SERPL CREATININE-BSD FRML MDRD: 129 ML/MIN/1.73SQ M
GLUCOSE SERPL-MCNC: 78 MG/DL (ref 65–140)
HCT VFR BLD AUTO: 33.4 % (ref 34.8–46.1)
HGB BLD-MCNC: 11.3 G/DL (ref 11.5–15.4)
MCH RBC QN AUTO: 31.8 PG (ref 26.8–34.3)
MCHC RBC AUTO-ENTMCNC: 33.8 G/DL (ref 31.4–37.4)
MCV RBC AUTO: 94 FL (ref 82–98)
PLATELET # BLD AUTO: 208 THOUSANDS/UL (ref 149–390)
PMV BLD AUTO: 10.6 FL (ref 8.9–12.7)
POTASSIUM SERPL-SCNC: 3.6 MMOL/L (ref 3.5–5.3)
PROT SERPL-MCNC: 6 G/DL (ref 6.4–8.4)
PROT UR-MCNC: 33 MG/DL
PROT/CREAT UR: 0.18 MG/G{CREAT} (ref 0–0.1)
RBC # BLD AUTO: 3.55 MILLION/UL (ref 3.81–5.12)
SODIUM SERPL-SCNC: 137 MMOL/L (ref 135–147)
WBC # BLD AUTO: 14.42 THOUSAND/UL (ref 4.31–10.16)

## 2024-01-16 PROCEDURE — 99202 OFFICE O/P NEW SF 15 MIN: CPT

## 2024-01-16 PROCEDURE — NC001 PR NO CHARGE: Performed by: OBSTETRICS & GYNECOLOGY

## 2024-01-16 PROCEDURE — 85027 COMPLETE CBC AUTOMATED: CPT

## 2024-01-16 PROCEDURE — 82570 ASSAY OF URINE CREATININE: CPT

## 2024-01-16 PROCEDURE — 84156 ASSAY OF PROTEIN URINE: CPT

## 2024-01-16 PROCEDURE — 80053 COMPREHEN METABOLIC PANEL: CPT

## 2024-01-16 RX ORDER — ACETAMINOPHEN 325 MG/1
975 TABLET ORAL ONCE
Status: COMPLETED | OUTPATIENT
Start: 2024-01-16 | End: 2024-01-16

## 2024-01-16 RX ORDER — DIPHENHYDRAMINE HCL 25 MG
25 TABLET ORAL ONCE
Status: COMPLETED | OUTPATIENT
Start: 2024-01-16 | End: 2024-01-16

## 2024-01-16 RX ORDER — METOCLOPRAMIDE 10 MG/1
10 TABLET ORAL ONCE
Status: COMPLETED | OUTPATIENT
Start: 2024-01-16 | End: 2024-01-16

## 2024-01-16 RX ADMIN — DIPHENHYDRAMINE HCL 25 MG: 25 TABLET, COATED ORAL at 19:48

## 2024-01-16 RX ADMIN — METOCLOPRAMIDE 10 MG: 10 TABLET ORAL at 19:48

## 2024-01-16 RX ADMIN — ACETAMINOPHEN 975 MG: 325 TABLET, FILM COATED ORAL at 19:47

## 2024-01-16 NOTE — PATIENT COMMUNICATION
Contacted patient to review symptoms.      Patient's BP in office have been on the lower side.  Patient states her headache has been unrelieved by tylenol or napping.  She is eating and drinking appropriately.  No RUQ pain, no swelling.      After discussing with provider in office, recommendation is for Shruti to go to [facility] for evaluation.  Discussed recommendations with patient - patient stated she will have to wait for her son's father to get home before she is able to head into the hospital.     Advised patient that I would let L&D know that she will be heading in for evaluation - advised to disregard labs that were ordered since they will draw them in the hospital.      L&D charge notified

## 2024-01-17 PROBLEM — Z3A.34 34 WEEKS GESTATION OF PREGNANCY: Status: ACTIVE | Noted: 2023-12-18

## 2024-01-17 NOTE — PROGRESS NOTES
L&D Triage Note - OB/GYN  Shruti Camilo 28 y.o. female MRN: [id number]  Unit/Bed#: L&D 326-01 Encounter: [id number]      ASSESSMENT:    Shruti Camilo is a 28 y.o.  at 34w3d presenting with headache. Not responding to Tylenol at home.    PLAN:    1) Uncomplicated migraine  Discharge home with Tylenol, Reglan, Benadryl as needed for headaches.   Continue routine prenatal care  Discharge from OB triage with  labor precautions    - Reviewed rupture of membranes, false vs true labor, decreased fetal movement, and vaginal bleeding   - Pt to call provider with any concerns and follow up at her next scheduled prenatal appointment on 24   - Case discussed with Dr. Peraza     SUBJECTIVE:    Shruti Camilo 28 y.o.  at 34w3d with an Estimated Date of Delivery: 24 complaining of worsening headache for 2 weeks.  Patient has  History of migraines, previously seen by neurology, was going to be started on injectable medication, but got pregnant 1 month later and has not followed up with neurology since.  Patient reports having headaches since the beginning of her pregnancy, she would take Tylenol and they would resolve.  However, for the last 2 weeks, headaches have been getting worse and not responding to Tylenol.  Patient taking 2 extra strength Tylenol's at home, last dose at 9am this morning.  She describes headache as left-sided, sharp, throbbing, lasting all day, associated with nausea/vomiting, visual floaters.  No aura.  Patient has Zofran at home for nausea, which helps symptoms.   Patient also notes that she had a sinus infection approximately 2 months ago and was treated with amoxicillin.  Patient has known history of allergies and was using Flonase daily prior to pregnancy.  Since sinus infection, patient reports feeling congested with sinus pressure, and the symptoms seem to be making her headaches worse.  Patient not using Flonase during  pregnancy.    Denies abdominal pain or cramping. No leakage of fluids, no spotting.    Her current obstetrical history is significant for using marijuana approximately once per day.     Her past obstetrical history is significant for primary  for failed IOL.     Contractions: none  Leakage of fluid: none  Vaginal Bleeding: none  Fetal movement: present    OBJECTIVE:    Vitals:    24 1853   BP: 119/66   Pulse: 95   Resp: 18   Temp: 98.1 °F (36.7 °C)       ROS:  Constitutional: Negative  Respiratory: Negative  Cardiovascular: Negative    Gastrointestinal: Nausea, vomiting  Neuro: headache, no weakness, no blurry vision      General Physical Exam:  General: in no apparent distress, alert, and well hydrated  Cardiovascular: No murmurs  Lungs: non-labored breathing  Abdomen: abdomen is soft without significant tenderness, masses, organomegaly or guarding  Lower extremeties: no ALBINA  Neuro: negative      Fetal monitoring:  FHT:  135 bpm/ Moderate 6 - 25 bpm / 15 x 15 accelerations present, no decelerations  [place]: quiet       Analy Alvarez   Family Medicine PGY3  2024 8:00 PM

## 2024-01-17 NOTE — PROGRESS NOTES
Problem List Items Addressed This Visit          Other    S/P primary low transverse      Was previous counseled and wants to     This baby is expected to be about - 8 lb   At 33 weeks was 5.14 AC 93 %   Last pregnancy at 32 weeks was - 4.10 AC was 54%    Shoulder dystocia was discussed     Maximum dilation was lat pregnancy 5 cm was ruptured and pitocin that entire time Fetal weight at that time was - 7.10          Pregnancy resulting from assisted reproductive technology     Letrozole - no need for testing          Medical marijuana use     Advised will get drug screen in hospital bc of use less then 2 years ago   If positive [facility] will be called   Remind to bring her medical card.          34 weeks gestation of pregnancy - Primary     NIPT / AFP low risk   Level 2 and growth scan reviewed     Last growth AC diameter was 98%           Other Visit Diagnoses       Macrosomia        Relevant Orders    Ambulatory Referral to Maternal Fetal Medicine

## 2024-01-17 NOTE — ASSESSMENT & PLAN NOTE
Was previous counseled and wants to     This baby is expected to be about - 8 lb   At 33 weeks was 5.14 AC 93 %   Last pregnancy at 32 weeks was - 4.10 AC was 54%    Shoulder dystocia was discussed     Maximum dilation was lat pregnancy 5 cm was ruptured and pitocin that entire time Fetal weight at that time was - 7.10

## 2024-01-17 NOTE — ASSESSMENT & PLAN NOTE
Advised will get drug screen in hospital bc of use less then 2 years ago   If positive [facility] will be called   Remind to bring her medical card.

## 2024-01-18 ENCOUNTER — ROUTINE PRENATAL (OUTPATIENT)
Dept: OBGYN CLINIC | Facility: CLINIC | Age: 29
End: 2024-01-18
Payer: COMMERCIAL

## 2024-01-18 VITALS — WEIGHT: 227 LBS | SYSTOLIC BLOOD PRESSURE: 120 MMHG | BODY MASS INDEX: 37.77 KG/M2 | DIASTOLIC BLOOD PRESSURE: 74 MMHG

## 2024-01-18 DIAGNOSIS — O09.813 PREGNANCY RESULTING FROM ASSISTED REPRODUCTIVE TECHNOLOGY IN THIRD TRIMESTER: ICD-10-CM

## 2024-01-18 DIAGNOSIS — Z98.891 S/P PRIMARY LOW TRANSVERSE C-SECTION: ICD-10-CM

## 2024-01-18 DIAGNOSIS — Z79.899 MEDICAL MARIJUANA USE: ICD-10-CM

## 2024-01-18 DIAGNOSIS — Z3A.34 34 WEEKS GESTATION OF PREGNANCY: Primary | ICD-10-CM

## 2024-01-18 PROCEDURE — 99214 OFFICE O/P EST MOD 30 MIN: CPT | Performed by: OBSTETRICS & GYNECOLOGY

## 2024-01-19 ENCOUNTER — TELEPHONE (OUTPATIENT)
Facility: HOSPITAL | Age: 29
End: 2024-01-19

## 2024-01-19 NOTE — TELEPHONE ENCOUNTER
----- Message from Charlie Hernández RN sent at 2024  7:56 AM EST -----    ----- Message -----  From: Mayte Palacio RN  Sent: 2024   4:11 PM EST  [facility]     Dr. Hinojosa put a referral in for this patient to get a growth scan at 37 weeks - are you able to reach out to her to schedule?    Thank you so much!    Mayte MCKNIGHT RN

## 2024-01-21 ENCOUNTER — HOSPITAL ENCOUNTER (EMERGENCY)
Facility: HOSPITAL | Age: 29
Discharge: HOME | End: 2024-01-21
Attending: EMERGENCY MEDICINE
Payer: COMMERCIAL

## 2024-01-21 VITALS
WEIGHT: 220 LBS | TEMPERATURE: 97.5 F | BODY MASS INDEX: 36.61 KG/M2 | SYSTOLIC BLOOD PRESSURE: 100 MMHG | RESPIRATION RATE: 18 BRPM | OXYGEN SATURATION: 98 % | HEART RATE: 96 BPM | DIASTOLIC BLOOD PRESSURE: 52 MMHG

## 2024-01-21 DIAGNOSIS — J32.9 VIRAL SINUSITIS: ICD-10-CM

## 2024-01-21 DIAGNOSIS — R51.9 HEADACHE: Primary | ICD-10-CM

## 2024-01-21 DIAGNOSIS — B97.89 VIRAL SINUSITIS: ICD-10-CM

## 2024-01-21 LAB
FLUAV RNA RESP QL NAA+PROBE: NEGATIVE
FLUBV RNA RESP QL NAA+PROBE: NEGATIVE
RSV RNA RESP QL NAA+PROBE: NEGATIVE
SARS-COV-2 RNA RESP QL NAA+PROBE: NEGATIVE

## 2024-01-21 PROCEDURE — 96375 TX/PRO/DX INJ NEW DRUG ADDON: CPT

## 2024-01-21 PROCEDURE — 0241U HB NFCT DS VIR RESP RNA 4 TRGT: CPT | Performed by: EMERGENCY MEDICINE

## 2024-01-21 PROCEDURE — 99283 EMERGENCY DEPT VISIT LOW MDM: CPT

## 2024-01-21 PROCEDURE — 99284 EMERGENCY DEPT VISIT MOD MDM: CPT | Performed by: EMERGENCY MEDICINE

## 2024-01-21 PROCEDURE — 96365 THER/PROPH/DIAG IV INF INIT: CPT

## 2024-01-21 RX ORDER — METOCLOPRAMIDE HYDROCHLORIDE 5 MG/ML
10 INJECTION INTRAMUSCULAR; INTRAVENOUS ONCE
Status: COMPLETED | OUTPATIENT
Start: 2024-01-21 | End: 2024-01-21

## 2024-01-21 RX ORDER — MAGNESIUM SULFATE HEPTAHYDRATE 40 MG/ML
2 INJECTION, SOLUTION INTRAVENOUS ONCE
Status: COMPLETED | OUTPATIENT
Start: 2024-01-21 | End: 2024-01-21

## 2024-01-21 RX ORDER — DIPHENHYDRAMINE HYDROCHLORIDE 50 MG/ML
25 INJECTION INTRAMUSCULAR; INTRAVENOUS ONCE
Status: COMPLETED | OUTPATIENT
Start: 2024-01-21 | End: 2024-01-21

## 2024-01-21 RX ADMIN — SODIUM CHLORIDE 1000 ML: 0.9 INJECTION, SOLUTION INTRAVENOUS at 14:05

## 2024-01-21 RX ADMIN — METOCLOPRAMIDE 10 MG: 5 INJECTION, SOLUTION INTRAMUSCULAR; INTRAVENOUS at 14:04

## 2024-01-21 RX ADMIN — DIPHENHYDRAMINE HYDROCHLORIDE 25 MG: 50 INJECTION, SOLUTION INTRAMUSCULAR; INTRAVENOUS at 14:04

## 2024-01-21 RX ADMIN — MAGNESIUM SULFATE HEPTAHYDRATE 2 G: 40 INJECTION, SOLUTION INTRAVENOUS at 14:10

## 2024-01-21 NOTE — ED PROVIDER NOTES
"History  Chief Complaint   Patient presents with    Headache     35 weeks pregnant; believes she may have ear infection in left ear causing the head ache      Patient is a 37 yo F at 35 weeks gestation who presents for evaluation of a headache.  Patient says she has had a headache over the past week.  She says it has been persistent.  She says it has not been worsening or changed.  She says it is located in the sinuses and frontal part of her head.  She was seen at the labor delivery floor on 1-18 for evaluation of her symptoms.  She said they \"did a lot of tests\" and sent her home.  She says her blood pressure has been fine.  She denies any blurred vision.  She does admit to some left ear pain and thinks that it may be coming from that.  She says she stuck a Q-tip in there and saw some \"blood\" yesterday.  She also admits to persistent sinus congestion.  She has been taking Tylenol for the symptoms with little relief.  She denies fevers, chillls.         Prior to Admission Medications   Prescriptions Last Dose Informant Patient Reported? Taking?   Elastic Bandages & Supports (Abdominal Support/L-XL) MISC   No No   Sig: Wear abd support during the day, take off at night   Patient not taking: Reported on 1/12/2024   Prenatal Vit-Fe Fumarate-FA (Prenatal Vitamin) 27-0.8 MG TABS   Yes No   Sig: Take 1 tablet by mouth daily   Pyridoxine HCl (vitamin B-6) 25 MG tablet   Yes No   albuterol (ProAir HFA) 90 mcg/act inhaler   No No   Sig: Inhale 2 puffs every 6 (six) hours as needed for wheezing   cetirizine (ZyrTEC) 10 mg tablet   Yes No   Sig: Take 10 mg by mouth daily as needed   diphenhydrAMINE (BENADRYL) 25 mg capsule   Yes No   ondansetron (ZOFRAN-ODT) 4 mg disintegrating tablet   Yes No   Sig: Apply 4 mg to cheek every 8 (eight) hours as needed      Facility-Administered Medications: None       Past Medical History:   Diagnosis Date    Anxiety     Celiac disease     Depression     Gastroparesis     Marijuana use, " "continuous 07/10/2018    Last Assessment & Plan:  Shruti reports that she has less ability to use THC due to her step-daughter being in the house.  She reports that she has significantly decrease to twice/day.  Reviewed risks of routine daily use, she does not feel this is an issue & states \"I know my body\" & is not willing to quit at this point in time.    Nicotine use disorder 07/10/2018    Last Assessment & Plan:  Shruti Vee continues to smoke 1 cigarette/day.  She reports that \"it will not be hard for her to stop\" & plans on quitting immediately.    Personality disorder (HCC)     Substance abuse (HCC)     marijuana    Trauma     PTSD    Varicella     vaccine       Past Surgical History:   Procedure Laterality Date    [place]  DELIVERY ONLY N/A 2021    Procedure:  SECTION ();  Surgeon: Charlie Hinojosa MD;  Location: [facility];  Service: Obstetrics    TONSILLECTOMY         Family History   Problem Relation Age of Onset    Other Mother         mental health issues    Psoriasis Mother     Heart disease Mother     No Known Problems Father     No Known Problems Brother     Heart disease Maternal Grandmother     Thrombophlebitis Maternal Grandmother     Heart disease Maternal Grandfather     Heart attack Maternal Grandfather     Obesity Maternal Grandfather     Stomach cancer Paternal Grandmother     Bone cancer Paternal Grandfather     No Known Problems Son     Breast cancer Neg Hx     Colon cancer Neg Hx     Ovarian cancer Neg Hx      I have reviewed and agree with the history as documented.    E-Cigarette/Vaping    E-Cigarette Use Never User      E-Cigarette/Vaping Substances    Nicotine No     THC No     CBD No     Flavoring No     Other No     Unknown No      Social History     Tobacco Use    Smoking status: Former     Current packs/day: 0.00     Average packs/day: 0.3 packs/day for 8.0 years (2.0 ttl pk-yrs)     Types: Cigarettes     Start date: 2012     Quit date: 2020     " Years since quitting: 3.6    Smokeless tobacco: Never    Tobacco comments:     quit with knowledge of pregnancy   Vaping Use    Vaping status: Never Used   Substance Use Topics    Alcohol use: Not Currently     Comment: rare    Drug use: Yes     Types: Marijuana     Comment: medical marijuana as needed       Review of Systems   Constitutional:  Negative for chills, diaphoresis and fever.   HENT:  Positive for congestion. Negative for sinus pressure, sore throat and trouble swallowing.    Eyes:  Negative for pain, discharge and itching.   Respiratory:  Negative for cough, chest tightness, shortness of breath and wheezing.    Cardiovascular:  Negative for chest pain, palpitations and leg swelling.   Gastrointestinal:  Negative for abdominal distention, abdominal pain, blood in stool, diarrhea, nausea and vomiting.   Endocrine: Negative for polyphagia and polyuria.   Genitourinary:  Negative for difficulty urinating, dysuria, flank pain, hematuria, pelvic pain and vaginal bleeding.   Musculoskeletal:  Negative for arthralgias and back pain.   Skin:  Negative for rash.   Neurological:  Positive for headaches. Negative for dizziness, syncope, weakness and light-headedness.       Physical Exam  Physical Exam  Vitals and nursing note reviewed.   Constitutional:       General: She is not in acute distress.     Appearance: She is well-developed.   HENT:      Head: Normocephalic and atraumatic.      Right Ear: External ear normal.      Left Ear: External ear normal.      Nose: Nose normal.      Mouth/Throat:      Mouth: Mucous membranes are moist.      Pharynx: No oropharyngeal exudate.   Eyes:      Conjunctiva/sclera: Conjunctivae normal.      Pupils: Pupils are equal, round, and reactive to light.   Cardiovascular:      Rate and Rhythm: Normal rate and regular rhythm.      Heart sounds: Normal heart sounds. No murmur heard.     No friction rub. No gallop.   Pulmonary:      Effort: Pulmonary effort is normal. No respiratory  distress.      Breath sounds: Normal breath sounds. No wheezing or rales.   Abdominal:      General: There is no distension.      Palpations: Abdomen is soft.      Tenderness: There is no abdominal tenderness. There is no guarding.   Musculoskeletal:         General: No swelling, tenderness or deformity. Normal range of motion.      Cervical back: Normal range of motion and neck supple.   Lymphadenopathy:      Cervical: No cervical adenopathy.   Skin:     General: Skin is warm and dry.   Neurological:      General: No focal deficit present.      Mental Status: She is alert and oriented to person, place, and time. Mental status is at baseline.      Cranial Nerves: No cranial nerve deficit.      Sensory: No sensory deficit.      Motor: No weakness or abnormal muscle tone.      Coordination: Coordination normal.         Vital Signs  ED Triage Vitals   Temperature Pulse Respirations Blood Pressure SpO2   01/21/24 1336 01/21/24 1332 01/21/24 1332 01/21/24 1332 01/21/24 1332   97.5 °F (36.4 °C) 96 18 100/52 97 %      Temp src Heart Rate Source Patient Position - Orthostatic VS BP Location FiO2 (%)   -- 01/21/24 1332 01/21/24 1332 01/21/24 1332 --    Monitor Sitting Left arm       Pain Score       01/21/24 1332       6           Vitals:    01/21/24 1332 01/21/24 1336   BP: 100/52 100/52   Pulse: 96 96   Patient Position - Orthostatic VS: Sitting          Visual Acuity  Visual Acuity      Flowsheet Row Most Recent Value   L Pupil Size (mm) 4   R Pupil Size (mm) 4            ED Medications  Medications   sodium chloride 0.9 % bolus 1,000 mL (0 mL Intravenous Stopped 1/21/24 1505)   metoclopramide (REGLAN) injection 10 mg (10 mg Intravenous Given 1/21/24 1404)   diphenhydrAMINE (BENADRYL) injection 25 mg (25 mg Intravenous Given 1/21/24 1404)   magnesium sulfate 2 g/50 mL IVPB (premix) 2 g (0 g Intravenous Stopped 1/21/24 1440)       Diagnostic Studies  Results Reviewed       Procedure Component Value Units Date/Time     FLU/RSV/COVID - if FLU/RSV clinically relevant [[id number]]  (Normal) Collected: 01/21/24 1412    Lab Status: Final result Specimen: Nares from Nose Updated: 01/21/24 1455     SARS-CoV-2 Negative     INFLUENZA A PCR Negative     INFLUENZA B PCR Negative     RSV PCR Negative    Narrative:      FOR PEDIATRIC PATIENTS - copy/paste COVID Guidelines URL to browser: [email or web address]    SARS-CoV-2 assay is a Nucleic Acid Amplification assay intended for the  qualitative detection of nucleic acid from SARS-CoV-2 in nasopharyngeal  swabs. Results are for the presumptive identification of SARS-CoV-2 RNA.    Positive results are indicative of infection with SARS-CoV-2, the virus  causing COVID-19, but do not rule out bacterial infection or co-infection  with other viruses. Laboratories within the [place] and its  territories are required to report all positive results to the appropriate  public health authorities. Negative results do not preclude SARS-CoV-2  infection and should not be used as the sole basis for treatment or other  patient management decisions. Negative results must be combined with  clinical observations, patient history, and epidemiological information.  This test has not been FDA cleared or approved.    This test has been authorized by FDA under an Emergency Use Authorization  (EUA). This test is only authorized for the duration of time the  declaration that circumstances exist justifying the authorization of the  emergency use of an in vitro diagnostic tests for detection of SARS-CoV-2  virus and/or diagnosis of COVID-19 infection under section 564(b)(1) of  the Act, 21 U.S.C. 360bbb-3(b)(1), unless the authorization is terminated  or revoked sooner. The test has been validated but independent review by FDA  and CLIA is pending.    Test performed using [employer]: This RT-PCR assay targets N2,  a region unique to SARS-CoV-2. A conserved  region in the E-gene was chosen  for pan-Sarbecovirus detection which includes SARS-CoV-2.    According to CMS-2020-01-R, this platform meets the definition of high-throughput technology.                   No orders to display              Procedures  Procedures         ED Course  ED Course as of 01/21/24 1607   Sun Jan 21, 2024   1547 Patient symptoms improved.  She is resting comfortably in bed.  She feels comfortable going home at this time.                               SBIRT 22yo+      Flowsheet Row Most Recent Value   Initial Alcohol Screen: US AUDIT-C     1. How often do you have a drink containing alcohol? 0 Filed at: 01/21/2024 1336   2. How many drinks containing alcohol do you have on a typical day you are drinking?  0 Filed at: 01/21/2024 1336   3a. Male UNDER 65: How often do you have five or more drinks on one occasion? 0 Filed at: 01/21/2024 1336   3b. FEMALE Any Age, or MALE 65+: How often do you have 4 or more drinks on one occassion? 0 Filed at: 01/21/2024 1336   Audit-C Score 0 Filed at: 01/21/2024 1336   ARIES: How many times in the past year have you...    Used an illegal drug or used a prescription medication for non-medical reasons? Never Filed at: 01/21/2024 1336                      Medical Decision Making  28-year-old female presenting for evaluation of a headache.  Has been constant over the past week.  Has not been worsening.  Has been evaluated for the headache by OB recently.  Was cleared from their service.  Blood pressures have been within normal limits.  No visual changes.  No worsening nausea or vomiting above her baseline pregnancy symptoms.  Does admit to sinus congestion some left ear pain  TMs clear bilaterally, no signs of TM perforation or erythema.  Does have some sinus pressure.  Differential includes viral URI/COVID, migraine.  Will obtain COVID/flu/RSV swab.  Will give migraine cocktail minus Toradol given pregnancy.  Patient's blood pressure within normal limits, do not  have concern for preeclampsia at this time  Patient symptoms improved after medications.  She is in no acute distress.  Fetal heart tones 135 bpm  Patient discharged home, told to follow-up with OB/GYN, told to return to the ED if symptoms acutely worsen.    Problems Addressed:  Headache: acute illness or injury  Viral sinusitis: acute illness or injury    Risk  Prescription drug management.             Disposition  Final diagnoses:   Headache   Viral sinusitis     Time reflects when diagnosis was documented in both MDM as applicable and the Disposition within this note       Time User Action Codes Description Comment    1/21/2024  3:48 PM Donavon Flores Add [R51.9] Headache     1/21/2024  3:49 PM Donavon Flores Add [J32.9,  B97.89] Viral sinusitis           ED Disposition       ED Disposition   Discharge    Condition   Stable    Date/Time   Sun Jan 21, 2024 5862    Comment   Shruti Camilo discharge to home/self care.                   Follow-up Information       Follow up With Specialties Details Why Contact Info Additional Information    your obgyn  Schedule an appointment as soon as possible for a visit  For follow up of symptoms      [facility] Emergency Department Emergency Medicine Go to  If symptoms worsen [address] [facility] [id number] [phone] [facility] Emergency Department, [address] [address]            Discharge Medication List as of 1/21/2024  3:50 PM        CONTINUE these medications which have NOT CHANGED    Details   albuterol (ProAir HFA) 90 mcg/act inhaler Inhale 2 puffs every 6 (six) hours as needed for wheezing, Starting Tue 12/26/2023, Normal      cetirizine (ZyrTEC) 10 mg tablet Take 10 mg by mouth daily as needed, Starting Tue 7/20/2021, Historical Med      diphenhydrAMINE (BENADRYL) 25 mg capsule Starting Fri 8/18/2023, Historical Med      Elastic Bandages & Supports (Abdominal Support/L-XL)  MISC Wear abd support during the day, take off at night, Print      ondansetron (ZOFRAN-ODT) 4 mg disintegrating tablet Apply 4 mg to cheek every 8 (eight) hours as needed, Starting Tue 12/19/2023, Historical Med      Prenatal Vit-Fe Fumarate-FA (Prenatal Vitamin) 27-0.8 MG TABS Take 1 tablet by mouth daily, Starting Fri 7/7/2023, Historical Med      Pyridoxine HCl (vitamin B-6) 25 MG tablet Starting Fri 9/22/2023, Historical Med             No discharge procedures on file.    PDMP Review       None            ED Provider  Electronically Signed by             Donavon Flores DO  01/21/24 8957

## 2024-01-22 ENCOUNTER — APPOINTMENT (OUTPATIENT)
Dept: PHYSICAL THERAPY | Facility: HOME HEALTHCARE | Age: 29
End: 2024-01-22
Payer: COMMERCIAL

## 2024-01-26 ENCOUNTER — TELEPHONE (OUTPATIENT)
Dept: OBGYN CLINIC | Facility: MEDICAL CENTER | Age: 29
End: 2024-01-26

## 2024-01-26 NOTE — TELEPHONE ENCOUNTER
Patient called  will be 36 weeks 1/27/24, concerns of severely swollen legs, Deep purple color around her thighs and was vomiting 1/25/24. Per Dr. Hinojosa, sent her to L&D. Called L&D to inform them she was on her way and the concerns.

## 2024-01-28 PROBLEM — Z3A.36 36 WEEKS GESTATION OF PREGNANCY: Status: ACTIVE | Noted: 2023-12-18

## 2024-01-29 ENCOUNTER — APPOINTMENT (OUTPATIENT)
Dept: PHYSICAL THERAPY | Facility: HOME HEALTHCARE | Age: 29
End: 2024-01-29
Payer: COMMERCIAL

## 2024-01-29 ENCOUNTER — APPOINTMENT (OUTPATIENT)
Dept: LAB | Facility: CLINIC | Age: 29
End: 2024-01-29
Payer: COMMERCIAL

## 2024-01-29 ENCOUNTER — ROUTINE PRENATAL (OUTPATIENT)
Dept: OBGYN CLINIC | Facility: CLINIC | Age: 29
End: 2024-01-29
Payer: COMMERCIAL

## 2024-01-29 VITALS — WEIGHT: 232.8 LBS | BODY MASS INDEX: 38.74 KG/M2 | SYSTOLIC BLOOD PRESSURE: 116 MMHG | DIASTOLIC BLOOD PRESSURE: 72 MMHG

## 2024-01-29 DIAGNOSIS — R60.9 SWELLING: ICD-10-CM

## 2024-01-29 DIAGNOSIS — Z98.891 HISTORY OF C-SECTION: ICD-10-CM

## 2024-01-29 DIAGNOSIS — Z34.93 THIRD TRIMESTER PREGNANCY: ICD-10-CM

## 2024-01-29 DIAGNOSIS — O09.813 PREGNANCY RESULTING FROM ASSISTED REPRODUCTIVE TECHNOLOGY IN THIRD TRIMESTER: Primary | ICD-10-CM

## 2024-01-29 DIAGNOSIS — O26.893 HEADACHE IN PREGNANCY, ANTEPARTUM, THIRD TRIMESTER: ICD-10-CM

## 2024-01-29 DIAGNOSIS — R11.2 NAUSEA AND VOMITING, UNSPECIFIED VOMITING TYPE: Primary | ICD-10-CM

## 2024-01-29 DIAGNOSIS — O99.213 OBESITY DURING PREGNANCY IN THIRD TRIMESTER: ICD-10-CM

## 2024-01-29 DIAGNOSIS — Z3A.36 36 WEEKS GESTATION OF PREGNANCY: ICD-10-CM

## 2024-01-29 DIAGNOSIS — R51.9 HEADACHE IN PREGNANCY, ANTEPARTUM, THIRD TRIMESTER: ICD-10-CM

## 2024-01-29 DIAGNOSIS — L73.9 FOLLICULITIS: ICD-10-CM

## 2024-01-29 DIAGNOSIS — O98.513 COVID-19 AFFECTING PREGNANCY IN THIRD TRIMESTER: ICD-10-CM

## 2024-01-29 DIAGNOSIS — Z79.899 MEDICAL MARIJUANA USE: ICD-10-CM

## 2024-01-29 DIAGNOSIS — U07.1 COVID-19 AFFECTING PREGNANCY IN THIRD TRIMESTER: ICD-10-CM

## 2024-01-29 LAB
ALBUMIN SERPL BCP-MCNC: 3.4 G/DL (ref 3.5–5)
ALP SERPL-CCNC: 99 U/L (ref 34–104)
ALT SERPL W P-5'-P-CCNC: 11 U/L (ref 7–52)
ANION GAP SERPL CALCULATED.3IONS-SCNC: 11 MMOL/L
AST SERPL W P-5'-P-CCNC: 18 U/L (ref 13–39)
BASOPHILS # BLD AUTO: 0.03 THOUSANDS/ÂΜL (ref 0–0.1)
BASOPHILS NFR BLD AUTO: 0 % (ref 0–1)
BILIRUB SERPL-MCNC: 0.37 MG/DL (ref 0.2–1)
BUN SERPL-MCNC: 4 MG/DL (ref 5–25)
CALCIUM ALBUM COR SERPL-MCNC: 9 MG/DL (ref 8.3–10.1)
CALCIUM SERPL-MCNC: 8.5 MG/DL (ref 8.4–10.2)
CHLORIDE SERPL-SCNC: 104 MMOL/L (ref 96–108)
CO2 SERPL-SCNC: 22 MMOL/L (ref 21–32)
CREAT SERPL-MCNC: 0.49 MG/DL (ref 0.6–1.3)
CREAT UR-MCNC: 47.2 MG/DL
EOSINOPHIL # BLD AUTO: 0.14 THOUSAND/ÂΜL (ref 0–0.61)
EOSINOPHIL NFR BLD AUTO: 1 % (ref 0–6)
ERYTHROCYTE [DISTWIDTH] IN BLOOD BY AUTOMATED COUNT: 14.2 % (ref 11.6–15.1)
GFR SERPL CREATININE-BSD FRML MDRD: 133 ML/MIN/1.73SQ M
GLUCOSE P FAST SERPL-MCNC: 73 MG/DL (ref 65–99)
HCT VFR BLD AUTO: 37.1 % (ref 34.8–46.1)
HGB BLD-MCNC: 12.2 G/DL (ref 11.5–15.4)
IMM GRANULOCYTES # BLD AUTO: 0.15 THOUSAND/UL (ref 0–0.2)
IMM GRANULOCYTES NFR BLD AUTO: 1 % (ref 0–2)
LYMPHOCYTES # BLD AUTO: 2.12 THOUSANDS/ÂΜL (ref 0.6–4.47)
LYMPHOCYTES NFR BLD AUTO: 13 % (ref 14–44)
MCH RBC QN AUTO: 31 PG (ref 26.8–34.3)
MCHC RBC AUTO-ENTMCNC: 32.9 G/DL (ref 31.4–37.4)
MCV RBC AUTO: 94 FL (ref 82–98)
MONOCYTES # BLD AUTO: 0.73 THOUSAND/ÂΜL (ref 0.17–1.22)
MONOCYTES NFR BLD AUTO: 5 % (ref 4–12)
NEUTROPHILS # BLD AUTO: 12.61 THOUSANDS/ÂΜL (ref 1.85–7.62)
NEUTS SEG NFR BLD AUTO: 80 % (ref 43–75)
NRBC BLD AUTO-RTO: 0 /100 WBCS
PLATELET # BLD AUTO: 234 THOUSANDS/UL (ref 149–390)
PMV BLD AUTO: 11.5 FL (ref 8.9–12.7)
POTASSIUM SERPL-SCNC: 4 MMOL/L (ref 3.5–5.3)
PROT SERPL-MCNC: 6.1 G/DL (ref 6.4–8.4)
PROT UR-MCNC: 14 MG/DL
PROT/CREAT UR: 0.3 MG/G{CREAT} (ref 0–0.1)
RBC # BLD AUTO: 3.93 MILLION/UL (ref 3.81–5.12)
SODIUM SERPL-SCNC: 137 MMOL/L (ref 135–147)
URATE SERPL-MCNC: 3.9 MG/DL (ref 2–7.5)
WBC # BLD AUTO: 15.78 THOUSAND/UL (ref 4.31–10.16)

## 2024-01-29 PROCEDURE — 36415 COLL VENOUS BLD VENIPUNCTURE: CPT

## 2024-01-29 PROCEDURE — 87150 DNA/RNA AMPLIFIED PROBE: CPT | Performed by: ADVANCED PRACTICE MIDWIFE

## 2024-01-29 PROCEDURE — 85025 COMPLETE CBC W/AUTO DIFF WBC: CPT

## 2024-01-29 PROCEDURE — 99213 OFFICE O/P EST LOW 20 MIN: CPT | Performed by: ADVANCED PRACTICE MIDWIFE

## 2024-01-29 PROCEDURE — 80053 COMPREHEN METABOLIC PANEL: CPT

## 2024-01-29 PROCEDURE — 84550 ASSAY OF BLOOD/URIC ACID: CPT

## 2024-01-29 RX ORDER — CHLORHEXIDINE GLUCONATE 4 G/100ML
1 SOLUTION TOPICAL DAILY PRN
Qty: 118 ML | Refills: 0 | Status: SHIPPED | OUTPATIENT
Start: 2024-01-29

## 2024-01-29 RX ORDER — ONDANSETRON 4 MG/1
4 TABLET, ORALLY DISINTEGRATING ORAL EVERY 8 HOURS PRN
Qty: 30 TABLET | Refills: 1 | Status: SHIPPED | OUTPATIENT
Start: 2024-01-29

## 2024-01-29 RX ORDER — ALPRAZOLAM 0.5 MG/1
0.5 TABLET ORAL AS NEEDED
COMMUNITY
Start: 2024-01-18

## 2024-01-29 NOTE — PROGRESS NOTES
Routine Prenatal Visit  [facility] [address] Jazlyn Cotter PA    Assessment/Plan:  Shruti is a 28 y.o. year old  at 36w2d who presents for routine prenatal visit.     1. Pregnancy resulting from assisted reproductive technology in third trimester    2. Obesity during pregnancy in third trimester    3. COVID-19 affecting pregnancy in third trimester    4. Medical marijuana use    5. History of     6. Third trimester pregnancy  -     Strep B DNA probe, amplification  -     Protein / creatinine ratio, urine; Future  -     CBC and differential; Future  -     Comprehensive metabolic panel; Future    7. 36 weeks gestation of pregnancy  Assessment & Plan:  - reviewed s/s labor, [facility]  - Birth plan- will bring next visit  - has breast pump at home  - Delivery plans- undecided until next ultrasound. Would like to TOLAC, but will wait to see size of baby with next ultrasound.   - GBS today  - Last ultrasound:   AC             32.53 cm        36 weeks 3 days* (98%)  BPD             8.63 cm        34 weeks 6 days* (74%)  HC             30.71 cm        34 weeks 1 day * (23%)  Femur           6.63 cm        34 weeks 1 day * (48%)     HC/AC           0.94 [0.96 - 1.11]                 (8%)  FL/AC             20 [20 - 24]  FL/BPD            77 [71 - 87]  EFW [place] 4   2678 grams - 5 lbs 14 oz                 (85%)    - next visit 1 week    Orders:  -     Strep B DNA probe, amplification  -     Protein / creatinine ratio, urine; Future  -     CBC and differential; Future  -     Comprehensive metabolic panel; Future    8. Swelling  -     Protein / creatinine ratio, urine; Future  -     CBC and differential; Future  -     Comprehensive metabolic panel; Future          Subjective:     CC: Prenatal care    Shruti Camilo is a 28 y.o.  female who presents for routine prenatal care at 36w2d.  Pregnancy ROS: no leakage of fluid, pelvic pain, or vaginal bleeding.  Good fetal  movement. Notes that she is still vomiting and using Zofran.     The following portions of the patient's history were reviewed and updated as appropriate: allergies, current medications, past family history, past medical history, obstetric history, gynecologic history, past social history, past surgical history and problem list.      Objective:  /72   Wt 106 kg (232 lb 12.8 oz)   LMP 2023   BMI 38.74 kg/m²   Pregravid Weight/BMI: 91.2 kg (201 lb) (BMI 33.45)  Current Weight: 106 kg (232 lb 12.8 oz)   Total Weight Gain: 14.4 kg (31 lb 12.8 oz)   Pre- Vitals    Flowsheet Row Most Recent Value   Prenatal Assessment    Fetal Heart Rate 136   Movement Present   Prenatal Vitals    Blood Pressure 116/72   Weight - Scale 106 kg (232 lb 12.8 oz)   Urine Albumin/Glucose    Dilation/Effacement/Station    Cervical Dilation 0   Cervical Effacement 30   Fetal Station -4   Vaginal Drainage    Edema    LLE Edema Trace   RLE Edema Trace           General: Well appearing, no distress  Respiratory: Unlabored breathing  Cardiovascular: Regular rate.  Abdomen: Soft, gravid, nontender  Fundal Height: Appropriate for gestational age.  Extremities: Warm and well perfused.  Non tender.

## 2024-01-29 NOTE — ASSESSMENT & PLAN NOTE
- reviewed s/s labor, FK  - Birth plan- will bring next visit  - has breast pump at home  - Delivery plans- undecided until next ultrasound. Would like to TOLAC, but will wait to see size of baby with next ultrasound.   - GBS today  - Last ultrasound:   AC             32.53 cm        36 weeks 3 days* (98%)  BPD             8.63 cm        34 weeks 6 days* (74%)  HC             30.71 cm        34 weeks 1 day * (23%)  Femur           6.63 cm        34 weeks 1 day * (48%)     HC/AC           0.94 [0.96 - 1.11]                 (8%)  FL/AC             20 [20 - 24]  FL/BPD            77 [71 - 87]  EFW Hadlock 4   2678 grams - 5 lbs 14 oz                 (85%)    - next visit 1 week

## 2024-01-30 ENCOUNTER — HOSPITAL ENCOUNTER (OUTPATIENT)
Facility: HOSPITAL | Age: 29
Discharge: HOME | End: 2024-01-30
Attending: OBSTETRICS & GYNECOLOGY | Admitting: OBSTETRICS & GYNECOLOGY
Payer: COMMERCIAL

## 2024-01-30 ENCOUNTER — TELEPHONE (OUTPATIENT)
Dept: OBGYN CLINIC | Facility: CLINIC | Age: 29
End: 2024-01-30

## 2024-01-30 VITALS
RESPIRATION RATE: 18 BRPM | SYSTOLIC BLOOD PRESSURE: 106 MMHG | HEART RATE: 101 BPM | TEMPERATURE: 98.6 F | DIASTOLIC BLOOD PRESSURE: 62 MMHG

## 2024-01-30 PROBLEM — O26.899 HEADACHE IN PREGNANCY: Status: ACTIVE | Noted: 2024-01-16

## 2024-01-30 LAB
ABO GROUP BLD: NORMAL
ALBUMIN SERPL BCP-MCNC: 3.5 G/DL (ref 3.5–5)
ALP SERPL-CCNC: 106 U/L (ref 34–104)
ALT SERPL W P-5'-P-CCNC: 11 U/L (ref 7–52)
ANION GAP SERPL CALCULATED.3IONS-SCNC: 6 MMOL/L
AST SERPL W P-5'-P-CCNC: 14 U/L (ref 13–39)
BASOPHILS # BLD AUTO: 0.04 THOUSANDS/ÂΜL (ref 0–0.1)
BASOPHILS NFR BLD AUTO: 0 % (ref 0–1)
BILIRUB SERPL-MCNC: 0.33 MG/DL (ref 0.2–1)
BLD GP AB SCN SERPL QL: NEGATIVE
BUN SERPL-MCNC: 4 MG/DL (ref 5–25)
CALCIUM SERPL-MCNC: 8.6 MG/DL (ref 8.4–10.2)
CHLORIDE SERPL-SCNC: 105 MMOL/L (ref 96–108)
CO2 SERPL-SCNC: 24 MMOL/L (ref 21–32)
CREAT SERPL-MCNC: 0.51 MG/DL (ref 0.6–1.3)
EOSINOPHIL # BLD AUTO: 0.08 THOUSAND/ÂΜL (ref 0–0.61)
EOSINOPHIL NFR BLD AUTO: 1 % (ref 0–6)
ERYTHROCYTE [DISTWIDTH] IN BLOOD BY AUTOMATED COUNT: 14.1 % (ref 11.6–15.1)
GFR SERPL CREATININE-BSD FRML MDRD: 131 ML/MIN/1.73SQ M
GLUCOSE SERPL-MCNC: 84 MG/DL (ref 65–140)
HCT VFR BLD AUTO: 35.9 % (ref 34.8–46.1)
HGB BLD-MCNC: 12.1 G/DL (ref 11.5–15.4)
IMM GRANULOCYTES # BLD AUTO: 0.13 THOUSAND/UL (ref 0–0.2)
IMM GRANULOCYTES NFR BLD AUTO: 1 % (ref 0–2)
LYMPHOCYTES # BLD AUTO: 2.43 THOUSANDS/ÂΜL (ref 0.6–4.47)
LYMPHOCYTES NFR BLD AUTO: 16 % (ref 14–44)
MCH RBC QN AUTO: 31.6 PG (ref 26.8–34.3)
MCHC RBC AUTO-ENTMCNC: 33.7 G/DL (ref 31.4–37.4)
MCV RBC AUTO: 94 FL (ref 82–98)
MONOCYTES # BLD AUTO: 0.77 THOUSAND/ÂΜL (ref 0.17–1.22)
MONOCYTES NFR BLD AUTO: 5 % (ref 4–12)
NEUTROPHILS # BLD AUTO: 11.52 THOUSANDS/ÂΜL (ref 1.85–7.62)
NEUTS SEG NFR BLD AUTO: 77 % (ref 43–75)
NRBC BLD AUTO-RTO: 0 /100 WBCS
PLATELET # BLD AUTO: 242 THOUSANDS/UL (ref 149–390)
PMV BLD AUTO: 10.8 FL (ref 8.9–12.7)
POTASSIUM SERPL-SCNC: 3.9 MMOL/L (ref 3.5–5.3)
PROT SERPL-MCNC: 6.4 G/DL (ref 6.4–8.4)
RBC # BLD AUTO: 3.83 MILLION/UL (ref 3.81–5.12)
RH BLD: POSITIVE
SODIUM SERPL-SCNC: 135 MMOL/L (ref 135–147)
SPECIMEN EXPIRATION DATE: NORMAL
WBC # BLD AUTO: 14.97 THOUSAND/UL (ref 4.31–10.16)

## 2024-01-30 PROCEDURE — 86901 BLOOD TYPING SEROLOGIC RH(D): CPT

## 2024-01-30 PROCEDURE — 86900 BLOOD TYPING SEROLOGIC ABO: CPT

## 2024-01-30 PROCEDURE — 80053 COMPREHEN METABOLIC PANEL: CPT

## 2024-01-30 PROCEDURE — 99213 OFFICE O/P EST LOW 20 MIN: CPT

## 2024-01-30 PROCEDURE — 59025 FETAL NON-STRESS TEST: CPT

## 2024-01-30 PROCEDURE — NC001 PR NO CHARGE: Performed by: OBSTETRICS & GYNECOLOGY

## 2024-01-30 PROCEDURE — 86850 RBC ANTIBODY SCREEN: CPT

## 2024-01-30 PROCEDURE — 85025 COMPLETE CBC W/AUTO DIFF WBC: CPT

## 2024-01-30 RX ORDER — DEXAMETHASONE SODIUM PHOSPHATE 10 MG/ML
10 INJECTION, SOLUTION INTRAMUSCULAR; INTRAVENOUS ONCE
Status: COMPLETED | OUTPATIENT
Start: 2024-01-30 | End: 2024-01-30

## 2024-01-30 RX ORDER — MAGNESIUM SULFATE HEPTAHYDRATE 40 MG/ML
2 INJECTION, SOLUTION INTRAVENOUS
Status: DISCONTINUED | OUTPATIENT
Start: 2024-01-30 | End: 2024-01-30 | Stop reason: HOSPADM

## 2024-01-30 RX ORDER — DEXAMETHASONE SODIUM PHOSPHATE 10 MG/ML
INJECTION, SOLUTION INTRAMUSCULAR; INTRAVENOUS
Status: DISCONTINUED
Start: 2024-01-30 | End: 2024-01-30 | Stop reason: WASHOUT

## 2024-01-30 RX ORDER — METOCLOPRAMIDE HYDROCHLORIDE 5 MG/ML
10 INJECTION INTRAMUSCULAR; INTRAVENOUS EVERY 8 HOURS SCHEDULED
Status: DISCONTINUED | OUTPATIENT
Start: 2024-01-30 | End: 2024-01-30 | Stop reason: HOSPADM

## 2024-01-30 RX ORDER — DIPHENHYDRAMINE HYDROCHLORIDE 50 MG/ML
25 INJECTION INTRAMUSCULAR; INTRAVENOUS EVERY 8 HOURS SCHEDULED
Status: DISCONTINUED | OUTPATIENT
Start: 2024-01-30 | End: 2024-01-30 | Stop reason: HOSPADM

## 2024-01-30 RX ORDER — SODIUM CHLORIDE 9 MG/ML
100 INJECTION, SOLUTION INTRAVENOUS ONCE
Status: DISCONTINUED | OUTPATIENT
Start: 2024-01-30 | End: 2024-01-30 | Stop reason: HOSPADM

## 2024-01-30 RX ORDER — ACETAMINOPHEN 325 MG/1
975 TABLET ORAL ONCE
Status: COMPLETED | OUTPATIENT
Start: 2024-01-30 | End: 2024-01-30

## 2024-01-30 RX ADMIN — DIPHENHYDRAMINE HYDROCHLORIDE 25 MG: 50 INJECTION, SOLUTION INTRAMUSCULAR; INTRAVENOUS at 13:52

## 2024-01-30 RX ADMIN — METOCLOPRAMIDE 10 MG: 5 INJECTION, SOLUTION INTRAMUSCULAR; INTRAVENOUS at 13:50

## 2024-01-30 RX ADMIN — ACETAMINOPHEN 325MG 975 MG: 325 TABLET ORAL at 13:43

## 2024-01-30 RX ADMIN — DEXAMETHASONE SODIUM PHOSPHATE 10 MG: 10 INJECTION INTRAMUSCULAR; INTRAVENOUS at 13:59

## 2024-01-30 RX ADMIN — MAGNESIUM SULFATE HEPTAHYDRATE 2 G: 40 INJECTION, SOLUTION INTRAVENOUS at 13:44

## 2024-01-30 NOTE — PROCEDURES
Shruti Camilo, a  at 36w3d with an ARRON of 2024, by Ultrasound, was seen at [facility] for the following procedure(s): $Procedure Type: NST]    Nonstress Test  Reason for NST: Routine  Variability: Moderate  Decelerations: None  Accelerations: Yes  Acoustic Stimulator: No  Baseline: 130 BPM  Uterine Irritability: No  Contractions: Irregular                   Interpretation  Nonstress Test Interpretation: Reactive  Overall Impression: Reassuring

## 2024-01-30 NOTE — TELEPHONE ENCOUNTER
Reached out to patient to discuss symptoms she has been experiencing.  Patient states that her symptoms have not gotten worse since yesterday but they have not gotten better.      Has c/o HA, swelling/discoloration in LE, hand swelling, nausea,     Discussed with provider in office - patient to go to L&D for evaluation, extended monitoring and delivery if indicated.      L&D Charge nurse notified

## 2024-01-30 NOTE — PROGRESS NOTES
Patient seen and reevaluated. BP continues to remain wnl. Reports that headache has improved from 6/10 to 3/10 at this time. Shruti states that she believes eating would help her headache. Gluten free diet ordered as patient has a history of celiac disease.     Vitals:    01/30/24 1441   BP: 106/62   Pulse: 101   Resp:    Temp:      Discussed w/ Dr. Meraz-Anoop Ramírez MD  Obstetrics & Gynecology PGY-3  1/30/2024

## 2024-01-30 NOTE — PROGRESS NOTES
"L&D Triage Note - OB/GYN  Shruti Camilo 28 y.o. female MRN: [id number]  Unit/Bed#: L&D 329-02 Encounter: [id number]        [facility] is [facility] by [facility]    ASSESSMENT/PLAN  Shruti Camilo is a 28 y.o.  at 36w3d who presents for rule out preeclampsia. Migraine cocktail started, will follow up symptoms and repeat labs.       1) Headache  - Reports headache x 1 month   - reports headache unrelieved by tylenol   - Reports one episode of ringing in ears and white spots in vision prior to presentation on 24, no episodes since  - reports significant history of migraines, previously on abortive medication (patient does not remember name of medication)  - will trial headache cocktail     2) r/o PreE   - CBC/CMP completed on  wnl   - P:C 0.30   - no elevated BP during this pregnancy   - Will repeat CBC/CMP       FHT:  Baseline 135 bpm   Variability: Moderate 6-25 bpm   Accelerations: Present 15x15   Decelerations: none     TOCO:   No contractions noted       D/w Dr. Brooks  ______________    SUBJECTIVE    ARRON: Estimated Date of Delivery: 24    HPI:  28 y.o.  36w3d presents with complaint of headache and edema. Shruti reports headache that has been present for approximately the last month.  She states that she has been consistently taking Tylenol without any relief.  She reports 1 episode of vision changes and ringing in her ears that occurred prior to her presentation on 2024.  She states that these headaches start in the occiput of her head and radiate around to her temporal region.  She states that the headaches are waxing and waning but never completely resolved.  She is found nothing that relieves her headache and nothing that makes it worse.  She has a history of migraines prior to pregnancy for which she was previously using an abortive medication which she cannot remember and she states that her headaches are \"all over the place\" when they " occur    She denies any vaginal bleeding or leakage of fluid but does endorse occasional, irregular contractions at night when trying to sleep.       Her obstetrical history is significant for one prior  section for failed IOL     Review of Systems   Constitutional: Negative for chills and fever.   Eyes:  Positive for visual disturbance (prior to presentation 24).   Cardiovascular:  Negative for chest pain and palpitations.   Respiratory:  Negative for shortness of breath.    Gastrointestinal:  Negative for abdominal pain.   Neurological:  Positive for headaches. Negative for light-headedness.        Physical Exam  Constitutional:       General: She is not in acute distress.     Appearance: Normal appearance.   HENT:      Head: Normocephalic and atraumatic.   Cardiovascular:      Rate and Rhythm: Normal rate.   Pulmonary:      Effort: Pulmonary effort is normal.   Abdominal:      Palpations: Abdomen is soft.      Tenderness: There is no abdominal tenderness.      Comments: Gravid   Musculoskeletal:      Right lower le+ Edema present.      Left lower le+ Edema present.   Neurological:      General: No focal deficit present.      Mental Status: She is alert.   Skin:     General: Skin is warm and dry.   Psychiatric:         Mood and Affect: Mood normal.          OBJECTIVE:  /65   Pulse (!) 123   LMP 2023   There is no height or weight on file to calculate BMI.  Labs:   Recent Results (from the past 24 hour(s))   CBC and differential    Collection Time: 24  1:37 PM   Result Value Ref Range    WBC 14.97 (H) 4.31 - 10.16 Thousand/uL    RBC 3.83 3.81 - 5.12 Million/uL    Hemoglobin 12.1 11.5 - 15.4 g/dL    Hematocrit 35.9 34.8 - 46.1 %    MCV 94 82 - 98 fL    MCH 31.6 26.8 - 34.3 pg    MCHC 33.7 31.4 - 37.4 g/dL    RDW 14.1 11.6 - 15.1 %    MPV 10.8 8.9 - 12.7 fL    Platelets 242 149 - 390 Thousands/uL    nRBC 0 /100 WBCs    Neutrophils Relative 77 (H) 43 - 75 %    Immat GRANS % 1 0  - 2 %    Lymphocytes Relative 16 14 - 44 %    Monocytes Relative 5 4 - 12 %    Eosinophils Relative 1 0 - 6 %    Basophils Relative 0 0 - 1 %    Neutrophils Absolute 11.52 (H) 1.85 - 7.62 Thousands/µL    Immature Grans Absolute 0.13 0.00 - 0.20 Thousand/uL    Lymphocytes Absolute 2.43 0.60 - 4.47 Thousands/µL    Monocytes Absolute 0.77 0.17 - 1.22 Thousand/µL    Eosinophils Absolute 0.08 0.00 - 0.61 Thousand/µL    Basophils Absolute 0.04 0.00 - 0.10 Thousands/µL       Dania Ramírez MD  Obstetrics & Gynecology PGY-3  1/30/2024  1:51 PM

## 2024-01-30 NOTE — PROGRESS NOTES
Shruti reports that her headache has continued to improve after eating. She now rates her HA as a 1/10 and continues to deny any other associated symptoms. Blood pressures all remain within normal limits and fetal monitoring was reassuring. Will plan for discharge home with return precautions and close follow up in office this week.         Dania Ramírez MD  Obstetrics & Gynecology PGY-3  1/30/2024  4:53 PM

## 2024-01-30 NOTE — PROGRESS NOTES
Patient seen and reevaluated. Reports headache is improving at this time. Continues to deny and vision changes. Reports HA is very mild and intermittent. CBC/CMP reviewed and noted to be within normal limits. Remains normotensive. NS infusing at this time. Will continue NS infusion and reassess when fluids completed.               Vitals:    01/30/24 1340 01/30/24 1434   BP: 109/65 115/73   BP Location: Left arm    Pulse: (!) 123 101   Resp: 18    Temp: 98.6 °F (37 °C)    TempSrc: Axillary            Dania Ramírez MD  Obstetrics & Gynecology PGY-3  1/30/2024  2:41 PM

## 2024-01-31 ENCOUNTER — TELEPHONE (OUTPATIENT)
Dept: OBGYN CLINIC | Facility: MEDICAL CENTER | Age: 29
End: 2024-01-31

## 2024-01-31 PROBLEM — O34.219 HISTORY OF CESAREAN DELIVERY, ANTEPARTUM: Status: ACTIVE | Noted: 2024-01-31

## 2024-01-31 LAB — GP B STREP DNA SPEC QL NAA+PROBE: POSITIVE

## 2024-01-31 NOTE — TELEPHONE ENCOUNTER
Patient scheduled for f/u this Friday 2/2. Patient does not have any current complaints at this time. Advised to call the office with any questions or concerns.     ----- Message from Deya Brooks MD sent at 1/30/2024  6:07 PM EST -----  Regarding: follow up from triage  Kristopher can this patient be scheduled for follow up this week   She was seen in triage - refer to note

## 2024-02-01 ENCOUNTER — ULTRASOUND (OUTPATIENT)
Dept: PERINATAL CARE | Facility: OTHER | Age: 29
End: 2024-02-01
Payer: COMMERCIAL

## 2024-02-01 VITALS
HEART RATE: 98 BPM | HEIGHT: 65 IN | WEIGHT: 236.4 LBS | SYSTOLIC BLOOD PRESSURE: 110 MMHG | DIASTOLIC BLOOD PRESSURE: 70 MMHG | BODY MASS INDEX: 39.39 KG/M2

## 2024-02-01 DIAGNOSIS — O34.219 HISTORY OF CESAREAN DELIVERY, ANTEPARTUM: ICD-10-CM

## 2024-02-01 DIAGNOSIS — U07.1 COVID-19 AFFECTING PREGNANCY IN THIRD TRIMESTER: Primary | ICD-10-CM

## 2024-02-01 DIAGNOSIS — Z3A.36 36 WEEKS GESTATION OF PREGNANCY: ICD-10-CM

## 2024-02-01 DIAGNOSIS — Z36.89 ENCOUNTER FOR ULTRASOUND TO ASSESS FETAL GROWTH: ICD-10-CM

## 2024-02-01 DIAGNOSIS — O98.513 COVID-19 AFFECTING PREGNANCY IN THIRD TRIMESTER: Primary | ICD-10-CM

## 2024-02-01 DIAGNOSIS — O99.213 OBESITY DURING PREGNANCY IN THIRD TRIMESTER: ICD-10-CM

## 2024-02-01 PROCEDURE — 76816 OB US FOLLOW-UP PER FETUS: CPT | Performed by: OBSTETRICS & GYNECOLOGY

## 2024-02-01 PROCEDURE — 99213 OFFICE O/P EST LOW 20 MIN: CPT | Performed by: OBSTETRICS & GYNECOLOGY

## 2024-02-01 NOTE — PROGRESS NOTES
"[facility]: Shruti Camilo was seen today at 36w5d for fetal growth assessment ultrasound.  See ultrasound report under \"OB Procedures\" tab.  Please don't hesitate to contact our office with any concerns or questions.  -Dannielle Sanford MD    "

## 2024-02-01 NOTE — LETTER
"2024     [address]  [address]  [facility] [address]    Patient: Shruti Camilo   YOB: 1995   Date of Visit: 2024       Dear DAMION Malloy:    Thank you for referring Shruti Camilo to me for evaluation. Below are my notes for this consultation.    If you have questions, please do not hesitate to call me. I look forward to following your patient along with you.         Sincerely,    Dannielle Sanford MD          CC: No Recipients    Dannielle Sanford MD  2024  1:04 PM  Sign when Signing Visit  [facility]: Shruti Camilo was seen today at 36w5d for fetal growth assessment ultrasound.  See ultrasound report under \"OB Procedures\" tab.  Please don't hesitate to contact our office with any concerns or questions.  -Dannielle Sanford MD    "

## 2024-02-02 ENCOUNTER — ROUTINE PRENATAL (OUTPATIENT)
Dept: OBGYN CLINIC | Facility: CLINIC | Age: 29
End: 2024-02-02
Payer: COMMERCIAL

## 2024-02-02 VITALS
HEIGHT: 65 IN | WEIGHT: 232.8 LBS | SYSTOLIC BLOOD PRESSURE: 110 MMHG | BODY MASS INDEX: 38.79 KG/M2 | DIASTOLIC BLOOD PRESSURE: 58 MMHG

## 2024-02-02 DIAGNOSIS — U07.1 COVID-19 AFFECTING PREGNANCY IN THIRD TRIMESTER: ICD-10-CM

## 2024-02-02 DIAGNOSIS — Z78.9 NONIMMUNE TO HEPATITIS B VIRUS: ICD-10-CM

## 2024-02-02 DIAGNOSIS — Z3A.36 36 WEEKS GESTATION OF PREGNANCY: ICD-10-CM

## 2024-02-02 DIAGNOSIS — O09.813 PREGNANCY RESULTING FROM ASSISTED REPRODUCTIVE TECHNOLOGY IN THIRD TRIMESTER: ICD-10-CM

## 2024-02-02 DIAGNOSIS — Z34.93 THIRD TRIMESTER PREGNANCY: ICD-10-CM

## 2024-02-02 DIAGNOSIS — O26.893 PREGNANCY HEADACHE IN THIRD TRIMESTER: ICD-10-CM

## 2024-02-02 DIAGNOSIS — O98.513 COVID-19 AFFECTING PREGNANCY IN THIRD TRIMESTER: ICD-10-CM

## 2024-02-02 DIAGNOSIS — R51.9 PREGNANCY HEADACHE IN THIRD TRIMESTER: ICD-10-CM

## 2024-02-02 DIAGNOSIS — O99.213 OBESITY DURING PREGNANCY IN THIRD TRIMESTER: Primary | ICD-10-CM

## 2024-02-02 DIAGNOSIS — O34.219 HISTORY OF CESAREAN DELIVERY, ANTEPARTUM: ICD-10-CM

## 2024-02-02 PROCEDURE — 99213 OFFICE O/P EST LOW 20 MIN: CPT | Performed by: ADVANCED PRACTICE MIDWIFE

## 2024-02-02 NOTE — ASSESSMENT & PLAN NOTE
- reviewed s/s labor, FKC, perineal massage  - Birth plan- will bring next visit  - Prior  desires - TOLAC at this time, but wants  if reaches 39W    Ultrasound on 2024- EFW Hadlock 4   2955 grams - 6 lbs 8 oz   - GBS positive- will treat in labor  - next visit 1 week

## 2024-02-02 NOTE — PROGRESS NOTES
Routine Prenatal Visit  [facility]    Assessment/Plan:  Shruti is a 28 y.o. year old  at 36w6d who presents for routine prenatal visit.     1. Obesity during pregnancy in third trimester    2. History of  delivery, antepartum    3. Nonimmune to hepatitis B virus    4. Pregnancy resulting from assisted reproductive technology in third trimester    5. Pregnancy headache in third trimester  -     Protein / creatinine ratio, urine; Future  -     CBC and differential; Future  -     Comprehensive metabolic panel; Future    6. COVID-19 affecting pregnancy in third trimester    7. Third trimester pregnancy  -     Protein / creatinine ratio, urine; Future  -     CBC and differential; Future  -     Comprehensive metabolic panel; Future    8. 36 weeks gestation of pregnancy  Assessment & Plan:  - reviewed s/s labor, FKC, perineal massage  - Birth plan- will bring next visit  - Prior  desires - TOLAC at this time, but wants  if reaches 39W    Ultrasound on 2024- EFW Hadlock 4   2955 grams - 6 lbs 8 oz   - GBS positive- will treat in labor  - next visit 1 week                  Orders:  -     Protein / creatinine ratio, urine; Future  -     CBC and differential; Future  -     Comprehensive metabolic panel; Future          Subjective:     CC: Prenatal care    Shruti Camilo is a 28 y.o.  female who presents for routine prenatal care at 36w6d.  Pregnancy ROS: Nothing in past 2 days- leakage of fluid, pelvic pain, or vaginal bleeding.  Good fetal movement. Notes that she still has headache. Swelling improved. Will get PIH labs next week. Last Protein/creatinine level was  0.3.     The following portions of the patient's history were reviewed and updated as appropriate: allergies, current medications, past family history, past medical history, obstetric history, gynecologic history, past social history, past surgical  "history and problem list.      Objective:  /58   Ht 5' 5\" (1.651 m)   Wt 106 kg (232 lb 12.8 oz)   LMP 2023   BMI 38.74 kg/m²   Pregravid Weight/BMI: 91.2 kg (201 lb) (BMI 33.45)  Current Weight: 106 kg (232 lb 12.8 oz)   Total Weight Gain: 14.4 kg (31 lb 12.8 oz)   Pre- Vitals    Flowsheet Row Most Recent Value   Prenatal Assessment    Fetal Heart Rate 132   Fundal Height (cm) 36 cm   Movement Present   Prenatal Vitals    Blood Pressure 110/58   Weight - Scale 106 kg (232 lb 12.8 oz)   Urine Albumin/Glucose    Dilation/Effacement/Station    Vaginal Drainage    Edema    LLE Edema Trace   RLE Edema Trace   Facial Edema None           General: Well appearing, no distress  Respiratory: Unlabored breathing  Cardiovascular: Regular rate.  Abdomen: Soft, gravid, nontender  Fundal Height: Appropriate for gestational age.  Extremities: Warm and well perfused.  Non tender.   "

## 2024-02-03 ENCOUNTER — OFFICE VISIT (OUTPATIENT)
Dept: URGENT CARE | Facility: CLINIC | Age: 29
End: 2024-02-03
Payer: COMMERCIAL

## 2024-02-03 VITALS
BODY MASS INDEX: 38.22 KG/M2 | RESPIRATION RATE: 20 BRPM | HEIGHT: 65 IN | TEMPERATURE: 98.5 F | SYSTOLIC BLOOD PRESSURE: 123 MMHG | WEIGHT: 229.4 LBS | DIASTOLIC BLOOD PRESSURE: 74 MMHG | HEART RATE: 104 BPM | OXYGEN SATURATION: 97 %

## 2024-02-03 DIAGNOSIS — J01.90 ACUTE SINUSITIS, RECURRENCE NOT SPECIFIED, UNSPECIFIED LOCATION: Primary | ICD-10-CM

## 2024-02-03 DIAGNOSIS — J02.9 SORE THROAT: ICD-10-CM

## 2024-02-03 LAB
S PYO AG THROAT QL: NEGATIVE
SARS-COV-2 AG UPPER RESP QL IA: NEGATIVE
VALID CONTROL: NORMAL

## 2024-02-03 PROCEDURE — 87811 SARS-COV-2 COVID19 W/OPTIC: CPT | Performed by: PHYSICIAN ASSISTANT

## 2024-02-03 PROCEDURE — 87880 STREP A ASSAY W/OPTIC: CPT | Performed by: PHYSICIAN ASSISTANT

## 2024-02-03 PROCEDURE — 99213 OFFICE O/P EST LOW 20 MIN: CPT | Performed by: PHYSICIAN ASSISTANT

## 2024-02-03 RX ORDER — GUAIFENESIN 200 MG/10ML
200 LIQUID ORAL 3 TIMES DAILY PRN
Qty: 200 ML | Refills: 1 | Status: SHIPPED | OUTPATIENT
Start: 2024-02-03

## 2024-02-03 RX ORDER — FLUTICASONE PROPIONATE 50 MCG
2 SPRAY, SUSPENSION (ML) NASAL DAILY
Qty: 18.2 ML | Refills: 0 | Status: SHIPPED | OUTPATIENT
Start: 2024-02-03

## 2024-02-03 RX ORDER — AMOXICILLIN 875 MG/1
875 TABLET, COATED ORAL 2 TIMES DAILY
Qty: 20 TABLET | Refills: 0 | Status: SHIPPED | OUTPATIENT
Start: 2024-02-03 | End: 2024-02-13

## 2024-02-03 NOTE — PROGRESS NOTES
[facility]    NAME: Shruti Camilo is a 28 y.o. female  : 1995    MRN: [id number]  DATE: February 3, 2024  TIME: 10:21 AM    Assessment and Plan   Acute sinusitis, recurrence not specified, unspecified location [J01.90]  1. Acute sinusitis, recurrence not specified, unspecified location  Poct Covid 19 Rapid Antigen Test    amoxicillin (AMOXIL) 875 mg tablet    fluticasone (FLONASE) 50 mcg/act nasal spray    guaiFENesin (ROBITUSSIN) 100 MG/5ML oral liquid      2. Sore throat  POCT rapid ANTIGEN strepA          Patient Instructions     Patient Instructions   I have prescribed an antibiotic for the infection.  Please take the antibiotic as prescribed and finish the entire prescription.    Flonase  Plain robitussin.  Plain tylenol.  Neti-pot  If not improving over the next 7-10 days, follow up with PCP.          Chief Complaint     Chief Complaint   Patient presents with    Migraine     For 3 days     Earache     Left ear worse than right since yesterday    Sore Throat     Since yesterday        History of Present Illness   28 year old female here with complaint of migraine headache the past 3 days.  Has sore throat, sinus pressure, nasal congestion, ear pain, swollen lymph nodes in neck.  No fever, chills.  Has had issues with headaches during her pregnancy.  Currently 37 weeks.  Some proteinuria recently.  Blood pressure normal.  Was admitted for possible pre-eclampsia but then discharged.  Has not been able to take anything for congestion because she states that she has no money to buy anything.        Review of Systems   Review of Systems   Constitutional:  Positive for fatigue. Negative for appetite change, chills and fever.   HENT:  Positive for congestion, ear pain, sinus pressure, sinus pain and sore throat. Negative for ear discharge, facial swelling, postnasal drip and sneezing.    Respiratory:  Negative for cough, shortness of breath and wheezing.    Neurological:  Positive for  headaches.   Hematological:  Positive for adenopathy.       Current Medications     Current Outpatient Medications:     amoxicillin (AMOXIL) 875 mg tablet, Take 1 tablet (875 mg total) by mouth 2 (two) times a day for 10 days, Disp: 20 tablet, Rfl: 0    fluticasone (FLONASE) 50 mcg/act nasal spray, 2 sprays into each nostril daily, Disp: 18.2 mL, Rfl: 0    guaiFENesin (ROBITUSSIN) 100 MG/5ML oral liquid, Take 10 mL (200 mg total) by mouth 3 (three) times a day as needed for cough, Disp: 200 mL, Rfl: 1    ondansetron (ZOFRAN-ODT) 4 mg disintegrating tablet, Take 1 tablet (4 mg total) by mouth every 8 (eight) hours as needed for vomiting, Disp: 30 tablet, Rfl: 1    Prenatal Vit-Fe Fumarate-FA (Prenatal Vitamin) 27-0.8 MG TABS, Take 1 tablet by mouth daily, Disp: , Rfl:     albuterol (ProAir HFA) 90 mcg/act inhaler, Inhale 2 puffs every 6 (six) hours as needed for wheezing (Patient not taking: Reported on 1/29/2024), Disp: 8.5 g, Rfl: 0    ALPRAZolam (XANAX) 0.5 mg tablet, Take 0.5 mg by mouth if needed (Patient not taking: Reported on 2/3/2024), Disp: , Rfl:     cetirizine (ZyrTEC) 10 mg tablet, Take 10 mg by mouth daily as needed (Patient not taking: Reported on 1/29/2024), Disp: , Rfl:     chlorhexidine (HIBICLENS) 4 % external liquid, Apply 1 Application topically daily as needed for wound care (Patient not taking: Reported on 2/3/2024), Disp: 118 mL, Rfl: 0    diphenhydrAMINE (BENADRYL) 25 mg capsule, , Disp: , Rfl:     Elastic Bandages & Supports (Abdominal Support/L-XL) MISC, Wear abd support during the day, take off at night (Patient not taking: Reported on 1/12/2024), Disp: 1 each, Rfl: 0    Pyridoxine HCl (vitamin B-6) 25 MG tablet, , Disp: , Rfl:     Current Allergies     Allergies as of 02/03/2024 - Reviewed 02/03/2024   Allergen Reaction Noted    Apple - food allergy  01/11/2021    Gluten meal - food allergy Vomiting 01/18/2021    Other Other (See Comments) 10/24/2014    Vaccinium angustifolium Itching  "2022          The following portions of the patient's history were reviewed and updated as appropriate: allergies, current medications, past family history, past medical history, past social history, past surgical history and problem list.   Past Medical History:   Diagnosis Date    Anxiety     Celiac disease     Depression     Gastroparesis     Marijuana use, continuous 07/10/2018    Last Assessment & Plan:  Shruti reports that she has less ability to use THC due to her step-daughter being in the house.  She reports that she has significantly decrease to twice/day.  Reviewed risks of routine daily use, she does not feel this is an issue & states \"I know my body\" & is not willing to quit at this point in time.    Nicotine use disorder 07/10/2018    Last Assessment & Plan:  Shruti Vee continues to smoke 1 cigarette/day.  She reports that \"it will not be hard for her to stop\" & plans on quitting immediately.    Personality disorder (HCC)     Substance abuse (HCC)     marijuana    Trauma     PTSD    Varicella     vaccine     Past Surgical History:   Procedure Laterality Date    [place]  DELIVERY ONLY N/A 2021    Procedure:  SECTION ();  Surgeon: Charlie Hinojosa MD;  [facility]: [facility];  Service: Obstetrics    TONSILLECTOMY       Family History   Problem Relation Age of Onset    Other Mother         mental health issues    Psoriasis Mother     Heart disease Mother     No Known Problems Father     No Known Problems Brother     Heart disease Maternal Grandmother     Thrombophlebitis Maternal Grandmother     Heart disease Maternal Grandfather     Heart attack Maternal Grandfather     Obesity Maternal Grandfather     Stomach cancer Paternal Grandmother     Bone cancer Paternal Grandfather     No Known Problems Son     Breast cancer Neg Hx     Colon cancer Neg Hx     Ovarian cancer Neg Hx      Social History     Socioeconomic History    Marital status: Single     Spouse name: Not on file    " "Number of children: Not on file    Years of education: Not on file    Highest education level: Not on file   Occupational History    Not on file   Tobacco Use    Smoking status: Former     Current packs/day: 0.00     Average packs/day: 0.3 packs/day for 8.0 years (2.0 ttl pk-yrs)     Types: Cigarettes     Start date: 6/1/2012     Quit date: 6/1/2020     Years since quitting: 3.6    Smokeless tobacco: Never    Tobacco comments:     quit with knowledge of pregnancy   Vaping Use    Vaping status: Never Used   Substance and Sexual Activity    Alcohol use: Not Currently     Comment: rare    Drug use: Yes     Types: Marijuana     Comment: medical marijuana as needed    Sexual activity: Not Currently     Partners: Male     Birth control/protection: None   Other Topics Concern    Not on file   Social History Narrative    Not on file     Social Determinants of Health     Financial Resource Strain: Not on file   Food Insecurity: Not on file   Transportation Needs: Not on file   Physical Activity: Not on file   Stress: Not on file   Social Connections: Not on file   Intimate Partner Violence: Not on file   Housing Stability: Not on file     Medications have been verified.    Objective   /74   Pulse 104   Temp 98.5 °F (36.9 °C)   Resp 20   Ht 5' 5\" (1.651 m)   Wt 104 kg (229 lb 6.4 oz)   LMP 05/13/2023   SpO2 97%   BMI 38.17 kg/m²      Physical Exam   Physical Exam  Vitals and nursing note reviewed.   Constitutional:       General: She is not in acute distress.     Appearance: She is well-developed.   HENT:      Head: Normocephalic and atraumatic.      Right Ear: Tympanic membrane normal.      Ears:      Comments: Left TM dull     Nose: Mucosal edema and congestion present.      Right Sinus: No maxillary sinus tenderness or frontal sinus tenderness.      Left Sinus: Maxillary sinus tenderness present. No frontal sinus tenderness.      Mouth/Throat:      Pharynx: No oropharyngeal exudate or posterior oropharyngeal " erythema.   Eyes:      Conjunctiva/sclera: Conjunctivae normal.   Cardiovascular:      Rate and Rhythm: Normal rate and regular rhythm.      Heart sounds: Normal heart sounds. No murmur heard.  Lymphadenopathy:      Cervical: Cervical adenopathy present.      Left cervical: Superficial cervical adenopathy present.

## 2024-02-03 NOTE — PATIENT INSTRUCTIONS
I have prescribed an antibiotic for the infection.  Please take the antibiotic as prescribed and finish the entire prescription.    Flonase  Plain robitussin.  Plain tylenol.  Neti-pot  If not improving over the next 7-10 days, follow up with PCP.

## 2024-02-07 ENCOUNTER — ROUTINE PRENATAL (OUTPATIENT)
Dept: OBGYN CLINIC | Facility: CLINIC | Age: 29
End: 2024-02-07
Payer: COMMERCIAL

## 2024-02-07 VITALS — BODY MASS INDEX: 38.91 KG/M2 | DIASTOLIC BLOOD PRESSURE: 66 MMHG | WEIGHT: 233.8 LBS | SYSTOLIC BLOOD PRESSURE: 100 MMHG

## 2024-02-07 DIAGNOSIS — O34.219 HISTORY OF CESAREAN DELIVERY, ANTEPARTUM: ICD-10-CM

## 2024-02-07 DIAGNOSIS — Z34.93 THIRD TRIMESTER PREGNANCY: Primary | ICD-10-CM

## 2024-02-07 DIAGNOSIS — Z3A.37 37 WEEKS GESTATION OF PREGNANCY: ICD-10-CM

## 2024-02-07 DIAGNOSIS — O99.213 OBESITY DURING PREGNANCY IN THIRD TRIMESTER: ICD-10-CM

## 2024-02-07 PROCEDURE — 99214 OFFICE O/P EST MOD 30 MIN: CPT | Performed by: OBSTETRICS & GYNECOLOGY

## 2024-02-07 NOTE — PROGRESS NOTES
Assessment  28 y.o.  at 37w4d presenting for routine prenatal visit.     Plan  Diagnoses and all orders for this visit:    Third trimester pregnancy  37 weeks gestation of pregnancy  - Labor precautions  - FKC  - Return weekly until delivery    History of  delivery, antepartum  - Task sent to schedule RLTCS late 39th week  - Open to TOLAC if spontaneous labor    Obesity during pregnancy in third trimester  - Follows with MFM    ____________________________________________________________        Subjective    Shruti Camilo is a 28 y.o.  at 37w4d who presents for routine prenatal visit. She is noting increased pelvic pressure. Denies regular contractions, loss of fluid, or vaginal bleeding. She feels regular fetal movements. Discussed TOLAC v RLTCS. Does not want to be induced, unsure if labor onset imminent. Would like to tentatively schedule c/s in event does not occur spontaneously.     Pregnancy Problems:  Patient Active Problem List   Diagnosis    Celiac disease    Diffuse cystic mastopathy    Eating disorder    Osteopenia    Panic disorder with agoraphobia    PTSD (post-traumatic stress disorder)    Migraine    Depression    Anxiety    Sciatica of right side    S/P primary low transverse     Gastroesophageal reflux disease    Nonimmune to hepatitis B virus    PCOS (polycystic ovarian syndrome)    Pregnancy resulting from assisted reproductive technology    Medical marijuana use    Obesity during pregnancy in third trimester    Hyperplastic polyp of sigmoid colon    37 weeks gestation of pregnancy    COVID-19 affecting pregnancy in third trimester    Headache in pregnancy    History of  delivery, antepartum         Objective  /66   Wt 106 kg (233 lb 12.8 oz)   LMP 2023   BMI 38.91 kg/m²     FHT: 145 BPM   Uterine Size: size equals dates     Physical Exam:  Physical Exam  Constitutional:       General: She is not in acute distress.     Appearance:  Normal appearance. She is well-developed. She is not ill-appearing, toxic-appearing or diaphoretic.   HENT:      Head: Normocephalic and atraumatic.   Eyes:      General: No scleral icterus.        Right eye: No discharge.         Left eye: No discharge.      Conjunctiva/sclera: Conjunctivae normal.   Pulmonary:      Effort: Pulmonary effort is normal. No accessory muscle usage or respiratory distress.   Abdominal:      General: There is distension (gravid).      Tenderness: There is no abdominal tenderness. There is no guarding or rebound.   Skin:     General: Skin is warm and dry.      Coloration: Skin is not jaundiced.      Findings: No bruising, erythema or rash.   Neurological:      Mental Status: She is alert.   Psychiatric:         Mood and Affect: Mood normal.         Behavior: Behavior normal.         Thought Content: Thought content normal.         Judgment: Judgment normal.

## 2024-02-12 ENCOUNTER — PATIENT MESSAGE (OUTPATIENT)
Dept: OBGYN CLINIC | Facility: CLINIC | Age: 29
End: 2024-02-12

## 2024-02-12 ENCOUNTER — TELEPHONE (OUTPATIENT)
Dept: OBGYN CLINIC | Facility: MEDICAL CENTER | Age: 29
End: 2024-02-12

## 2024-02-12 ENCOUNTER — APPOINTMENT (OUTPATIENT)
Dept: LAB | Facility: CLINIC | Age: 29
End: 2024-02-12
Payer: COMMERCIAL

## 2024-02-12 DIAGNOSIS — Z3A.36 36 WEEKS GESTATION OF PREGNANCY: ICD-10-CM

## 2024-02-12 DIAGNOSIS — R51.9 PREGNANCY HEADACHE IN THIRD TRIMESTER: ICD-10-CM

## 2024-02-12 DIAGNOSIS — Z34.93 THIRD TRIMESTER PREGNANCY: ICD-10-CM

## 2024-02-12 DIAGNOSIS — Z34.93 THIRD TRIMESTER PREGNANCY: Primary | ICD-10-CM

## 2024-02-12 DIAGNOSIS — B37.9 YEAST INFECTION: ICD-10-CM

## 2024-02-12 DIAGNOSIS — O26.893 PREGNANCY HEADACHE IN THIRD TRIMESTER: ICD-10-CM

## 2024-02-12 LAB
ALBUMIN SERPL BCP-MCNC: 3.2 G/DL (ref 3.5–5)
ALP SERPL-CCNC: 120 U/L (ref 34–104)
ALT SERPL W P-5'-P-CCNC: 12 U/L (ref 7–52)
ANION GAP SERPL CALCULATED.3IONS-SCNC: 9 MMOL/L
AST SERPL W P-5'-P-CCNC: 13 U/L (ref 13–39)
BASOPHILS # BLD AUTO: 0.03 THOUSANDS/ÂΜL (ref 0–0.1)
BASOPHILS NFR BLD AUTO: 0 % (ref 0–1)
BILIRUB SERPL-MCNC: 0.23 MG/DL (ref 0.2–1)
BUN SERPL-MCNC: 4 MG/DL (ref 5–25)
CALCIUM ALBUM COR SERPL-MCNC: 8.9 MG/DL (ref 8.3–10.1)
CALCIUM SERPL-MCNC: 8.3 MG/DL (ref 8.4–10.2)
CHLORIDE SERPL-SCNC: 105 MMOL/L (ref 96–108)
CO2 SERPL-SCNC: 22 MMOL/L (ref 21–32)
CREAT SERPL-MCNC: 0.53 MG/DL (ref 0.6–1.3)
CREAT UR-MCNC: 92.5 MG/DL
EOSINOPHIL # BLD AUTO: 0.11 THOUSAND/ÂΜL (ref 0–0.61)
EOSINOPHIL NFR BLD AUTO: 1 % (ref 0–6)
ERYTHROCYTE [DISTWIDTH] IN BLOOD BY AUTOMATED COUNT: 13.8 % (ref 11.6–15.1)
GFR SERPL CREATININE-BSD FRML MDRD: 129 ML/MIN/1.73SQ M
GLUCOSE P FAST SERPL-MCNC: 85 MG/DL (ref 65–99)
HCT VFR BLD AUTO: 37.1 % (ref 34.8–46.1)
HGB BLD-MCNC: 12.2 G/DL (ref 11.5–15.4)
IMM GRANULOCYTES # BLD AUTO: 0.17 THOUSAND/UL (ref 0–0.2)
IMM GRANULOCYTES NFR BLD AUTO: 1 % (ref 0–2)
LYMPHOCYTES # BLD AUTO: 2.74 THOUSANDS/ÂΜL (ref 0.6–4.47)
LYMPHOCYTES NFR BLD AUTO: 18 % (ref 14–44)
MCH RBC QN AUTO: 31 PG (ref 26.8–34.3)
MCHC RBC AUTO-ENTMCNC: 32.9 G/DL (ref 31.4–37.4)
MCV RBC AUTO: 94 FL (ref 82–98)
MONOCYTES # BLD AUTO: 0.73 THOUSAND/ÂΜL (ref 0.17–1.22)
MONOCYTES NFR BLD AUTO: 5 % (ref 4–12)
NEUTROPHILS # BLD AUTO: 11.44 THOUSANDS/ÂΜL (ref 1.85–7.62)
NEUTS SEG NFR BLD AUTO: 75 % (ref 43–75)
NRBC BLD AUTO-RTO: 0 /100 WBCS
PLATELET # BLD AUTO: 303 THOUSANDS/UL (ref 149–390)
PMV BLD AUTO: 11 FL (ref 8.9–12.7)
POTASSIUM SERPL-SCNC: 4 MMOL/L (ref 3.5–5.3)
PROT SERPL-MCNC: 6.1 G/DL (ref 6.4–8.4)
PROT UR-MCNC: 16 MG/DL
PROT/CREAT UR: 0.17 MG/G{CREAT} (ref 0–0.1)
RBC # BLD AUTO: 3.93 MILLION/UL (ref 3.81–5.12)
SODIUM SERPL-SCNC: 136 MMOL/L (ref 135–147)
WBC # BLD AUTO: 15.22 THOUSAND/UL (ref 4.31–10.16)

## 2024-02-12 PROCEDURE — 36415 COLL VENOUS BLD VENIPUNCTURE: CPT

## 2024-02-12 PROCEDURE — 85025 COMPLETE CBC W/AUTO DIFF WBC: CPT

## 2024-02-12 PROCEDURE — 82570 ASSAY OF URINE CREATININE: CPT

## 2024-02-12 PROCEDURE — 80053 COMPREHEN METABOLIC PANEL: CPT

## 2024-02-12 PROCEDURE — 84156 ASSAY OF PROTEIN URINE: CPT

## 2024-02-12 NOTE — TELEPHONE ENCOUNTER
Pt called she was urg care last week and has an infection she has 38 weeks pregnancy I offered to her come today but she refused she only want speak with provider, please advise

## 2024-02-13 RX ORDER — FLUCONAZOLE 150 MG/1
150 TABLET ORAL
Qty: 2 TABLET | Refills: 0 | Status: SHIPPED | OUTPATIENT
Start: 2024-02-13 | End: 2024-02-17

## 2024-02-14 ENCOUNTER — TELEPHONE (OUTPATIENT)
Dept: OBGYN CLINIC | Facility: MEDICAL CENTER | Age: 29
End: 2024-02-14

## 2024-02-14 NOTE — TELEPHONE ENCOUNTER
Patient scheduled for RLTCS on 2/22 at 10AM with an arrival time of 8AM. Patient aware.    ----- Message from Teresa Hadley MD sent at 2/7/2024  3:42 PM EST -----  Regarding: c/s  Please schedule Shruti for RLTCS in mid to late 39th week. Open to TOLAC if laboring, but declines IOL.

## 2024-02-16 ENCOUNTER — ROUTINE PRENATAL (OUTPATIENT)
Dept: OBGYN CLINIC | Facility: CLINIC | Age: 29
End: 2024-02-16
Payer: COMMERCIAL

## 2024-02-16 VITALS — WEIGHT: 236 LBS | SYSTOLIC BLOOD PRESSURE: 112 MMHG | BODY MASS INDEX: 39.27 KG/M2 | DIASTOLIC BLOOD PRESSURE: 62 MMHG

## 2024-02-16 DIAGNOSIS — O09.813 PREGNANCY RESULTING FROM ASSISTED REPRODUCTIVE TECHNOLOGY IN THIRD TRIMESTER: ICD-10-CM

## 2024-02-16 DIAGNOSIS — U07.1 COVID-19 AFFECTING PREGNANCY IN THIRD TRIMESTER: ICD-10-CM

## 2024-02-16 DIAGNOSIS — Z3A.38 38 WEEKS GESTATION OF PREGNANCY: ICD-10-CM

## 2024-02-16 DIAGNOSIS — Z34.93 THIRD TRIMESTER PREGNANCY: ICD-10-CM

## 2024-02-16 DIAGNOSIS — O99.213 OBESITY DURING PREGNANCY IN THIRD TRIMESTER: Primary | ICD-10-CM

## 2024-02-16 DIAGNOSIS — O34.219 HISTORY OF CESAREAN DELIVERY, ANTEPARTUM: ICD-10-CM

## 2024-02-16 DIAGNOSIS — O98.513 COVID-19 AFFECTING PREGNANCY IN THIRD TRIMESTER: ICD-10-CM

## 2024-02-16 PROCEDURE — 99213 OFFICE O/P EST LOW 20 MIN: CPT | Performed by: ADVANCED PRACTICE MIDWIFE

## 2024-02-16 NOTE — PROGRESS NOTES
Routine Prenatal Visit  [facility] [address], [place]    Assessment/Plan:  Shruti is a 28 y.o. year old  at 38w6d who presents for routine prenatal visit.     1. Obesity during pregnancy in third trimester    2. COVID-19 affecting pregnancy in third trimester    3. History of  delivery, antepartum    4. Pregnancy resulting from assisted reproductive technology in third trimester    5. Third trimester pregnancy    6. 38 weeks gestation of pregnancy  Assessment & Plan:  - reviewed s/s labor, FKC, perineal massage  - Birth plan- reviewed  -  scheduled for 24    Ultrasound on 2024- EFW Hadlock 4   2955 grams - 6 lbs 8 oz   - GBS positive- will tx in labor  - next visit 1 week               Subjective:     CC: Prenatal care    Shruti Camilo is a 28 y.o.  female who presents for routine prenatal care at 38w6d.  Pregnancy ROS: no leakage of fluid, pelvic pain, or vaginal bleeding.  Good fetal movement. Tightness lower back and menstrual like cramping. Requested cervical exam- fingertip/30%/-4    The following portions of the patient's history were reviewed and updated as appropriate: allergies, current medications, past family history, past medical history, obstetric history, gynecologic history, past social history, past surgical history and problem list.      Objective:  /62   Wt 107 kg (236 lb)   LMP 2023   BMI 39.27 kg/m²   Pregravid Weight/BMI: 91.2 kg (201 lb) (BMI 33.45)  Current Weight: 107 kg (236 lb)   Total Weight Gain: 15.9 kg (35 lb)   Pre- Vitals    Flowsheet Row Most Recent Value   Prenatal Assessment    Fetal Heart Rate 134   Movement Present   Prenatal Vitals    Blood Pressure 112/62   Weight - Scale 107 kg (236 lb)   Urine Albumin/Glucose    Dilation/Effacement/Station    Cervical Dilation .5   Cervical Effacement 30   Fetal Station -4   Vaginal Drainage    Edema    LLE Edema +1   RLE Edema +1            General: Well appearing, no distress  Respiratory: Unlabored breathing  Cardiovascular: Regular rate.  Abdomen: Soft, gravid, nontender  Fundal Height: Appropriate for gestational age.  Extremities: Warm and well perfused.  Non tender.

## 2024-02-16 NOTE — ASSESSMENT & PLAN NOTE
- reviewed s/s labor, FKC, perineal massage  - Birth plan- reviewed  -  scheduled for 24    Ultrasound on 2024- EFW Hadlock 4   2955 grams - 6 lbs 8 oz   - GBS positive- will tx in labor  - next visit 1 week

## 2024-02-19 ENCOUNTER — TELEPHONE (OUTPATIENT)
Dept: OBGYN CLINIC | Facility: MEDICAL CENTER | Age: 29
End: 2024-02-19

## 2024-02-19 ENCOUNTER — TELEPHONE (OUTPATIENT)
Dept: OBGYN CLINIC | Facility: CLINIC | Age: 29
End: 2024-02-19

## 2024-02-19 NOTE — TELEPHONE ENCOUNTER
Returned patients call. Patient states at this time would like to keep her surgery for 2/22. Will call back to reschedule if need be.

## 2024-02-19 NOTE — TELEPHONE ENCOUNTER
Patient called and her  is scheduled for  @8am and she MUST reschedule, she has no childcare for that day. Please advise.

## 2024-02-20 ENCOUNTER — ANESTHESIA EVENT (INPATIENT)
Dept: LABOR AND DELIVERY | Facility: HOSPITAL | Age: 29
End: 2024-02-20
Payer: COMMERCIAL

## 2024-02-20 ENCOUNTER — ROUTINE PRENATAL (OUTPATIENT)
Dept: OBGYN CLINIC | Facility: CLINIC | Age: 29
End: 2024-02-20
Payer: COMMERCIAL

## 2024-02-20 VITALS — DIASTOLIC BLOOD PRESSURE: 74 MMHG | WEIGHT: 234 LBS | SYSTOLIC BLOOD PRESSURE: 128 MMHG | BODY MASS INDEX: 38.94 KG/M2

## 2024-02-20 DIAGNOSIS — Z3A.39 39 WEEKS GESTATION OF PREGNANCY: ICD-10-CM

## 2024-02-20 DIAGNOSIS — O34.219 PREVIOUS CESAREAN DELIVERY AFFECTING PREGNANCY: Primary | ICD-10-CM

## 2024-02-20 PROCEDURE — 99213 OFFICE O/P EST LOW 20 MIN: CPT | Performed by: STUDENT IN AN ORGANIZED HEALTH CARE EDUCATION/TRAINING PROGRAM

## 2024-02-20 NOTE — PROGRESS NOTES
Routine Prenatal Visit  [facility] [address] [place]    Assessment/Plan:  Shruti is a 28 y.o. year old  at 39w3d who presents for routine prenatal visit.     1. Previous  delivery affecting pregnancy    2. 39 weeks gestation of pregnancy  Assessment & Plan:  - SVE unchanged today  - She has opted for repeat LTCS this week, declines tubal sterilization; scheduled with Dr. Hinojosa on 2024  - Considering postplacental IUD, discussed increased risk of expulsion compared with interval placement            Subjective:     CC: Prenatal care    Shruti Camilo is a 28 y.o.  female who presents for routine prenatal care at 39w3d.  Pregnancy ROS: Denies leakage of fluid, pelvic pain, or vaginal bleeding.  Reports normal fetal movement.    The following portions of the patient's history were reviewed and updated as appropriate: allergies, current medications, past family history, past medical history, obstetric history, gynecologic history, past social history, past surgical history and problem list.      Objective:  /74   Wt 106 kg (234 lb)   LMP 2023   BMI 38.94 kg/m²   Pregravid Weight/BMI: 91.2 kg (201 lb) (BMI 33.45)  Current Weight: 106 kg (234 lb)   Total Weight Gain: 15 kg (33 lb)   Pre- Vitals      Flowsheet Row Most Recent Value   Prenatal Assessment    Fetal Heart Rate 132   Movement Present   Prenatal Vitals    Blood Pressure 128/74   Weight - Scale 106 kg (234 lb)   Urine Albumin/Glucose    Dilation/Effacement/Station    Vaginal Drainage    Edema    LLE Edema +1   RLE Edema +1   Facial Edema +1             General: Well appearing, no distress  Respiratory: Unlabored breathing  Cardiovascular: Regular rate.  Abdomen: Soft, gravid, nontender  Fundal Height: Appropriate for gestational age.  Extremities: Warm and well perfused.  Non tender.  : 0.5/long/-3    Gilma Figueroa MD  [facility]  [facility]  [facility]  2/20/2024 4:17 PM

## 2024-02-20 NOTE — ASSESSMENT & PLAN NOTE
- SVE unchanged today  - She has opted for repeat LTCS this week, declines tubal sterilization; scheduled with Dr. Hinojosa on 2/22/2024  - Considering postplacental IUD, discussed increased risk of expulsion compared with interval placement

## 2024-02-21 PROCEDURE — NC001 PR NO CHARGE: Performed by: OBSTETRICS & GYNECOLOGY

## 2024-02-21 NOTE — H&P
History and Physical - Obstetrics  Shruti Camilo 28 y.o. female MRN: [id number]  Unit/Bed#:  TRIAGE 2- Encounter: [id number]    ObGyn Provider:  [facility]    ASSESSMENT AND PLAN:  Shruti Camilo is a 28 y.o. year-old  at 39w5d, Hospital Day: 1, admitted for scheduled RLTCS.  By issue:    * 39 weeks gestation of pregnancy  Assessment & Plan  Admit for scheduled RLTCS  CBC, RPR, type and screen ordered  Anesthesia consulted for spinal  Ancef ordered for surgical ppx    History of  delivery, antepartum  Assessment & Plan  Prior CS for failure to progress    Nonimmune to hepatitis B virus  Assessment & Plan  Previously declined at [facility], can offer postpartum        The above assessment and plan was discussed with the admitting provider, Dr. Hinojosa    Expected LOS: >2 Midnights  Admission: INPATIENT     SUBJECTIVE:    History of Present Illness:  Shruti Camilo is a 28 y.o.  female at 39w4d, ARRON 2024, by Ultrasound, Hospital Day: 1, who presents for scheduled RLTCS. She denies contractions, LOF and vaginal bleeding. She has felt good fetal movement.    Review of Systems   Constitutional: Negative.    HENT: Negative.     Eyes: Negative.    Respiratory: Negative.     Cardiovascular: Negative.    Gastrointestinal: Negative.    Genitourinary: Negative.    Musculoskeletal: Negative.    Neurological: Negative.    Psychiatric/Behavioral:  The patient is nervous/anxious.    All other systems reviewed and are negative.      Current Pregnancy Problems:  Problem   39 Weeks Gestation of Pregnancy   History of  Delivery, Antepartum   Nonimmune to Hepatitis B Virus    Declined at [facility]     S/P Primary Low Transverse  (Resolved)    For failed induction. Would like to .          Past Obstetric History:   Patient's last menstrual period was 2023.   OB History    Para Term  AB Living   4 1 1 0 2 1   SAB IAB Ectopic  "Multiple Live Births   0 2 0 0 1      # Outcome Date GA Lbr Chris/2nd Weight Sex Delivery Anes PTL Lv   4 Current            3 Term 21 39w2d  3480 g (7 lb 10.8 oz) M CS-LTranv EPI  MELISSA      Complications: Failure to Progress in First Stage      Name: Sixto      Apgar1: 9  Apgar5: 9   2 IAB 2017     TAB      1 IAB 2015 9w0d    TAB         Birth Comments: medically induced        Pregnancy Plan:  Pregnancy: Hunt[place]  Fetal sex: Female  Support person: Everton     Delivery Plans  Planned delivery method: TOLAC  Planned delivery location: [place] L&D  Planned anesthesia: Epidural  Acceptable blood products: All     Post-Delivery Plans  Feeding intentions: Breast Milk    HISTORICAL INFORMATION:  Past Medical History:   Diagnosis Date    Anxiety     Celiac disease     Depression     Gastroparesis     Marijuana use, continuous 07/10/2018    Last Assessment & Plan:  Shruti reports that she has less ability to use THC due to her step-daughter being in the house.  She reports that she has significantly decrease to twice/day.  Reviewed risks of routine daily use, she does not feel this is an issue & states \"I know my body\" & is not willing to quit at this point in time.    Migraine     Nicotine use disorder 07/10/2018    Last Assessment & Plan:  Shruti Vee continues to smoke 1 cigarette/day.  She reports that \"it will not be hard for her to stop\" & plans on quitting immediately.    Personality disorder (HCC)     Postpartum depression     S/P primary low transverse  2021    For failed induction. Would like to .     Substance abuse (HCC)     marijuana    Trauma     PTSD    Varicella     vaccine     Past Surgical History:   Procedure Laterality Date    NM  DELIVERY ONLY N/A 2021    Procedure:  SECTION ();  Surgeon: Charlie Hinojosa MD;  Location: [facility];  Service: Obstetrics    TONSILLECTOMY      WISDOM TOOTH EXTRACTION           Family History   Problem Relation Age " "of Onset    Other Mother         mental health issues    Psoriasis Mother     Heart disease Mother     No Known Problems Father     No Known Problems Brother     Heart disease Maternal Grandmother     Thrombophlebitis Maternal Grandmother     Heart disease Maternal Grandfather     Heart attack Maternal Grandfather     Obesity Maternal Grandfather     Stomach cancer Paternal Grandmother     Bone cancer Paternal Grandfather     No Known Problems Son     Breast cancer Neg Hx     Colon cancer Neg Hx     Ovarian cancer Neg Hx        Allergies:   Allergies   Allergen Reactions    Apple - Food Allergy     Gluten Meal - Food Allergy Vomiting    Other Other (See Comments)     Enviromental allergies    apples    Enviromental allergies      Other reaction(s): Other (See Comments)      apples    Vaccinium Angustifolium Itching     Berries        Current Medications:  Current Outpatient Medications   Medication Instructions    albuterol (ProAir HFA) 90 mcg/act inhaler 2 puffs, Inhalation, Every 6 hours PRN    ALPRAZolam (XANAX) 0.5 mg, Oral, As needed    cetirizine (ZYRTEC) 10 mg, Daily PRN    chlorhexidine (HIBICLENS) 4 % external liquid 1 Application, Topical, Daily PRN    diphenhydrAMINE (BENADRYL) 25 mg capsule No dose, route, or frequency recorded.    Elastic Bandages & Supports (Abdominal Support/L-XL) MISC Wear abd support during the day, take off at night    fluticasone (FLONASE) 50 mcg/act nasal spray 2 sprays, Nasal, Daily    guaiFENesin (ROBITUSSIN) 200 mg, Oral, 3 times daily PRN    ondansetron (ZOFRAN-ODT) 4 mg, Oral, Every 8 hours PRN    Prenatal Vit-Fe Fumarate-FA (Prenatal Vitamin) 27-0.8 MG TABS 1 tablet, Oral, Daily    Pyridoxine HCl (vitamin B-6) 25 MG tablet No dose, route, or frequency recorded.       OBJECTIVE:  Vitals:  Patient Vitals for the past 24 hrs:   BP Temp Temp src Pulse Resp Height Weight   02/22/24 1154 119/79 98.3 °F (36.8 °C) Temporal 102 20 -- --   02/22/24 1135 -- -- -- -- -- 5' 5\" (1.651 m) " 106 kg (234 lb)     Body mass index is 38.94 kg/m².    Invasive Devices       Peripheral Intravenous Line  Duration             Peripheral IV 02/22/24 Dorsal (posterior);Left Forearm <1 day                    Physical Exam  GEN: The patient was alert and oriented x3, pleasant well-appearing female in no acute distress.   CV: Regular rate  PULM: Non-labored respirations  MSK: Normal gait  Skin: Warm, dry  Neuro: No focal deficits  Psych: Normal affect and judgement, cooperative        FHT:    Baseline: 125bpm  Variability: moderate  Accelerations: yes, 15 x 15   Decelerations: no  Fetal [place] Rate Category: Category 1    [place]:   irritable    Prenatal Labs:  Lab Results   Component Value Date/Time    ABO A 02/22/2024 12:11 PM    RH Positive 02/22/2024 12:11 PM    HGB 12.1 02/22/2024 12:11 PM    HCT 36.0 02/22/2024 12:11 PM     02/22/2024 12:11 PM    HIVAGAB Non-Reactive 07/29/2020 02:05 PM    HEPBSAG Non-reactive 01/06/2024 11:14 AM    HEPBSAG NON-REACTIVE 08/24/2016 12:00 AM    RUBELLAIGGQT 30.4 07/29/2020 02:05 PM    KQH7KATV43SJ 96 01/08/2024 10:58 AM    [id number] 115 12/14/2020 11:49 AM    GLUF 85 02/12/2024 10:18 AM    STREPGRPB Positive (A) 01/29/2024 10:28 AM      RPR: negative  Varicella: unknown  Gonorrhea/Chlamydia: negative  UDS+ for THC on 1/6/2024        Mihaela Hays MD  OB/GYN, PGY-3  2/22/2024  1:11 PM

## 2024-02-22 ENCOUNTER — ANESTHESIA (INPATIENT)
Dept: LABOR AND DELIVERY | Facility: HOSPITAL | Age: 29
End: 2024-02-22
Payer: COMMERCIAL

## 2024-02-22 ENCOUNTER — HOSPITAL ENCOUNTER (INPATIENT)
Facility: HOSPITAL | Age: 29
LOS: 3 days | Discharge: HOME | DRG: 540 | End: 2024-02-25
Attending: OBSTETRICS & GYNECOLOGY | Admitting: OBSTETRICS & GYNECOLOGY
Payer: COMMERCIAL

## 2024-02-22 DIAGNOSIS — Z78.9 NONIMMUNE TO HEPATITIS B VIRUS: ICD-10-CM

## 2024-02-22 DIAGNOSIS — Z98.891 S/P REPEAT LOW TRANSVERSE C-SECTION: ICD-10-CM

## 2024-02-22 DIAGNOSIS — Z3A.39 39 WEEKS GESTATION OF PREGNANCY: Primary | ICD-10-CM

## 2024-02-22 LAB
ABO GROUP BLD: NORMAL
AMPHETAMINES SERPL QL SCN: NEGATIVE
BARBITURATES UR QL: NEGATIVE
BASE EXCESS BLDCOA CALC-SCNC: -2 MMOL/L (ref 3–11)
BASE EXCESS BLDCOV CALC-SCNC: 2.8 MMOL/L (ref 1–9)
BASOPHILS # BLD AUTO: 0.02 THOUSANDS/ÂΜL (ref 0–0.1)
BASOPHILS NFR BLD AUTO: 0 % (ref 0–1)
BENZODIAZ UR QL: NEGATIVE
BLD GP AB SCN SERPL QL: NEGATIVE
COCAINE UR QL: NEGATIVE
EOSINOPHIL # BLD AUTO: 0.06 THOUSAND/ÂΜL (ref 0–0.61)
EOSINOPHIL NFR BLD AUTO: 1 % (ref 0–6)
ERYTHROCYTE [DISTWIDTH] IN BLOOD BY AUTOMATED COUNT: 13.4 % (ref 11.6–15.1)
HCO3 BLDCOA-SCNC: 24.7 MMOL/L (ref 17.3–27.3)
HCO3 BLDCOV-SCNC: 29.9 MMOL/L (ref 12.2–28.6)
HCT VFR BLD AUTO: 36 % (ref 34.8–46.1)
HGB BLD-MCNC: 12.1 G/DL (ref 11.5–15.4)
HOLD SPECIMEN: YES
IMM GRANULOCYTES # BLD AUTO: 0.09 THOUSAND/UL (ref 0–0.2)
IMM GRANULOCYTES NFR BLD AUTO: 1 % (ref 0–2)
LYMPHOCYTES # BLD AUTO: 2.29 THOUSANDS/ÂΜL (ref 0.6–4.47)
LYMPHOCYTES NFR BLD AUTO: 20 % (ref 14–44)
MCH RBC QN AUTO: 31.2 PG (ref 26.8–34.3)
MCHC RBC AUTO-ENTMCNC: 33.6 G/DL (ref 31.4–37.4)
MCV RBC AUTO: 93 FL (ref 82–98)
METHADONE UR QL: NEGATIVE
MONOCYTES # BLD AUTO: 0.61 THOUSAND/ÂΜL (ref 0.17–1.22)
MONOCYTES NFR BLD AUTO: 5 % (ref 4–12)
NEUTROPHILS # BLD AUTO: 8.63 THOUSANDS/ÂΜL (ref 1.85–7.62)
NEUTS SEG NFR BLD AUTO: 73 % (ref 43–75)
NRBC BLD AUTO-RTO: 0 /100 WBCS
O2 CT VFR BLDCOA CALC: 8.5 ML/DL
OPIATES UR QL SCN: NEGATIVE
OXYCODONE+OXYMORPHONE UR QL SCN: NEGATIVE
OXYHGB MFR BLDCOA: 39.3 %
OXYHGB MFR BLDCOV: 36.3 %
PCO2 BLDCOA: 49.5 MM[HG] (ref 30–60)
PCO2 BLDCOV: 56.1 MM HG (ref 27–43)
PCP UR QL: NEGATIVE
PH BLDCOA: 7.32 [PH] (ref 7.23–7.43)
PH BLDCOV: 7.34 [PH] (ref 7.19–7.49)
PLATELET # BLD AUTO: 263 THOUSANDS/UL (ref 149–390)
PMV BLD AUTO: 11 FL (ref 8.9–12.7)
PO2 BLDCOA: 18.2 MM HG (ref 5–25)
PO2 BLDCOV: 17.2 MM HG (ref 15–45)
RBC # BLD AUTO: 3.88 MILLION/UL (ref 3.81–5.12)
RH BLD: POSITIVE
SAO2 % BLDCOV: 7.7 ML/DL
SPECIMEN EXPIRATION DATE: NORMAL
THC UR QL: POSITIVE
TREPONEMA PALLIDUM IGG+IGM AB [PRESENCE] IN SERUM OR PLASMA BY IMMUNOASSAY: NORMAL
WBC # BLD AUTO: 11.7 THOUSAND/UL (ref 4.31–10.16)

## 2024-02-22 PROCEDURE — 59514 CESAREAN DELIVERY ONLY: CPT | Performed by: OBSTETRICS & GYNECOLOGY

## 2024-02-22 PROCEDURE — 86900 BLOOD TYPING SEROLOGIC ABO: CPT

## 2024-02-22 PROCEDURE — 80307 DRUG TEST PRSMV CHEM ANLYZR: CPT

## 2024-02-22 PROCEDURE — 86780 TREPONEMA PALLIDUM: CPT

## 2024-02-22 PROCEDURE — 86850 RBC ANTIBODY SCREEN: CPT

## 2024-02-22 PROCEDURE — 82805 BLOOD GASES W/O2 SATURATION: CPT | Performed by: OBSTETRICS & GYNECOLOGY

## 2024-02-22 PROCEDURE — 4A1HXCZ MONITORING OF PRODUCTS OF CONCEPTION, CARDIAC RATE, EXTERNAL APPROACH: ICD-10-PCS | Performed by: OBSTETRICS & GYNECOLOGY

## 2024-02-22 PROCEDURE — 86901 BLOOD TYPING SEROLOGIC RH(D): CPT

## 2024-02-22 PROCEDURE — 85025 COMPLETE CBC W/AUTO DIFF WBC: CPT

## 2024-02-22 RX ORDER — OXYTOCIN/RINGER'S LACTATE 30/500 ML
PLASTIC BAG, INJECTION (ML) INTRAVENOUS
Status: COMPLETED
Start: 2024-02-22 | End: 2024-02-22

## 2024-02-22 RX ORDER — ONDANSETRON 2 MG/ML
4 INJECTION INTRAMUSCULAR; INTRAVENOUS EVERY 8 HOURS PRN
Status: DISCONTINUED | OUTPATIENT
Start: 2024-02-22 | End: 2024-02-22

## 2024-02-22 RX ORDER — NALBUPHINE HYDROCHLORIDE 10 MG/ML
5 INJECTION, SOLUTION INTRAMUSCULAR; INTRAVENOUS; SUBCUTANEOUS
Status: DISPENSED | OUTPATIENT
Start: 2024-02-22 | End: 2024-02-23

## 2024-02-22 RX ORDER — TRANEXAMIC ACID 100 MG/ML
INJECTION, SOLUTION INTRAVENOUS AS NEEDED
Status: DISCONTINUED | OUTPATIENT
Start: 2024-02-22 | End: 2024-02-22

## 2024-02-22 RX ORDER — CEFAZOLIN SODIUM 2 G/50ML
2000 SOLUTION INTRAVENOUS ONCE
Status: COMPLETED | OUTPATIENT
Start: 2024-02-22 | End: 2024-02-22

## 2024-02-22 RX ORDER — ONDANSETRON 2 MG/ML
4 INJECTION INTRAMUSCULAR; INTRAVENOUS EVERY 8 HOURS PRN
Status: DISCONTINUED | OUTPATIENT
Start: 2024-02-22 | End: 2024-02-25 | Stop reason: HOSPADM

## 2024-02-22 RX ORDER — OXYTOCIN/RINGER'S LACTATE 30/500 ML
62.5 PLASTIC BAG, INJECTION (ML) INTRAVENOUS ONCE
Qty: 500 ML | Refills: 0 | Status: DISCONTINUED | OUTPATIENT
Start: 2024-02-22 | End: 2024-02-25 | Stop reason: HOSPADM

## 2024-02-22 RX ORDER — ONDANSETRON 2 MG/ML
4 INJECTION INTRAMUSCULAR; INTRAVENOUS EVERY 6 HOURS PRN
Status: ACTIVE | OUTPATIENT
Start: 2024-02-22 | End: 2024-02-23

## 2024-02-22 RX ORDER — BENZOCAINE/MENTHOL 6 MG-10 MG
1 LOZENGE MUCOUS MEMBRANE DAILY PRN
Status: DISCONTINUED | OUTPATIENT
Start: 2024-02-22 | End: 2024-02-25 | Stop reason: HOSPADM

## 2024-02-22 RX ORDER — IBUPROFEN 600 MG/1
600 TABLET ORAL EVERY 6 HOURS
Status: DISCONTINUED | OUTPATIENT
Start: 2024-02-23 | End: 2024-02-25 | Stop reason: HOSPADM

## 2024-02-22 RX ORDER — TRANEXAMIC ACID 100 MG/ML
INJECTION, SOLUTION INTRAVENOUS
Status: COMPLETED
Start: 2024-02-22 | End: 2024-02-22

## 2024-02-22 RX ORDER — OXYCODONE HYDROCHLORIDE 5 MG/1
10 TABLET ORAL EVERY 4 HOURS PRN
Status: DISCONTINUED | OUTPATIENT
Start: 2024-02-23 | End: 2024-02-25 | Stop reason: HOSPADM

## 2024-02-22 RX ORDER — SODIUM CHLORIDE, SODIUM LACTATE, POTASSIUM CHLORIDE, CALCIUM CHLORIDE 600; 310; 30; 20 MG/100ML; MG/100ML; MG/100ML; MG/100ML
125 INJECTION, SOLUTION INTRAVENOUS CONTINUOUS
Status: DISCONTINUED | OUTPATIENT
Start: 2024-02-22 | End: 2024-02-23

## 2024-02-22 RX ORDER — KETOROLAC TROMETHAMINE 30 MG/ML
30 INJECTION, SOLUTION INTRAMUSCULAR; INTRAVENOUS EVERY 6 HOURS
Status: COMPLETED | OUTPATIENT
Start: 2024-02-22 | End: 2024-02-23

## 2024-02-22 RX ORDER — SIMETHICONE 80 MG
80 TABLET,CHEWABLE ORAL 4 TIMES DAILY PRN
Status: DISCONTINUED | OUTPATIENT
Start: 2024-02-22 | End: 2024-02-25 | Stop reason: HOSPADM

## 2024-02-22 RX ORDER — OXYTOCIN/RINGER'S LACTATE 30/500 ML
PLASTIC BAG, INJECTION (ML) INTRAVENOUS CONTINUOUS PRN
Status: DISCONTINUED | OUTPATIENT
Start: 2024-02-22 | End: 2024-02-22

## 2024-02-22 RX ORDER — MORPHINE SULFATE 0.5 MG/ML
INJECTION, SOLUTION EPIDURAL; INTRATHECAL; INTRAVENOUS AS NEEDED
Status: DISCONTINUED | OUTPATIENT
Start: 2024-02-22 | End: 2024-02-22

## 2024-02-22 RX ORDER — ACETAMINOPHEN 325 MG/1
650 TABLET ORAL EVERY 6 HOURS SCHEDULED
Status: DISCONTINUED | OUTPATIENT
Start: 2024-02-22 | End: 2024-02-25 | Stop reason: HOSPADM

## 2024-02-22 RX ORDER — NALOXONE HYDROCHLORIDE 0.4 MG/ML
0.1 INJECTION, SOLUTION INTRAMUSCULAR; INTRAVENOUS; SUBCUTANEOUS
Status: ACTIVE | OUTPATIENT
Start: 2024-02-22 | End: 2024-02-23

## 2024-02-22 RX ORDER — OXYCODONE HYDROCHLORIDE 5 MG/1
5 TABLET ORAL EVERY 4 HOURS PRN
Status: DISCONTINUED | OUTPATIENT
Start: 2024-02-23 | End: 2024-02-25 | Stop reason: HOSPADM

## 2024-02-22 RX ORDER — MORPHINE SULFATE 0.5 MG/ML
INJECTION, SOLUTION EPIDURAL; INTRATHECAL; INTRAVENOUS
Status: COMPLETED
Start: 2024-02-22 | End: 2024-02-22

## 2024-02-22 RX ORDER — ONDANSETRON 2 MG/ML
INJECTION INTRAMUSCULAR; INTRAVENOUS AS NEEDED
Status: DISCONTINUED | OUTPATIENT
Start: 2024-02-22 | End: 2024-02-22

## 2024-02-22 RX ORDER — ENOXAPARIN SODIUM 100 MG/ML
40 INJECTION SUBCUTANEOUS DAILY
Status: DISCONTINUED | OUTPATIENT
Start: 2024-02-23 | End: 2024-02-25 | Stop reason: HOSPADM

## 2024-02-22 RX ORDER — FENTANYL CITRATE 50 UG/ML
INJECTION, SOLUTION INTRAMUSCULAR; INTRAVENOUS AS NEEDED
Status: DISCONTINUED | OUTPATIENT
Start: 2024-02-22 | End: 2024-02-22

## 2024-02-22 RX ORDER — ACETAMINOPHEN 325 MG/1
650 TABLET ORAL EVERY 6 HOURS SCHEDULED
Status: DISCONTINUED | OUTPATIENT
Start: 2024-02-22 | End: 2024-02-22 | Stop reason: SDUPTHER

## 2024-02-22 RX ORDER — BUPIVACAINE HYDROCHLORIDE 7.5 MG/ML
INJECTION, SOLUTION INTRASPINAL AS NEEDED
Status: DISCONTINUED | OUTPATIENT
Start: 2024-02-22 | End: 2024-02-22

## 2024-02-22 RX ORDER — DOCUSATE SODIUM 100 MG/1
100 CAPSULE, LIQUID FILLED ORAL 2 TIMES DAILY
Status: DISCONTINUED | OUTPATIENT
Start: 2024-02-22 | End: 2024-02-25 | Stop reason: HOSPADM

## 2024-02-22 RX ORDER — OXYCODONE HYDROCHLORIDE 5 MG/1
5 TABLET ORAL EVERY 4 HOURS PRN
Status: DISCONTINUED | OUTPATIENT
Start: 2024-02-22 | End: 2024-02-22 | Stop reason: SDUPTHER

## 2024-02-22 RX ORDER — DIPHENHYDRAMINE HCL 25 MG
25 TABLET ORAL EVERY 6 HOURS PRN
Status: DISCONTINUED | OUTPATIENT
Start: 2024-02-22 | End: 2024-02-25 | Stop reason: HOSPADM

## 2024-02-22 RX ORDER — SODIUM CHLORIDE, SODIUM LACTATE, POTASSIUM CHLORIDE, CALCIUM CHLORIDE 600; 310; 30; 20 MG/100ML; MG/100ML; MG/100ML; MG/100ML
125 INJECTION, SOLUTION INTRAVENOUS CONTINUOUS
Status: DISCONTINUED | OUTPATIENT
Start: 2024-02-22 | End: 2024-02-22

## 2024-02-22 RX ORDER — ONDANSETRON 2 MG/ML
INJECTION INTRAMUSCULAR; INTRAVENOUS
Status: COMPLETED
Start: 2024-02-22 | End: 2024-02-22

## 2024-02-22 RX ORDER — HYDROXYZINE HYDROCHLORIDE 25 MG/1
25 TABLET, FILM COATED ORAL EVERY 6 HOURS PRN
Status: DISCONTINUED | OUTPATIENT
Start: 2024-02-22 | End: 2024-02-25 | Stop reason: HOSPADM

## 2024-02-22 RX ORDER — KETOROLAC TROMETHAMINE 30 MG/ML
15 INJECTION, SOLUTION INTRAMUSCULAR; INTRAVENOUS EVERY 6 HOURS SCHEDULED
Status: DISPENSED | OUTPATIENT
Start: 2024-02-22 | End: 2024-02-23

## 2024-02-22 RX ORDER — FENTANYL CITRATE 50 UG/ML
INJECTION, SOLUTION INTRAMUSCULAR; INTRAVENOUS
Status: COMPLETED
Start: 2024-02-22 | End: 2024-02-22

## 2024-02-22 RX ORDER — CALCIUM CARBONATE 500 MG/1
1000 TABLET, CHEWABLE ORAL DAILY PRN
Status: DISCONTINUED | OUTPATIENT
Start: 2024-02-22 | End: 2024-02-25 | Stop reason: HOSPADM

## 2024-02-22 RX ADMIN — CEFAZOLIN SODIUM 2000 MG: 2 SOLUTION INTRAVENOUS at 13:59

## 2024-02-22 RX ADMIN — Medication 999 MILLI-UNITS/MIN: at 14:33

## 2024-02-22 RX ADMIN — BUPIVACAINE HYDROCHLORIDE IN DEXTROSE 1.4 ML: 7.5 INJECTION, SOLUTION SUBARACHNOID at 14:04

## 2024-02-22 RX ADMIN — KETOROLAC TROMETHAMINE 30 MG: 30 INJECTION, SOLUTION INTRAMUSCULAR; INTRAVENOUS at 22:04

## 2024-02-22 RX ADMIN — SODIUM CHLORIDE, SODIUM LACTATE, POTASSIUM CHLORIDE, AND CALCIUM CHLORIDE: .6; .31; .03; .02 INJECTION, SOLUTION INTRAVENOUS at 15:06

## 2024-02-22 RX ADMIN — HYDROXYZINE HYDROCHLORIDE 25 MG: 25 TABLET, FILM COATED ORAL at 13:08

## 2024-02-22 RX ADMIN — ACETAMINOPHEN 325MG 650 MG: 325 TABLET ORAL at 23:54

## 2024-02-22 RX ADMIN — ONDANSETRON 4 MG: 2 INJECTION INTRAMUSCULAR; INTRAVENOUS at 13:59

## 2024-02-22 RX ADMIN — KETOROLAC TROMETHAMINE 30 MG: 30 INJECTION, SOLUTION INTRAMUSCULAR; INTRAVENOUS at 16:04

## 2024-02-22 RX ADMIN — HYDROXYZINE HYDROCHLORIDE 25 MG: 25 TABLET, FILM COATED ORAL at 19:28

## 2024-02-22 RX ADMIN — ACETAMINOPHEN 325MG 650 MG: 325 TABLET ORAL at 18:04

## 2024-02-22 RX ADMIN — SODIUM CHLORIDE, SODIUM LACTATE, POTASSIUM CHLORIDE, AND CALCIUM CHLORIDE 999 ML/HR: .6; .31; .03; .02 INJECTION, SOLUTION INTRAVENOUS at 12:10

## 2024-02-22 RX ADMIN — SODIUM CHLORIDE, SODIUM LACTATE, POTASSIUM CHLORIDE, AND CALCIUM CHLORIDE: .6; .31; .03; .02 INJECTION, SOLUTION INTRAVENOUS at 14:11

## 2024-02-22 RX ADMIN — DOCUSATE SODIUM 100 MG: 100 CAPSULE, LIQUID FILLED ORAL at 18:04

## 2024-02-22 RX ADMIN — TRANEXAMIC ACID 1 G: 100 INJECTION, SOLUTION INTRAVENOUS at 14:49

## 2024-02-22 RX ADMIN — PHENYLEPHRINE HYDROCHLORIDE 25 MCG/MIN: 10 INJECTION INTRAVENOUS at 14:05

## 2024-02-22 RX ADMIN — FENTANYL CITRATE 15 MCG: 50 INJECTION INTRAMUSCULAR; INTRAVENOUS at 14:04

## 2024-02-22 RX ADMIN — MORPHINE SULFATE 0.15 MG: 0.5 INJECTION, SOLUTION EPIDURAL; INTRATHECAL; INTRAVENOUS at 14:04

## 2024-02-22 RX ADMIN — NALBUPHINE HYDROCHLORIDE 5 MG: 10 INJECTION, SOLUTION INTRAMUSCULAR; INTRAVENOUS; SUBCUTANEOUS at 16:04

## 2024-02-22 RX ADMIN — ONDANSETRON 4 MG: 2 INJECTION INTRAMUSCULAR; INTRAVENOUS at 14:50

## 2024-02-22 NOTE — ANESTHESIA PROCEDURE NOTES
Spinal Block    Patient location during procedure: OB  Start time: 2/22/2024 2:04 PM  Reason for block: primary anesthetic  Staffing  Performed by: Jeremy Albarran CRNA  Authorized by: Elzbieta Raymond DO    Preanesthetic Checklist  Completed: patient identified, IV checked, site marked, risks and benefits discussed, surgical consent, monitors and equipment checked, pre-op evaluation and timeout performed  Spinal Block  Patient position: sitting  Prep: Betadine  Patient monitoring: heart rate, continuous pulse ox and frequent blood pressure checks  Approach: midline  Location: L3-4  Injection technique: single-shot  Needle  Needle type: pencil-tip   Needle gauge: 24 G  Assessment  Sensory level: T4  Injection Assessment:  negative aspiration for heme, no paresthesia on injection and positive aspiration for clear CSF.

## 2024-02-22 NOTE — ANESTHESIA POSTPROCEDURE EVALUATION
"Post-Op Assessment Note    CV Status:  Stable  Pain Score: 7      Multimodal analgesia used between 6 hours prior to anesthesia start to PACU discharge    Mental Status:  Alert and awake   Hydration Status:  Euvolemic   PONV Controlled:  Controlled   Airway Patency:  Patent     Post Op Vitals Reviewed: Yes    No anethesia notable event occurred.    Staff: AnesthesiologistNICA         /61 (BP Location: Right arm)   Pulse 85   Temp 98.1 °F (36.7 °C) (Oral)   Resp 18   Ht 5' 5\" (1.651 m)   Wt 106 kg (234 lb)   LMP 05/13/2023   SpO2 99%   BMI 38.94 kg/m²         BP      Temp      Pulse     Resp      SpO2        "

## 2024-02-22 NOTE — ANESTHESIA PREPROCEDURE EVALUATION
Procedure:   SECTION () (Uterus)    Relevant Problems   ANESTHESIA (within normal limits)      CARDIO   (+) Migraine      GI/HEPATIC   (+) Gastroesophageal reflux disease      GYN   (+) 39 weeks gestation of pregnancy   (+) Pregnancy resulting from assisted reproductive technology      MUSCULOSKELETAL   (+) Sciatica of right side      NEURO/PSYCH   (+) Anxiety   (+) Depression   (+) Headache in pregnancy   (+) Migraine   (+) PTSD (post-traumatic stress disorder)   (+) Panic disorder with agoraphobia      Endocrine   (+) PCOS (polycystic ovarian syndrome)      Other   (+) Medical marijuana use   (+) Obesity during pregnancy in third trimester        Physical Exam    Airway    Mallampati score: III  TM Distance: >3 FB  Neck ROM: full     Dental   No notable dental hx     Cardiovascular  Rhythm: regular, Rate: normal, Cardiovascular exam normal    Pulmonary  Pulmonary exam normal Breath sounds clear to auscultation    Other Findings        Anesthesia Plan  ASA Score- 3     Anesthesia Type- spinal with ASA Monitors.         Additional Monitors:     Airway Plan:            Plan Factors-    Chart reviewed.   Existing labs reviewed. Patient summary reviewed.    Patient is a current smoker.  Patient instructed to abstain from smoking on day of procedure. Patient did not smoke on day of surgery.            Induction-     Postoperative Plan-     Informed Consent- Anesthetic plan and risks discussed with patient.  I personally reviewed this patient with the CRNA. Discussed and agreed on the Anesthesia Plan with the CRNA..               Clear

## 2024-02-22 NOTE — PLAN OF CARE
Problem: PAIN - ADULT  Goal: Verbalizes/displays adequate comfort level or baseline comfort level  Description: Interventions:  - Encourage patient to monitor pain and request assistance  - Assess pain using appropriate pain scale  - Administer analgesics based on type and severity of pain and evaluate response  - Implement non-pharmacological measures as appropriate and evaluate response  - Consider cultural and social influences on pain and pain management  - Notify physician/advanced practitioner if interventions unsuccessful or patient reports new pain  Outcome: Progressing     Problem: INFECTION - ADULT  Goal: Absence or prevention of progression during hospitalization  Description: INTERVENTIONS:  - Assess and monitor for signs and symptoms of infection  - Monitor lab/diagnostic results  - Monitor all insertion sites, i.e. indwelling lines, tubes, and drains  - Monitor endotracheal if appropriate and nasal secretions for changes in amount and color  - [place] appropriate cooling/warming therapies per order  - Administer medications as ordered  - Instruct and encourage patient and family to use good hand hygiene technique  - Identify and instruct in appropriate isolation precautions for identified infection/condition  Outcome: Progressing  Goal: Absence of fever/infection during neutropenic period  Description: INTERVENTIONS:  - Monitor WBC    Outcome: Progressing     Problem: SAFETY ADULT  Goal: Patient will remain free of falls  Description: INTERVENTIONS:  - Educate patient/family on patient safety including physical limitations  - Instruct patient to call for assistance with activity   - Consult OT/PT to assist with strengthening/mobility   - Keep Call bell within reach  - Keep bed low and locked with side rails adjusted as appropriate  - Keep care items and personal belongings within reach  - Initiate and maintain comfort rounds  - Make Fall Risk Sign visible to staff  - Offer Toileting every  Hours,  in advance of need  - Initiate/Maintain alarm  - Obtain necessary fall risk management equipment:   - Apply yellow socks and bracelet for high fall risk patients  - Consider moving patient to room near nurses station  Outcome: Progressing  Goal: Maintain or return to baseline ADL function  Description: INTERVENTIONS:  -  Assess patient's ability to carry out ADLs; assess patient's baseline for ADL function and identify physical deficits which impact ability to perform ADLs (bathing, care of mouth/teeth, toileting, grooming, dressing, etc.)  - Assess/evaluate cause of self-care deficits   - Assess range of motion  - Assess patient's mobility; develop plan if impaired  - Assess patient's need for assistive devices and provide as appropriate  - Encourage maximum independence but intervene and supervise when necessary  - Involve family in performance of ADLs  - Assess for home care needs following discharge   - Consider OT consult to assist with ADL evaluation and planning for discharge  - Provide patient education as appropriate  Outcome: Progressing  Goal: Maintains/Returns to pre admission functional level  Description: INTERVENTIONS:  - Perform AM-PAC 6 Click Basic Mobility/ Daily Activity assessment daily.  - Set and communicate daily mobility goal to care team and patient/family/caregiver.   - Collaborate with rehabilitation services on mobility goals if consulted  - Perform Range of Motion  times a day.  - Reposition patient every  hours.  - Dangle patient  times a day  - Stand patient  times a day  - Ambulate patient  times a day  - Out of bed to chair  times a day   - Out of bed for meals  times a day  - Out of bed for toileting  - Record patient progress and toleration of activity level   Outcome: Progressing     Problem: Knowledge Deficit  Goal: Patient/family/caregiver demonstrates understanding of disease process, treatment plan, medications, and discharge instructions  Description: Complete learning  assessment and assess knowledge base.  Interventions:  - Provide teaching at level of understanding  - Provide teaching via preferred learning methods  Outcome: Progressing     Problem: DISCHARGE PLANNING  Goal: Discharge to home or other facility with appropriate resources  Description: INTERVENTIONS:  - Identify barriers to discharge w/patient and caregiver  - Arrange for needed discharge resources and transportation as appropriate  - Identify discharge learning needs (meds, wound care, etc.)  - Arrange for interpretive services to assist at discharge as needed  - Refer to Case Management Department for coordinating discharge planning if the patient needs post-hospital services based on physician/advanced practitioner order or complex needs related to functional status, cognitive ability, or social support system  Outcome: Progressing

## 2024-02-22 NOTE — ASSESSMENT & PLAN NOTE
, Hgb 12.1 --> 11   Voiding spontaneously  Pain: Tylenol and motrin scheduled, robert 5/10 PRN    FEN: Tolerating regular diet  DVT ppx: SCDs and Lovenox 40mg qD  Passing flatus   Incision C/D/I

## 2024-02-22 NOTE — DISCHARGE SUMMARY
Discharge Summary - Shruti Camilo 28 y.o. female MRN: [id number]    Unit/Bed#: LD TRIAGE 2- Encounter: [id number]    Admission Date: 2024     Discharge Date: 2024    Admitting Diagnosis:   Patient Active Problem List   Diagnosis    Celiac disease    Diffuse cystic mastopathy    Eating disorder    Osteopenia    Panic disorder with agoraphobia    PTSD (post-traumatic stress disorder)    Migraine    Depression    Anxiety    Sciatica of right side    Gastroesophageal reflux disease    Nonimmune to hepatitis B virus    PCOS (polycystic ovarian syndrome)    Pregnancy resulting from assisted reproductive technology    Medical marijuana use    Obesity during pregnancy in third trimester    Hyperplastic polyp of sigmoid colon    39 weeks gestation of pregnancy    COVID-19 affecting pregnancy in third trimester    Headache in pregnancy    History of  delivery, antepartum       Discharge Diagnosis:   Same, delivered    Procedures:   RLTCS    Admitting Attending: Dr. Charlie Hinojosa MD  Delivery Attending: Dr. Charlie Hinojosa MD  Discharge Attending: Dr. Elaine Rea  [facility] Course:     Shruti Camilo is a 28 y.o.  who was admitted at 39w5d for scheduled RLTCS. She had no complaints on arrival.      She delivered a viable female  on 24 at 1432. Weight 7lbs 15oz via  RLTCS . Apgars were 8 (1 min) and 9 (5 min).  was transferred to  nursery. Patient tolerated the procedure well and was transferred to recovery in stable condition.     Her post-partum course was uncomplicated.  Her post-partum pain was well controlled with oral analgesics.    On day of discharge, she was ambulating and able to reasonably perform all ADLs. She was voiding and had appropriate bowel function. Pain was well controlled. She was discharged home on postpartum day #3 without complications. Patient was instructed to follow up with her OB as an outpatient and was given appropriate  warnings to call doctor or provider if she develops signs of infection or uncontrolled pain.    On day of discharge she was ambulating, voiding spontaneously, tolerating oral intake and hemodynamically stable. Mom's blood type is A positive and  Rhogam was not given.    Disposition: Home    Planned Readmission: No    Discharge Medications:   Prenatal vitamin daily for 6 months or the duration of nursing, whichever is longer.  Motrin 600 mg orally every 6 hours as needed for pain  Tylenol (over the counter) per bottle directions as needed for pain  Hydrocortisone cream 1% (over the counter) applied 1-2x daily to perineum as needed  Witch hazel pads for perineal discomfort as needed      Discharge instructions :   -Do not place anything (no partner, tampons or douche) in your vagina for 6 weeks.  -You may walk for exercise for the first 6 weeks then gradually return to your usual activities.   -Please do not drive for 1 week if you have no stitches and for 2 weeks if you have stitches or underwent a  delivery.    -You may take baths or shower per your preference.   -Please look at your bust (breasts) in the mirror daily and call your doctor for redness or tenderness or increased warmth.   - If you have had a  section please look at your incision daily as well and call provider for increasing redness or steady drainage from the incision.   -Please call your doctor's office if temperature > 100.4*F or 38* C, worsening pain or a foul discharge.    Follow Up:  - Follow up in 1 weeks for postpartum visit

## 2024-02-22 NOTE — PLAN OF CARE
Problem: PAIN - ADULT  Goal: Verbalizes/displays adequate comfort level or baseline comfort level  Description: Interventions:  - Encourage patient to monitor pain and request assistance  - Assess pain using appropriate pain scale  - Administer analgesics based on type and severity of pain and evaluate response  - Implement non-pharmacological measures as appropriate and evaluate response  - Consider cultural and social influences on pain and pain management  - Notify physician/advanced practitioner if interventions unsuccessful or patient reports new pain  Outcome: Progressing     Problem: INFECTION - ADULT  Goal: Absence or prevention of progression during hospitalization  Description: INTERVENTIONS:  - Assess and monitor for signs and symptoms of infection  - Monitor lab/diagnostic results  - Monitor all insertion sites, i.e. indwelling lines, tubes, and drains  - Monitor endotracheal if appropriate and nasal secretions for changes in amount and color  - [place] appropriate cooling/warming therapies per order  - Administer medications as ordered  - Instruct and encourage patient and family to use good hand hygiene technique  - Identify and instruct in appropriate isolation precautions for identified infection/condition  Outcome: Progressing  Goal: Absence of fever/infection during neutropenic period  Description: INTERVENTIONS:  - Monitor WBC    Outcome: Progressing     Problem: SAFETY ADULT  Goal: Patient will remain free of falls  Description: INTERVENTIONS:  - Educate patient/family on patient safety including physical limitations  - Instruct patient to call for assistance with activity   - Consult OT/PT to assist with strengthening/mobility   - Keep Call bell within reach  - Keep bed low and locked with side rails adjusted as appropriate  - Keep care items and personal belongings within reach  - Initiate and maintain comfort rounds  - Make Fall Risk Sign visible to staff  - Apply yellow socks and bracelet  for high fall risk patients  - Consider moving patient to room near nurses station  Outcome: Progressing  Goal: Maintain or return to baseline ADL function  Description: INTERVENTIONS:  -  Assess patient's ability to carry out ADLs; assess patient's baseline for ADL function and identify physical deficits which impact ability to perform ADLs (bathing, care of mouth/teeth, toileting, grooming, dressing, etc.)  - Assess/evaluate cause of self-care deficits   - Assess range of motion  - Assess patient's mobility; develop plan if impaired  - Assess patient's need for assistive devices and provide as appropriate  - Encourage maximum independence but intervene and supervise when necessary  - Involve family in performance of ADLs  - Assess for home care needs following discharge   - Consider OT consult to assist with ADL evaluation and planning for discharge  - Provide patient education as appropriate  Outcome: Progressing  Goal: Maintains/Returns to pre admission functional level  Description: INTERVENTIONS:  - Perform AM-PAC 6 Click Basic Mobility/ Daily Activity assessment daily.  - Set and communicate daily mobility goal to care team and patient/family/caregiver.   - Collaborate with rehabilitation services on mobility goals if consulted  - Out of bed for toileting  - Record patient progress and toleration of activity level   Outcome: Progressing     Problem: Knowledge Deficit  Goal: Patient/family/caregiver demonstrates understanding of disease process, treatment plan, medications, and discharge instructions  Description: Complete learning assessment and assess knowledge base.  Interventions:  - Provide teaching at level of understanding  - Provide teaching via preferred learning methods  Outcome: Progressing     Problem: DISCHARGE PLANNING  Goal: Discharge to home or other facility with appropriate resources  Description: INTERVENTIONS:  - Identify barriers to discharge w/patient and caregiver  - Arrange for needed  discharge resources and transportation as appropriate  - Identify discharge learning needs (meds, wound care, etc.)  - Arrange for interpretive services to assist at discharge as needed  - Refer to Case Management Department for coordinating discharge planning if the patient needs post-hospital services based on physician/advanced practitioner order or complex needs related to functional status, cognitive ability, or social support system  Outcome: Progressing

## 2024-02-22 NOTE — ASSESSMENT & PLAN NOTE
QBL 614cc, Hgb 12.1 -> 11.0  DVT ppx: SCDs, Lovenox 40mg (BMI 38)  Montoya now pulled; UOP prior to removal 1200cc/106kg/9.5h = 1.19cc/kg/hr. Plan for TOV this am  Passing flatus  Pain well-controlled, transition to oral analgesics  Continue postoperative care

## 2024-02-22 NOTE — OP NOTE
OPERATIVE REPORT  PATIENT NAME: Shruti Camilo    :  1995  MRN: [id number]  Pt Location: [facility] L&D OR ROOM 01    SURGERY DATE: 2024    Surgeons and Role:     * Charlie Hinojoas MD - Primary     * Mihaela Hays MD - Assisting    Preop Diagnosis:  Pregnancy at 39w5d  Prior CS x1  BMI 38  Depression/anxiety  Medical marijuana use    Procedure(s) (LRB):   SECTION () (N/A)    Specimen(s):  ID Type Source Tests Collected by Time Destination   A :  Cord Blood Cord BLOOD GAS, VENOUS, CORD, BLOOD GAS, ARTERIAL, CORD Charlie Hinojosa MD 2024 1350    B :  Tissue (Placenta on Hold) OB Only Placenta PLACENTA IN STORAGE Charlie Hinojosa MD 2024 1350        Surgical QBL:  Surgical QBL (mL): 614 mL    Brief History  Shruti Camilo is a 28 y.o.  at 39w5d admitted for scheduled RLTCS.     Operative Indications:  Scheduled repeat low transverse  section    Attending Surgeon: Dr. Charlie Hinojosa  Resident Surgeon: Dr. Mihaela Hays    Operative Findings:  1. Viable female  at 39w5d with APGARs of 8 and 9 at 1 and 5 minutes. Fetus weighed 7lb 15oz.  2. Clear amniotic fluid  3. Placenta with a normally inserted 3VC, delivered intact  4. Normal uterus, bilateral tubes and ovaries  5. Adhesions present between the fascia, rectus and subcutaneous tissue. Adhesions also present between anterior abdominal wall, omentum, bladder and anterior aspect of the uterus.    Umbilical Cord Venous Blood Gas:  Results from last 7 days   Lab Units 24  1350   PH COV  7.345   PCO2 COV mm HG 56.1*   HCO3 COV mmol/L 29.9*   BASE EXC COV mmol/L 2.8   O2 CT CD VB mL/dL 7.7   O2 HGB, VENOUS CORD % 36.3     Umbilical Cord Arterial Blood Gas:  Results from last 7 days   Lab Units 24  1350   PH COA  7.316   PCO2 COA  49.5   PO2 COA mm HG 18.2   HCO3 COA mmol/L 24.7   BASE EXC COA mmol/L -2.0*   O2 CONTENT CORD ART ml/dl 8.5   O2 HGB, ARTERIAL CORD % 39.3       Operative  Technique  The patient was taken to the operating room. Spinal anesthesia was adequately established and Ancef was given for preoperative prophylaxis. The patient was then placed in the dorsal supine position with a left tilt of the hips. The patient was then prepped with chlorhexidine for vaginal prep and a Montoya catheter was placed. ChloraPrep was then used for the abdomen and allowed to dry prior to draping the patient for a Pfannenstiel skin incision.      A time out was performed to confirm correct patient and correct procedure.     An incision was made in the skin and carried out over subsequent layers of tissue including the fascia using Bovie electrocautery for hemostasis. The fascia was incised to expose the rectus and extended bilaterally using the curved Stewart scissors.      The superior edge of the fascial incision was grasped with Kocher clamps, elevated and the underlying rectus muscles were dissected off using the Bovie. The rectus muscles were divided at the midline and the peritoneum was entered and then extended longitudinally. Care was taken to avoid the bladder.    The bladder blade was inserted and a bladder flap was created to displace the bladder inferiorly. A transverse incision was made in the lower uterine segment using a new surgical blade. The uterine incision was extended cephalad with blunt dissection and bandage scissors.     The surgeon's hand was placed into the uterine cavity and the amniotic sac was entered. The fetal head was identified and elevated through the uterine incision with the assistance of fundal pressure. With gentle traction, the shoulder was delivered, followed by the rest of the fetal body. There was no nuchal cord noted. On delivery the cord was doubly clamped and cut after delayed cord clamping. The infant was then passed off the table to the awaiting  staff. Venous and arterial blood gas, cord blood, and portion of cord was obtained for analysis and  routine blood testing. The placenta was delivered intact with a 3 vessel cord and sent to storage. Oxytocin was administered by IV infusion to enhance uterine contraction. The uterus was exteriorized and cleared of all clots and remaining products of conception. TXA was given for hemorrhage prophylaxis.    The uterine incision was re approximated using an 0 Vicryl in a running locked fashion. A second horizontal imbricating stitch with the same suture was applied. The uterine incision was examined and noted to be hemostatic. The posterior cul-de-sac was cleared of all clots and products of conception. The uterus was replaced into the abdomen and the paracolic gutters were cleared of all clots.  The uterine incision was once again reexamined and noted to be hemostatic. The fascia was re approximated using an 0 Vicryl in a running nonlocked fashion. The subcutaneous tissue was cleared of all clots and debris. Good hemostasis was achieved with Bovie electrocautery. The subcutaneous tissue was reapproximated with 3-0 plain gut. The skin incision was closed using 3-0 Monocryl with exofin. Good hemostasis was noted. Patient tolerated the procedure well. All needle, sponge, and instrument counts were noted to be correct x 2 at the end of the procedure.  Patient was transferred to postpartum in stable condition. Dr. Hinojosa was present and participated in the entire surgery.          SIGNATURE: Mihaela Hays MD  DATE: February 22, 2024  TIME: 5:08 PM

## 2024-02-23 PROBLEM — Z98.891 S/P REPEAT LOW TRANSVERSE C-SECTION: Status: ACTIVE | Noted: 2023-12-18

## 2024-02-23 LAB
BASOPHILS # BLD AUTO: 0.03 THOUSANDS/ÂΜL (ref 0–0.1)
BASOPHILS NFR BLD AUTO: 0 % (ref 0–1)
EOSINOPHIL # BLD AUTO: 0.15 THOUSAND/ÂΜL (ref 0–0.61)
EOSINOPHIL NFR BLD AUTO: 1 % (ref 0–6)
ERYTHROCYTE [DISTWIDTH] IN BLOOD BY AUTOMATED COUNT: 13.5 % (ref 11.6–15.1)
HCT VFR BLD AUTO: 33.8 % (ref 34.8–46.1)
HGB BLD-MCNC: 11 G/DL (ref 11.5–15.4)
IMM GRANULOCYTES # BLD AUTO: 0.09 THOUSAND/UL (ref 0–0.2)
IMM GRANULOCYTES NFR BLD AUTO: 1 % (ref 0–2)
LYMPHOCYTES # BLD AUTO: 3.04 THOUSANDS/ÂΜL (ref 0.6–4.47)
LYMPHOCYTES NFR BLD AUTO: 22 % (ref 14–44)
MCH RBC QN AUTO: 30.8 PG (ref 26.8–34.3)
MCHC RBC AUTO-ENTMCNC: 32.5 G/DL (ref 31.4–37.4)
MCV RBC AUTO: 95 FL (ref 82–98)
MONOCYTES # BLD AUTO: 0.93 THOUSAND/ÂΜL (ref 0.17–1.22)
MONOCYTES NFR BLD AUTO: 7 % (ref 4–12)
NEUTROPHILS # BLD AUTO: 9.63 THOUSANDS/ÂΜL (ref 1.85–7.62)
NEUTS SEG NFR BLD AUTO: 69 % (ref 43–75)
NRBC BLD AUTO-RTO: 0 /100 WBCS
PLATELET # BLD AUTO: 218 THOUSANDS/UL (ref 149–390)
PMV BLD AUTO: 10.8 FL (ref 8.9–12.7)
RBC # BLD AUTO: 3.57 MILLION/UL (ref 3.81–5.12)
WBC # BLD AUTO: 13.87 THOUSAND/UL (ref 4.31–10.16)

## 2024-02-23 PROCEDURE — 99024 POSTOP FOLLOW-UP VISIT: CPT | Performed by: OBSTETRICS & GYNECOLOGY

## 2024-02-23 PROCEDURE — 85025 COMPLETE CBC W/AUTO DIFF WBC: CPT

## 2024-02-23 RX ADMIN — KETOROLAC TROMETHAMINE 15 MG: 30 INJECTION, SOLUTION INTRAMUSCULAR; INTRAVENOUS at 16:48

## 2024-02-23 RX ADMIN — ACETAMINOPHEN 325MG 650 MG: 325 TABLET ORAL at 20:13

## 2024-02-23 RX ADMIN — DOCUSATE SODIUM 100 MG: 100 CAPSULE, LIQUID FILLED ORAL at 17:52

## 2024-02-23 RX ADMIN — KETOROLAC TROMETHAMINE 30 MG: 30 INJECTION, SOLUTION INTRAMUSCULAR; INTRAVENOUS at 04:05

## 2024-02-23 RX ADMIN — HYDROXYZINE HYDROCHLORIDE 25 MG: 25 TABLET, FILM COATED ORAL at 09:02

## 2024-02-23 RX ADMIN — ENOXAPARIN SODIUM 40 MG: 40 INJECTION SUBCUTANEOUS at 08:32

## 2024-02-23 RX ADMIN — OXYCODONE HYDROCHLORIDE 10 MG: 5 TABLET ORAL at 22:04

## 2024-02-23 RX ADMIN — ACETAMINOPHEN 325MG 650 MG: 325 TABLET ORAL at 14:04

## 2024-02-23 RX ADMIN — HYDROXYZINE HYDROCHLORIDE 25 MG: 25 TABLET, FILM COATED ORAL at 20:29

## 2024-02-23 RX ADMIN — ACETAMINOPHEN 325MG 650 MG: 325 TABLET ORAL at 06:10

## 2024-02-23 RX ADMIN — KETOROLAC TROMETHAMINE 30 MG: 30 INJECTION, SOLUTION INTRAMUSCULAR; INTRAVENOUS at 10:18

## 2024-02-23 RX ADMIN — DIPHENHYDRAMINE HYDROCHLORIDE 25 MG: 25 TABLET ORAL at 09:02

## 2024-02-23 RX ADMIN — DOCUSATE SODIUM 100 MG: 100 CAPSULE, LIQUID FILLED ORAL at 08:31

## 2024-02-23 NOTE — CASE MANAGEMENT
Case Management Assessment    Patient name Shruti Camilo  Location /-01 MRN [id number]  : 1995 Date 2024       Current Admission Date: 2024  Current Admission Diagnosis:History of 2  sections   Patient Active Problem List    Diagnosis Date Noted    History of 2  sections 2024    Headache in pregnancy 2024    COVID-19 affecting pregnancy in third trimester 2023    S/P repeat low transverse  2023    Obesity during pregnancy in third trimester 2023    Nonimmune to hepatitis B virus 2023    Pregnancy resulting from assisted reproductive technology 2023    Medical marijuana use 2023    PCOS (polycystic ovarian syndrome) 2023    Hyperplastic polyp of sigmoid colon 2022    Gastroesophageal reflux disease 2021    Sciatica of right side 2020    Migraine     Depression     Anxiety     Diffuse cystic mastopathy 2019    Celiac disease 2019    Osteopenia 2019    Eating disorder 2018    PTSD (post-traumatic stress disorder) 2018    Panic disorder with agoraphobia 2017      LOS (days): 1  Geometric Mean LOS (GMLOS) (days):   Days to GMLOS:     OBJECTIVE:    Risk of Unplanned Readmission Score: 8.25         Current admission status: Inpatient       Preferred Pharmacy:   [id number] [address] [address]  Phone: [phone] Fax: [phone]    [facility] [address] [address] [place] [address]  Phone: [phone] Fax: [phone]    Primary Care Provider: Fariha Mauro MD    Primary Insurance: [email or web address]  Secondary Insurance:     ASSESSMENT:    Consult(s): THC (+)    CM met w/MOB who provided the following information:      Baby's name/gender: BG Camilo  Mother of baby: Shruti Camilo  Father of baby//SO: Bairon Camara  children: 4yo son  Lives with: MOB/FOB/son  Baby Supplies:  MOB confirmed having all necessary supplies  Breast Pump if breast feeding: MOB reported she already received a breast pump through insurance  Government Assistance Programs/WIC/EBT/SSI:  WIC  Work/School:  FOB is employed   Transportation: Both drive   Prenatal care: [facility]  Pediatrician:  [facility]  Mental Health Hx or Treatment: PTSD, panic disorder, depression, anxiety. OB prescribed Xanax prn through pregnancy. MOB sees psychiatrist and therapist through [facility].  Substance Abuse: MOB has a medical marijuana card. MOB and baby both positive for THC. CM explained need for Childline call. MOB understanding. Report made to [phone]. Assigned to [employer] CYS.  Insurance for baby: [employer]      CM completed SDOH questions. Family reported the are slightly behind on mortgage; however, they have already looked into resources and most of them are for renters, not home owners. Family interested in food bank locations, as they do not have SNAP. CM utilized [employer] and provided resources for housing and food to family. Family denies any other CM needs at this time, encouraged to contact CM as needed. Will await to hear from [employer] CYS.       Social Determinants of Health (SDOH)      Flowsheet Row Most Recent Value   Housing Stability    In the last 12 months, was there a time when you were not able to pay the mortgage or rent on time? Y   In the last 12 months, how many places have you lived? 1   In the last 12 months, was there a time when you did not have a steady place to sleep or slept in a shelter (including now)? N   Transportation Needs    In the past 12 months, has lack of transportation kept you from medical appointments or from getting medications? no   In the past 12 months, has lack of transportation kept you from meetings, work, or from getting things needed for daily living? No   Food Insecurity    Within  the past 12 months, you worried that your food would run out before you got the money to buy more. Sometimes   Within the past 12 months, the food you bought just didn't last and you didn't have money to get more. Sometimes   Utilities    In the past 12 months has the electric, gas, oil, or water company threatened to shut off services in your home? No

## 2024-02-23 NOTE — PLAN OF CARE
Problem: PAIN - ADULT  Goal: Verbalizes/displays adequate comfort level or baseline comfort level  Description: Interventions:  - Encourage patient to monitor pain and request assistance  - Assess pain using appropriate pain scale  - Administer analgesics based on type and severity of pain and evaluate response  - Implement non-pharmacological measures as appropriate and evaluate response  - Consider cultural and social influences on pain and pain management  - Notify physician/advanced practitioner if interventions unsuccessful or patient reports new pain  Outcome: Progressing     Problem: INFECTION - ADULT  Goal: Absence or prevention of progression during hospitalization  Description: INTERVENTIONS:  - Assess and monitor for signs and symptoms of infection  - Monitor lab/diagnostic results  - Monitor all insertion sites, i.e. indwelling lines, tubes, and drains  - Monitor endotracheal if appropriate and nasal secretions for changes in amount and color  - [place] appropriate cooling/warming therapies per order  - Administer medications as ordered  - Instruct and encourage patient and family to use good hand hygiene technique  - Identify and instruct in appropriate isolation precautions for identified infection/condition  Outcome: Progressing  Goal: Absence of fever/infection during neutropenic period  Description: INTERVENTIONS:  - Monitor WBC    Outcome: Progressing     Problem: SAFETY ADULT  Goal: Patient will remain free of falls  Description: INTERVENTIONS:  - Educate patient/family on patient safety including physical limitations  - Instruct patient to call for assistance with activity   - Consult OT/PT to assist with strengthening/mobility   - Keep Call bell within reach  - Keep bed low and locked with side rails adjusted as appropriate  - Keep care items and personal belongings within reach  - Initiate and maintain comfort rounds  - Make Fall Risk Sign visible to staff  - Apply yellow socks and bracelet  for high fall risk patients  - Consider moving patient to room near nurses station  Outcome: Progressing  Goal: Maintain or return to baseline ADL function  Description: INTERVENTIONS:  -  Assess patient's ability to carry out ADLs; assess patient's baseline for ADL function and identify physical deficits which impact ability to perform ADLs (bathing, care of mouth/teeth, toileting, grooming, dressing, etc.)  - Assess/evaluate cause of self-care deficits   - Assess range of motion  - Assess patient's mobility; develop plan if impaired  - Assess patient's need for assistive devices and provide as appropriate  - Encourage maximum independence but intervene and supervise when necessary  - Involve family in performance of ADLs  - Assess for home care needs following discharge   - Consider OT consult to assist with ADL evaluation and planning for discharge  - Provide patient education as appropriate  Outcome: Progressing  Goal: Maintains/Returns to pre admission functional level  Description: INTERVENTIONS:  - Perform AM-PAC 6 Click Basic Mobility/ Daily Activity assessment daily.  - Set and communicate daily mobility goal to care team and patient/family/caregiver.   - Collaborate with rehabilitation services on mobility goals if consulted  - Out of bed for toileting  - Record patient progress and toleration of activity level   Outcome: Progressing     Problem: Knowledge Deficit  Goal: Patient/family/caregiver demonstrates understanding of disease process, treatment plan, medications, and discharge instructions  Description: Complete learning assessment and assess knowledge base.  Interventions:  - Provide teaching at level of understanding  - Provide teaching via preferred learning methods  Outcome: Progressing     Problem: DISCHARGE PLANNING  Goal: Discharge to home or other facility with appropriate resources  Description: INTERVENTIONS:  - Identify barriers to discharge w/patient and caregiver  - Arrange for needed  discharge resources and transportation as appropriate  - Identify discharge learning needs (meds, wound care, etc.)  - Arrange for interpretive services to assist at discharge as needed  - Refer to Case Management Department for coordinating discharge planning if the patient needs post-hospital services based on physician/advanced practitioner order or complex needs related to functional status, cognitive ability, or social support system  Outcome: Progressing

## 2024-02-23 NOTE — LACTATION NOTE
This note was copied from a baby's chart.  CONSULT - LACTATION  Baby Girl Camilo (Jocelyn) 1 days female MRN: [id number]    [facility] NURSERY Room / Bed: (N)/(N) Encounter: [id number]    Maternal Information     MOTHER:  Shruti Camilo  Maternal Age: 28 y.o.   OB History: # 1 - Date: 2015, Sex: None, Weight: None, GA: 9w0d, Delivery: Therapeutic , Apgar1: None, Apgar5: None, Living: None, Birth Comments: medically induced     # 2 - Date: , Sex: None, Weight: None, GA: None, Delivery: Therapeutic , Apgar1: None, Apgar5: None, Living: None, Birth Comments: None    # 3 - Date: 21, Sex: Male, Weight: 3480 g (7 lb 10.8 oz), GA: 39w2d, Delivery: , Low Transverse, Apgar1: 9, Apgar5: 9, Living: Living, Birth Comments: None    # 4 - Date: 24, Sex: Female, Weight: 3600 g (7 lb 15 oz), GA: 39w5d, Delivery: , Low Transverse, Apgar1: None, Apgar5: None, Living: Living, Birth Comments: None   Previouse breast reduction surgery? No    Lactation history:   Has patient previously breast fed: No   How long had patient previously breast fed:     Previous breast feeding complications:       Past Surgical History:   Procedure Laterality Date    ME  DELIVERY ONLY N/A 2021    Procedure:  SECTION ();  Surgeon: Charlie Hinojosa MD;  Location: [facility];  Service: Obstetrics    ME  DELIVERY ONLY N/A 2024    Procedure:  SECTION ();  Surgeon: Charlie Hinojosa MD;  Location: [facility];  Service: Obstetrics    TONSILLECTOMY      WISDOM TOOTH EXTRACTION          Birth information:  YOB: 2024   Time of birth: 2:32 PM   Sex: female   Delivery type: , Low Transverse   Birth Weight: 3600 g (7 lb 15 oz)   Percent of Weight Change: -1%     Gestational Age: 39w5d   [unfilled]    Assessment     Breast and nipple assessment:  Mom denies need for  assistance     Assessment:  nursing well as per Mom    Feeding assessment: feeding well as per Mom    LATCH:  Latch:     Audible Swallowing:     Type of Nipple:     Comfort (Breast/Nipple):     Hold (Positioning):     LATCH Score:            Feeding recommendations:  breast feed on demand    Met with mother. Provided  with Ready, Set, Baby booklet which contained information on:  Hand expression with access to QR codes to review hand expression.  Positioning and latch reviewed as well as showing images of other feeding positions.  Discussed the properties of a good latch in any position.   Feeding on cue and what that means for recognizing infant's hunger, s/s that baby is getting enough milk and some s/s that breastfeeding dyad may need further help Denies need for assistance.  Skin to Skin contact and benefits to mom and baby  Avoidance of pacifiers for the first month discussed.   Gave information on common concerns, what to expect the first few weeks after delivery, preparing for other caregivers, and how partners can help. Resources for support also provided. Grandmother at bedside.     Also reviewed discharge breastfeeding booklet including the feeding log. Emphasized 8 or more (12) feedings in a 24 hour period, what to expect for the number of diapers per day of life and the progression of properties of the  stooling pattern.    List of reasons to call a lactation consultant.  Feeding logs  Feeding cues  Hand expression  Baby's Second day (cluster feeding)  Breastfeeding and Your Lifestyle (Medications, Alcohol, Caffeine, Smoking, Street Drugs, Methadone)  First Two Weeks Survival Guide for Breastfeeding  Breast Changes  Physical Therapy  Storage and Handling of Breast milk  How to Keep Your Breast Pump Kit Clean  The Employed Breastfeeding Mother  Mixed feeding  Bottle feeding like breastfeeding (paced bottle feeding)  astfeeding and your lifestyle, storage and preparation of breast milk, how  to keep you breast pump clean, the employed breastfeeding mother and paced bottle feeding handouts.     Booklet included Breastfeeding Resources for after discharge including access to the number for the [facility].    [employer]  to call for assistance, questions and concerns.  Extension number for inpatient lactation support provided.          Hyun Bonner RN 2/23/2024 5:18 PM

## 2024-02-23 NOTE — DISCHARGE INSTRUCTIONS
Self Care After Delivery   AMBULATORY CARE:   The postpartum period  is the period of time from delivery to about 6 weeks. During this time you may experience many physical and emotional changes. It is important to understand what is normal and when you need to call your healthcare provider. It is also important to know how to care for yourself during this time.  Call your local emergency number (911 in the [place]) for any of the following:   You see or hear things that are not there, or have thoughts of harming yourself or your baby.    You soak through 1 pad in 15 minutes, have blurry vision, clammy or pale skin, and feel faint.    You faint or lose consciousness.    You have trouble breathing.    You cough up blood.    Your  incision comes apart.    Seek care immediately if:   Your heart is beating faster than usual.    You have a bad headache or changes in your vision.    Your episiotomy or  incision is red, swollen, bleeding, or draining pus.    You have severe abdominal pain.    Call your doctor or obstetrician if:   Your leg is painful, red, and larger than usual.    You soak through 1 or more pads in an hour, or pass blood clots larger than a quarter from your vagina.    You have a fever.    You have new or worsening pain in your abdomen or vagina.    You continue to have depression 1 to 2 weeks after you deliver.    You have trouble sleeping.    You have foul-smelling discharge from your vagina.    You have pain or burning when you urinate.    You do not have a bowel movement for 3 days or more.    You have nausea or are vomiting.    You have hard lumps or red streaks over your breasts.    You have cracked nipples or bleed from your nipples.    You have questions or concerns about your condition or care.    Physical changes:  The following are normal changes after you give birth:  Pain in the area between your anus and vagina    Breast pain    Constipation or hemorrhoids    Hot or cold  flashes    Vaginal bleeding or discharge    Mild to moderate abdominal cramping    Difficulty controlling bowel movements or urine    Emotional changes:  A drop in hormone levels after you deliver may cause changes in your emotions. You may feel irritable, sad, or anxious. You may cry easily or for no reason. You may also feel depressed. Depression that continues can be a sign of postpartum depression, a condition that can be treated. Treatment may include talk therapy, medicines, or both. Healthcare providers will ask how you are feeling and if you have any depression. These talks can happen during appointments for your medical care and for your baby's care, such as well child visits. Providers can help you find ways to care for yourself and your baby. Talk to your providers about the following:  When emotional changes or depression started, and if it is getting worse over time    Problems you are having with daily activities, sleep, or caring for your baby    If anything makes you feel worse, or makes you feel better    Feeling that you are not bonding with your baby the way you want    Any problems your baby has with sleeping or feeding    Your baby is fussy or cries a lot    Support you have from friends, family, or others    Breast care for breastfeeding mothers:  You may have sore breasts for 3 to 6 days after you give birth. This happens as your milk begins to fill your breasts. You may also have sore breasts if you do not breastfeed frequently. Do the following to care for your breasts:  Apply a moist, warm, compress to your breast as directed.  This may help soothe your breasts. Make sure the washcloth is not too hot before you apply it to your breast.    Nurse your baby or pump your milk frequently.  This may prevent clogged milk ducts. Ask your healthcare provider how often to nurse or pump.    Massage your breasts as directed.  This may help increase your milk flow. Gently rub your breasts in a circular  motion before you breastfeed. You may need to gently squeeze your breast or nipple to help release milk. You can also use a breast pump to help release milk from your breast.    Wash your breasts with warm water only.  Do not put soap on your nipples. Soap may cause your nipples to become dry.    Apply lanolin cream to your nipples as directed.  Lanolin cream may add moisture to your skin and prevent nipple dryness. Always  wash off lanolin cream with warm water before you breastfeed.    Place pads in your bra.  Your nipples may leak milk when you are not breastfeeding. You can place pads inside of your bra to help prevent leaking onto your clothing. Ask your healthcare provider where to purchase bra pads.    Get breastfeeding support if needed.  Healthcare providers can answer questions about breastfeeding and provide you with support. Ask your healthcare provider who you can contact if you need breastfeeding support.    Breast care for non-breastfeeding mothers:  Milk will fill your breasts even if you bottle feed your baby. Do the following to help stop your milk from filling your breasts and causing pain:  Wear a bra with support at all times.  A sports bra or a tight-fitting bra will help stop your milk from coming in.    Apply ice on each breast for 15 to 20 minutes every hour or as directed.  Use an ice pack, or put crushed ice in a plastic bag. Cover it with a towel before you apply it to your breast. Ice helps your milk ducts shrink.    Keep your breasts away from warm water.  Warm water will make it easier for milk to fill your breasts. Stand with your breasts away from warm water in the shower.    Limit how much you touch your breasts.  This will prevent them from filling with milk.    Perineum care:  Your perineum is the area between your rectum and vagina. It is normal to have swelling and pain in this area after you give birth. If you had an episiotomy, your healthcare provider may give you special  instructions.  Clean your perineum after you use the bathroom.  This may prevent infection and help with healing. Use a spray bottle with warm water to clean your perineum. You may also gently spray warm water against your perineum when you urinate. Always wipe front to back.    Take a sitz bath as directed.  A sitz bath may help relieve swelling and pain. Fill your bath tub or bucket with water up to your hips and sit in the water. Use cold water for 2 days after you deliver. Then use warm water. Ask your healthcare provider for more information about a sitz bath.    Apply ice packs for the first 24 hours or as directed.  Use a plastic glove filled with ice or buy an ice pack. Wrap the ice pack or plastic glove in a small towel or wash cloth. Place the ice pack on your perineum for 20 minutes at a time.    Sit on a donut-shaped pillow.  This may relieve pressure on your perineum when you sit.    Use wipes that contain medicine or take pills as directed.  Your healthcare provider may tell you to use witch hazel pads. You can place witch hazel pads in the refrigerator before you apply them to your perineum. Your provider may also tell you to take NSAIDs. Ask him or her how often to take pills or use the wipes.    Do not go swimming or take tub baths for 4 to 6 weeks or as directed.  This will help prevent an infection in your vagina or uterus.    Bowel and bladder care:  It may take 3 to 5 days to have a bowel movement after you deliver your baby. You can do the following to prevent or manage constipation, and get control of your bowel or bladder:  Take stool softeners as directed.  A stool softener is medicine that will make your bowel movements softer. This may prevent or relieve constipation. A stool softener may also make bowel movements less painful.    Drink plenty of liquids.  Ask how much liquid to drink each day and which liquids are best for you. Liquids may help prevent constipation.    Eat foods high in  fiber.  Examples include fruits, vegetables, grains, beans, and lentils. Ask your healthcare provider how much fiber you need each day. Fiber may prevent constipation.         Do Kegel exercises as directed.  Kegel exercises will help strengthen the muscles that control bowel movements and urination. Ask your healthcare provider for more information on Kegel exercises.    Apply cold compresses or medicine to hemorrhoids as directed.  This may relieve swelling and pain. Your healthcare provider may tell you to apply ice or wipes that contain medicine to your hemorrhoids. He or she may also tell you to use a sitz bath. Ask your provider for more information on how to manage hemorrhoids.    Nutrition:  Good nutrition is important in the postpartum period. It will help you return to a healthy weight, increase your energy levels, and prevent constipation. It will also help you get enough nutrients and calories if you are going to breastfeed your baby.  Eat a variety of healthy foods.  Healthy foods include fruits, vegetables, whole-grain breads, low-fat dairy products, beans, lean meats, and fish. You may need 500 to 700 extra calories each day if you breastfeed your baby. You may also need extra protein.         Limit foods with added sugar and high amounts of fat.  These foods are high in calories and low in healthy nutrients. Read food labels so you know how much sugar and fat is in the food you want to eat.    Drink 8 to 10 glasses of water per day.  Water will help you make plenty of milk for your baby. It will also help prevent constipation. Drink a glass of water every time you breastfeed your baby.    Take vitamins as directed.  Ask your healthcare provider what vitamins you need.    Limit caffeine and alcohol if you are breastfeeding.  Caffeine and alcohol can get into your breast milk. Caffeine and alcohol can make your baby fussy. They can also interfere with your baby's sleep. Ask your healthcare provider if  you can drink alcohol or caffeine.    Rest and sleep:  You may feel very tired in the postpartum period. Enough sleep will help you heal and give you energy to care for your baby. The following may help you get sleep and rest:  Nap when your baby naps.  Your baby may nap several times during the day. Get rest during this time.    Limit visitors.  Many people may want to see you and your baby. Ask friends or family to visit on different days. This will give you time to rest.    Do not plan too much for one day.  Put off household chores so that you have time to rest. Gradually do more each day.    Ask for help from family, friends, or neighbors.  Ask them to help you with laundry, cleaning, or errands. Also ask someone to watch the baby while you take a nap or relax. Ask your partner to help with the care of your baby. Pump some of your breast milk so your partner can feed your baby during the night.    Exercise after delivery:  Wait until your healthcare provider says it is okay to exercise. Exercise can help you lose weight, increase your energy levels, and manage your mood. It can also prevent constipation and blood clots. Start with gentle exercises such as walking. Do more as you have more energy. You may need to avoid abdominal exercises for 1 to 2 weeks after you deliver. Talk to your healthcare provider about an exercise plan that is right for you.       Sexual activity after delivery:   Do not have sex until your healthcare provider says it is okay. You may need to wait 4 to 6 weeks before you have sex. This may prevent infection and allow time to heal.    Your menstrual cycle may begin as soon as 3 weeks after you deliver. Your period may be delayed if you breastfeed your baby. You can become pregnant before you get your first postpartum period. Talk to your healthcare provider about birth control that is right for you. Some types of birth control are not safe during breastfeeding.    For support and more  information:  Join a support group for new mothers. Ask for help from family and friends with chores, errands, and care of your baby.  Office of Women's Health,  Department of Health and Human Services  [address] [place] [address] [address] [place] [address]  [address] [facility] [address] [address]  Phone: [phone]  Web Address: www.womenshealth.gov  [facility]  [address] [place] [address]  Web Address: [email or web address]  Follow up with your doctor or obstetrician as directed:  You will need to follow up within 2 to 6 weeks of delivery. Write down your questions so you remember to ask them at your visits.  © Copyright [employer] 2023 Information is for End User's use only and may not be sold, redistributed or otherwise used for commercial purposes.  The above information is an  only. It is not intended as medical advice for individual conditions or treatments. Talk to your doctor, nurse or pharmacist before following any medical regimen to see if it is safe and effective for you.

## 2024-02-23 NOTE — PROGRESS NOTES
"Progress Note - OB/GYN  Shruti Camilo 28 y.o. female MRN: [id number]  Unit/Bed#:  411-01 Encounter: [id number]    Assessment and Plan     Shruti Camilo is a patient of: [facility]. She is PPD# 1 s/p RLTCS.  Recovering well and is stable       * History of 2  sections  Assessment & Plan  QBL 614cc, Hgb 12.1 -> 11.0  DVT ppx: SCDs, Lovenox 40mg (BMI 38)  Hollis now pulled; UOP prior to removal 1200cc/106kg/9.5h = 1.19cc/kg/hr. Plan for TOV this am  Passing flatus  Pain well-controlled, transition to oral analgesics  Continue postoperative care      S/P repeat low transverse   Assessment & Plan  Admit for scheduled RLTCS  CBC, RPR, type and screen ordered  Anesthesia consulted for spinal  Ancef ordered for surgical ppx    Nonimmune to hepatitis B virus  Assessment & Plan  Previously declined at [facility], can offer postpartum          Disposition    - Anticipate discharge home on POD# 3      Subjective/Objective     Chief Complaint: Postpartum State     Subjective:    Shruti Camilo reports feeling well this morning overall.  Pain is well controlled.  Patient was voiding via hollis catheter; plan for void trial this morning. She is ambulating without lightheadedness or dizziness.  Patient is currently passing flatus and has had no bowel movement. She is tolerating PO, and denies nausea or vomitting. Patient denies fever, chills, chest pain, shortness of breath, or calf tenderness. Lochia is minimal. She is Breastfeeding. She is recovering well and is stable.       Vitals:   /76 (BP Location: Right arm)   Pulse 90   Temp 98.2 °F (36.8 °C) (Temporal)   Resp 18   Ht 5' 5\" (1.651 m)   Wt 106 kg (234 lb)   LMP 2023   SpO2 99%   Breastfeeding Unknown   BMI 38.94 kg/m²       Intake/Output Summary (Last 24 hours) at 2024 0709  Last data filed at 2024 0133  Gross per 24 hour   Intake 2000 ml   Output 2164 ml   Net -164 ml       Invasive " Devices       Peripheral Intravenous Line  Duration             Peripheral IV 02/22/24 Dorsal (posterior);Left Forearm <1 day                    Physical Exam:   GEN: Shruti Camilo appears well, alert, pleasant and cooperative   CARDIO: RRR, no murmurs or rubs  RESP:  CTAB, no wheezes or rales  ABDOMEN: soft, no tenderness, no distention, fundus @ umbilicus, Incision C/D/I  EXTREMITIES: SCDs on, non tender, no erythema      Labs:     Hemoglobin   Date Value Ref Range Status   02/23/2024 11.0 (L) 11.5 - 15.4 g/dL Final   02/22/2024 12.1 11.5 - 15.4 g/dL Final     WBC   Date Value Ref Range Status   02/23/2024 13.87 (H) 4.31 - 10.16 Thousand/uL Final   02/22/2024 11.70 (H) 4.31 - 10.16 Thousand/uL Final     Platelets   Date Value Ref Range Status   02/23/2024 218 149 - 390 Thousands/uL Final   02/22/2024 263 149 - 390 Thousands/uL Final     Creatinine   Date Value Ref Range Status   02/12/2024 0.53 (L) 0.60 - 1.30 mg/dL Final     Comment:     Standardized to IDMS reference method   01/30/2024 0.51 (L) 0.60 - 1.30 mg/dL Final     Comment:     Standardized to IDMS reference method   12/18/2023 0.48 0.40 - 1.10 mg/dL Final   08/21/2023 0.57 0.40 - 1.10 mg/dL Final     AST   Date Value Ref Range Status   02/12/2024 13 13 - 39 U/L Final   01/30/2024 14 13 - 39 U/L Final   12/18/2023 16 <41 U/L Final   08/21/2023 18 <41 U/L Final     ALT   Date Value Ref Range Status   02/12/2024 12 7 - 52 U/L Final     Comment:     Specimen collection should occur prior to Sulfasalazine administration due to the potential for falsely depressed results.    01/30/2024 11 7 - 52 U/L Final     Comment:     Specimen collection should occur prior to Sulfasalazine administration due to the potential for falsely depressed results.    12/18/2023 14 <56 U/L Final   08/21/2023 11 <56 U/L Final          Andreia Munson MD  2/23/2024  7:09 AM

## 2024-02-23 NOTE — PLAN OF CARE
Problem: PAIN - ADULT  Goal: Verbalizes/displays adequate comfort level or baseline comfort level  Description: Interventions:  - Encourage patient to monitor pain and request assistance  - Assess pain using appropriate pain scale  - Administer analgesics based on type and severity of pain and evaluate response  - Implement non-pharmacological measures as appropriate and evaluate response  - Consider cultural and social influences on pain and pain management  - Notify physician/advanced practitioner if interventions unsuccessful or patient reports new pain  Outcome: Progressing     Problem: INFECTION - ADULT  Goal: Absence or prevention of progression during hospitalization  Description: INTERVENTIONS:  - Assess and monitor for signs and symptoms of infection  - Monitor lab/diagnostic results  - Monitor all insertion sites, i.e. indwelling lines, tubes, and drains  - Monitor endotracheal if appropriate and nasal secretions for changes in amount and color  - [place] appropriate cooling/warming therapies per order  - Administer medications as ordered  - Instruct and encourage patient and family to use good hand hygiene technique  - Identify and instruct in appropriate isolation precautions for identified infection/condition  Outcome: Progressing  Goal: Absence of fever/infection during neutropenic period  Description: INTERVENTIONS:  - Monitor WBC    Outcome: Progressing     Problem: SAFETY ADULT  Goal: Patient will remain free of falls  Description: INTERVENTIONS:  - Educate patient/family on patient safety including physical limitations  - Instruct patient to call for assistance with activity   - Consult OT/PT to assist with strengthening/mobility   - Keep Call bell within reach  - Keep bed low and locked with side rails adjusted as appropriate  - Keep care items and personal belongings within reach  - Initiate and maintain comfort rounds  - Make Fall Risk Sign visible to staff  - Offer Toileting every  Hours,  in advance of need  - Initiate/Maintain alarm  - Obtain necessary fall risk management equipment:   - Apply yellow socks and bracelet for high fall risk patients  - Consider moving patient to room near nurses station  Outcome: Progressing  Goal: Maintain or return to baseline ADL function  Description: INTERVENTIONS:  -  Assess patient's ability to carry out ADLs; assess patient's baseline for ADL function and identify physical deficits which impact ability to perform ADLs (bathing, care of mouth/teeth, toileting, grooming, dressing, etc.)  - Assess/evaluate cause of self-care deficits   - Assess range of motion  - Assess patient's mobility; develop plan if impaired  - Assess patient's need for assistive devices and provide as appropriate  - Encourage maximum independence but intervene and supervise when necessary  - Involve family in performance of ADLs  - Assess for home care needs following discharge   - Consider OT consult to assist with ADL evaluation and planning for discharge  - Provide patient education as appropriate  Outcome: Progressing  Goal: Maintains/Returns to pre admission functional level  Description: INTERVENTIONS:  - Perform AM-PAC 6 Click Basic Mobility/ Daily Activity assessment daily.  - Set and communicate daily mobility goal to care team and patient/family/caregiver.   - Collaborate with rehabilitation services on mobility goals if consulted  - Perform Range of Motion  times a day.  - Reposition patient every  hours.  - Dangle patient  times a day  - Stand patient  times a day  - Ambulate patient  times a day  - Out of bed to chair  times a day   - Out of bed for meals  times a day  - Out of bed for toileting  - Record patient progress and toleration of activity level   Outcome: Progressing     Problem: Knowledge Deficit  Goal: Patient/family/caregiver demonstrates understanding of disease process, treatment plan, medications, and discharge instructions  Description: Complete learning  assessment and assess knowledge base.  Interventions:  - Provide teaching at level of understanding  - Provide teaching via preferred learning methods  Outcome: Progressing     Problem: DISCHARGE PLANNING  Goal: Discharge to home or other facility with appropriate resources  Description: INTERVENTIONS:  - Identify barriers to discharge w/patient and caregiver  - Arrange for needed discharge resources and transportation as appropriate  - Identify discharge learning needs (meds, wound care, etc.)  - Arrange for interpretive services to assist at discharge as needed  - Refer to Case Management Department for coordinating discharge planning if the patient needs post-hospital services based on physician/advanced practitioner order or complex needs related to functional status, cognitive ability, or social support system  Outcome: Progressing

## 2024-02-23 NOTE — CASE MANAGEMENT
Case Management Progress Note    Patient name Shruti Camilo  Location /-01 MRN [id number]  : 1995 Date 2024       LOS (days): 1  Geometric Mean LOS (GMLOS) (days):   Days to GMLOS:        OBJECTIVE:        Current admission status: Inpatient  Preferred Pharmacy:   [id number] - [place], [place] - [address]  [address] [address]  Phone: [phone] Fax: [phone]    [facility] [place], [place] - [address]  [address]  [facility] [address]  Phone: [phone] Fax: [phone]    Primary Care Provider: Fariha Mauro MD    Primary Insurance: "[employer]  Secondary Insurance:     PROGRESS NOTE:    Received VM from Mert Vides at [employer] [facility]. Mert reported that referral will be screened out as information only, as MOB is prescribed the marijuana. They do not intend to make contact at all or proceed any further with this referral. Informed family of same.

## 2024-02-24 PROCEDURE — 99024 POSTOP FOLLOW-UP VISIT: CPT | Performed by: OBSTETRICS & GYNECOLOGY

## 2024-02-24 RX ORDER — BENZOCAINE/MENTHOL 6 MG-10 MG
1 LOZENGE MUCOUS MEMBRANE DAILY PRN
Status: CANCELLED
Start: 2024-02-24

## 2024-02-24 RX ORDER — SIMETHICONE 80 MG
80 TABLET,CHEWABLE ORAL 4 TIMES DAILY PRN
Status: CANCELLED
Start: 2024-02-24

## 2024-02-24 RX ORDER — CALCIUM CARBONATE 500 MG/1
1000 TABLET, CHEWABLE ORAL DAILY PRN
Status: CANCELLED
Start: 2024-02-24

## 2024-02-24 RX ADMIN — DOCUSATE SODIUM 100 MG: 100 CAPSULE, LIQUID FILLED ORAL at 09:28

## 2024-02-24 RX ADMIN — ACETAMINOPHEN 325MG 650 MG: 325 TABLET ORAL at 02:56

## 2024-02-24 RX ADMIN — IBUPROFEN 600 MG: 600 TABLET, FILM COATED ORAL at 11:15

## 2024-02-24 RX ADMIN — OXYCODONE 5 MG: 5 TABLET ORAL at 21:40

## 2024-02-24 RX ADMIN — ACETAMINOPHEN 325MG 650 MG: 325 TABLET ORAL at 20:04

## 2024-02-24 RX ADMIN — IBUPROFEN 600 MG: 600 TABLET, FILM COATED ORAL at 02:56

## 2024-02-24 RX ADMIN — OXYCODONE 5 MG: 5 TABLET ORAL at 12:41

## 2024-02-24 RX ADMIN — IBUPROFEN 600 MG: 600 TABLET, FILM COATED ORAL at 17:39

## 2024-02-24 RX ADMIN — HYDROXYZINE HYDROCHLORIDE 25 MG: 25 TABLET, FILM COATED ORAL at 13:37

## 2024-02-24 RX ADMIN — DOCUSATE SODIUM 100 MG: 100 CAPSULE, LIQUID FILLED ORAL at 17:39

## 2024-02-24 RX ADMIN — ENOXAPARIN SODIUM 40 MG: 40 INJECTION SUBCUTANEOUS at 09:29

## 2024-02-24 RX ADMIN — OXYCODONE HYDROCHLORIDE 10 MG: 5 TABLET ORAL at 06:06

## 2024-02-24 RX ADMIN — ACETAMINOPHEN 325MG 650 MG: 325 TABLET ORAL at 09:28

## 2024-02-24 NOTE — LACTATION NOTE
This note was copied from a baby's chart.      In to see dyad today prior to D/C. Mom states nursing keeps imroving. States she feels ready for Discharge Home today. Review of establishing breastfeeding and Lactation support available.      02/24/24 1045   Maternal Information   Has mother  before? No   Infant to breast within first hour of birth? No   Breastfeeding Delayed Due to Maternal status   Exclusive Pump and Bottle Feed No   LATCH Documentation   Latch 2   Audible Swallowing 1   Type of Nipple 2   Comfort (Breast/Nipple) 2   Hold (Positioning) 2   LATCH Score 9   Having latch problems? No  (Mom states feedings are going better; encouraged latctation support)   Position(s) Used Side Lying   Breasts/Nipples   Intervention Other (comment)  (review of establishing breastfeeding; Lactation support available)   Breastfeeding Status Yes   Breastfeeding Progress Not yet established   Breast Pump   Pump 3  (has Spectra S9)   Patient Follow-Up   Lactation Consult Status 2   Follow-Up Type Inpatient;Call as needed   Other OB Lactation Documentation    Additional Problem Noted Mom states nursing is going well; reminded of Lactation support available  (BOTH Booklets at bedside)     [employer]cor[employer] MOB  to call for assistance, questions and concerns.  Extension number for inpatient lactation support provided. No family support at bedside at this time.

## 2024-02-24 NOTE — NURSING NOTE
Discharge teaching done. Save your life magnet and all handouts given. Questions encouraged and answered.

## 2024-02-24 NOTE — PLAN OF CARE
Problem: PAIN - ADULT  Goal: Verbalizes/displays adequate comfort level or baseline comfort level  Description: Interventions:  - Encourage patient to monitor pain and request assistance  - Assess pain using appropriate pain scale  - Administer analgesics based on type and severity of pain and evaluate response  - Implement non-pharmacological measures as appropriate and evaluate response  - Consider cultural and social influences on pain and pain management  - Notify physician/advanced practitioner if interventions unsuccessful or patient reports new pain  Outcome: Progressing     Problem: INFECTION - ADULT  Goal: Absence or prevention of progression during hospitalization  Description: INTERVENTIONS:  - Assess and monitor for signs and symptoms of infection  - Monitor lab/diagnostic results  - Monitor all insertion sites, i.e. indwelling lines, tubes, and drains  - Monitor endotracheal if appropriate and nasal secretions for changes in amount and color  - [place] appropriate cooling/warming therapies per order  - Administer medications as ordered  - Instruct and encourage patient and family to use good hand hygiene technique  - Identify and instruct in appropriate isolation precautions for identified infection/condition  Outcome: Progressing  Goal: Absence of fever/infection during neutropenic period  Description: INTERVENTIONS:  - Monitor WBC    Outcome: Progressing     Problem: SAFETY ADULT  Goal: Patient will remain free of falls  Description: INTERVENTIONS:  - Educate patient/family on patient safety including physical limitations  - Instruct patient to call for assistance with activity   - Consult OT/PT to assist with strengthening/mobility   - Keep Call bell within reach  - Keep bed low and locked with side rails adjusted as appropriate  - Keep care items and personal belongings within reach  - Initiate and maintain comfort rounds  - Make Fall Risk Sign visible to staff  - Offer Toileting every  Hours,  in advance of need  - Initiate/Maintain alarm  - Obtain necessary fall risk management equipment:   - Apply yellow socks and bracelet for high fall risk patients  - Consider moving patient to room near nurses station  Outcome: Progressing  Goal: Maintain or return to baseline ADL function  Description: INTERVENTIONS:  -  Assess patient's ability to carry out ADLs; assess patient's baseline for ADL function and identify physical deficits which impact ability to perform ADLs (bathing, care of mouth/teeth, toileting, grooming, dressing, etc.)  - Assess/evaluate cause of self-care deficits   - Assess range of motion  - Assess patient's mobility; develop plan if impaired  - Assess patient's need for assistive devices and provide as appropriate  - Encourage maximum independence but intervene and supervise when necessary  - Involve family in performance of ADLs  - Assess for home care needs following discharge   - Consider OT consult to assist with ADL evaluation and planning for discharge  - Provide patient education as appropriate  Outcome: Progressing  Goal: Maintains/Returns to pre admission functional level  Description: INTERVENTIONS:  - Perform AM-PAC 6 Click Basic Mobility/ Daily Activity assessment daily.  - Set and communicate daily mobility goal to care team and patient/family/caregiver.   - Collaborate with rehabilitation services on mobility goals if consulted  - Perform Range of Motion  times a day.  - Reposition patient every  hours.  - Dangle patient  times a day  - Stand patient  times a day  - Ambulate patient times a day  - Out of bed to chair  times a day   - Out of bed for meals times a day  - Out of bed for toileting  - Record patient progress and toleration of activity level   Outcome: Progressing     Problem: Knowledge Deficit  Goal: Patient/family/caregiver demonstrates understanding of disease process, treatment plan, medications, and discharge instructions  Description: Complete learning  assessment and assess knowledge base.  Interventions:  - Provide teaching at level of understanding  - Provide teaching via preferred learning methods  Outcome: Progressing     Problem: DISCHARGE PLANNING  Goal: Discharge to home or other facility with appropriate resources  Description: INTERVENTIONS:  - Identify barriers to discharge w/patient and caregiver  - Arrange for needed discharge resources and transportation as appropriate  - Identify discharge learning needs (meds, wound care, etc.)  - Arrange for interpretive services to assist at discharge as needed  - Refer to Case Management Department for coordinating discharge planning if the patient needs post-hospital services based on physician/advanced practitioner order or complex needs related to functional status, cognitive ability, or social support system  Outcome: Progressing

## 2024-02-24 NOTE — PROGRESS NOTES
"Progress Note - OB/GYN  Shruti Camilo 28 y.o. female MRN: [id number]  Unit/Bed#:  411-01 Encounter: [id number]    Assessment and Plan     Shruti Camilo is a patient of: [facility]. She is POD# 2 s/p RLTCS.  Recovering well and is stable       S/P repeat low transverse   Assessment & Plan  , Hgb 12.1 --> 11   Voiding spontaneously  Pain: Tylenol and motrin scheduled, roebrt 5/10 PRN    FEN: Tolerating regular diet  DVT ppx: SCDs and Lovenox 40mg qD  Passing flatus   Incision C/D/I       Nonimmune to hepatitis B virus  Assessment & Plan  Previously declined at [facility], can offer postpartum        Disposition    - Anticipate discharge home today vs tomorrow      Subjective/Objective     Chief Complaint: Postoperative State     Subjective:    Shruti Camilo is s/p RLTCS. She is POD# 2. She has no current complaints.  Pain is well controlled.  Patient is currently voiding.  She is ambulating.  Patient is currently passing flatus and has had no bowel movement. She is tolerating PO, and denies nausea or vomitting. Patient denies fever, chills, chest pain, shortness of breath, or calf tenderness. Lochia is minimal. She is  Breastfeeding. Her incision is C/D/I. She is recovering well and is stable.       Vitals:   /83 (BP Location: Right arm)   Pulse 90   Temp 97.5 °F (36.4 °C) (Temporal)   Resp 18   Ht 5' 5\" (1.651 m)   Wt 106 kg (234 lb)   LMP 2023   SpO2 100%   Breastfeeding Yes   BMI 38.94 kg/m²       Intake/Output Summary (Last 24 hours) at 2024 0652  Last data filed at 2024 1255  Gross per 24 hour   Intake --   Output 825 ml   Net -825 ml       Invasive Devices       Peripheral Intravenous Line  Duration             Peripheral IV 24 Dorsal (posterior);Left Forearm 1 day                    Physical Exam:   GEN: Shruti Camilo appears well, alert, pleasant and cooperative   CARDIO: RRR  RESP:  Normal respiratory " effort  ABDOMEN: soft, no tenderness, no distention, fundus firm, Incision C/D/I  EXTREMITIES: SCDs on, Negative Yue's sign bilaterally      Labs:     Hemoglobin   Date Value Ref Range Status   02/23/2024 11.0 (L) 11.5 - 15.4 g/dL Final   02/22/2024 12.1 11.5 - 15.4 g/dL Final     WBC   Date Value Ref Range Status   02/23/2024 13.87 (H) 4.31 - 10.16 Thousand/uL Final   02/22/2024 11.70 (H) 4.31 - 10.16 Thousand/uL Final     Platelets   Date Value Ref Range Status   02/23/2024 218 149 - 390 Thousands/uL Final   02/22/2024 263 149 - 390 Thousands/uL Final     Creatinine   Date Value Ref Range Status   02/12/2024 0.53 (L) 0.60 - 1.30 mg/dL Final     Comment:     Standardized to IDMS reference method   01/30/2024 0.51 (L) 0.60 - 1.30 mg/dL Final     Comment:     Standardized to IDMS reference method   12/18/2023 0.48 0.40 - 1.10 mg/dL Final   08/21/2023 0.57 0.40 - 1.10 mg/dL Final     AST   Date Value Ref Range Status   02/12/2024 13 13 - 39 U/L Final   01/30/2024 14 13 - 39 U/L Final   12/18/2023 16 <41 U/L Final   08/21/2023 18 <41 U/L Final     ALT   Date Value Ref Range Status   02/12/2024 12 7 - 52 U/L Final     Comment:     Specimen collection should occur prior to Sulfasalazine administration due to the potential for falsely depressed results.    01/30/2024 11 7 - 52 U/L Final     Comment:     Specimen collection should occur prior to Sulfasalazine administration due to the potential for falsely depressed results.    12/18/2023 14 <56 U/L Final   08/21/2023 11 <56 U/L Final          Arturo Gomez MD  OBGYN PGY2  2/24/2024  6:52 AM

## 2024-02-24 NOTE — PLAN OF CARE
Problem: PAIN - ADULT  Goal: Verbalizes/displays adequate comfort level or baseline comfort level  Description: Interventions:  - Encourage patient to monitor pain and request assistance  - Assess pain using appropriate pain scale  - Administer analgesics based on type and severity of pain and evaluate response  - Implement non-pharmacological measures as appropriate and evaluate response  - Consider cultural and social influences on pain and pain management  - Notify physician/advanced practitioner if interventions unsuccessful or patient reports new pain  Outcome: Progressing     Problem: INFECTION - ADULT  Goal: Absence or prevention of progression during hospitalization  Description: INTERVENTIONS:  - Assess and monitor for signs and symptoms of infection  - Monitor lab/diagnostic results  - Monitor all insertion sites, i.e. indwelling lines, tubes, and drains  - Monitor endotracheal if appropriate and nasal secretions for changes in amount and color  - [place] appropriate cooling/warming therapies per order  - Administer medications as ordered  - Instruct and encourage patient and family to use good hand hygiene technique  - Identify and instruct in appropriate isolation precautions for identified infection/condition  Outcome: Progressing  Goal: Absence of fever/infection during neutropenic period  Description: INTERVENTIONS:  - Monitor WBC    Outcome: Progressing     Problem: SAFETY ADULT  Goal: Patient will remain free of falls  Description: INTERVENTIONS:  - Keep Call bell within reach  - Keep bed low and locked with side rails adjusted as appropriate  - Keep care items and personal belongings within reach  - Initiate and maintain comfort rounds    -  Outcome: Progressing  Goal: Maintain or return to baseline ADL function  Description: INTERVENTIONS:  - Provide patient education as appropriate  Outcome: Progressing  Goal: Maintains/Returns to pre admission functional level  Description: INTERVENTIONS:  -  Record patient progress and toleration of activity level   Outcome: Progressing     Problem: Knowledge Deficit  Goal: Patient/family/caregiver demonstrates understanding of disease process, treatment plan, medications, and discharge instructions  Description: Complete learning assessment and assess knowledge base.  Interventions:  - Provide teaching at level of understanding  - Provide teaching via preferred learning methods  Outcome: Progressing     Problem: DISCHARGE PLANNING  Goal: Discharge to home or other facility with appropriate resources  Description: INTERVENTIONS:  - Identify barriers to discharge w/patient and caregiver  - Arrange for needed discharge resources and transportation as appropriate  - Identify discharge learning needs (meds, wound care, etc.)  - Arrange for interpretive services to assist at discharge as needed  - Refer to Case Management Department for coordinating discharge planning if the patient needs post-hospital services based on physician/advanced practitioner order or complex needs related to functional status, cognitive ability, or social support system  Outcome: Progressing

## 2024-02-25 VITALS
BODY MASS INDEX: 38.99 KG/M2 | OXYGEN SATURATION: 98 % | SYSTOLIC BLOOD PRESSURE: 120 MMHG | TEMPERATURE: 97.8 F | HEART RATE: 91 BPM | RESPIRATION RATE: 18 BRPM | WEIGHT: 234 LBS | DIASTOLIC BLOOD PRESSURE: 63 MMHG | HEIGHT: 65 IN

## 2024-02-25 PROCEDURE — 99024 POSTOP FOLLOW-UP VISIT: CPT | Performed by: OBSTETRICS & GYNECOLOGY

## 2024-02-25 PROCEDURE — 90746 HEPB VACCINE 3 DOSE ADULT IM: CPT

## 2024-02-25 PROCEDURE — NC001 PR NO CHARGE: Performed by: OBSTETRICS & GYNECOLOGY

## 2024-02-25 RX ORDER — ACETAMINOPHEN 325 MG/1
650 TABLET ORAL EVERY 6 HOURS SCHEDULED
Start: 2024-02-25 | End: 2024-02-27 | Stop reason: SDUPTHER

## 2024-02-25 RX ORDER — DOCUSATE SODIUM 100 MG/1
100 CAPSULE, LIQUID FILLED ORAL 2 TIMES DAILY
Start: 2024-02-25

## 2024-02-25 RX ORDER — OXYCODONE HYDROCHLORIDE 5 MG/1
5 TABLET ORAL EVERY 4 HOURS PRN
Qty: 7 TABLET | Refills: 0 | Status: SHIPPED | OUTPATIENT
Start: 2024-02-25 | End: 2024-03-06

## 2024-02-25 RX ORDER — IBUPROFEN 600 MG/1
600 TABLET ORAL EVERY 6 HOURS
Qty: 60 TABLET | Refills: 0
Start: 2024-02-25

## 2024-02-25 RX ADMIN — ENOXAPARIN SODIUM 40 MG: 40 INJECTION SUBCUTANEOUS at 08:57

## 2024-02-25 RX ADMIN — IBUPROFEN 600 MG: 600 TABLET, FILM COATED ORAL at 11:41

## 2024-02-25 RX ADMIN — IBUPROFEN 600 MG: 600 TABLET, FILM COATED ORAL at 03:34

## 2024-02-25 RX ADMIN — OXYCODONE 5 MG: 5 TABLET ORAL at 08:57

## 2024-02-25 RX ADMIN — ACETAMINOPHEN 325MG 650 MG: 325 TABLET ORAL at 06:02

## 2024-02-25 RX ADMIN — DOCUSATE SODIUM 100 MG: 100 CAPSULE, LIQUID FILLED ORAL at 08:57

## 2024-02-25 RX ADMIN — HEPATITIS B VACCINE (RECOMBINANT) 20 MCG: 20 INJECTION, SUSPENSION INTRAMUSCULAR at 12:40

## 2024-02-25 NOTE — PLAN OF CARE
Problem: PAIN - ADULT  Goal: Verbalizes/displays adequate comfort level or baseline comfort level  Description: Interventions:  - Encourage patient to monitor pain and request assistance  - Assess pain using appropriate pain scale  - Administer analgesics based on type and severity of pain and evaluate response  - Implement non-pharmacological measures as appropriate and evaluate response  - Consider cultural and social influences on pain and pain management  - Notify physician/advanced practitioner if interventions unsuccessful or patient reports new pain  Outcome: Adequate for Discharge     Problem: INFECTION - ADULT  Goal: Absence or prevention of progression during hospitalization  Description: INTERVENTIONS:  - Assess and monitor for signs and symptoms of infection  - Monitor lab/diagnostic results  - Monitor all insertion sites, i.e. indwelling lines, tubes, and drains  - Monitor endotracheal if appropriate and nasal secretions for changes in amount and color  - [place] appropriate cooling/warming therapies per order  - Administer medications as ordered  - Instruct and encourage patient and family to use good hand hygiene technique  - Identify and instruct in appropriate isolation precautions for identified infection/condition  Outcome: Adequate for Discharge  Goal: Absence of fever/infection during neutropenic period  Description: INTERVENTIONS:  - Monitor WBC    Outcome: Adequate for Discharge     Problem: SAFETY ADULT  Goal: Patient will remain free of falls  Description: INTERVENTIONS:  - Keep Call bell within reach  - Keep bed low and locked with side rails adjusted as appropriate  - Keep care items and personal belongings within reach  - Initiate and maintain comfort rounds    -  Outcome: Adequate for Discharge  Goal: Maintain or return to baseline ADL function  Description: INTERVENTIONS:  - Provide patient education as appropriate  Outcome: Adequate for Discharge  Goal: Maintains/Returns to pre  admission functional level  Description: INTERVENTIONS:  - Record patient progress and toleration of activity level   Outcome: Adequate for Discharge     Problem: Knowledge Deficit  Goal: Patient/family/caregiver demonstrates understanding of disease process, treatment plan, medications, and discharge instructions  Description: Complete learning assessment and assess knowledge base.  Interventions:  - Provide teaching at level of understanding  - Provide teaching via preferred learning methods  Outcome: Adequate for Discharge     Problem: DISCHARGE PLANNING  Goal: Discharge to home or other facility with appropriate resources  Description: INTERVENTIONS:  - Identify barriers to discharge w/patient and caregiver  - Arrange for needed discharge resources and transportation as appropriate  - Identify discharge learning needs (meds, wound care, etc.)  - Arrange for interpretive services to assist at discharge as needed  - Refer to Case Management Department for coordinating discharge planning if the patient needs post-hospital services based on physician/advanced practitioner order or complex needs related to functional status, cognitive ability, or social support system  Outcome: Adequate for Discharge

## 2024-02-25 NOTE — PROGRESS NOTES
"Progress Note - OB/GYN  Shruti Camilo 28 y.o. female MRN: [id number]  Unit/Bed#:  411-01 Encounter: [id number]    Assessment and Plan     Shruti Camilo is a patient of: [facility]. She is POD# 3 s/p RLTCS.  Recovering well and is stable       Nonimmune to hepatitis B virus  Assessment & Plan  Previously declined at [facility], can offer postpartum    * S/P repeat low transverse   Assessment & Plan  , Hgb 12.1 --> 11   Voiding spontaneously  Pain: Tylenol and motrin scheduled, robert 5/10 PRN    FEN: Tolerating regular diet  DVT ppx: SCDs and Lovenox 40mg qD  Passing flatus   Incision C/D/I           Disposition    - Anticipate discharge home today      Subjective/Objective     Chief Complaint: Postoperative State     Subjective:    Shruti Camilo is s/p RLTCS. She is POD# 3. She has no current complaints.  Pain is well controlled.  Patient is currently voiding.  She is ambulating.  Patient is currently passing flatus and has had no bowel movement. She is tolerating PO, and denies nausea or vomitting. Patient denies fever, chills, chest pain, shortness of breath, or calf tenderness. Lochia is minimal. She is  Breastfeeding. Her incision is C/D/I. She is recovering well and is stable.       Vitals:   /59   Pulse 81   Temp 97.7 °F (36.5 °C) (Temporal)   Resp 16   Ht 5' 5\" (1.651 m)   Wt 106 kg (234 lb)   LMP 2023   SpO2 98%   Breastfeeding Yes   BMI 38.94 kg/m²     No intake or output data in the 24 hours ending 24 0457    Invasive Devices       None                   Physical Exam:   GEN: Shruti Camilo appears well, alert, pleasant and cooperative   CARDIO: RRR  RESP:  Normal respiratory effort  ABDOMEN: soft, no tenderness, no distention, fundus firm, Incision C/D/I  EXTREMITIES: SCDs on, Negative Yue's sign bilaterally      Labs:     Hemoglobin   Date Value Ref Range Status   2024 11.0 (L) 11.5 - 15.4 g/dL Final "   02/22/2024 12.1 11.5 - 15.4 g/dL Final     WBC   Date Value Ref Range Status   02/23/2024 13.87 (H) 4.31 - 10.16 Thousand/uL Final   02/22/2024 11.70 (H) 4.31 - 10.16 Thousand/uL Final     Platelets   Date Value Ref Range Status   02/23/2024 218 149 - 390 Thousands/uL Final   02/22/2024 263 149 - 390 Thousands/uL Final     Creatinine   Date Value Ref Range Status   02/12/2024 0.53 (L) 0.60 - 1.30 mg/dL Final     Comment:     Standardized to IDMS reference method   01/30/2024 0.51 (L) 0.60 - 1.30 mg/dL Final     Comment:     Standardized to IDMS reference method   12/18/2023 0.48 0.40 - 1.10 mg/dL Final   08/21/2023 0.57 0.40 - 1.10 mg/dL Final     AST   Date Value Ref Range Status   02/12/2024 13 13 - 39 U/L Final   01/30/2024 14 13 - 39 U/L Final   12/18/2023 16 <41 U/L Final   08/21/2023 18 <41 U/L Final     ALT   Date Value Ref Range Status   02/12/2024 12 7 - 52 U/L Final     Comment:     Specimen collection should occur prior to Sulfasalazine administration due to the potential for falsely depressed results.    01/30/2024 11 7 - 52 U/L Final     Comment:     Specimen collection should occur prior to Sulfasalazine administration due to the potential for falsely depressed results.    12/18/2023 14 <56 U/L Final   08/21/2023 11 <56 U/L Final          Arturo Gomez MD  OBGYN PGY2  2/25/2024  4:57 AM

## 2024-02-25 NOTE — PLAN OF CARE
Problem: PAIN - ADULT  Goal: Verbalizes/displays adequate comfort level or baseline comfort level  Description: Interventions:  - Encourage patient to monitor pain and request assistance  - Assess pain using appropriate pain scale  - Administer analgesics based on type and severity of pain and evaluate response  - Implement non-pharmacological measures as appropriate and evaluate response  - Consider cultural and social influences on pain and pain management  - Notify physician/advanced practitioner if interventions unsuccessful or patient reports new pain  Outcome: Progressing     Problem: INFECTION - ADULT  Goal: Absence or prevention of progression during hospitalization  Description: INTERVENTIONS:  - Assess and monitor for signs and symptoms of infection  - Monitor lab/diagnostic results  - Monitor all insertion sites, i.e. indwelling lines, tubes, and drains  - Monitor endotracheal if appropriate and nasal secretions for changes in amount and color  - [place] appropriate cooling/warming therapies per order  - Administer medications as ordered  - Instruct and encourage patient and family to use good hand hygiene technique  - Identify and instruct in appropriate isolation precautions for identified infection/condition  Outcome: Progressing  Goal: Absence of fever/infection during neutropenic period  Description: INTERVENTIONS:  - Monitor WBC    Outcome: Progressing     Problem: SAFETY ADULT  Goal: Patient will remain free of falls  Description: INTERVENTIONS:  - Keep Call bell within reach  - Keep bed low and locked with side rails adjusted as appropriate  - Keep care items and personal belongings within reach  - Initiate and maintain comfort rounds    -  Outcome: Progressing  Goal: Maintain or return to baseline ADL function  Description: INTERVENTIONS:  - Provide patient education as appropriate  Outcome: Progressing  Goal: Maintains/Returns to pre admission functional level  Description: INTERVENTIONS:  -  Record patient progress and toleration of activity level   Outcome: Progressing     Problem: Knowledge Deficit  Goal: Patient/family/caregiver demonstrates understanding of disease process, treatment plan, medications, and discharge instructions  Description: Complete learning assessment and assess knowledge base.  Interventions:  - Provide teaching at level of understanding  - Provide teaching via preferred learning methods  Outcome: Progressing     Problem: DISCHARGE PLANNING  Goal: Discharge to home or other facility with appropriate resources  Description: INTERVENTIONS:  - Identify barriers to discharge w/patient and caregiver  - Arrange for needed discharge resources and transportation as appropriate  - Identify discharge learning needs (meds, wound care, etc.)  - Arrange for interpretive services to assist at discharge as needed  - Refer to Case Management Department for coordinating discharge planning if the patient needs post-hospital services based on physician/advanced practitioner order or complex needs related to functional status, cognitive ability, or social support system  Outcome: Progressing

## 2024-02-25 NOTE — NURSING NOTE
Discharge Paperwork and reviewed and signed with patient and FOB. Verbalized understanding with no further questions at this time.

## 2024-02-25 NOTE — LACTATION NOTE
Pt with left nipple pain with a notable knot underneath. Pt first noticed about 10 days ago and it has gotten larger and more painful. Denies any discharge. This note was copied from a baby's chart.  CONSULT - LACTATION  Baby Girl Camilo (Jocelyn) 3 days female MRN: [id number]    [facility] NURSERY Room / Bed: (N)/(N) Encounter: [id number]    Maternal Information     MOTHER:  Shruti Camilo  Maternal Age: 28 y.o.   OB History: # 1 - Date: 2015, Sex: None, Weight: None, GA: 9w0d, Delivery: Therapeutic , Apgar1: None, Apgar5: None, Living: None, Birth Comments: medically induced     # 2 - Date: , Sex: None, Weight: None, GA: None, Delivery: Therapeutic , Apgar1: None, Apgar5: None, Living: None, Birth Comments: None    # 3 - Date: 21, Sex: Male, Weight: 3480 g (7 lb 10.8 oz), GA: 39w2d, Delivery: , Low Transverse, Apgar1: 9, Apgar5: 9, Living: Living, Birth Comments: None    # 4 - Date: 24, Sex: Female, Weight: 3600 g (7 lb 15 oz), GA: 39w5d, Delivery: , Low Transverse, Apgar1: None, Apgar5: None, Living: Living, Birth Comments: None   Previouse breast reduction surgery? No    Lactation history:   Has patient previously breast fed: Yes   How long had patient previously breast fed: 3 days with first son   Previous breast feeding complications: Lack of support, Low milk supply     Past Surgical History:   Procedure Laterality Date    NM  DELIVERY ONLY N/A 2021    Procedure:  SECTION ();  Surgeon: Charlie Hinojosa MD;  Location: [facility];  Service: Obstetrics    NM  DELIVERY ONLY N/A 2024    Procedure:  SECTION ();  Surgeon: Charlie Hinojosa MD;  Location: [facility];  Service: Obstetrics    TONSILLECTOMY      WISDOM TOOTH EXTRACTION          Birth information:  YOB: 2024   Time of birth: 2:32 PM   Sex: female   Delivery type: , Low Transverse   Birth Weight: 3600 g (7 lb 15 oz)   Percent of Weight Change: -7%     Gestational Age: 39w5d   [unfilled]    Assessment     Breast and  nipple assessment:  bilateral filling breasts          02/25/24 0800   Lactation Consultation   Reason for Consult 20 minutes   Lactation Consultant Total Time 20   Maternal Information   Has mother  before? Yes   How long did the the mother previously breastfeed? 3 days with first son   Previous Maternal Breastfeeding Challenges Lack of support;Low milk supply   Breasts/Nipples   Left Breast Filling;Soft   Right Breast Filling;Soft   Left Nipple Everted   Right Nipple Everted   Intervention Hand expression;Breast pump   Breastfeeding Status Yes   Breastfeeding Progress Breastfeeding well   Breast Pump   Pump 1   Pump Review/Education Setup, frequency, and cleaning   Patient Follow-Up   Lactation Consult Status 2   Follow-Up Type Inpatient;Call as needed   Other OB Lactation Documentation    Additional Problem Noted Mom states breasts are filling and feels like her milk is in. Mom states baby is nursing much better than yesterday. Baby just nursed from one side before unlatching and falling asleep. Enc mom to pump other side, hand pump given. mom will use electric pump when goes homes  (D/C Booklet reviewed)       Feeding recommendations:  breast feed on demand  Mom states breasts are filling and feels like her milk is in. Educ on initial engorgement. Enc to keep nursing on demand. Mom states baby is nursing much better than yesterday. Baby just nursed from one side before unlatching and falling asleep. Enc mom to pump other side, hand pump given.    Discussed s/s engorgement, blocked milk ducts, and mastitis. Discussed how to remedy at home and when to contact physician. Reviewed engorgement and ways to reduce symptoms. Use a warmth on breasts with massage prior to feeding / pumping. Use massage during pumping over full ducts. Used cold, compression on breasts after feeding/pumping session. Ideas are rice in a sock. Place one in the freezer for cool and one in the microwave for warmth. Referred to  discharge book / engorgement page.    Met with mother to go over discharge breastfeeding booklet including the feeding log. Emphasized 8 or more (12) feedings in a 24 hour period, what to expect for the number of diapers per day of life and the progression of properties of the  stooling pattern.    List of reasons to call a lactation consultant.  Feeding logs  Feeding cues  Hand expression  Baby's Second day (cluster feeding)  Breastfeeding and Your Lifestyle (Medications, Alcohol, Caffeine, Smoking, Street Drugs, Methadone)  First Two Weeks Survival Guide for Breastfeeding  Breast Changes  Physical Therapy  Storage and Handling of Breast milk  How to Keep Your Breast Pump Kit Clean  The Employed Breastfeeding Mother  Mixed feeding  Bottle feeding like breastfeeding (paced bottle feeding)  astfeeding and your lifestyle, storage and preparation of breast milk, how to keep you breast pump clean, the employed breastfeeding mother and paced bottle feeding handouts.     Booklet included Breastfeeding Resources for after discharge including access to the number for the [facility].    Encouraged parents to call for assistance, questions, and concerns about breastfeeding.  Extension provided.      Beatrice Worthy 2024 8:32 AM

## 2024-02-25 NOTE — PROGRESS NOTES
At bedside to discuss elevated EPDs score with patient. Shruti reports that she feels well at this time. She denies any suicidal or homicidal ideation. We discussed postpartum resources including baby and me. Shruti reports that she has a therapist that she has an appointment with on 3/8.         Dania Ramírez MD  Obstetrics & Gynecology PGY-3  2/25/2024  12:27 PM

## 2024-02-25 NOTE — PLAN OF CARE
Problem: PAIN - ADULT  Goal: Verbalizes/displays adequate comfort level or baseline comfort level  Description: Interventions:  - Encourage patient to monitor pain and request assistance  - Assess pain using appropriate pain scale  - Administer analgesics based on type and severity of pain and evaluate response  - Implement non-pharmacological measures as appropriate and evaluate response  - Consider cultural and social influences on pain and pain management  - Notify physician/advanced practitioner if interventions unsuccessful or patient reports new pain  Outcome: Progressing     Problem: INFECTION - ADULT  Goal: Absence or prevention of progression during hospitalization  Description: INTERVENTIONS:  - Assess and monitor for signs and symptoms of infection  - Monitor lab/diagnostic results  - Monitor all insertion sites, i.e. indwelling lines, tubes, and drains  - Monitor endotracheal if appropriate and nasal secretions for changes in amount and color  - [place] appropriate cooling/warming therapies per order  - Administer medications as ordered  - Instruct and encourage patient and family to use good hand hygiene technique  - Identify and instruct in appropriate isolation precautions for identified infection/condition  Outcome: Progressing  Goal: Absence of fever/infection during neutropenic period  Description: INTERVENTIONS:  - Monitor WBC    Outcome: Progressing     Problem: SAFETY ADULT  Goal: Patient will remain free of falls  Description: INTERVENTIONS:  - Keep Call bell within reach  - Keep bed low and locked with side rails adjusted as appropriate  - Keep care items and personal belongings within reach  - Initiate and maintain comfort rounds    -  Outcome: Progressing  Goal: Maintain or return to baseline ADL function  Description: INTERVENTIONS:  - Provide patient education as appropriate  Outcome: Progressing  Goal: Maintains/Returns to pre admission functional level  Description: INTERVENTIONS:  -  Record patient progress and toleration of activity level   Outcome: Progressing     Problem: Knowledge Deficit  Goal: Patient/family/caregiver demonstrates understanding of disease process, treatment plan, medications, and discharge instructions  Description: Complete learning assessment and assess knowledge base.  Interventions:  - Provide teaching at level of understanding  - Provide teaching via preferred learning methods  Outcome: Progressing     Problem: DISCHARGE PLANNING  Goal: Discharge to home or other facility with appropriate resources  Description: INTERVENTIONS:  - Identify barriers to discharge w/patient and caregiver  - Arrange for needed discharge resources and transportation as appropriate  - Identify discharge learning needs (meds, wound care, etc.)  - Arrange for interpretive services to assist at discharge as needed  - Refer to Case Management Department for coordinating discharge planning if the patient needs post-hospital services based on physician/advanced practitioner order or complex needs related to functional status, cognitive ability, or social support system  Outcome: Progressing

## 2024-02-26 ENCOUNTER — NURSE TRIAGE (OUTPATIENT)
Age: 29
End: 2024-02-26

## 2024-02-26 DIAGNOSIS — Z98.891 S/P REPEAT LOW TRANSVERSE C-SECTION: ICD-10-CM

## 2024-02-26 LAB
DME PARACHUTE DELIVERY DATE ACTUAL: NORMAL
DME PARACHUTE DELIVERY DATE REQUESTED: NORMAL
DME PARACHUTE ITEM DESCRIPTION: NORMAL
DME PARACHUTE ORDER STATUS: NORMAL
DME PARACHUTE SUPPLIER NAME: NORMAL
DME PARACHUTE SUPPLIER PHONE: NORMAL

## 2024-02-26 NOTE — UTILIZATION REVIEW
"Mother and baby discharged on 2024       NOTIFICATION OF INPATIENT ADMISSION   MATERNITY/DELIVERY AUTHORIZATION REQUEST   SERVICING FACILITY:   On license of [facility]  Parent Child Health - L&D, , NICU  [address] [place] [address]  Tax ID: [id number]  NPI: [id number] ATTENDING PROVIDER:  Attending Name and NPI#: Charlie Hinojosa Md [[id number]]  Address: [address] [place] [address]  Phone: [phone]     ADMISSION INFORMATION:  Place of Service: Inpatient [facility]  Place of Service Code: 21  Inpatient Admission Date/Time: 24 11:28 AM  Discharge Date/Time: 2024  1:10 PM  Admitting Diagnosis Code/Description:  Term birth of  [Z37.0]   Mother: Shruti Camilo 1995 Estimated Date of Delivery: 24  Delivering clinician: Charlie Hinojosa    OB History          4    Para   2    Term   2            AB   2    Living   2         SAB        IAB   2    Ectopic        Multiple   0    Live Births   2                Name & MRN:   Information for the patient's :  Leslee, Baby Girl (Shruti) [[id number]]   [place] Delivery Information:  Sex: female  Delivered 2024 2:32 PM by , Low Transverse; Gestational Age: 39w5d    [place] Measurements:  Weight: 7 lb 15 oz (3600 g);  Height: 20.5\"    APGAR 1 minute 5 minutes 10 minutes   Totals:         UTILIZATION REVIEW CONTACT:  Lourdes Sosa, Utilization   Network Utilization Review Department  Phone: [phone]  Fax [phone]  Email: [email or web address]  Contact for approvals/pending authorizations, clinical reviews, and discharge.   PHYSICIAN ADVISORY SERVICES:  Medical Necessity Denial & [employer] Review  Phone: [phone]  Fax: [phone]  Email: [email or web address]   DISCHARGE SUPPORT TEAM:  For Patients Discharge Needs & Updates  Phone: [phone] opt. 2 Fax: [phone]  Email: " [email or web address]

## 2024-02-26 NOTE — TELEPHONE ENCOUNTER
"Patient called, she had a  on . She reports a headache that started yesterday that was very severe. Tylenol is not helping. She also reports spotty vision. She is also is noticing some redness along her incision line, she states it looks like a rash. Offered patient an appt today at the office at 1. She is unable to get there, she is going to go to the ED instead.     Reason for Disposition   [1] SEVERE headache AND [2] < 1 week since delivery    Answer Assessment - Initial Assessment Questions  1. LOCATION: \"Where does it hurt?\"       Behind left eye  2. ONSET: \"When did the headache start?\" (Minutes, hours or days)       Yesterday   3. PATTERN: \"Does the pain come and go, or has it been constant since it started?\"      Constant   4. SEVERITY: \"How bad is the pain?\" and \"What does it keep you from doing?\"     - MILD - doesn't interfere with normal activities     - MODERATE - interferes with normal activities or awakens from sleep     - SEVERE - excruciating pain, unable to do any normal activities         severe  5. RECURRENT SYMPTOM: \"Have you ever had headaches before?\" If Yes, ask: \"When was the last time?\" and \"What happened that time?\"       Hx of migraines   6. CAUSE: \"What do you think is causing the headache?\"      Unknowns  8. PREECLAMPSIA:  \"Were you diagnosed with preeclampsia during your pregnancy?\"        No  9. HEAD INJURY: \"Has there been any recent injury to the head?\"       No   10. OTHER SYMPTOMS: \"Do you have any other symptoms?\" (e.g., fever, stiff neck, blurred vision; swelling of hands, face, or feet)        Blurred vision, swelling improved  11. DELIVERY DATE: \"When was your delivery date?\" \"Vaginal delivery or ?\" \"Did you have an epidural block during childbirth?\"        2024    Protocols used: Postpartum - Headache-ADULT-AH    "

## 2024-02-27 DIAGNOSIS — Z98.891 S/P CESAREAN SECTION: Primary | ICD-10-CM

## 2024-02-27 RX ORDER — IBUPROFEN 600 MG/1
600 TABLET ORAL EVERY 6 HOURS PRN
Qty: 30 TABLET | Refills: 0 | Status: SHIPPED | OUTPATIENT
Start: 2024-02-27

## 2024-02-27 RX ORDER — ACETAMINOPHEN 325 MG/1
650 TABLET ORAL EVERY 6 HOURS PRN
Qty: 30 TABLET | Refills: 0 | Status: SHIPPED | OUTPATIENT
Start: 2024-02-27

## 2024-02-27 RX ORDER — IBUPROFEN 600 MG/1
600 TABLET ORAL EVERY 6 HOURS
Qty: 60 TABLET | Refills: 1 | Status: CANCELLED | OUTPATIENT
Start: 2024-02-27

## 2024-02-27 NOTE — TELEPHONE ENCOUNTER
"Patient called did not go to the ER as directed yesterday for her headache.  Patient is c/o increased pain with moving around s/p C/S on 24.  Patient notices lower abdominal tightness, cramping, and pain with moving,b ending, and exerting herself. Patient educated on s/p c/s mobility and to take it slow. Use of heat/ice to incision area and cramps, tylenol/motrin PRN alternating the two, caffeine for HA.  If HA does not resolve with OTC and caffeine to call back.     Per patient yesterday a HA started behind her left eye.  Has had this kind of HA when she was pregnant. Denies visual disturbances. Sometimes throbbing, and blinking. Will try some caffeine. Patient denies, shortness of breath, chest pain, lightheadedness, dizziness, visual disturbances.       Patient scheduled for 3 week PP appointment. Patient is requesting that her Motrin and Tylenol be sent to her pharmacy (they were set to no print). Patient ws made aware at times these are not covered by her insurance company, patient requests they are sent as Motrin was covered in the past.         Reason for Disposition   Sounds like afterbirth pains (e.g., lower abdominal cramps that come and go, occurring days 1-4 postpartum)    Answer Assessment - Initial Assessment Questions  1. LOCATION: \"Where does it hurt?\"       Across the lower abdominal area with a pulling sensation on her left side.   2. RADIATION: \"Does the pain shoot anywhere else?\" (e.g., chest, back)      denies  3. ONSET: \"When did the pain begin?\" (Minutes, hours or days ago)       1 day ag   4. DELIVERY DATE: \"When was your delivery date?\" \"Vaginal delivery or ?\"      24  5. ONSET: \"Gradual or sudden onset?\"      Gradual  6. PATTERN \"Does the pain come and go, or has it been constant since it started?\"  (Note: most serious pain is constant and it progresses)       Constant  7. SEVERITY: \"How bad is the pain?\" \"What does it keep you from doing?\"      - MILD -  doesn't " "interfere with normal activities, abdomen soft and not tender to touch      - MODERATE - interferes with normal activities or awakens from sleep, tender to touch      - SEVERE - patient doesn't want to move, doubled over, abdomen rigid (R/O peritonitis)       Moderate   8. FEVER: \"Do you have a fever?\" If Yes, ask: \"What is your temperature, how was it measured, and when did it start?\"      Denies  9. VAGINAL BLEEDING - DISCHARGE: \"Describe any vaginal bleeding or discharge you are having.\" (e.g., amount; color; odor)      Minimal bleeding   10. OTHER SYMPTOMS: \"Has there been any vomiting, diarrhea, constipation, or urine problems?\"        Heat flashes    Protocols used: Postpartum - Abdominal Pain-ADULT-AH    "

## 2024-03-01 LAB — PLACENTA IN STORAGE: NORMAL

## 2024-03-14 DIAGNOSIS — R11.2 NAUSEA AND VOMITING, UNSPECIFIED VOMITING TYPE: ICD-10-CM

## 2024-03-14 RX ORDER — ONDANSETRON 4 MG/1
TABLET, ORALLY DISINTEGRATING ORAL
Qty: 30 TABLET | Refills: 1 | Status: SHIPPED | OUTPATIENT
Start: 2024-03-14

## 2024-03-19 ENCOUNTER — TELEPHONE (OUTPATIENT)
Age: 29
End: 2024-03-19

## 2024-03-19 ENCOUNTER — NURSE TRIAGE (OUTPATIENT)
Age: 29
End: 2024-03-19

## 2024-03-19 NOTE — TELEPHONE ENCOUNTER
"Pt reports that she developed a rash on her face on Thursday 3/14/24. Reports starts on chin and goes up to her cheekbones. Used hydrocortisone cream and took benadryl which has been helping symptoms but they come right back. States today it worsened and went all the way up to her forehead. Denies any swelling or trouble breathing. Denies new soaps/foods/medications. Went to  today, was told it was hormone related. Made dermatology appt but its not for another month. Pt has her post partum visit tomorrow.    Reason for Disposition   Mild localized rash    Answer Assessment - Initial Assessment Questions  1. APPEARANCE of RASH: \"Describe the rash.\"       Small little clusters of \"pimples\" on face  2. LOCATION: \"Where is the rash located?\"       face  3. NUMBER: \"How many spots are there?\"       All over face  4. SIZE: \"How big are the spots?\" (Inches, centimeters or compare to size of a coin)       Pencil tip  5. ONSET: \"When did the rash start?\"       3/14/24  6. ITCHING: \"Does the rash itch?\" If Yes, ask: \"How bad is the itch?\"  (Scale 1-10; or mild, moderate, severe)      Yes, mild/moderate  7. PAIN: \"Does the rash hurt?\" If Yes, ask: \"How bad is the pain?\"  (Scale 1-10; or mild, moderate, severe)      deneis  8. OTHER SYMPTOMS: \"Do you have any other symptoms?\" (e.g., fever)      denies  9. PREGNANCY: \"Is there any chance you are pregnant?\" \"When was your last menstrual period?\"      4 weeks postpartum    Protocols used: Rash or Redness - Localized-ADULT-[place]    "

## 2024-03-19 NOTE — TELEPHONE ENCOUNTER
----- Message from Nicolle Farley sent at 3/19/2024  5:03 PM EDT -----  Patient called stating that she went to Urgent Care today due to a rash all over her face. She didn't really get any answers from them. She states that it kind of goes away but comes right back. Patient is 3-4 wks post partum. She has an appointment tomorrow with Dr. Hadley for a post partum visit but would like to speak to a nurse in the mean time.

## 2024-03-20 ENCOUNTER — APPOINTMENT (OUTPATIENT)
Dept: LAB | Facility: CLINIC | Age: 29
End: 2024-03-20
Payer: COMMERCIAL

## 2024-03-20 ENCOUNTER — POSTPARTUM VISIT (OUTPATIENT)
Dept: OBGYN CLINIC | Facility: CLINIC | Age: 29
End: 2024-03-20
Payer: COMMERCIAL

## 2024-03-20 VITALS
HEIGHT: 65 IN | WEIGHT: 202.6 LBS | DIASTOLIC BLOOD PRESSURE: 66 MMHG | BODY MASS INDEX: 33.76 KG/M2 | SYSTOLIC BLOOD PRESSURE: 102 MMHG

## 2024-03-20 DIAGNOSIS — B37.89 YEAST INFECTION OF NIPPLE, POSTPARTUM: ICD-10-CM

## 2024-03-20 DIAGNOSIS — R23.2 HOT FLASHES: ICD-10-CM

## 2024-03-20 DIAGNOSIS — R19.7 DIARRHEA, UNSPECIFIED TYPE: ICD-10-CM

## 2024-03-20 DIAGNOSIS — Z98.891 S/P CESAREAN SECTION: ICD-10-CM

## 2024-03-20 DIAGNOSIS — M54.50 ACUTE MIDLINE LOW BACK PAIN WITHOUT SCIATICA: ICD-10-CM

## 2024-03-20 LAB
ALBUMIN SERPL BCP-MCNC: 4.2 G/DL (ref 3.5–5)
ALP SERPL-CCNC: 82 U/L (ref 34–104)
ALT SERPL W P-5'-P-CCNC: 46 U/L (ref 7–52)
AMYLASE SERPL-CCNC: 22 IU/L (ref 29–103)
ANION GAP SERPL CALCULATED.3IONS-SCNC: 7 MMOL/L (ref 4–13)
AST SERPL W P-5'-P-CCNC: 40 U/L (ref 13–39)
BASOPHILS # BLD AUTO: 0.04 THOUSANDS/ÂΜL (ref 0–0.1)
BASOPHILS NFR BLD AUTO: 1 % (ref 0–1)
BILIRUB SERPL-MCNC: 0.33 MG/DL (ref 0.2–1)
BUN SERPL-MCNC: 8 MG/DL (ref 5–25)
CALCIUM SERPL-MCNC: 8.9 MG/DL (ref 8.4–10.2)
CHLORIDE SERPL-SCNC: 105 MMOL/L (ref 96–108)
CO2 SERPL-SCNC: 26 MMOL/L (ref 21–32)
CREAT SERPL-MCNC: 0.67 MG/DL (ref 0.6–1.3)
EOSINOPHIL # BLD AUTO: 0.11 THOUSAND/ÂΜL (ref 0–0.61)
EOSINOPHIL NFR BLD AUTO: 1 % (ref 0–6)
ERYTHROCYTE [DISTWIDTH] IN BLOOD BY AUTOMATED COUNT: 11.9 % (ref 11.6–15.1)
GFR SERPL CREATININE-BSD FRML MDRD: 120 ML/MIN/1.73SQ M
GLUCOSE P FAST SERPL-MCNC: 89 MG/DL (ref 65–99)
HCT VFR BLD AUTO: 41.4 % (ref 34.8–46.1)
HGB BLD-MCNC: 13.6 G/DL (ref 11.5–15.4)
IMM GRANULOCYTES # BLD AUTO: 0.02 THOUSAND/UL (ref 0–0.2)
IMM GRANULOCYTES NFR BLD AUTO: 0 % (ref 0–2)
LIPASE SERPL-CCNC: 21 U/L (ref 11–82)
LYMPHOCYTES # BLD AUTO: 3.18 THOUSANDS/ÂΜL (ref 0.6–4.47)
LYMPHOCYTES NFR BLD AUTO: 40 % (ref 14–44)
MCH RBC QN AUTO: 30.4 PG (ref 26.8–34.3)
MCHC RBC AUTO-ENTMCNC: 32.9 G/DL (ref 31.4–37.4)
MCV RBC AUTO: 92 FL (ref 82–98)
MONOCYTES # BLD AUTO: 0.47 THOUSAND/ÂΜL (ref 0.17–1.22)
MONOCYTES NFR BLD AUTO: 6 % (ref 4–12)
NEUTROPHILS # BLD AUTO: 4.08 THOUSANDS/ÂΜL (ref 1.85–7.62)
NEUTS SEG NFR BLD AUTO: 52 % (ref 43–75)
NRBC BLD AUTO-RTO: 0 /100 WBCS
PLATELET # BLD AUTO: 273 THOUSANDS/UL (ref 149–390)
PMV BLD AUTO: 11.2 FL (ref 8.9–12.7)
POTASSIUM SERPL-SCNC: 3.6 MMOL/L (ref 3.5–5.3)
PROT SERPL-MCNC: 6.9 G/DL (ref 6.4–8.4)
RBC # BLD AUTO: 4.48 MILLION/UL (ref 3.81–5.12)
SODIUM SERPL-SCNC: 138 MMOL/L (ref 135–147)
TSH SERPL DL<=0.05 MIU/L-ACNC: 1.54 UIU/ML (ref 0.45–4.5)
WBC # BLD AUTO: 7.9 THOUSAND/UL (ref 4.31–10.16)

## 2024-03-20 PROCEDURE — 80053 COMPREHEN METABOLIC PANEL: CPT

## 2024-03-20 PROCEDURE — 36415 COLL VENOUS BLD VENIPUNCTURE: CPT

## 2024-03-20 PROCEDURE — 82150 ASSAY OF AMYLASE: CPT

## 2024-03-20 PROCEDURE — 85025 COMPLETE CBC W/AUTO DIFF WBC: CPT

## 2024-03-20 PROCEDURE — 84443 ASSAY THYROID STIM HORMONE: CPT

## 2024-03-20 PROCEDURE — 83690 ASSAY OF LIPASE: CPT

## 2024-03-20 RX ORDER — CYCLOBENZAPRINE HCL 5 MG
5 TABLET ORAL 3 TIMES DAILY PRN
Qty: 30 TABLET | Refills: 0 | Status: SHIPPED | OUTPATIENT
Start: 2024-03-20 | End: 2024-04-05

## 2024-03-20 NOTE — PROGRESS NOTES
"Assessment:  28 y.o.  who is POD#4wk from [facility].    Plan:  Diagnoses and all orders for this visit:    Postpartum exam  S/P  section  - Routine postop care  - Anticipatory guidance given  - Return for yearly    Hot flashes  -     CBC and differential; Future  -     Comprehensive metabolic panel; Future  -     TSH, 3rd generation with Free T4 reflex; Future    Diarrhea, unspecified type  -     CBC and differential; Future  -     Comprehensive metabolic panel; Future  -     TSH, 3rd generation with Free T4 reflex; Future  -     Lipase; Future  -     Amylase; Future    Acute midline low back pain without sciatica  -    cyclobenzaprine (FLEXERIL) 5 mg tablet; Take 1 tablet (5 mg total) by mouth 3 (three) times a day as needed for muscle spasms    Yeast infection of nipple, postpartum  -     miconazole 2 % cream; Apply small amount to nipple after each feeding for 1 week.        __________________________________________________________________    Subjective   Shrutielsy Camilo is a 28 y.o.  who presents POD#4wk from [facility] of a 7lb 15oz baby girl.     Noting an itchy rash on her face. Also nausea, diarrhea, and hot flashes. Off and on in nature. No sick contacts or change in diet/activity. No associated fevers, chills, vomiting.     Patient reports pain is resolved. Does have some back aching, but more acute and thinks she lifted something wrong. Not using meds presently. Her incision is healing well overall. Thinks psb pulled a scab off the lateral margin with glue excision.  She has  returned to most of her normal activities. She has not yet returned to sexual activity.  Lochia is resolved. She reports that she is doing well emotionally and denies depressive sx. She is both breastfeeding and bottle feeding, weaning down now. Nipples are sore and baby recently dx with thrush. Inquires if this could be same.       Objective  /66   Ht 5' 5\" (1.651 m)   Wt 91.9 kg (202 lb 9.6 oz)   LMP " 05/13/2023   Breastfeeding Yes   BMI 33.71 kg/m²      Physical Exam:  Physical Exam  Constitutional:       General: She is not in acute distress.     Appearance: Normal appearance. She is not ill-appearing, toxic-appearing or diaphoretic.   Eyes:      General: No scleral icterus.        Right eye: No discharge.         Left eye: No discharge.      Conjunctiva/sclera: Conjunctivae normal.   Cardiovascular:      Rate and Rhythm: Normal rate.   Pulmonary:      Effort: Pulmonary effort is normal. No respiratory distress.   Abdominal:      General: There is no distension.      Palpations: There is no mass.      Tenderness: There is no abdominal tenderness. There is no guarding or rebound.      Hernia: No hernia is present.      Comments: Incision clean, dry, and intact. No erythema or drainage   Musculoskeletal:         General: No swelling.   Skin:     General: Skin is warm and dry.      Coloration: Skin is not jaundiced or pale.      Findings: No bruising or erythema.   Neurological:      Mental Status: She is alert.   Psychiatric:         Mood and Affect: Mood normal.         Behavior: Behavior normal.         Thought Content: Thought content normal.         Judgment: Judgment normal.

## 2024-03-23 DIAGNOSIS — R79.89 ELEVATED LFTS: Primary | ICD-10-CM

## 2024-03-25 ENCOUNTER — TELEPHONE (OUTPATIENT)
Age: 29
End: 2024-03-25

## 2024-03-25 NOTE — TELEPHONE ENCOUNTER
----- Message from Teresa Hadley MD sent at 3/23/2024  4:56 PM EDT -----  Please call pt with results. Labs are overall ok, but do show a very slight increase in her LFTs. This is more likely a result of the diarrhea, rather than a direct point to a cause. I would like her to repeat the CMP in a few days to insure its cristy  g back down (or to catch it rising, should there be a concern!). Amylase flags as abnormal, but just slightly low (ok in this setting).

## 2024-03-25 NOTE — TELEPHONE ENCOUNTER
Spoke to pt and reviewed results and recommendations from their labs per  Dr Hadley. Repeat CMP= order placed

## 2024-04-04 DIAGNOSIS — Z98.891 S/P CESAREAN SECTION: ICD-10-CM

## 2024-04-04 DIAGNOSIS — M54.50 ACUTE MIDLINE LOW BACK PAIN WITHOUT SCIATICA: ICD-10-CM

## 2024-04-05 RX ORDER — CYCLOBENZAPRINE HCL 5 MG
TABLET ORAL
Qty: 30 TABLET | Refills: 0 | Status: SHIPPED | OUTPATIENT
Start: 2024-04-05

## 2024-04-05 RX ORDER — IBUPROFEN 600 MG/1
600 TABLET ORAL EVERY 6 HOURS PRN
Qty: 30 TABLET | Refills: 1 | Status: SHIPPED | OUTPATIENT
Start: 2024-04-05

## 2024-04-08 ENCOUNTER — PATIENT MESSAGE (OUTPATIENT)
Dept: OBGYN CLINIC | Facility: CLINIC | Age: 29
End: 2024-04-08

## 2024-04-08 ENCOUNTER — NURSE TRIAGE (OUTPATIENT)
Age: 29
End: 2024-04-08

## 2024-04-08 ENCOUNTER — TELEPHONE (OUTPATIENT)
Dept: OBGYN CLINIC | Facility: CLINIC | Age: 29
End: 2024-04-08

## 2024-04-08 DIAGNOSIS — Z72.51 HIGH RISK HETEROSEXUAL BEHAVIOR: Primary | ICD-10-CM

## 2024-04-08 DIAGNOSIS — F41.9 ANXIETY DUE TO INVASIVE PROCEDURE: ICD-10-CM

## 2024-04-08 DIAGNOSIS — N88.2 CERVICAL STENOSIS (UTERINE CERVIX): ICD-10-CM

## 2024-04-08 DIAGNOSIS — Z72.51 HIGH RISK SEXUAL BEHAVIOR, UNSPECIFIED TYPE: Primary | ICD-10-CM

## 2024-04-08 RX ORDER — MISOPROSTOL 200 UG/1
TABLET ORAL
Qty: 1 TABLET | Refills: 0 | Status: SHIPPED | OUTPATIENT
Start: 2024-04-08

## 2024-04-08 RX ORDER — DIAZEPAM 5 MG/1
TABLET ORAL
Qty: 2 TABLET | Refills: 0 | Status: SHIPPED | OUTPATIENT
Start: 2024-04-08

## 2024-04-08 NOTE — TELEPHONE ENCOUNTER
"Patient called office complaining of vaginal bleeding almost 7 weeks post partum. She explains she stopped bleeding for about 2 days and has started again. She is not soaking through a pad, bud does have some small coin size stringy clots and red bleeding that started today, along with period like cramps and lower back pain. She explains this feels like a period. She also explains she took the morning after pill 1 week ago. Advised her to monitor bleeding at home and if it becomes worse to call back. She verbalizes understanding.       Reason for Disposition   Normal postpartum bleeding    Answer Assessment - Initial Assessment Questions  1. ONSET: \"Describe your bleeding: is it getting worse, staying the same, improving, or stopping and starting?\"      Stopping and starting. Gotten worse   2. AMOUNT: \"How much bleeding are you having today?\"      - SPOTTING: spotting, or pinkish / brownish mucous discharge; does not fill panti-liner or pad     - MILD:  less than 1 pad / hour; less than patient's usual menstrual bleeding    - MODERATE: 1-2 pads / hour; 1 menstrual cup every 6 hours; small-medium blood clots (e.g., pea, grape, small coin)    - SEVERE: soaking 2 or more pads/hour for 2 or more hours; 1 menstrual cup every 2 hours; bleeding not contained by pads or continuous red blood from vagina; large blood clots (e.g., golf ball, large coin)       Moderate   3. ABDOMINAL PAIN: \"Do you have any pain?\" \"How bad is the pain?\" \"What does it keep you from doing?\"      - MILD -  doesn't interfere with normal activities, abdomen soft and not tender to touch      - MODERATE - interferes with normal activities or awakens from sleep, tender to touch      - SEVERE - excruciating pain, doubled over, unable to do any normal activities        Period cramps - lower back pain  4. HORMONES: \"Are you taking any birth control medications?\" (e.g., birth control pills, Depo-Provera)      No   5. BLOOD THINNERS: \"Do you take any blood " "thinners?\" (e.g., coumadin, aspirin)      No   6. OTHER SYMPTOMS: \"Do you have any other symptoms?\" (e.g., fever, chills, dizziness)      No   7. DELIVERY DATE: \"When was your delivery date?\" \"Vaginal delivery or ?\"      24  8. BREASTFEEDING: \"Are you breastfeeding?\"      Not exclusively    Protocols used: Postpartum - Vaginal Bleeding and Lochia-ADULT-[place]    "

## 2024-04-08 NOTE — TELEPHONE ENCOUNTER
----- Message from Nafisa Soto sent at 4/8/2024  2:15 PM EDT -----  Regarding: FW: Scheduling appointment   Contact: [phone]  Asking Dr Hinojosa for STD panel

## 2024-04-09 ENCOUNTER — APPOINTMENT (OUTPATIENT)
Dept: LAB | Facility: CLINIC | Age: 29
End: 2024-04-09
Payer: COMMERCIAL

## 2024-04-09 DIAGNOSIS — R79.89 ELEVATED LFTS: ICD-10-CM

## 2024-04-09 DIAGNOSIS — Z72.51 HIGH RISK SEXUAL BEHAVIOR, UNSPECIFIED TYPE: ICD-10-CM

## 2024-04-09 DIAGNOSIS — Z72.51 HIGH RISK HETEROSEXUAL BEHAVIOR: ICD-10-CM

## 2024-04-09 LAB
ALBUMIN SERPL BCP-MCNC: 4.2 G/DL (ref 3.5–5)
ALP SERPL-CCNC: 85 U/L (ref 34–104)
ALT SERPL W P-5'-P-CCNC: 44 U/L (ref 7–52)
ANION GAP SERPL CALCULATED.3IONS-SCNC: 7 MMOL/L (ref 4–13)
AST SERPL W P-5'-P-CCNC: 29 U/L (ref 13–39)
BILIRUB SERPL-MCNC: 0.41 MG/DL (ref 0.2–1)
BUN SERPL-MCNC: 11 MG/DL (ref 5–25)
CALCIUM SERPL-MCNC: 9.1 MG/DL (ref 8.4–10.2)
CHLORIDE SERPL-SCNC: 103 MMOL/L (ref 96–108)
CO2 SERPL-SCNC: 28 MMOL/L (ref 21–32)
CREAT SERPL-MCNC: 0.67 MG/DL (ref 0.6–1.3)
GFR SERPL CREATININE-BSD FRML MDRD: 120 ML/MIN/1.73SQ M
GLUCOSE P FAST SERPL-MCNC: 82 MG/DL (ref 65–99)
POTASSIUM SERPL-SCNC: 4 MMOL/L (ref 3.5–5.3)
PROT SERPL-MCNC: 7.1 G/DL (ref 6.4–8.4)
SODIUM SERPL-SCNC: 138 MMOL/L (ref 135–147)

## 2024-04-09 PROCEDURE — 87340 HEPATITIS B SURFACE AG IA: CPT

## 2024-04-09 PROCEDURE — 87491 CHLMYD TRACH DNA AMP PROBE: CPT

## 2024-04-09 PROCEDURE — 87389 HIV-1 AG W/HIV-1&-2 AB AG IA: CPT

## 2024-04-09 PROCEDURE — 86780 TREPONEMA PALLIDUM: CPT

## 2024-04-09 PROCEDURE — 80053 COMPREHEN METABOLIC PANEL: CPT

## 2024-04-09 PROCEDURE — 86803 HEPATITIS C AB TEST: CPT

## 2024-04-09 PROCEDURE — 87591 N.GONORRHOEAE DNA AMP PROB: CPT

## 2024-04-09 PROCEDURE — 36415 COLL VENOUS BLD VENIPUNCTURE: CPT

## 2024-04-10 LAB
C TRACH DNA SPEC QL NAA+PROBE: NEGATIVE
HBV SURFACE AG SER QL: NORMAL
HCV AB SER QL: NORMAL
HIV 1+2 AB+HIV1 P24 AG SERPL QL IA: NORMAL
HIV 2 AB SERPL QL IA: NORMAL
HIV1 AB SERPL QL IA: NORMAL
HIV1 P24 AG SERPL QL IA: NORMAL
N GONORRHOEA DNA SPEC QL NAA+PROBE: NEGATIVE
TREPONEMA PALLIDUM IGG+IGM AB [PRESENCE] IN SERUM OR PLASMA BY IMMUNOASSAY: NORMAL

## 2024-05-09 ENCOUNTER — PROCEDURE VISIT (OUTPATIENT)
Dept: OBGYN CLINIC | Facility: CLINIC | Age: 29
End: 2024-05-09
Payer: COMMERCIAL

## 2024-05-09 VITALS
SYSTOLIC BLOOD PRESSURE: 108 MMHG | BODY MASS INDEX: 36.49 KG/M2 | DIASTOLIC BLOOD PRESSURE: 72 MMHG | HEIGHT: 65 IN | WEIGHT: 219 LBS

## 2024-05-09 DIAGNOSIS — R68.89 UNINTENTIONAL WEIGHT CHANGE: ICD-10-CM

## 2024-05-09 DIAGNOSIS — Z01.812 PRE-PROCEDURE LAB EXAM: Primary | ICD-10-CM

## 2024-05-09 DIAGNOSIS — Z30.430 ENCOUNTER FOR INSERTION OF PARAGARD IUD: ICD-10-CM

## 2024-05-09 DIAGNOSIS — G89.18 PAIN FOLLOWING SURGERY OR PROCEDURE: ICD-10-CM

## 2024-05-09 DIAGNOSIS — Z98.891 S/P CESAREAN SECTION: ICD-10-CM

## 2024-05-09 LAB — SL AMB POCT URINE HCG: NEGATIVE

## 2024-05-09 PROCEDURE — 81025 URINE PREGNANCY TEST: CPT | Performed by: OBSTETRICS & GYNECOLOGY

## 2024-05-09 PROCEDURE — 58300 INSERT INTRAUTERINE DEVICE: CPT | Performed by: OBSTETRICS & GYNECOLOGY

## 2024-05-09 RX ORDER — COPPER 313.4 MG/1
1 INTRAUTERINE DEVICE INTRAUTERINE
OUTPATIENT
Start: 2024-05-09

## 2024-05-09 RX ORDER — OXYCODONE HYDROCHLORIDE 5 MG/1
5 TABLET ORAL EVERY 6 HOURS PRN
Qty: 5 TABLET | Refills: 0 | Status: SHIPPED | OUTPATIENT
Start: 2024-05-09 | End: 2024-05-14 | Stop reason: SDUPTHER

## 2024-05-09 RX ORDER — IBUPROFEN 600 MG/1
TABLET ORAL
Qty: 30 TABLET | Refills: 0 | Status: SHIPPED | OUTPATIENT
Start: 2024-05-09 | End: 2024-05-14 | Stop reason: SDUPTHER

## 2024-05-09 RX ADMIN — COPPER 1 INTRA UTERINE DEVICE: 313.4 INTRAUTERINE DEVICE INTRAUTERINE at 12:12

## 2024-05-09 NOTE — PROGRESS NOTES
Iud insertions    Date/Time: 5/9/2024 11:30 AM    Performed by: Teresa Hadley MD  Authorized by: Teresa Hadley MD    Other Assisting Provider: No    Verbal consent obtained?: Yes    Written consent obtained?: Yes    Risks and benefits: Risks, benefits and alternatives were discussed    Consent given by:  Patient  Patient states understanding of procedure being performed: Yes    Patient's understanding of procedure matches consent: Yes    Procedure consent matches procedure scheduled: Yes    Required items: Required blood products, implants, devices and special equipment available    Procedure:     Pelvic exam performed: yes      Negative GC/chlamydia test: yes      Negative urine pregnancy test: yes      Cervix cleaned and prepped: yes      Tenaculum applied to cervix: yes      Uterus sounded: yes      Uterus sound depth (cm):  8.5    IUD inserted with no complications: yes      IUD type:  ParaGard    Strings trimmed: yes    Post-procedure:     Patient tolerated procedure well: yes      Patient will follow up after next period: yes    Comments:      Hx significant post-IUD pain prior. Discussed ibuprofen 600mg q6hr ATC x2d, then prn. Oxycodone also ordered for breakthrough over 1st few days.        Reporting weight gain pp. Initially dropped weight as expected, but now increasing again. Inc social stressors and travel to care for family.

## 2024-05-10 ENCOUNTER — APPOINTMENT (OUTPATIENT)
Dept: LAB | Facility: CLINIC | Age: 29
End: 2024-05-10
Payer: COMMERCIAL

## 2024-05-10 DIAGNOSIS — R68.89 UNINTENTIONAL WEIGHT CHANGE: ICD-10-CM

## 2024-05-10 LAB
ALBUMIN SERPL BCP-MCNC: 4.3 G/DL (ref 3.5–5)
ALP SERPL-CCNC: 79 U/L (ref 34–104)
ALT SERPL W P-5'-P-CCNC: 41 U/L (ref 7–52)
ANION GAP SERPL CALCULATED.3IONS-SCNC: 7 MMOL/L (ref 4–13)
AST SERPL W P-5'-P-CCNC: 24 U/L (ref 13–39)
BILIRUB SERPL-MCNC: 0.4 MG/DL (ref 0.2–1)
BUN SERPL-MCNC: 9 MG/DL (ref 5–25)
CALCIUM SERPL-MCNC: 9 MG/DL (ref 8.4–10.2)
CHLORIDE SERPL-SCNC: 106 MMOL/L (ref 96–108)
CO2 SERPL-SCNC: 27 MMOL/L (ref 21–32)
CREAT SERPL-MCNC: 0.64 MG/DL (ref 0.6–1.3)
ERYTHROCYTE [DISTWIDTH] IN BLOOD BY AUTOMATED COUNT: 13 % (ref 11.6–15.1)
GFR SERPL CREATININE-BSD FRML MDRD: 121 ML/MIN/1.73SQ M
GLUCOSE P FAST SERPL-MCNC: 92 MG/DL (ref 65–99)
HCT VFR BLD AUTO: 38.8 % (ref 34.8–46.1)
HGB BLD-MCNC: 12.7 G/DL (ref 11.5–15.4)
MCH RBC QN AUTO: 30 PG (ref 26.8–34.3)
MCHC RBC AUTO-ENTMCNC: 32.7 G/DL (ref 31.4–37.4)
MCV RBC AUTO: 92 FL (ref 82–98)
PLATELET # BLD AUTO: 319 THOUSANDS/UL (ref 149–390)
PMV BLD AUTO: 11 FL (ref 8.9–12.7)
POTASSIUM SERPL-SCNC: 4 MMOL/L (ref 3.5–5.3)
PROT SERPL-MCNC: 7.1 G/DL (ref 6.4–8.4)
RBC # BLD AUTO: 4.24 MILLION/UL (ref 3.81–5.12)
SODIUM SERPL-SCNC: 140 MMOL/L (ref 135–147)
TSH SERPL DL<=0.05 MIU/L-ACNC: 1.84 UIU/ML (ref 0.45–4.5)
WBC # BLD AUTO: 8.79 THOUSAND/UL (ref 4.31–10.16)

## 2024-05-10 PROCEDURE — 84402 ASSAY OF FREE TESTOSTERONE: CPT

## 2024-05-10 PROCEDURE — 36415 COLL VENOUS BLD VENIPUNCTURE: CPT

## 2024-05-10 PROCEDURE — 80053 COMPREHEN METABOLIC PANEL: CPT

## 2024-05-10 PROCEDURE — 84443 ASSAY THYROID STIM HORMONE: CPT

## 2024-05-10 PROCEDURE — 84403 ASSAY OF TOTAL TESTOSTERONE: CPT

## 2024-05-10 PROCEDURE — 85027 COMPLETE CBC AUTOMATED: CPT

## 2024-05-11 LAB
TESTOST FREE SERPL-MCNC: 3.1 PG/ML (ref 0–4.2)
TESTOST SERPL-MCNC: 33 NG/DL (ref 13–71)

## 2024-05-13 ENCOUNTER — NURSE TRIAGE (OUTPATIENT)
Age: 29
End: 2024-05-13

## 2024-05-13 NOTE — TELEPHONE ENCOUNTER
"Called patient in reference to below [employer] message.  Pt had a Paraguard placed on Thursday, had some bleeding, Fri some discharge, Sat started bleeding like a period but with cramping.  She delivered approx 11 weeks ago and had a period approx 3 weeks ago.  She is using approx 1 pad every 4 hours.  She denies heavy bleeding or severe pain, reports cramps 4/10.  She was advised she can take 600mg ibuprofen every 6 hours to help with the pain/bleeding.  She was advised to call back if she develops heavy bleeding more than a pad an hour or severe abd pain.  She understood and had no additional questions or concerns.     Hello. On Thursday I had gotten a IUD inserted and since Thursday my bleeding has gotten increasingly worse. I've also been cramping alot too. It's almost like the IUD induced a period. Is that possible? I was hoping someone could give me a call.   Reason for Disposition   MILD bleeding or SPOTTING after procedure (e.g., biopsy) or pelvic examination (e.g., pap smear) persisting < 4 days    Answer Assessment - Initial Assessment Questions  1. AMOUNT: \"Describe the bleeding that you are having.\"     - SPOTTING: spotting, or pinkish / brownish mucous discharge; does not fill panti-liner or pad     - MILD:  less than 1 pad / hour; less than patient's usual menstrual bleeding    - MODERATE: 1-2 pads / hour; 1 menstrual cup every 6 hours; small-medium blood clots (e.g., pea, grape, small coin)    - SEVERE: soaking 2 or more pads/hour for 2 or more hours; 1 menstrual cup every 2 hours; bleeding not contained by pads or continuous red blood from vagina; large blood clots (e.g., golf ball, large coin)       1 every 4 hours  2. ONSET: \"When did the bleeding begin?\" \"Is it continuing now?\"     Saturday after IUD  3. MENSTRUAL PERIOD: \"When was the last normal menstrual period?\" \"How is this different than your period?\"      3 weeks ago, doesn't normally get cramping.  4. REGULARITY: \"How regular are your " "periods?\"      Gave birth 11 weeks ago.  5. ABDOMINAL PAIN: \"Do you have any pain?\" \"How bad is the pain?\"  (e.g., Scale 1-10; mild, moderate, or severe)    - MILD (1-3): doesn't interfere with normal activities, abdomen soft and not tender to touch     - MODERATE (4-7): interferes with normal activities or awakens from sleep, tender to touch     - SEVERE (8-10): excruciating pain, doubled over, unable to do any normal activities       Cramping 4/10  6. PREGNANCY: \"Could you be pregnant?\" \"Are you sexually active?\" \"Did you recently give birth?\"      Recently gave birth  7. BREASTFEEDING: \"Are you breastfeeding?\"      yes  8. HORMONES: \"Are you taking any hormone medications, prescription or OTC?\" (e.g., birth control pills, estrogen)      Copper IUD  9. BLOOD THINNERS: \"Do you take any blood thinners?\" (e.g., Coumadin/warfarin, Pradaxa/dabigatran, aspirin)      no  10. CAUSE: \"What do you think is causing the bleeding?\" (e.g., recent gyn surgery, recent gyn procedure; known bleeding disorder, cervical cancer, polycystic ovarian disease, fibroids)          Unknown possible IUD  11. HEMODYNAMIC STATUS: \"Are you weak or feeling lightheaded?\" If Yes, ask: \"Can you stand and walk normally?\"         Yesterday felt week, feels better today.  12. OTHER SYMPTOMS: \"What other symptoms are you having with the bleeding?\" (e.g., passed tissue, vaginal discharge, fever, menstrual-type cramps)        Cramps, hot flashes.    Protocols used: Vaginal Bleeding - Abnormal-ADULT-[place]    "

## 2024-05-14 DIAGNOSIS — N92.0: Primary | ICD-10-CM

## 2024-05-14 DIAGNOSIS — G89.18 PAIN FOLLOWING SURGERY OR PROCEDURE: ICD-10-CM

## 2024-05-14 DIAGNOSIS — R11.2 NAUSEA AND VOMITING, UNSPECIFIED VOMITING TYPE: ICD-10-CM

## 2024-05-14 DIAGNOSIS — T83.89XA: Primary | ICD-10-CM

## 2024-05-14 RX ORDER — IBUPROFEN 600 MG/1
TABLET ORAL
Qty: 30 TABLET | Refills: 0 | Status: SHIPPED | OUTPATIENT
Start: 2024-05-14

## 2024-05-14 RX ORDER — OXYCODONE HYDROCHLORIDE 5 MG/1
5 TABLET ORAL EVERY 6 HOURS PRN
Qty: 15 TABLET | Refills: 0 | Status: SHIPPED | OUTPATIENT
Start: 2024-05-14

## 2024-05-14 RX ORDER — ONDANSETRON 4 MG/1
4 TABLET, ORALLY DISINTEGRATING ORAL EVERY 8 HOURS SCHEDULED
Qty: 30 TABLET | Refills: 1 | Status: SHIPPED | OUTPATIENT
Start: 2024-05-14

## 2024-05-14 RX ORDER — AMINOCAPROIC ACID 500 MG/1
2 TABLET ORAL 3 TIMES DAILY
Qty: 60 TABLET | Refills: 0 | Status: SHIPPED | OUTPATIENT
Start: 2024-05-14 | End: 2024-05-19

## 2024-05-15 ENCOUNTER — TELEPHONE (OUTPATIENT)
Age: 29
End: 2024-05-15

## 2024-05-15 NOTE — TELEPHONE ENCOUNTER
PA for Amicar    Submitted via    []CMM-KEY   [x]Surescripts-Case ID #   []Faxed to plan   []Other website   []Phone call Case ID #     Office notes sent, clinical questions answered. Awaiting determination    Turnaround time for your insurance to make a decision on your Prior Authorization can take 7-21 business days.

## 2024-05-20 ENCOUNTER — TELEPHONE (OUTPATIENT)
Dept: OBGYN CLINIC | Facility: CLINIC | Age: 29
End: 2024-05-20

## 2024-05-20 NOTE — TELEPHONE ENCOUNTER
PA for Amicar Denied    Reason:(Screenshot if applicable)        Message sent to office clinical pool Yes    Denial letter scanned into Media Yes    Appeal started No ( Provider will need to decide if appeal is warranted and send clinical documentation to PA team for initiation.)    **Please follow up with your patient regarding denial and next steps**

## 2024-06-19 ENCOUNTER — OFFICE VISIT (OUTPATIENT)
Dept: OBGYN CLINIC | Facility: CLINIC | Age: 29
End: 2024-06-19
Payer: COMMERCIAL

## 2024-06-19 VITALS
SYSTOLIC BLOOD PRESSURE: 100 MMHG | DIASTOLIC BLOOD PRESSURE: 60 MMHG | WEIGHT: 221.6 LBS | HEIGHT: 65 IN | BODY MASS INDEX: 36.92 KG/M2

## 2024-06-19 DIAGNOSIS — Z30.431 ENCOUNTER FOR ROUTINE CHECKING OF INTRAUTERINE CONTRACEPTIVE DEVICE (IUD): Primary | ICD-10-CM

## 2024-06-19 PROCEDURE — 99213 OFFICE O/P EST LOW 20 MIN: CPT | Performed by: OBSTETRICS & GYNECOLOGY

## 2024-06-19 NOTE — PROGRESS NOTES
"Assessment:  29 y.o.  who presents as a follow up after IUD placement.    Plan:  Diagnoses and all orders for this visit:    Encounter for routine checking of intrauterine contraceptive device (IUD)    - Appropriate exam  - Very symptomatic post-insertion, improving now  - Continue to monitor and call office if not improving    __________________________________________________________________    Subjective   Shruti Camilo is a 29 y.o.  who presents as a follow up after insertion of IUD.    Patient reports heavy bleeding x2wk afterwards, then stopped. Has some cramping still. Very severe after placement, but improving since.  No further flow. Denies other concerns with IUD and otherwise tolerating well. No headaches, nausea, skin changes, mood changes. Agreeable to continued use for now. Discussed expectations for continued improvement, but can consider alternatives if pain persists.       The following portions of the patient's history were reviewed and updated as appropriate: allergies, current medications, past medical history, past social history, past surgical history, and problem list.    Review of Systems  Review of Systems   Constitutional:  Negative for chills, fatigue and fever.   Respiratory:  Negative for shortness of breath.    Cardiovascular:  Negative for chest pain and palpitations.   Gastrointestinal:  Negative for abdominal distention, abdominal pain, constipation, diarrhea, nausea and vomiting.   Genitourinary:  Positive for pelvic pain. Negative for dysuria, flank pain, frequency, genital sores, vaginal bleeding, vaginal discharge and vaginal pain.   Skin:  Negative for rash and wound.   Neurological:  Negative for dizziness, light-headedness and headaches.            Objective  /60   Ht 5' 5\" (1.651 m)   Wt 101 kg (221 lb 9.6 oz)   Breastfeeding Yes   BMI 36.88 kg/m²      Physical Exam:  Physical Exam  Exam conducted with a chaperone present.   Constitutional:  "      General: She is not in acute distress.     Appearance: Normal appearance. She is not ill-appearing, toxic-appearing or diaphoretic.   Eyes:      General: No scleral icterus.        Right eye: No discharge.         Left eye: No discharge.      Conjunctiva/sclera: Conjunctivae normal.   Cardiovascular:      Rate and Rhythm: Normal rate.   Pulmonary:      Effort: Pulmonary effort is normal. No respiratory distress.   Abdominal:      General: There is no distension.      Palpations: There is no mass.      Tenderness: There is no abdominal tenderness. There is no guarding or rebound.      Hernia: No hernia is present.   Genitourinary:     General: Normal vulva.      Exam position: Lithotomy position.      Labia:         Right: No rash, tenderness or lesion.         Left: No rash, tenderness or lesion.       Urethra: No prolapse, urethral swelling or urethral lesion.      Vagina: No signs of injury. No vaginal discharge, erythema, tenderness, bleeding or prolapsed vaginal walls.      Cervix: No cervical motion tenderness (iud strings visible), discharge, friability, lesion, erythema or cervical bleeding.   Musculoskeletal:         General: No swelling.   Skin:     General: Skin is warm and dry.      Coloration: Skin is not jaundiced or pale.      Findings: No bruising or erythema.   Neurological:      Mental Status: She is alert.   Psychiatric:         Mood and Affect: Mood normal.         Behavior: Behavior normal.         Thought Content: Thought content normal.         Judgment: Judgment normal.           Lab Review  CBC, CMP, TSH, T (5/10/24)

## 2024-07-04 DIAGNOSIS — Z98.891 S/P REPEAT LOW TRANSVERSE C-SECTION: ICD-10-CM

## 2024-07-05 DIAGNOSIS — R11.2 NAUSEA AND VOMITING, UNSPECIFIED VOMITING TYPE: ICD-10-CM

## 2024-07-05 RX ORDER — ONDANSETRON 4 MG/1
4 TABLET, ORALLY DISINTEGRATING ORAL EVERY 8 HOURS SCHEDULED
Qty: 30 TABLET | Refills: 2 | Status: SHIPPED | OUTPATIENT
Start: 2024-07-05

## 2024-07-05 RX ORDER — ACETAMINOPHEN 325 MG/1
TABLET ORAL
Qty: 30 TABLET | Refills: 2 | Status: SHIPPED | OUTPATIENT
Start: 2024-07-05

## 2024-07-09 ENCOUNTER — TELEPHONE (OUTPATIENT)
Dept: OBGYN CLINIC | Facility: MEDICAL CENTER | Age: 29
End: 2024-07-09

## 2024-07-09 DIAGNOSIS — N91.2 AMENORRHEA: Primary | ICD-10-CM

## 2024-07-09 RX ORDER — MEDROXYPROGESTERONE ACETATE 5 MG/1
5 TABLET ORAL DAILY
Qty: 7 TABLET | Refills: 0 | Status: SHIPPED | OUTPATIENT
Start: 2024-07-09

## 2024-07-09 NOTE — TELEPHONE ENCOUNTER
Spoke with patient. Shruti said she has the Paragard not the Mirena. Stated she was to call the office if she had not gotten her menses in 3 months. She still has yet to get it. States she gets all the symptoms of getting it but never does. She is asking if a pill can be sent to her pharmacy.

## 2024-07-10 NOTE — TELEPHONE ENCOUNTER
LM that a script for provera was sent to the pharmacy. Informed patient that if there is not response the provera to contact our office.

## 2024-07-19 ENCOUNTER — TRANSCRIBE ORDERS (OUTPATIENT)
Dept: OBGYN CLINIC | Facility: MEDICAL CENTER | Age: 29
End: 2024-07-19

## 2024-07-19 DIAGNOSIS — Z97.5 IUD (INTRAUTERINE DEVICE) IN PLACE: Primary | ICD-10-CM

## 2024-07-22 ENCOUNTER — APPOINTMENT (OUTPATIENT)
Dept: LAB | Facility: CLINIC | Age: 29
End: 2024-07-22
Payer: COMMERCIAL

## 2024-07-22 ENCOUNTER — ANNUAL EXAM (OUTPATIENT)
Dept: OBGYN CLINIC | Facility: CLINIC | Age: 29
End: 2024-07-22
Payer: COMMERCIAL

## 2024-07-22 VITALS
HEIGHT: 65 IN | SYSTOLIC BLOOD PRESSURE: 110 MMHG | WEIGHT: 220 LBS | DIASTOLIC BLOOD PRESSURE: 74 MMHG | BODY MASS INDEX: 36.65 KG/M2

## 2024-07-22 DIAGNOSIS — R61 DIAPHORESIS: ICD-10-CM

## 2024-07-22 DIAGNOSIS — R50.9 FEVER, UNSPECIFIED FEVER CAUSE: ICD-10-CM

## 2024-07-22 DIAGNOSIS — R10.2 PELVIC PAIN: ICD-10-CM

## 2024-07-22 DIAGNOSIS — Z01.419 ENCOUNTER FOR GYNECOLOGICAL EXAMINATION WITHOUT ABNORMAL FINDING: Primary | ICD-10-CM

## 2024-07-22 DIAGNOSIS — Z30.431 ENCOUNTER FOR ROUTINE CHECKING OF INTRAUTERINE CONTRACEPTIVE DEVICE (IUD): ICD-10-CM

## 2024-07-22 LAB
ALBUMIN SERPL BCG-MCNC: 4.2 G/DL (ref 3.5–5)
ALP SERPL-CCNC: 87 U/L (ref 34–104)
ALT SERPL W P-5'-P-CCNC: 25 U/L (ref 7–52)
ANION GAP SERPL CALCULATED.3IONS-SCNC: 11 MMOL/L (ref 4–13)
AST SERPL W P-5'-P-CCNC: 20 U/L (ref 13–39)
BASOPHILS # BLD AUTO: 0.05 THOUSANDS/ÂΜL (ref 0–0.1)
BASOPHILS NFR BLD AUTO: 1 % (ref 0–1)
BILIRUB SERPL-MCNC: 0.48 MG/DL (ref 0.2–1)
BUN SERPL-MCNC: 8 MG/DL (ref 5–25)
CALCIUM SERPL-MCNC: 9 MG/DL (ref 8.4–10.2)
CHLORIDE SERPL-SCNC: 106 MMOL/L (ref 96–108)
CO2 SERPL-SCNC: 24 MMOL/L (ref 21–32)
CREAT SERPL-MCNC: 0.61 MG/DL (ref 0.6–1.3)
EOSINOPHIL # BLD AUTO: 0.06 THOUSAND/ÂΜL (ref 0–0.61)
EOSINOPHIL NFR BLD AUTO: 1 % (ref 0–6)
ERYTHROCYTE [DISTWIDTH] IN BLOOD BY AUTOMATED COUNT: 12.9 % (ref 11.6–15.1)
GFR SERPL CREATININE-BSD FRML MDRD: 122 ML/MIN/1.73SQ M
GLUCOSE P FAST SERPL-MCNC: 78 MG/DL (ref 65–99)
HCT VFR BLD AUTO: 41.9 % (ref 34.8–46.1)
HGB BLD-MCNC: 13.5 G/DL (ref 11.5–15.4)
IMM GRANULOCYTES # BLD AUTO: 0.03 THOUSAND/UL (ref 0–0.2)
IMM GRANULOCYTES NFR BLD AUTO: 0 % (ref 0–2)
LYMPHOCYTES # BLD AUTO: 2.59 THOUSANDS/ÂΜL (ref 0.6–4.47)
LYMPHOCYTES NFR BLD AUTO: 24 % (ref 14–44)
MCH RBC QN AUTO: 30 PG (ref 26.8–34.3)
MCHC RBC AUTO-ENTMCNC: 32.2 G/DL (ref 31.4–37.4)
MCV RBC AUTO: 93 FL (ref 82–98)
MONOCYTES # BLD AUTO: 0.49 THOUSAND/ÂΜL (ref 0.17–1.22)
MONOCYTES NFR BLD AUTO: 5 % (ref 4–12)
NEUTROPHILS # BLD AUTO: 7.64 THOUSANDS/ÂΜL (ref 1.85–7.62)
NEUTS SEG NFR BLD AUTO: 69 % (ref 43–75)
NRBC BLD AUTO-RTO: 0 /100 WBCS
PLATELET # BLD AUTO: 370 THOUSANDS/UL (ref 149–390)
PMV BLD AUTO: 11 FL (ref 8.9–12.7)
POTASSIUM SERPL-SCNC: 4.2 MMOL/L (ref 3.5–5.3)
PROT SERPL-MCNC: 7.2 G/DL (ref 6.4–8.4)
RBC # BLD AUTO: 4.5 MILLION/UL (ref 3.81–5.12)
SODIUM SERPL-SCNC: 141 MMOL/L (ref 135–147)
WBC # BLD AUTO: 10.86 THOUSAND/UL (ref 4.31–10.16)

## 2024-07-22 PROCEDURE — G0143 SCR C/V CYTO,THINLAYER,RESCR: HCPCS | Performed by: SPECIALIST

## 2024-07-22 PROCEDURE — 80053 COMPREHEN METABOLIC PANEL: CPT

## 2024-07-22 PROCEDURE — 99395 PREV VISIT EST AGE 18-39: CPT | Performed by: ADVANCED PRACTICE MIDWIFE

## 2024-07-22 PROCEDURE — G0476 HPV COMBO ASSAY CA SCREEN: HCPCS | Performed by: ADVANCED PRACTICE MIDWIFE

## 2024-07-22 PROCEDURE — 85025 COMPLETE CBC W/AUTO DIFF WBC: CPT

## 2024-07-22 PROCEDURE — 36415 COLL VENOUS BLD VENIPUNCTURE: CPT

## 2024-07-22 RX ORDER — CETIRIZINE HYDROCHLORIDE 10 MG/1
10 CAPSULE, LIQUID FILLED ORAL DAILY
COMMUNITY
Start: 2024-04-09

## 2024-07-22 RX ORDER — NAPROXEN SODIUM 550 MG/1
550 TABLET ORAL 2 TIMES DAILY WITH MEALS
Qty: 24 TABLET | Refills: 0 | Status: SHIPPED | OUTPATIENT
Start: 2024-07-22

## 2024-07-22 NOTE — PROGRESS NOTES
[facility] [address] Jazlyn Cotter PA    ASSESSMENT/PLAN: Shruti Camilo is a 29 y.o.  who presents for annual gynecologic exam.    Encounter for routine gynecologic examination  - Routine well woman exam completed today.  - Cervical Cancer Screening: Current ASCCP Guidelines reviewed. Last Pap: 2020 normal. Next Pap Due: today  - HPV Vaccination status: at least 1 dose  - STI screening offered including HIV testing: not indicated based on hx or requested at time of visit  - Contraceptive counseling discussed.  Current contraception: Paraguard IUD since 2024  - RTO 1 yr annual exam and prn problem     Additional problems addressed during this visit:  1. Encounter for gynecological examination without abnormal finding  -     Liquid-based pap, screening  2. Encounter for routine checking of intrauterine contraceptive device (IUD)  3. Pelvic pain  -     naproxen sodium (ANAPROX) 550 mg tablet; Take 1 tablet (550 mg total) by mouth 2 (two) times a day with meals  -     CBC and differential; Future  -     Comprehensive metabolic panel; Future  4. Diaphoresis  -     CBC and differential; Future  -     Comprehensive metabolic panel; Future  5. Fever, unspecified fever cause  -     CBC and differential; Future  -     Comprehensive metabolic panel; Future      CC:  Annual Gynecologic Examination    HPI: Shruti Camilo is a 29 y.o.  who presents for annual gynecologic examination.  Shruti presents for gyn exam today. 2024 LMP. Menses is irregular- had Paragard IUD placed in May , flow Was given Provera to induce menses in July. Using Paragard as birth control method. Last pap smear . History of abnormal pap smear- no. Sexually active- yes, with partner for 7 yrs. HPV vaccine - at least 1 dose. Does not desire STI testing. 5-6 hrs sleep per day. Occasional servings of calcium rich food per day. Walks daily. 1 servings of caffeine per day.  "Breast changes - nipples dark and large, since stopping breastfeeding. Safe at home - yes. Wears seat belts. Concerns : notes that cramping is worse than any other period and states that she is wearing pads and tampons. Bleeding for 7 days since the Provera. She bled for 7 weeks after delivery stopped for a week and then had heavy bleeding for 1 week. States that she is noticing swelling of legs and heaviest period. Taking Motrin 600 mg and then Tylenol. Notes diarrhea and possible fever this weekend. Stopped breastfeeding 3 weeks ago.         The following portions of the patient's history were reviewed and updated as appropriate: allergies, current medications, past family history, past medical history, obstetric history, gynecologic history, past social history, past surgical history and problem list.    Review of Systems   Constitutional:  Positive for diaphoresis and fatigue. Negative for chills and fever.   Respiratory:  Negative for cough and shortness of breath.    Cardiovascular:  Negative for chest pain, palpitations and leg swelling.   Gastrointestinal:  Positive for diarrhea. Negative for constipation.   Genitourinary:  Positive for menstrual problem and pelvic pain. Negative for difficulty urinating, dyspareunia, dysuria, frequency, vaginal bleeding, vaginal discharge and vaginal pain.   Neurological:  Positive for headaches. Negative for light-headedness.        Notes migraine for 2 days.    Psychiatric/Behavioral:  The patient is nervous/anxious.          Objective:  /74   Ht 5' 5\" (1.651 m)   Wt 99.8 kg (220 lb)   LMP 07/16/2024 (Exact Date)   BMI 36.61 kg/m²    Physical Exam  Vitals reviewed.   Constitutional:       Appearance: Normal appearance.   HENT:      Head: Normocephalic.   Neck:      Thyroid: No thyroid mass or thyroid tenderness.   Cardiovascular:      Rate and Rhythm: Normal rate and regular rhythm.      Heart sounds: Normal heart sounds.   Pulmonary:      Effort: Pulmonary " effort is normal.      Breath sounds: Normal breath sounds.   Chest:   Breasts:     Right: No mass, nipple discharge, skin change or tenderness.      Left: No mass, nipple discharge, skin change or tenderness.   Abdominal:      General: There is no distension.      Palpations: There is no mass.      Tenderness: There is no abdominal tenderness. There is no guarding.   Genitourinary:     General: Normal vulva.      Exam position: Lithotomy position.      Labia:         Right: No rash, tenderness or lesion.         Left: No rash, tenderness or lesion.       Urethra: No urethral pain, urethral swelling or urethral lesion.      Vagina: No vaginal discharge, erythema, tenderness, bleeding or lesions.      Cervix: No cervical motion tenderness, discharge, friability, lesion, erythema or cervical bleeding.      Uterus: Normal. Not enlarged and not tender.       Adnexa:         Right: No mass, tenderness or fullness.          Left: No mass, tenderness or fullness.        Comments: Paragard strings noted.   Cx sensitive to touch with pap smear brush, but not with bimanual exam.  Musculoskeletal:      Cervical back: Normal range of motion.   Lymphadenopathy:      Upper Body:      Right upper body: No axillary adenopathy.      Left upper body: No axillary adenopathy.   Skin:     General: Skin is warm and dry.   Neurological:      Mental Status: She is alert.   Psychiatric:         Mood and Affect: Mood normal.         Behavior: Behavior normal.         Judgment: Judgment normal.

## 2024-07-23 ENCOUNTER — HOSPITAL ENCOUNTER (OUTPATIENT)
Dept: ULTRASOUND IMAGING | Facility: HOSPITAL | Age: 29
Discharge: HOME | End: 2024-07-23
Payer: COMMERCIAL

## 2024-07-23 DIAGNOSIS — Z97.5 IUD (INTRAUTERINE DEVICE) IN PLACE: ICD-10-CM

## 2024-07-23 LAB
HPV HR 12 DNA CVX QL NAA+PROBE: POSITIVE
HPV16 DNA CVX QL NAA+PROBE: NEGATIVE
HPV18 DNA CVX QL NAA+PROBE: NEGATIVE

## 2024-07-23 PROCEDURE — 76830 TRANSVAGINAL US NON-OB: CPT

## 2024-07-23 PROCEDURE — 76856 US EXAM PELVIC COMPLETE: CPT

## 2024-07-25 ENCOUNTER — PATIENT MESSAGE (OUTPATIENT)
Dept: OBGYN CLINIC | Facility: CLINIC | Age: 29
End: 2024-07-25

## 2024-07-25 ENCOUNTER — NURSE TRIAGE (OUTPATIENT)
Age: 29
End: 2024-07-25

## 2024-07-25 DIAGNOSIS — R11.2 NAUSEA AND VOMITING, UNSPECIFIED VOMITING TYPE: ICD-10-CM

## 2024-07-25 DIAGNOSIS — G89.18 PAIN FOLLOWING SURGERY OR PROCEDURE: ICD-10-CM

## 2024-07-25 RX ORDER — ONDANSETRON 4 MG/1
4 TABLET, ORALLY DISINTEGRATING ORAL EVERY 8 HOURS SCHEDULED
Qty: 30 TABLET | Refills: 2 | Status: SHIPPED | OUTPATIENT
Start: 2024-07-25

## 2024-07-25 RX ORDER — OXYCODONE HYDROCHLORIDE 5 MG/1
5 TABLET ORAL EVERY 6 HOURS PRN
Qty: 15 TABLET | Refills: 0 | Status: SHIPPED | OUTPATIENT
Start: 2024-07-25

## 2024-07-25 NOTE — TELEPHONE ENCOUNTER
Well positioned IUD on US. I can send in zofran and a few tablets of stronger meds. This is not a long term tx strategy, but should help until planned f/u 8/1. Can also continue tylenol/naproxen. Thanks!

## 2024-07-25 NOTE — TELEPHONE ENCOUNTER
"Spoke with patient who reports continued lower abd pain since IUD was placed.  She also reports having a period since 7/16, currently mild bleeding.  Pt had US done as well as recent PAP a/w results.  Pt reports naproxen is making her nauseous and doesn't seem to be helping, just takes the edge off.  She was advised to take with food.  She is requesting something else for pain as well as zofran for nausea.  She has an appointment scheduled for Monday.  No further questions or concerns at this time.     Reason for Disposition   Periods last > 7 days    Answer Assessment - Initial Assessment Questions  1. TYPE: \"What type of IUD do you have?\"     - Copper IUD (e.g., Cu-T380A, ParaGard, Nova-T, Flexi-T)    - Hormonal IUD (e.g., Jaydess, Kyleena, Liletta, LNG-IUS, Mirena, Marla)      Paragard  2. START DATE:  \"When was your IUD inserted?\" (e.g., date; weeks, months, years ago)       May  3. SYMPTOM: \"What is the main symptom (or question) you're concerned about?\"       Abd pain  4. ONSET: \"When did the  pain start?\"      Since IUD placed  5. VAGINAL BLEEDING: \"Are you having any unusual vaginal bleeding?\"     - NONE    - SPOTTING: spotting, or pinkish / brownish mucous discharge; does not fill panti-liner or pad     - MILD:  less than 1 pad / hour; less than patient's usual menstrual bleeding    - MODERATE: 1-2 pads / hour; small-medium blood clots (e.g., pea, grape, small coin)     - SEVERE: soaking 2 or more pads/hour for 2 or more hours; bleeding not contained by pads or continuous red blood from vagina; large blood clots (e.g., golf ball, large coin)       Mild  6. ABDOMEN OR PELVIC PAIN: \"Are you have any pain in your abdomen or pelvic area?\" (Scale: 0, 1-10; none, mild, moderate, severe)    - NONE (0): no pain    - MILD (1-3): doesn't interfere with normal activities, abdomen soft and not tender to touch     - MODERATE (4-7): interferes with normal activities or awakens from sleep, tender to touch     - SEVERE " "(8-10): excruciating pain, doubled over, unable to do any normal activities       6/10, did just take naproxen.  7. FEVER:\"Is there a fever?\" If yes, ask: \"What is the temperature, how was it measured, and when did it start?\"      Possibly over the weekend.  8. PREGNANCY: \"Are you concerned that you might be pregnant?\" \"When was your last menstrual period?\"      LMP 7/16    Protocols used: Contraception - IUD Symptoms and Questions-ADULT-[place]    "

## 2024-07-26 NOTE — TELEPHONE ENCOUNTER
Spoke with patient and advised meds sent to pharmacy.  She inquired about PAP results, advised they are not back yet.  No further questions or concerns at this time.

## 2024-07-29 LAB
LAB AP GYN PRIMARY INTERPRETATION: ABNORMAL
Lab: ABNORMAL
PATH INTERP SPEC-IMP: ABNORMAL

## 2024-07-29 PROCEDURE — 88141 CYTOPATH C/V INTERPRET: CPT | Performed by: SPECIALIST

## 2024-08-01 ENCOUNTER — OFFICE VISIT (OUTPATIENT)
Dept: OBGYN CLINIC | Facility: CLINIC | Age: 29
End: 2024-08-01
Payer: COMMERCIAL

## 2024-08-01 VITALS
SYSTOLIC BLOOD PRESSURE: 112 MMHG | HEIGHT: 65 IN | DIASTOLIC BLOOD PRESSURE: 76 MMHG | BODY MASS INDEX: 37.32 KG/M2 | WEIGHT: 224 LBS

## 2024-08-01 DIAGNOSIS — T83.84XA PAIN DUE TO INTRAUTERINE CONTRACEPTIVE DEVICE (IUD), INITIAL ENCOUNTER (HCC): Primary | ICD-10-CM

## 2024-08-01 DIAGNOSIS — F41.9 ANXIETY DUE TO INVASIVE PROCEDURE: ICD-10-CM

## 2024-08-01 DIAGNOSIS — R87.610 ASCUS WITH POSITIVE HIGH RISK HPV CERVICAL: ICD-10-CM

## 2024-08-01 DIAGNOSIS — N91.2 AMENORRHEA: ICD-10-CM

## 2024-08-01 DIAGNOSIS — R87.810 ASCUS WITH POSITIVE HIGH RISK HPV CERVICAL: ICD-10-CM

## 2024-08-01 PROCEDURE — 99214 OFFICE O/P EST MOD 30 MIN: CPT | Performed by: OBSTETRICS & GYNECOLOGY

## 2024-08-01 RX ORDER — MEDROXYPROGESTERONE ACETATE 5 MG/1
5 TABLET ORAL DAILY
Qty: 21 TABLET | Refills: 0 | Status: SHIPPED | OUTPATIENT
Start: 2024-08-01

## 2024-08-01 RX ORDER — DIAZEPAM 5 MG/1
TABLET ORAL
Qty: 2 TABLET | Refills: 0 | Status: SHIPPED | OUTPATIENT
Start: 2024-08-01

## 2024-08-01 NOTE — PROGRESS NOTES
Assessment:  29 y.o.  who presents as a follow up of pelvic pain secondary to IUD and abnormal pap result review.    Plan:  Diagnoses and all orders for this visit:    Pain due to intrauterine contraceptive device (IUD), initial encounter ([facility])  Amenorrhea  -     Motivated to continue IUD if can control pain  - Take provera course monthly to keep menses regular over next 3mo  -  medroxyPROGESTERone (PROVERA) 5 mg tablet; Take 1 tablet (5 mg total) by mouth daily for 7 days every 4 weeks to induce menses  - Continue NSAIDs prn and oxycodone sparingly while attempting regulation (understands this is not long-term plan and if requiring each cycle, will consider removing IUD)    ASCUS with positive high risk HPV cervical  Anxiety due to invasive procedure  -     Colposcopy in 3mo (with f/u and psb IUD removal)  - Reviewed pap result in detail  - Recommend paracervical block with colpo  - diazepam (VALIUM) 5 mg tablet; Take 1 tablet by mouth 1 hour prior to your procedure. Take 2nd tablet 30min prior as needed for residual anxiety        __________________________________________________________________    Subjective   Shrutielsy Camilo is a 29 y.o.  who presents with continued pelvic pain.     Patient reports stabbing low back and right pelvic pain with her menses. Largely seems to occur at this time, but very severe when it does present. Does have low back aching in between though. NSAIDs often not sufficient for relief at this time.  Rx given for oxycodone in short term, using sparingly. Reports unsure if menses contributing, as has not been regular. Her baseline mesnes is 2d and without cramping. Not getting menses lately, but when she induced with provera it lasted 2wk and was very heavy. Hx Mirena prior, taken out in <3mo due to bleeding and consistent pain.     Reviewed US with pt, notable for a well positioned IUD. Discussed her prior hx and that unfortunately, persistent discomfort can be  "noted in patients with IUD use even in absence of clear modifiable reason. She is interested in keeping device but not if will not improve. We discussed differences in mirena and paragard experience, and with some comfort between menses it may be effective to regulate cycles for less flow and see if improvement noted. Agreeable to this.     Discussed also pap results, incl new HPV+. She was prior vaccinated with gardasil. We discussed differences in strains and what gardasil covers. Discussed spectrum of abnormal paps and ASCCP guidelines for testing. Reviewed colposcopy in detail. Discussed procedure in relation to concerns above. As add't analgesia will be required for her, I recommend scheduling in 3mo with f/u to allow for iud removal if indicated concurrently. Pt is in agreement with this plan.     The following portions of the patient's history were reviewed and updated as appropriate: allergies, current medications, past medical history, past social history, past surgical history, and problem list.    Review of Systems  Review of Systems   Constitutional:  Positive for unexpected weight change (gaining). Negative for chills, fatigue and fever.   Respiratory:  Negative for shortness of breath.    Cardiovascular:  Negative for chest pain and palpitations.   Gastrointestinal:  Negative for abdominal distention, abdominal pain, nausea and vomiting.   Genitourinary:  Positive for menstrual problem and pelvic pain. Negative for dysuria, flank pain, frequency, genital sores, vaginal bleeding, vaginal discharge and vaginal pain.   Skin:  Negative for rash and wound.   Neurological:  Negative for dizziness and headaches.            Objective  /76   Ht 5' 5\" (1.651 m)   Wt 102 kg (224 lb)   LMP 07/16/2024 (Exact Date)   Breastfeeding No   BMI 37.28 kg/m²      Physical Exam:  Physical Exam  Constitutional:       General: She is not in acute distress.     Appearance: Normal appearance. She is not " ill-appearing, toxic-appearing or diaphoretic.   Eyes:      General: No scleral icterus.        Right eye: No discharge.         Left eye: No discharge.      Conjunctiva/sclera: Conjunctivae normal.   Cardiovascular:      Rate and Rhythm: Normal rate.   Pulmonary:      Effort: Pulmonary effort is normal. No respiratory distress.   Abdominal:      General: There is no distension.      Palpations: There is no mass.      Tenderness: There is no abdominal tenderness. There is no guarding or rebound.      Hernia: No hernia is present.   Musculoskeletal:         General: No swelling.   Skin:     General: Skin is warm and dry.      Coloration: Skin is not jaundiced or pale.      Findings: No bruising or erythema.   Neurological:      Mental Status: She is alert.   Psychiatric:         Mood and Affect: Mood normal.         Behavior: Behavior normal.         Thought Content: Thought content normal.         Judgment: Judgment normal.           Lab Review  Pelvic US 7/2024   Pap/HPV

## 2024-08-05 ENCOUNTER — OFFICE VISIT (OUTPATIENT)
Dept: OBGYN CLINIC | Facility: MEDICAL CENTER | Age: 29
End: 2024-08-05

## 2024-08-05 ENCOUNTER — NURSE TRIAGE (OUTPATIENT)
Age: 29
End: 2024-08-05

## 2024-08-05 VITALS
HEIGHT: 65 IN | DIASTOLIC BLOOD PRESSURE: 80 MMHG | SYSTOLIC BLOOD PRESSURE: 120 MMHG | BODY MASS INDEX: 37.59 KG/M2 | WEIGHT: 225.6 LBS

## 2024-08-05 DIAGNOSIS — N90.89 SKIN TAG OF VULVA: ICD-10-CM

## 2024-08-05 DIAGNOSIS — B37.9 CANDIDIASIS: Primary | ICD-10-CM

## 2024-08-05 LAB
BV WHIFF TEST VAG QL: ABNORMAL
CLUE CELLS SPEC QL WET PREP: ABNORMAL
SL AMB POCT WET MOUNT: ABNORMAL
T VAGINALIS VAG QL WET PREP: ABNORMAL
YEAST VAG QL WET PREP: ABNORMAL

## 2024-08-05 RX ORDER — FLUCONAZOLE 150 MG/1
150 TABLET ORAL ONCE
Qty: 1 TABLET | Refills: 0 | Status: SHIPPED | OUTPATIENT
Start: 2024-08-05 | End: 2024-08-05

## 2024-08-05 NOTE — PROGRESS NOTES
"[facility] [address], [place], [place]    Assessment/Plan:  No problem-specific Assessment & Plan notes found for this encounter.    Diagnoses and all orders for this visit:    Candidiasis  -     fluconazole (DIFLUCAN) 150 mg tablet; Take 1 tablet (150 mg total) by mouth once for 1 dose  -     POCT wet mount    Skin tag of vulva          Subjective:   Shruti Camilo is a 29 y.o.  female.  CC: vaginal burning, skin tag    HPI: HPI  Patient presents with complaints of increased vaginal discharge, burning.  Denies any bleeding or odor.  She does report a lump that is painful especially when she sits.  She feels a pulling in the area.  ROS: Review of Systems   Constitutional: Negative.    HENT: Negative.     Eyes: Negative.    Respiratory: Negative.     Cardiovascular: Negative.    Gastrointestinal: Negative.    Genitourinary:  Positive for vaginal discharge and vaginal pain.   Musculoskeletal: Negative.    All other systems reviewed and are negative.      PFSH: The following portions of the patient's history were reviewed and updated as appropriate: allergies, current medications, past family history, past medical history, obstetric history, gynecologic history, past social history, past surgical history and problem list.       Objective:  /80   Ht 5' 5\" (1.651 m)   Wt 102 kg (225 lb 9.6 oz)   LMP 2024 (Exact Date)   BMI 37.54 kg/m²    Physical Exam  Vitals reviewed.   Constitutional:       Appearance: Normal appearance.   Cardiovascular:      Rate and Rhythm: Normal rate.   Pulmonary:      Effort: Pulmonary effort is normal. No respiratory distress.   Genitourinary:     Exam position: Lithotomy position.      Labia:         Right: No tenderness or lesion.         Left: No tenderness or lesion.       Vagina: Vaginal discharge present.      Cervix: Normal.      Uterus: Normal.           Comments: Skin tag Noted  Neurological:      Mental Status: She " is alert.   Psychiatric:         Mood and Affect: Mood normal.         Behavior: Behavior normal.

## 2024-08-05 NOTE — TELEPHONE ENCOUNTER
"Incoming call from patient. Vaginal itching, burning and increased yellow vaginal discharge started yesterday. Patient also states she has a skin tag she would like to be evaluated that is located on her buttocks near her vaginal area. Denies vaginal odor.     Appointment scheduled for evaluation today, 8/5.     Reason for Disposition   Patient wants to be seen    Answer Assessment - Initial Assessment Questions  1. SYMPTOM: \"What's the main symptom you're concerned about?\" (e.g., pain, itching, dryness)      Itching, burning and increased discharge (yellow), denies odor    2. LOCATION: \"Where is the  itching/burning located?\" (e.g., inside/outside, left/right)      vagina    3. ONSET: \"When did the  symptoms  start?\"      Yesterday morning     4. PAIN: \"Is there any pain?\" If Yes, ask: \"How bad is it?\" (Scale: 1-10; mild, moderate, severe)      burning    5. ITCHING: \"Is there any itching?\" If Yes, ask: \"How bad is it?\" (Scale: 1-10; mild, moderate, severe)      Yes, itching     6. CAUSE: \"What do you think is causing the discharge?\" \"Have you had the same problem before? What happened then?\"      Has had bv and yeast infections in past, unsure which this feels like    7. OTHER SYMPTOMS: \"Do you have any other symptoms?\" (e.g., fever, itching, vaginal bleeding, pain with urination, injury to genital area, vaginal foreign body)      Denies    8. PREGNANCY: \"Is there any chance you are pregnant?\" \"When was your last menstrual period?\"      LMP 7/16/24    Protocols used: Vaginal Symptoms-ADULT-[place]    " Detail Level: Detailed Detail Level: Zone Detail Level: Generalized Detail Level: Simple

## 2024-08-06 NOTE — PROGRESS NOTES
Skin tag removal    Date/Time: 8/5/2024 2:45 PM    Performed by: Shelbie Peraza MD  Authorized by: Shelbie Peraza MD  Universal Protocol:  Consent: Verbal consent obtained.  Risks and benefits: risks, benefits and alternatives were discussed  Consent given by: patient  Patient understanding: patient states understanding of the procedure being performed  Patient consent: the patient's understanding of the procedure matches consent given  Required items: required blood products, implants, devices, and special equipment available  Patient identity confirmed: verbally with patient      Procedure Details - Skin Tag Destruction:     Up to 15      Body area:  Anogenital    Anogenital location:  Vulva    Initial size (mm):  4    Malignancy: benign lesion      Destruction method: scissors used for extraction

## 2024-08-09 DIAGNOSIS — B37.9 CANDIDIASIS: Primary | ICD-10-CM

## 2024-08-09 RX ORDER — FLUCONAZOLE 150 MG/1
150 TABLET ORAL ONCE
Qty: 1 TABLET | Refills: 0 | Status: SHIPPED | OUTPATIENT
Start: 2024-08-09 | End: 2024-08-09

## 2024-08-11 ENCOUNTER — PATIENT MESSAGE (OUTPATIENT)
Dept: OBGYN CLINIC | Facility: CLINIC | Age: 29
End: 2024-08-11

## 2024-08-11 DIAGNOSIS — G89.18 PAIN FOLLOWING SURGERY OR PROCEDURE: ICD-10-CM

## 2024-08-12 NOTE — TELEPHONE ENCOUNTER
Medication: oxycodone 5mg  PDMP   Refill must be reviewed and completed by the office or provider. The refill is unable to be approved or denied by the medication management team.

## 2024-08-13 RX ORDER — OXYCODONE HYDROCHLORIDE 5 MG/1
5 TABLET ORAL EVERY 6 HOURS PRN
Qty: 15 TABLET | Refills: 0 | Status: SHIPPED | OUTPATIENT
Start: 2024-08-13

## 2024-08-27 DIAGNOSIS — G89.18 PAIN FOLLOWING SURGERY OR PROCEDURE: ICD-10-CM

## 2024-08-27 NOTE — TELEPHONE ENCOUNTER
Reason for call:   [x] Refill   [] Prior Auth  [] Other:     Office:   [] PCP/Provider -   [x] Specialty/Provider - OBGYN    Medication: Oxycodone 5 mg, take 1 tablet by mouth every 6 hours as needed       Pharmacy: [facility] [place]    Does the patient have enough for 3 days?   [x] Yes   [] No - Send as HP to POD

## 2024-08-28 RX ORDER — OXYCODONE HYDROCHLORIDE 5 MG/1
5 TABLET ORAL EVERY 6 HOURS PRN
Qty: 30 TABLET | Refills: 0 | Status: SHIPPED | OUTPATIENT
Start: 2024-08-28

## 2024-09-10 ENCOUNTER — NURSE TRIAGE (OUTPATIENT)
Age: 29
End: 2024-09-10

## 2024-09-10 NOTE — TELEPHONE ENCOUNTER
"Shruti states she is having mild to moderate pain with urination and frequency that has gotten worse throughout the day.  Feels like UTI like she has had in past. Denies vaginal itching,discharge or odor.    Recommended urgent care evalution this evening for evaluation and treamtent of UTI symptoms.  Call back if negative for UTI for pelvic appt.  Can try AZO and increase water intake.    Patient in agreement    Reason for Disposition  • All other patients with painful urination  (Exception: [1] EITHER frequency or urgency AND [2] has on-call doctor.)     Evaluation in urgent care this evening    Answer Assessment - Initial Assessment Questions  1. SEVERITY: \"How bad is the pain?\"  (e.g., Scale 1-10; mild, moderate, or severe)    Mild to moderate  2. FREQUENCY: \"How many times have you had painful urination today?\"       Multiple times  3. PATTERN: \"Is pain present every time you urinate or just sometimes?\"       Every time  5. FEVER: \"Do you have a fever?\" If Yes, ask: \"What is your temperature, how was it measured, and when did it start?\"      Denies fever  6. PAST UTI: \"Have you had a urine infection before?\" If Yes, ask: \"When was the last time?\" and \"What happened that time?\"       UTI in past, feels the same  7. CAUSE: \"What do you think is causing the painful urination?\"  (e.g., UTI, scratch, Herpes sore)      UTI  8. OTHER SYMPTOMS: \"Do you have any other symptoms?\" (e.g., blood in urine, flank pain, genital sores, urgency, vaginal discharge)      Denies vaginal discharge, vaginal odor, vaginal itching  9. PREGNANCY: \"Is there any chance you are pregnant?\" \"When was your last menstrual period?\"      N/a    Protocols used: Urination Pain - Female-Adult-    "

## 2024-09-10 NOTE — TELEPHONE ENCOUNTER
Regarding: itching and sore  ----- Message from Layla GUZMAN sent at 9/10/2024  4:42 PM EDT -----  Patient called in regarding itching and soreness.

## 2024-09-12 ENCOUNTER — NURSE TRIAGE (OUTPATIENT)
Age: 29
End: 2024-09-12

## 2024-09-12 NOTE — TELEPHONE ENCOUNTER
"Spoke with patient who called 2 days ago with painful urination.  She was advised at that time to go to , however she did not. She was not able to go yesterday either.  Today she is c/o severe pain with urination, states she has been sitting on the toilet since 0900 feeling like she has to urinate, but only going a little bit.  She is unsure if she has a fever, but reports constant sweating.  She was advised I could place a UA/CS for her to get done at the lab and once resulted we could be able to prescribe medication based on the results.  She advised she would just go to the ER at [facility] for faster treatment.    No further questions or concerns at this time.    Reason for Disposition   SEVERE pain with urination    Answer Assessment - Initial Assessment Questions  1. SEVERITY: \"How bad is the pain?\"  (e.g., Scale 1-10; mild, moderate, or severe)    - MILD (1-3): complains slightly about urination hurting    - MODERATE (4-7): interferes with normal activities      - SEVERE (8-10): excruciating, unwilling or unable to urinate because of the pain       Moderate-severe  2. FREQUENCY: \"How many times have you had painful urination today?\"       constant  3. PATTERN: \"Is pain present every time you urinate or just sometimes?\"       Every time  4. ONSET: \"When did the painful urination start?\"       4 days ago  5. FEVER: \"Do you have a fever?\" If Yes, ask: \"What is your temperature, how was it measured, and when did it start?\"      States she has been sweating, she won't take her temperature.  6. PAST UTI: \"Have you had a urine infection before?\" If Yes, ask: \"When was the last time?\" and \"What happened that time?\"       Yes  7. CAUSE: \"What do you think is causing the painful urination?\"  (e.g., UTI, scratch, Herpes sore)      UTI  8. OTHER SYMPTOMS: \"Do you have any other symptoms?\" (e.g., flank pain, vaginal discharge, genital sores, urgency, blood in urine)      Urgency  9. PREGNANCY: \"Is there any chance you are " "pregnant?\" \"When was your last menstrual period?\"     Has scarlett, 8/17    Protocols used: Urination Pain - Female-ADULT-[place]    "

## 2024-09-17 ENCOUNTER — NURSE TRIAGE (OUTPATIENT)
Age: 29
End: 2024-09-17

## 2024-09-17 NOTE — TELEPHONE ENCOUNTER
"Incoming call from patient. States that she has been having UTI symptoms since last week. Is having increased urination and constant burning in lower abdomen and vaginal area. States that she was seen in ED 9/13 at [facility] and prescribed Cefalexin for presumed UTI. States that she started to take this on Sunday evening 9/15. States that symptoms did improve for short time but now they are back.     Patient denies back pain/fever/chills at this time.     Advised office visit for follow-up. Offered appointment for tomorrow, 9/18 but patient declines as she will need to take her son to school at this time. Next available appointment scheduled for 9/24. Patient advised to continue to take UTI treatment as prescribed and to contact office if symptoms worsen, develop back pain or fever as will need to be seen in urgent care sooner. Patient verbalized understanding.     Reason for Disposition   All other urine symptoms    Answer Assessment - Initial Assessment Questions  1. SYMPTOM: \"What's the main symptom you're concerned about?\" (e.g., frequency, incontinence)      Increased urination, constant burning in lower abdomen and vaginal area.     2. ONSET: \"When did the  symptoms  start?\"      Last week    3. PAIN: \"Is there any pain?\" If Yes, ask: \"How bad is it?\" (Scale: 1-10; mild, moderate, severe)      See above    4. CAUSE: \"What do you think is causing the symptoms?\"      UTI    5. OTHER SYMPTOMS: \"Do you have any other symptoms?\" (e.g., fever, flank pain, blood in urine, pain with urination)      Denies    Protocols used: Urinary Symptoms-ADULT-[place]    "

## 2024-09-17 NOTE — TELEPHONE ENCOUNTER
Regarding: UTI concerns  ----- Message from Layla GUZMAN sent at 9/17/2024  1:39 PM EDT -----  Patient called in previously with UTI concerns, she stated she did go to ED Friday and was given medication. She stated she was feeling ok for two days and now her symptoms are back.

## 2024-10-03 ENCOUNTER — PATIENT MESSAGE (OUTPATIENT)
Dept: OBGYN CLINIC | Facility: CLINIC | Age: 29
End: 2024-10-03

## 2024-10-03 DIAGNOSIS — G89.18 PAIN FOLLOWING SURGERY OR PROCEDURE: ICD-10-CM

## 2024-10-03 DIAGNOSIS — F41.9 ANXIETY DUE TO INVASIVE PROCEDURE: ICD-10-CM

## 2024-10-04 RX ORDER — OXYCODONE HYDROCHLORIDE 5 MG/1
5 TABLET ORAL EVERY 6 HOURS PRN
Qty: 30 TABLET | Refills: 0 | Status: SHIPPED | OUTPATIENT
Start: 2024-10-04

## 2024-10-04 RX ORDER — DIAZEPAM 5 MG
TABLET ORAL
Qty: 2 TABLET | Refills: 0 | Status: SHIPPED | OUTPATIENT
Start: 2024-10-04

## 2024-11-04 DIAGNOSIS — Z98.891 S/P REPEAT LOW TRANSVERSE C-SECTION: ICD-10-CM

## 2024-11-05 ENCOUNTER — TELEPHONE (OUTPATIENT)
Age: 29
End: 2024-11-05

## 2024-11-05 RX ORDER — ACETAMINOPHEN 325 MG/1
TABLET ORAL
Qty: 30 TABLET | Refills: 2 | Status: SHIPPED | OUTPATIENT
Start: 2024-11-05

## 2024-11-05 NOTE — TELEPHONE ENCOUNTER
Pt called and wanted to ask about the medication that was prescribed to her by Dr. Hadley fore anxiety prior to her colpo as to when she should take that again since procedure was rescheduled. Pt requesting a call back after 3pm

## 2024-11-05 NOTE — TELEPHONE ENCOUNTER
Patient's prescription's for zofran 4mg, oxycodone 5mg, and valium 5mg were sent to the wrong pharmacy and patient did not pick them up.  States she has changed to [facility] which is in [facility]  Colposcopy rescheduled to 1/24/25

## 2024-11-26 ENCOUNTER — NURSE TRIAGE (OUTPATIENT)
Age: 29
End: 2024-11-26

## 2024-11-26 NOTE — TELEPHONE ENCOUNTER
Regarding: Reoccuring UTI  ----- Message from Hannah PALACIOS sent at 11/26/2024 11:04 AM EST -----  Pt called regarding reoccurring UTI with ParaGard IUD. Pt requested call be returned after 1 pm to discuss UTI symptoms

## 2024-11-26 NOTE — TELEPHONE ENCOUNTER
"[facility], treated for same 5 day BID treatment.  Culture result not available. Has been using azo sporadically, feels contributing to low mid back pain. Denies flank pain/fever. Finished antibiotic yesterday.     Symptoms improved initially , frequency burning in upper vaginal area constantly.  Denies burning with urination.  Clear watery/mucousy discharge, denies vaginal odor.     Encouraged hydration- water. Warm sitz bath. Wash with unscented mild soap such as dove/ivory. Apply aqupahor,vitamin A/D or coconut oil to soothe and provide barrier.    Shruti feels concerns are related to IUD and wants removed ASAP. Appt scheduled for Monday.   Advised can provide f/u ua/culture and evaluated for vaginitis at time of appt on Monday.  To soon to repeat ua culture-off abx x one day.     Call back if develops fever, increased pelvic,back pain or additional concerns.       Reason for Disposition  • All other urine symptoms    Answer Assessment - Initial Assessment Questions  1. SYMPTOM: \"What's the main symptom you're concerned about?\" (e.g., frequency, incontinence)      Urgency, voiding small amount, burning sensation of labia,   2. ONSET: \"When did the  symptoms  start?\"      2 weeks  3. PAIN: \"Is there any pain?\" If Yes, ask: \"How bad is it?\" (Scale: 1-10; mild, moderate, severe)      Burning type sensation  4. CAUSE: \"What do you think is causing the symptoms?\"      IUD  5. OTHER SYMPTOMS: \"Do you have any other symptoms?\" (e.g., blood in urine, fever, flank pain, pain with urination)      Low centrally located back pain  6. PREGNANCY: \"Is there any chance you are pregnant?\" \"When was your last menstrual period?\"      LMP first week of pregnancy    Protocols used: Urinary Symptoms-Adult-[place]    "

## 2024-12-02 ENCOUNTER — TELEPHONE (OUTPATIENT)
Dept: OBGYN CLINIC | Facility: CLINIC | Age: 29
End: 2024-12-02

## 2024-12-02 ENCOUNTER — PROCEDURE VISIT (OUTPATIENT)
Dept: OBGYN CLINIC | Facility: CLINIC | Age: 29
End: 2024-12-02
Payer: COMMERCIAL

## 2024-12-02 VITALS
BODY MASS INDEX: 36.92 KG/M2 | HEIGHT: 65 IN | WEIGHT: 221.6 LBS | SYSTOLIC BLOOD PRESSURE: 116 MMHG | DIASTOLIC BLOOD PRESSURE: 70 MMHG

## 2024-12-02 DIAGNOSIS — N39.0 FREQUENT UTI: ICD-10-CM

## 2024-12-02 DIAGNOSIS — Z30.44 ENCOUNTER FOR SURVEILLANCE OF VAGINAL RING HORMONAL CONTRACEPTIVE DEVICE: Primary | ICD-10-CM

## 2024-12-02 DIAGNOSIS — Z30.432 ENCOUNTER FOR IUD REMOVAL: ICD-10-CM

## 2024-12-02 DIAGNOSIS — N89.8 VAGINAL DISCHARGE: ICD-10-CM

## 2024-12-02 PROBLEM — O98.513 COVID-19 AFFECTING PREGNANCY IN THIRD TRIMESTER: Status: RESOLVED | Noted: 2023-12-26 | Resolved: 2024-12-02

## 2024-12-02 PROBLEM — O09.819 PREGNANCY RESULTING FROM ASSISTED REPRODUCTIVE TECHNOLOGY: Status: RESOLVED | Noted: 2023-07-14 | Resolved: 2024-12-02

## 2024-12-02 PROBLEM — O99.213 OBESITY DURING PREGNANCY IN THIRD TRIMESTER: Status: RESOLVED | Noted: 2023-12-01 | Resolved: 2024-12-02

## 2024-12-02 PROBLEM — U07.1 COVID-19 AFFECTING PREGNANCY IN THIRD TRIMESTER: Status: RESOLVED | Noted: 2023-12-26 | Resolved: 2024-12-02

## 2024-12-02 PROCEDURE — 58301 REMOVE INTRAUTERINE DEVICE: CPT | Performed by: OBSTETRICS & GYNECOLOGY

## 2024-12-02 PROCEDURE — 81514 NFCT DS BV&VAGINITIS DNA ALG: CPT | Performed by: OBSTETRICS & GYNECOLOGY

## 2024-12-02 PROCEDURE — 99214 OFFICE O/P EST MOD 30 MIN: CPT | Performed by: OBSTETRICS & GYNECOLOGY

## 2024-12-02 PROCEDURE — 87086 URINE CULTURE/COLONY COUNT: CPT | Performed by: OBSTETRICS & GYNECOLOGY

## 2024-12-02 RX ORDER — ETONOGESTREL AND ETHINYL ESTRADIOL VAGINAL RING .015; .12 MG/D; MG/D
RING VAGINAL
Qty: 3 EACH | Refills: 1 | Status: SHIPPED | OUTPATIENT
Start: 2024-12-02 | End: 2024-12-03 | Stop reason: SDUPTHER

## 2024-12-02 NOTE — PROGRESS NOTES
Name: Shruti Camilo      : 1995      MRN: [id number]  Encounter Provider: Shelbie Peraza MD  Encounter Date: 2024   Encounter department: [facility]  :  Assessment & Plan  Encounter for surveillance of vaginal ring hormonal contraceptive device    Orders:    etonogestrel-ethinyl estradiol (NuvaRing) 0.12-0.015 MG/24HR vaginal ring; Insert vaginally and leave in place for 3 consecutive weeks, then remove for 1 week.    Frequent UTI    Orders:    Urine culture    Vaginal discharge    Orders:    Molecular Vaginal Panel    Encounter for IUD removal    Orders:    IUD Procedure        History of Present Illness     HPI  Shruti Camilo is a 29 y.o. female who presents with few complaints. Patient states she has been having recurrent UTIs. Concerned she may have a vaginal infection. Does report increased vaginal discharge. No odor, itching, or burning    History obtained from: patient    Review of Systems   Constitutional: Negative.    HENT: Negative.     Eyes: Negative.    Respiratory: Negative.     Cardiovascular: Negative.    Gastrointestinal: Negative.    Genitourinary:  Positive for vaginal discharge.   Musculoskeletal: Negative.    All other systems reviewed and are negative.    Current Outpatient Medications on File Prior to Visit   Medication Sig Dispense Refill    acetaminophen (TYLENOL) 325 mg tablet take 2 tablets by mouth every 6 hours if needed for pain 30 tablet 2    ALPRAZolam (XANAX) 0.5 mg tablet Take 0.5 mg by mouth if needed      Cetirizine HCl (ZyrTEC Allergy) 10 MG CAPS Take 10 mg by mouth daily      diphenhydrAMINE (BENADRYL) 25 mg capsule       fluticasone (FLONASE) 50 mcg/act nasal spray 2 sprays into each nostril daily 18.2 mL 0    naproxen sodium (ANAPROX) 550 mg tablet Take 1 tablet (550 mg total) by mouth 2 (two) times a day with meals 24 tablet 0    ondansetron (ZOFRAN-ODT) 4 mg disintegrating tablet Take 1 tablet (4 mg total)  by mouth every 8 (eight) hours 30 tablet 2    [DISCONTINUED] albuterol (ProAir HFA) 90 mcg/act inhaler Inhale 2 puffs every 6 (six) hours as needed for wheezing 8.5 g 0    [DISCONTINUED] diazepam (VALIUM) 5 mg tablet Take 1 tablet by mouth 1 hour prior to your procedure. Take 2nd tablet 30min prior as needed for residual anxiety (Patient not taking: Reported on 2024) 2 tablet 0    [DISCONTINUED] medroxyPROGESTERone (PROVERA) 5 mg tablet Take 1 tablet (5 mg total) by mouth daily for 7 days every 4 weeks to induce menses (Patient not taking: Reported on 2024) 21 tablet 0    [DISCONTINUED] miSOPROStol (Cytotec) 200 mcg tablet Place 1 tablet vaginally 1 hour prior to procedure. (Patient not taking: Reported on 2024) 1 tablet 0    [DISCONTINUED] oxyCODONE (Roxicodone) 5 immediate release tablet Take 1 tablet (5 mg total) by mouth every 6 (six) hours as needed for severe pain Max Daily Amount: 20 mg (Patient not taking: Reported on 2024) 30 tablet 0    [DISCONTINUED] Prenatal Vit-Fe Fumarate-FA (Prenatal Vitamin) 27-0.8 MG TABS Take 1 tablet by mouth daily (Patient not taking: Reported on 2024)       Current Facility-Administered Medications on File Prior to Visit   Medication Dose Route Frequency Provider Last Rate Last Admin    intrauterine copper (PARAGARD) IUD  1 each Intrauterine Device Once every 10 years    1 Intra Uterine Device at 24 1212      Social History     Tobacco Use    Smoking status: Former     Current packs/day: 0.00     Average packs/day: 0.3 packs/day for 8.0 years (2.0 ttl pk-yrs)     Types: Cigarettes     Start date: 2012     Quit date: 2020     Years since quittin.5    Smokeless tobacco: Never    Tobacco comments:     quit with knowledge of pregnancy   Vaping Use    Vaping status: Never Used   Substance and Sexual Activity    Alcohol use: Not Currently    Drug use: Yes     Frequency: 7.0 times per week     Types: Marijuana     Comment: Medical marijuana -  "uses nightly prior to bedtime    Sexual activity: Yes     Partners: Male     Birth control/protection: I.U.D.        Objective   /70   Ht 5' 5\" (1.651 m)   Wt 101 kg (221 lb 9.6 oz)   LMP 11/04/2024 (Approximate)   BMI 36.88 kg/m²      Physical Exam  Vitals and nursing note reviewed.   Constitutional:       General: She is not in acute distress.     Appearance: She is well-developed.   HENT:      Head: Normocephalic and atraumatic.   Eyes:      Conjunctiva/sclera: Conjunctivae normal.   Cardiovascular:      Rate and Rhythm: Normal rate and regular rhythm.      Heart sounds: No murmur heard.  Pulmonary:      Effort: Pulmonary effort is normal. No respiratory distress.      Breath sounds: Normal breath sounds.   Abdominal:      Palpations: Abdomen is soft.      Tenderness: There is no abdominal tenderness.   Genitourinary:     General: Normal vulva.      Exam position: Lithotomy position.      Labia:         Right: No tenderness or lesion.         Left: No tenderness or lesion.       Vagina: Vaginal discharge present.      Cervix: Normal.   Musculoskeletal:         General: No swelling.      Cervical back: Neck supple.   Skin:     General: Skin is warm and dry.      Capillary Refill: Capillary refill takes less than 2 seconds.   Neurological:      Mental Status: She is alert.   Psychiatric:         Mood and Affect: Mood normal.           "

## 2024-12-02 NOTE — TELEPHONE ENCOUNTER
Left voicemail for patient to call office to let us know correct pharmacy of where she would want medication sent for upcoming procedure. When patient was in earlier today, pharmacy on file was [facility] on [address] in [place], but it states in another encounter in her chart that she uses [facility]. Just want to make sure we have the correct pharmacy on file before Dr. Hadley sends medication to the pharmacy.

## 2024-12-02 NOTE — PROGRESS NOTES
IUD Procedure    Date/Time: 12/2/2024 10:30 AM    Performed by: Shelbie Peraza MD  Authorized by: Shelbie Peraza MD    Patient states understanding of procedure being performed: Yes    Select procedure: IUD removal    IUD Removal:     Reason for removal: patient request      Removed without complications: yes      Tenaculum/Allis/Ring Forceps applied to cervix: no      Strings visualized: yes      IUD intact: yes      Performed with ultrasound guidance: no

## 2024-12-03 DIAGNOSIS — Z30.44 ENCOUNTER FOR SURVEILLANCE OF VAGINAL RING HORMONAL CONTRACEPTIVE DEVICE: ICD-10-CM

## 2024-12-03 RX ORDER — ETONOGESTREL AND ETHINYL ESTRADIOL VAGINAL RING .015; .12 MG/D; MG/D
RING VAGINAL
Qty: 3 EACH | Refills: 1 | Status: SHIPPED | OUTPATIENT
Start: 2024-12-03

## 2024-12-03 NOTE — TELEPHONE ENCOUNTER
Sent refill request for the NuvaRing to be sent to [facility] instead of [facility], to Dr. Peraza. Sent message to Dr. Hadley to send medication for upcoming procedure to [facility] as well.

## 2024-12-03 NOTE — TELEPHONE ENCOUNTER
Called patient regarding pharmacy. Patient called back and said she would like the NuvaRing sent to [facility] instead of [facility]. Please review when you get a chance. Pended medication with new pharmacy. Thank you!

## 2024-12-04 LAB — BACTERIA UR CULT: NORMAL

## 2024-12-05 ENCOUNTER — TELEPHONE (OUTPATIENT)
Age: 29
End: 2024-12-05

## 2024-12-05 NOTE — TELEPHONE ENCOUNTER
Patient calling to follow up. Reports going to pharmacy to  [facility] and there was an issue with th order for the Nuvaring not being covered by insurance. She is unsure what the issue is, whether it needs to be sent in as a generic or if it needs a prior auth. Advised will message office clinical team to follow up pharmacy to clarify order and coverage and follow up with patient. Patient verbalizes understanding. Requesting emergency contraceptive as she had her IUD removed and currently has no contraceptive. Advised condom use until contraceptive is obtained and would inquire about order for emergency contraceptive from provider per request.    Message to Dr. Peraza and office clinial pool.

## 2024-12-06 DIAGNOSIS — Z30.012 ENCOUNTER FOR EMERGENCY CONTRACEPTION: Primary | ICD-10-CM

## 2024-12-06 RX ORDER — LEVONORGESTREL 1.5 MG/1
1.5 TABLET ORAL ONCE
Qty: 1 TABLET | Refills: 0 | Status: SHIPPED | OUTPATIENT
Start: 2024-12-06 | End: 2024-12-06

## 2024-12-06 NOTE — TELEPHONE ENCOUNTER
Call to pt and advised plan b was sent to her pharmacy. She verbalized understanding. Wondering about the prior auth for her nuva ring, advised can get that started for her. Pt thankful.    Pt also wondering about her recent test results. Advised not yet reviewed by MD, she will be notified once reviewed.

## 2024-12-12 ENCOUNTER — TELEPHONE (OUTPATIENT)
Age: 29
End: 2024-12-12

## 2024-12-12 DIAGNOSIS — N39.0 FREQUENT UTI: Primary | ICD-10-CM

## 2024-12-12 NOTE — TELEPHONE ENCOUNTER
Patient called in reporting she was seen 2 weeks ago and had cultures and testing done- has not heard back regarding the results and saw that her numbers were high and that she possibly has an infections. States she is still having the urgency and lower back pain - this has been going on for the past 2 months.     Please advise on interpretation of the results and any further recommendations for patient.

## 2024-12-13 NOTE — TELEPHONE ENCOUNTER
Called patient and reviewed provider advice. Patient agreeable - urine culture placed per protocol. Patient states she has been taking AZO for symptoms and its getting expensive. Patient asking if provider can send prescription for AZO to pharmacy on file in hopes it will get covered by insurance. Routing to provider for review.

## 2024-12-14 ENCOUNTER — APPOINTMENT (EMERGENCY)
Dept: CT IMAGING | Facility: HOSPITAL | Age: 29
End: 2024-12-14
Payer: COMMERCIAL

## 2024-12-14 ENCOUNTER — APPOINTMENT (EMERGENCY)
Dept: RADIOLOGY | Facility: HOSPITAL | Age: 29
End: 2024-12-14
Payer: COMMERCIAL

## 2024-12-14 ENCOUNTER — HOSPITAL ENCOUNTER (EMERGENCY)
Facility: HOSPITAL | Age: 29
Discharge: HOME | End: 2024-12-14
Attending: EMERGENCY MEDICINE
Payer: COMMERCIAL

## 2024-12-14 VITALS
DIASTOLIC BLOOD PRESSURE: 89 MMHG | SYSTOLIC BLOOD PRESSURE: 119 MMHG | HEART RATE: 80 BPM | TEMPERATURE: 97.6 F | OXYGEN SATURATION: 100 % | RESPIRATION RATE: 17 BRPM

## 2024-12-14 DIAGNOSIS — R30.0 DYSURIA: ICD-10-CM

## 2024-12-14 DIAGNOSIS — R35.0 URINARY FREQUENCY: ICD-10-CM

## 2024-12-14 DIAGNOSIS — N89.8 VAGINAL DISCHARGE: Primary | ICD-10-CM

## 2024-12-14 DIAGNOSIS — S92.512A: ICD-10-CM

## 2024-12-14 DIAGNOSIS — N39.0 FREQUENT UTI: ICD-10-CM

## 2024-12-14 LAB
ALBUMIN SERPL BCG-MCNC: 4.4 G/DL (ref 3.5–5)
ALP SERPL-CCNC: 72 U/L (ref 34–104)
ALT SERPL W P-5'-P-CCNC: 26 U/L (ref 7–52)
ANION GAP SERPL CALCULATED.3IONS-SCNC: 8 MMOL/L (ref 4–13)
AST SERPL W P-5'-P-CCNC: 25 U/L (ref 13–39)
BACTERIA UR QL AUTO: ABNORMAL /HPF
BASOPHILS # BLD AUTO: 0.07 THOUSANDS/ÂΜL (ref 0–0.1)
BASOPHILS NFR BLD AUTO: 1 % (ref 0–1)
BILIRUB SERPL-MCNC: 0.46 MG/DL (ref 0.2–1)
BILIRUB UR QL STRIP: NEGATIVE
BUN SERPL-MCNC: 7 MG/DL (ref 5–25)
CALCIUM SERPL-MCNC: 9.1 MG/DL (ref 8.4–10.2)
CHLORIDE SERPL-SCNC: 106 MMOL/L (ref 96–108)
CLARITY UR: ABNORMAL
CO2 SERPL-SCNC: 24 MMOL/L (ref 21–32)
COLOR UR: ABNORMAL
CREAT SERPL-MCNC: 0.67 MG/DL (ref 0.6–1.3)
EOSINOPHIL # BLD AUTO: 0.08 THOUSAND/ÂΜL (ref 0–0.61)
EOSINOPHIL NFR BLD AUTO: 1 % (ref 0–6)
ERYTHROCYTE [DISTWIDTH] IN BLOOD BY AUTOMATED COUNT: 12.6 % (ref 11.6–15.1)
EXT PREGNANCY TEST URINE: NEGATIVE
EXT. CONTROL: NORMAL
GFR SERPL CREATININE-BSD FRML MDRD: 119 ML/MIN/1.73SQ M
GLUCOSE SERPL-MCNC: 85 MG/DL (ref 65–140)
GLUCOSE UR STRIP-MCNC: NEGATIVE MG/DL
HCT VFR BLD AUTO: 39.3 % (ref 34.8–46.1)
HGB BLD-MCNC: 12.9 G/DL (ref 11.5–15.4)
HGB UR QL STRIP.AUTO: NEGATIVE
IMM GRANULOCYTES # BLD AUTO: 0.03 THOUSAND/UL (ref 0–0.2)
IMM GRANULOCYTES NFR BLD AUTO: 0 % (ref 0–2)
KETONES UR STRIP-MCNC: NEGATIVE MG/DL
LEUKOCYTE ESTERASE UR QL STRIP: ABNORMAL
LYMPHOCYTES # BLD AUTO: 2.56 THOUSANDS/ÂΜL (ref 0.6–4.47)
LYMPHOCYTES NFR BLD AUTO: 21 % (ref 14–44)
MCH RBC QN AUTO: 29.8 PG (ref 26.8–34.3)
MCHC RBC AUTO-ENTMCNC: 32.8 G/DL (ref 31.4–37.4)
MCV RBC AUTO: 91 FL (ref 82–98)
MONOCYTES # BLD AUTO: 0.68 THOUSAND/ÂΜL (ref 0.17–1.22)
MONOCYTES NFR BLD AUTO: 6 % (ref 4–12)
MUCOUS THREADS UR QL AUTO: ABNORMAL
NEUTROPHILS # BLD AUTO: 9.04 THOUSANDS/ÂΜL (ref 1.85–7.62)
NEUTS SEG NFR BLD AUTO: 71 % (ref 43–75)
NITRITE UR QL STRIP: NEGATIVE
NON-SQ EPI CELLS URNS QL MICRO: ABNORMAL /HPF
NRBC BLD AUTO-RTO: 0 /100 WBCS
PH UR STRIP.AUTO: 6 [PH]
PLATELET # BLD AUTO: 353 THOUSANDS/UL (ref 149–390)
PMV BLD AUTO: 10.4 FL (ref 8.9–12.7)
POTASSIUM SERPL-SCNC: 4 MMOL/L (ref 3.5–5.3)
PROT SERPL-MCNC: 7.4 G/DL (ref 6.4–8.4)
PROT UR STRIP-MCNC: NEGATIVE MG/DL
RBC # BLD AUTO: 4.33 MILLION/UL (ref 3.81–5.12)
RBC #/AREA URNS AUTO: ABNORMAL /HPF
SODIUM SERPL-SCNC: 138 MMOL/L (ref 135–147)
SP GR UR STRIP.AUTO: 1.01
UROBILINOGEN UR QL STRIP.AUTO: 0.2 E.U./DL
WBC # BLD AUTO: 12.46 THOUSAND/UL (ref 4.31–10.16)
WBC #/AREA URNS AUTO: ABNORMAL /HPF

## 2024-12-14 PROCEDURE — 73630 X-RAY EXAM OF FOOT: CPT

## 2024-12-14 PROCEDURE — 87591 N.GONORRHOEAE DNA AMP PROB: CPT | Performed by: PHYSICIAN ASSISTANT

## 2024-12-14 PROCEDURE — 81514 NFCT DS BV&VAGINITIS DNA ALG: CPT | Performed by: PHYSICIAN ASSISTANT

## 2024-12-14 PROCEDURE — 85025 COMPLETE CBC W/AUTO DIFF WBC: CPT | Performed by: PHYSICIAN ASSISTANT

## 2024-12-14 PROCEDURE — 74177 CT ABD & PELVIS W/CONTRAST: CPT

## 2024-12-14 PROCEDURE — 99284 EMERGENCY DEPT VISIT MOD MDM: CPT

## 2024-12-14 PROCEDURE — 81025 URINE PREGNANCY TEST: CPT | Performed by: PHYSICIAN ASSISTANT

## 2024-12-14 PROCEDURE — 81003 URINALYSIS AUTO W/O SCOPE: CPT | Performed by: PHYSICIAN ASSISTANT

## 2024-12-14 PROCEDURE — 87491 CHLMYD TRACH DNA AMP PROBE: CPT | Performed by: PHYSICIAN ASSISTANT

## 2024-12-14 PROCEDURE — 87661 TRICHOMONAS VAGINALIS AMPLIF: CPT | Performed by: PHYSICIAN ASSISTANT

## 2024-12-14 PROCEDURE — 87563 M. GENITALIUM AMP PROBE: CPT | Performed by: PHYSICIAN ASSISTANT

## 2024-12-14 PROCEDURE — 81001 URINALYSIS AUTO W/SCOPE: CPT | Performed by: PHYSICIAN ASSISTANT

## 2024-12-14 PROCEDURE — 96374 THER/PROPH/DIAG INJ IV PUSH: CPT

## 2024-12-14 PROCEDURE — 36415 COLL VENOUS BLD VENIPUNCTURE: CPT | Performed by: PHYSICIAN ASSISTANT

## 2024-12-14 PROCEDURE — 99284 EMERGENCY DEPT VISIT MOD MDM: CPT | Performed by: EMERGENCY MEDICINE

## 2024-12-14 PROCEDURE — 80053 COMPREHEN METABOLIC PANEL: CPT | Performed by: PHYSICIAN ASSISTANT

## 2024-12-14 RX ORDER — PHENAZOPYRIDINE HYDROCHLORIDE 200 MG/1
200 TABLET, FILM COATED ORAL 3 TIMES DAILY PRN
Qty: 6 TABLET | Refills: 0 | Status: SHIPPED | OUTPATIENT
Start: 2024-12-14 | End: 2024-12-16

## 2024-12-14 RX ORDER — METRONIDAZOLE 500 MG/1
500 TABLET ORAL ONCE
Status: COMPLETED | OUTPATIENT
Start: 2024-12-14 | End: 2024-12-14

## 2024-12-14 RX ORDER — KETOROLAC TROMETHAMINE 30 MG/ML
15 INJECTION, SOLUTION INTRAMUSCULAR; INTRAVENOUS ONCE
Status: COMPLETED | OUTPATIENT
Start: 2024-12-14 | End: 2024-12-14

## 2024-12-14 RX ORDER — METRONIDAZOLE 500 MG/1
500 TABLET ORAL EVERY 12 HOURS SCHEDULED
Qty: 14 TABLET | Refills: 0 | Status: SHIPPED | OUTPATIENT
Start: 2024-12-14 | End: 2024-12-21

## 2024-12-14 RX ADMIN — METRONIDAZOLE 500 MG: 500 TABLET ORAL at 20:03

## 2024-12-14 RX ADMIN — KETOROLAC TROMETHAMINE 15 MG: 30 INJECTION, SOLUTION INTRAMUSCULAR at 18:05

## 2024-12-14 RX ADMIN — IOHEXOL 100 ML: 350 INJECTION, SOLUTION INTRAVENOUS at 19:22

## 2024-12-14 NOTE — ED PROVIDER NOTES
Time reflects when diagnosis was documented in both MDM as applicable and the Disposition within this note       Time User Action Codes Description Comment    12/14/2024  7:35 PM Cookie Sabrina Add [N89.8] Vaginal discharge     12/14/2024  7:35 PM CookieSabrina lopez Add [R30.0] Dysuria     12/14/2024  7:35 PM CookieSabrina sanchez Add [R35.0] Urinary frequency     12/14/2024  7:45 PM Sabrina Gary Add [N39.0] Frequent UTI     12/14/2024  8:29 PM Cookie Sabrina Add [S92.512A] Fracture of proximal phalanx of lesser toe of left foot           ED Disposition       ED Disposition   Discharge    Condition   Stable    Date/Time   Sat Dec 14, 2024  8:29 PM    Comment   Shruti Camilo discharge to home/self care.                   Assessment & Plan       Medical Decision Making  Patient is a 29-year-old female presenting to the ED for evaluation of UTI symptoms and vaginal discharge.    Patient's labs are notable for a mild leukocytosis but are otherwise unremarkable.  UA is not consistent with infection.  Postvoid residual negative, no urinary retention.  CT abdomen/pelvis shows no acute abnormalities.  Patient's symptoms most consistent with bacterial vaginosis, will start patient on metronidazole pending results of molecular vaginal panel.  I advised her that we will contact her with any additional positive test results and start treatment as indicated.  Patient was given a referral to urology for further evaluation of her symptoms and recurrent UTIs.  She was given a prescription for Pyridium to help with the dysuria.  I advised her to schedule follow-up with her PCP or return to the ED if she experiences any new or worsening symptoms.  X-ray of the left foot shows a mildly displaced fracture of fourth proximal phalanx head.  The toe was jeff taped to the adjacent toe and patient was given a postop surgical shoe.  She was given a referral to podiatry for further management.  She was encouraged to rest,  "ice and elevate to the foot and take Motrin/Tylenol as needed for pain.    The management plan was discussed in detail with the patient at bedside and all questions were answered. Strict ED return instructions were discussed at bedside. Prior to discharge, both verbal and written instructions were provided. We discussed the signs and symptoms that should prompt the patient to return to the ED. All questions were answered and the patient was comfortable with the plan of care and discharged home. The patient agrees to return to the Emergency Department for concerns and/or progression of illness.     Amount and/or Complexity of Data Reviewed  Labs: ordered. Decision-making details documented in ED Course.  Radiology: ordered.    Risk  Prescription drug management.        ED Course as of 12/16/24 1209   Sat Dec 14, 2024   1839 WBC(!): 12.46   1839 PVR 10 mL       Medications   ketorolac (TORADOL) injection 15 mg (15 mg Intravenous Given 12/14/24 1805)   iohexol (OMNIPAQUE) 350 MG/ML injection (SINGLE-DOSE) 100 mL (100 mL Intravenous Given 12/14/24 1922)   metroNIDAZOLE (FLAGYL) tablet 500 mg (500 mg Oral Given 12/14/24 2003)       ED Risk Strat Scores                                              History of Present Illness       Chief Complaint   Patient presents with    Possible UTI     Recurrent UTIs since August.       Past Medical History:   Diagnosis Date    Anxiety     Celiac disease     Depression     Gastroparesis     Marijuana use, continuous 07/10/2018    Last Assessment & Plan:  Shruti reports that she has less ability to use THC due to her step-daughter being in the house.  She reports that she has significantly decrease to twice/day.  Reviewed risks of routine daily use, she does not feel this is an issue & states \"I know my body\" & is not willing to quit at this point in time.    Migraine     Nicotine use disorder 07/10/2018    Last Assessment & Plan:  Shruti Vee continues to smoke 1 cigarette/day.  " "She reports that \"it will not be hard for her to stop\" & plans on quitting immediately.    Personality disorder (HCC)     Postpartum depression     S/P primary low transverse  2021    For failed induction. Would like to .     Substance abuse (HCC)     marijuana    Trauma     PTSD    Varicella     vaccine      Past Surgical History:   Procedure Laterality Date    SD  DELIVERY ONLY N/A 2021    Procedure:  SECTION ();  Surgeon: Charlie Hinojosa MD;  Location: [facility];  Service: Obstetrics    SD  DELIVERY ONLY N/A 2024    Procedure:  SECTION ();  Surgeon: Charlie Hinojosa MD;  Location: [facility];  Service: Obstetrics    TONSILLECTOMY      WISDOM TOOTH EXTRACTION        Family History   Problem Relation Age of Onset    Other Mother         mental health issues    Psoriasis Mother     Heart disease Mother     No Known Problems Father     No Known Problems Brother     Heart disease Maternal Grandmother     Thrombophlebitis Maternal Grandmother     Heart disease Maternal Grandfather     Heart attack Maternal Grandfather     Obesity Maternal Grandfather     Stomach cancer Paternal Grandmother     Bone cancer Paternal Grandfather     No Known Problems Son     Breast cancer Neg Hx     Colon cancer Neg Hx     Ovarian cancer Neg Hx       Social History     Tobacco Use    Smoking status: Former     Current packs/day: 0.00     Average packs/day: 0.3 packs/day for 8.0 years (2.0 ttl pk-yrs)     Types: Cigarettes     Start date: 2012     Quit date: 2020     Years since quittin.5    Smokeless tobacco: Never    Tobacco comments:     quit with knowledge of pregnancy   Vaping Use    Vaping status: Never Used   Substance Use Topics    Alcohol use: Not Currently    Drug use: Yes     Frequency: 7.0 times per week     Types: Marijuana     Comment: Medical marijuana - uses nightly prior to bedtime      E-Cigarette/Vaping    E-Cigarette Use Never User     " "  E-Cigarette/Vaping Substances    Nicotine No     THC No     CBD No     Flavoring No     Other No     Unknown No       I have reviewed and agree with the history as documented.     Patient is a 29-year-old female presenting to the ED for evaluation of UTI symptoms and vaginal discharge.  Patient states that she has been dealing with intermittent UTIs since this past August (most recent UTI was a month ago at which time she completed a course of Keflex).  She states that over the past month she has had constant dysuria, urinary urgency/frequency, difficulty urinating and low back pain.  She states that she constantly has the urge to urinate but feels that she is only able to urinate small amounts each time that she goes.  She has been using Azo without relief.  She also states that she has been experiencing increased vaginal discharge which she describes as a thin, white color discharge with a \"fishy\" odor to it.  She reports a history of bacterial vaginosis and feels that this is similar.  She denies any vaginal pruritus or rashes.  She denies any new sexual partners or concern for STDs.  She was seen by her OB/GYN on 12/2/2024 for removal of her IUD as she was concerned this may be contributing to her symptoms.  They performed a urine culture as well as a molecular vaginal panel at that time which were both unremarkable.  Patient also states that she tripped on the steps and injured her left foot 2 days ago.  She is having pain over the top of the foot at the base of the 3rd-4th toes with overlying swelling/bruising.  She has been walking on the foot.  But is requesting an x-ray to evaluate for underlying fracture.        Review of Systems   Constitutional:  Negative for chills and fever.   HENT:  Negative for congestion, rhinorrhea and sore throat.    Eyes:  Negative for visual disturbance.   Respiratory:  Negative for cough and shortness of breath.    Cardiovascular:  Negative for chest pain and palpitations. "   Gastrointestinal:  Positive for abdominal pain. Negative for constipation, diarrhea, nausea and vomiting.   Genitourinary:  Positive for difficulty urinating, dysuria, frequency and vaginal discharge. Negative for flank pain and hematuria.   Musculoskeletal:  Positive for arthralgias (left foot pain) and back pain. Negative for neck pain.   Skin:  Negative for rash.   Neurological:  Negative for dizziness, syncope and headaches.   All other systems reviewed and are negative.          Objective       ED Triage Vitals [12/14/24 1729]   Temperature Pulse Blood Pressure Respirations SpO2 Patient Position - Orthostatic VS   97.6 °F (36.4 °C) 98 124/84 16 100 % Sitting      Temp Source Heart Rate Source BP Location FiO2 (%) Pain Score    Tympanic Monitor Left arm -- No Pain      Vitals      Date and Time Temp Pulse SpO2 Resp BP Pain Score FACES Pain Rating User   12/14/24 1940 -- 80 100 % 17 119/89 -- -- EM   12/14/24 1729 97.6 °F (36.4 °C) 98 100 % 16 124/84 No Pain -- NAD            Physical Exam  Vitals and nursing note reviewed. Exam conducted with a chaperone present.   Constitutional:       General: She is awake.      Appearance: Normal appearance. She is well-developed. She is not toxic-appearing or diaphoretic.   HENT:      Head: Normocephalic and atraumatic.      Right Ear: External ear normal.      Left Ear: External ear normal.      Nose: Nose normal.      Mouth/Throat:      Lips: Pink.      Mouth: Mucous membranes are moist.   Eyes:      General: Lids are normal. No scleral icterus.     Conjunctiva/sclera: Conjunctivae normal.      Pupils: Pupils are equal, round, and reactive to light.   Cardiovascular:      Rate and Rhythm: Normal rate and regular rhythm.      Pulses: Normal pulses.           Radial pulses are 2+ on the right side and 2+ on the left side.      Heart sounds: Normal heart sounds, S1 normal and S2 normal.   Pulmonary:      Effort: Pulmonary effort is normal. No accessory muscle usage.       Breath sounds: Normal breath sounds. No stridor. No decreased breath sounds, wheezing, rhonchi or rales.   Abdominal:      General: Abdomen is flat. Bowel sounds are normal. There is no distension.      Palpations: Abdomen is soft.      Tenderness: There is abdominal tenderness in the right lower quadrant, suprapubic area and left lower quadrant. There is no right CVA tenderness, left CVA tenderness, guarding or rebound.      Comments: Mild tenderness to palpation throughout the lower abdomen.  No rebound tenderness, guarding or rigidity.  No CVA tenderness.   Genitourinary:     Vagina: Vaginal discharge present.      Comments: No external rashes or lesions.  There is a small amount of thin, white discharge in the vaginal vault.  No cervical motion tenderness.  Musculoskeletal:      Cervical back: Full passive range of motion without pain and neck supple. No signs of trauma. No pain with movement.      Right lower leg: No edema.      Left lower leg: No edema.        Feet:    Lymphadenopathy:      Cervical: No cervical adenopathy.   Skin:     General: Skin is warm and dry.      Capillary Refill: Capillary refill takes less than 2 seconds.      Coloration: Skin is not cyanotic, jaundiced or pale.   Neurological:      Mental Status: She is alert and oriented to person, place, and time.      GCS: GCS eye subscore is 4. GCS verbal subscore is 5. GCS motor subscore is 6.      Gait: Gait normal.   Psychiatric:         Mood and Affect: Mood normal.         Speech: Speech normal.         Behavior: Behavior is cooperative.         Results Reviewed       Procedure Component Value Units Date/Time    Chlamydia/GC amplified DNA by PCR [[id number]]  (Normal) Collected: 12/14/24 4149    Lab Status: Final result Specimen: Urine, Other Updated: 12/16/24 1138     N gonorrhoeae, DNA Probe Negative     Chlamydia trachomatis, DNA Probe Negative    Narrative:      This test was performed using the FDA-approved Zach 6800 CT/NG assay (Roche  [employer]). This test uses real-time PCR to detect Chlamydia trachomatis (CT) and Neisseria gonorrhoeae (NG). This instrument and assay have been validated by the  and performing laboratory and verified by the performing laboratory.  This test is intended as an aid in the diagnosis of chlamydial and gonococcal disease. This test has not been evaluated in patients younger than 14 years of age and is not recommended for evaluation of suspected sexual abuse. This assay is not intended to replace other exams or tests for diagnosis of urogenital infection by causative factors other than Chalmydia trachomatis (CT) and Neisseria gonorrhoeae (NG). Additional testing is recommended when the results do not correlate with clinical signs and symptoms.   Procedural Limitations  This assay has only been validated for use with male and female urine, clinician-instructed self-collected vaginal swab specimens, clinician-collected vaginal swab specimens, endocervical swab specimens collected in yee® PCR Media and cervical specimens collected in PreservCyt® Solution. Assay performance has not been validated for use with other collection media and/or specimen types.   Detection of C. trachomatis and N. gonorrhoeaea is dependent on the number of organisms present in the specimen. Detection may be affected by specimen collection methods, patient factors, stage of infection, infecting strains, and presence of polymerase/PCR inhibitors.   When CT is present at very high concentrations, the detection of NG present at concentrations near the limit of detection may be impacted.  The presence of mucus in endocervical specimens may lead to false negative results.  The presence of whole blood in urine and cervical specimens collected in PreservCyt Solution may lead to false negative and/or invalid test results.   Urine testing is recommended to be performed on first catch urine samples. The effects of other collection variables  have not been evaluated at this time. The effects of vaginal discharge, tampon use, douching, and other collection variables have not been evaluated at this time.   This assay has not been evaluated with patients currently being treated with antimicrobial agents active against CT or NG, or patients with a history of hysterectomy.     Molecular Vaginal Panel [[id number]]  (Abnormal) Collected: 12/14/24 1800    Lab Status: Final result Specimen: Genital from Vaginal Updated: 12/15/24 1405     Bacterial Vaginosis Negative     Candida species Positive     Candida glabrata Negative     Anahi krusei Negative     Trichomonas vaginalis Negative    Narrative:      This test has been performed using the FDA-approved BD MAX system for the qualitative detection of bacterial vaginosis markers (Lactobacillus spp., Gardnerella vaginalis, Atopobium vaginae, Bacterial Vaginosis Associated Bacteria-2, Megasphaera-1), Candida spp. (C. albicans, C. tropicalis, C. parapsilosis, C. dubliniensis), Candida glabrata, Candida krusei, and Trichomonas vaginalis in vaginal swabs from patients who are symptomatic for vaginitis/vaginosis using polymerase chain reaction (PCR) methodology.    Negative PCR results do not preclude infection and should not be used as the sole basis for treatment or other patient management decisions.    Positive PCR results are indicative of infection, but do not necessarily indicate the presence of viable organisms and do not rule out co-infection with other bacteria or viruses.    As with all polymerase-chain reaction methodology, extremely low levels of target below the limit of detection may be detected, but results may not be reproducible.    All test results should be used as an adjunct to clinical observations and other information available to the provider.    Trichomonas vaginalis/Mycoplasma genitalium PCR [[id number]]  (Normal) Collected: 12/14/24 1801    Lab Status: Final result Specimen: Urine, Clean Catch  Updated: 12/15/24 1259     Trichomonas vaginalis Negative     Mycoplasma genitalium Negative    Narrative:      This test was performed using the FDA-approved Zach 6800 TV/MG assay ([employer]). This test uses real-time PCR to detect Trichomonas vaginalis (TV) and Mycoplasma genitalium (MG) DNA, to assist in identification of suspected infections. This instrument and assay have been validated by the  and performing laboratory and verified by the performing laboratory. Clinically validated specimen sources include urine and swabs (endocervical/vaginal). This test has not been evaluated in patients younger than 18 years of age.   Cervical specimens collected in PreservCyt® Solution are validated for the detection of Trichomonas vaginalis only.  Note that vaginal swabs are considered to be the most sensitive specimen type for MG testing.  Trichomonas vaginalis is a protozoan/amoebic infection that can be transmitted with sexual contact. Appropriate treatment of oneself and of sexual partners should be sought to avoid re-infection.  Mycoplasma genitalium is an increasingly identified and treatable bacterial infection and may be asymptomatic. Long term complications may result from untreated infections. Sexual transmission is possible.    Procedural Limitations  This assay has only been validated for use with male and female urine, clinician-instructed self-collected vaginal swab specimens, clinician-collected vaginal swab specimens, endocervical swab specimens collected in zach® PCR Media. This assay also detects TV DNA in cervical specimens collected in PreservCyt® Solution. Assay performance has not been validated for use with other collection media and/or specimen types.   Detection of Trichomonas vaginalis and/or Mycoplasma genitalium is dependent on the number of organisms present in the specimen. Detection may be affected by specimen collection methods, patient factors, stage of infection,  infecting strains, and presence of PCR inhibitors.   A negative result does not preclude the presence of infection as results depend on adequate specimen collection, absence of inhibitors, and sufficient DNA to be detected.   All results should be interpreted in conjunction with other clinical and laboratory data.  The presence of mucus in endocervical specimens may lead to false or invalid results.   Urine testing is recommended to be performed on first catch urine samples. The effects of other collection variables have not been evaluated at this time. The effects of vaginal discharge, tampon use, douching, and other collection variables have not been evaluated at this time.   This assay has not been evaluated with patients currently being treated with antimicrobial agents active against TV or MG, or patients with a history of hysterectomy.       Comprehensive metabolic panel [[id number]] Collected: 12/14/24 1756    Lab Status: Final result Specimen: Blood from Arm, Right Updated: 12/14/24 1836     Sodium 138 mmol/L      Potassium 4.0 mmol/L      Chloride 106 mmol/L      CO2 24 mmol/L      ANION GAP 8 mmol/L      BUN 7 mg/dL      Creatinine 0.67 mg/dL      Glucose 85 mg/dL      Calcium 9.1 mg/dL      AST 25 U/L      ALT 26 U/L      Alkaline Phosphatase 72 U/L      Total Protein 7.4 g/dL      Albumin 4.4 g/dL      Total Bilirubin 0.46 mg/dL      eGFR 119 ml/min/1.73sq m     Narrative:      National Kidney Disease Foundation guidelines for Chronic Kidney Disease (CKD):     Stage 1 with normal or high GFR (GFR > 90 mL/min/1.73 square meters)    Stage 2 Mild CKD (GFR = 60-89 mL/min/1.73 square meters)    Stage 3A Moderate CKD (GFR = 45-59 mL/min/1.73 square meters)    Stage 3B Moderate CKD (GFR = 30-44 mL/min/1.73 square meters)    Stage 4 Severe CKD (GFR = 15-29 mL/min/1.73 square meters)    Stage 5 End Stage CKD (GFR <15 mL/min/1.73 square meters)  Note: GFR calculation is accurate only with a steady state creatinine     Urine Microscopic [[id number]]  (Abnormal) Collected: 12/14/24 1759    Lab Status: Final result Specimen: Urine, Clean Catch Updated: 12/14/24 1833     RBC, UA None Seen /hpf      WBC, UA 2-4 /hpf      Epithelial Cells Occasional /hpf      Bacteria, UA Occasional /hpf      MUCUS THREADS Occasional    UA w Reflex to Microscopic w Reflex to Culture [[id number]]  (Abnormal) Collected: 12/14/24 1759    Lab Status: Final result Specimen: Urine, Clean Catch Updated: 12/14/24 1827     Color, UA Straw     Clarity, UA Hazy     Specific Gravity, UA 1.015     pH, UA 6.0     Leukocytes, UA 1+     Nitrite, UA Negative     Protein, UA Negative mg/dl      Glucose, UA Negative mg/dl      Ketones, UA Negative mg/dl      Urobilinogen, UA 0.2 E.U./dl      Bilirubin, UA Negative     Occult Blood, UA Negative    CBC and differential [[id number]]  (Abnormal) Collected: 12/14/24 1757    Lab Status: Final result Specimen: Blood from Arm, Right Updated: 12/14/24 1821     WBC 12.46 Thousand/uL      RBC 4.33 Million/uL      Hemoglobin 12.9 g/dL      Hematocrit 39.3 %      MCV 91 fL      MCH 29.8 pg      MCHC 32.8 g/dL      RDW 12.6 %      MPV 10.4 fL      Platelets 353 Thousands/uL      nRBC 0 /100 WBCs      Segmented % 71 %      Immature Grans % 0 %      Lymphocytes % 21 %      Monocytes % 6 %      Eosinophils Relative 1 %      Basophils Relative 1 %      Absolute Neutrophils 9.04 Thousands/µL      Absolute Immature Grans 0.03 Thousand/uL      Absolute Lymphocytes 2.56 Thousands/µL      Absolute Monocytes 0.68 Thousand/µL      Eosinophils Absolute 0.08 Thousand/µL      Basophils Absolute 0.07 Thousands/µL     POCT pregnancy, urine [[id number]]  (Normal) Collected: 12/14/24 1807    Lab Status: Final result Updated: 12/14/24 1807     EXT Preg Test, Ur Negative     Control Valid            XR foot 3+ views LEFT   Final Interpretation by Zoë Squires MD (12/15 0935)      Mildly displaced fracture of fourth proximal phalanx head          Computerized Assisted Algorithm (CAA) may have been used to analyze all applicable images.         Workstation performed: [id number]         CT abdomen pelvis with contrast   Final Interpretation by Ellis Luciano MD ([id number])      No acute findings in the abdomen or pelvis.         Workstation performed: [id number]             Procedures    ED Medication and Procedure Management   Prior to Admission Medications   Prescriptions Last Dose Informant Patient Reported? Taking?   ALPRAZolam (XANAX) 0.5 mg tablet   Yes No   Sig: Take 0.5 mg by mouth if needed   Cetirizine HCl (ZyrTEC Allergy) 10 MG CAPS   Yes No   Sig: Take 10 mg by mouth daily   acetaminophen (TYLENOL) 325 mg tablet   No No   Sig: take 2 tablets by mouth every 6 hours if needed for pain   diazepam (VALIUM) 5 mg tablet   No No   Sig: Take 1 tablet by mouth 1 hour prior to procedure. Take 2nd tablet 30min prior as needed for residual anxiety.   diphenhydrAMINE (BENADRYL) 25 mg capsule   Yes No   etonogestrel-ethinyl estradiol (NuvaRing) 0.12-0.015 MG/24HR vaginal ring   No No   Sig: Insert vaginally and leave in place for 3 consecutive weeks, then remove for 1 week.   fluticasone (FLONASE) 50 mcg/act nasal spray   No No   Si sprays into each nostril daily   naproxen sodium (ANAPROX) 550 mg tablet   No No   Sig: Take 1 tablet (550 mg total) by mouth 2 (two) times a day with meals   ondansetron (ZOFRAN) 4 mg tablet   No No   Sig: Take 1 tablet (4 mg total) by mouth every 8 (eight) hours as needed for nausea or vomiting   ondansetron (ZOFRAN-ODT) 4 mg disintegrating tablet   No No   Sig: Take 1 tablet (4 mg total) by mouth every 8 (eight) hours   oxyCODONE (Roxicodone) 5 immediate release tablet   No No   Sig: Take 1 tablet (5 mg total) by mouth every 6 (six) hours as needed for severe pain Max Daily Amount: 20 mg      Facility-Administered Medications Last Administration Doses Remaining   intrauterine copper (PARAGARD) IUD 2024 12:12 PM          Discharge Medication List as of 12/14/2024  8:30 PM        START taking these medications    Details   metroNIDAZOLE (FLAGYL) 500 mg tablet Take 1 tablet (500 mg total) by mouth every 12 (twelve) hours for 7 days, Starting Sat 12/14/2024, Until Sat 12/21/2024, Normal      phenazopyridine (Pyridium) 200 mg tablet Take 1 tablet (200 mg total) by mouth 3 (three) times a day as needed for bladder spasms for up to 2 days Will turn urine orange., Starting Sat 12/14/2024, Until Mon 12/16/2024 at 2359, Normal           CONTINUE these medications which have NOT CHANGED    Details   acetaminophen (TYLENOL) 325 mg tablet take 2 tablets by mouth every 6 hours if needed for pain, Normal      ALPRAZolam (XANAX) 0.5 mg tablet Take 0.5 mg by mouth if needed, Starting Thu 1/18/2024, Historical Med      Cetirizine HCl (ZyrTEC Allergy) 10 MG CAPS Take 10 mg by mouth daily, Starting Tue 4/9/2024, Historical Med      diazepam (VALIUM) 5 mg tablet Take 1 tablet by mouth 1 hour prior to procedure. Take 2nd tablet 30min prior as needed for residual anxiety., Normal      diphenhydrAMINE (BENADRYL) 25 mg capsule Starting Fri 8/18/2023, Historical Med      etonogestrel-ethinyl estradiol (NuvaRing) 0.12-0.015 MG/24HR vaginal ring Insert vaginally and leave in place for 3 consecutive weeks, then remove for 1 week., Normal      fluticasone (FLONASE) 50 mcg/act nasal spray 2 sprays into each nostril daily, Starting Sat 2/3/2024, Normal      naproxen sodium (ANAPROX) 550 mg tablet Take 1 tablet (550 mg total) by mouth 2 (two) times a day with meals, Starting Mon 7/22/2024, Normal      ondansetron (ZOFRAN) 4 mg tablet Take 1 tablet (4 mg total) by mouth every 8 (eight) hours as needed for nausea or vomiting, Starting Wed 12/4/2024, Normal      ondansetron (ZOFRAN-ODT) 4 mg disintegrating tablet Take 1 tablet (4 mg total) by mouth every 8 (eight) hours, Starting Thu 7/25/2024, Normal      oxyCODONE (Roxicodone) 5 immediate release tablet Take  1 tablet (5 mg total) by mouth every 6 (six) hours as needed for severe pain Max Daily Amount: 20 mg, Starting Wed 12/4/2024, Normal             ED SEPSIS DOCUMENTATION   Time reflects when diagnosis was documented in both MDM as applicable and the Disposition within this note       Time User Action Codes Description Comment    12/14/2024  7:35 PM Sabrina Gary [N89.8] Vaginal discharge     12/14/2024  7:35 PM Sabrina Gary [R30.0] Dysuria     12/14/2024  7:35 PM Sabrina Gary [R35.0] Urinary frequency     12/14/2024  7:45 PM Sabrina Gary [N39.0] Frequent UTI     12/14/2024  8:29 PM Sabrina Gary [S92.512A] Fracture of proximal phalanx of lesser toe of left foot                  Sabrina Gary PA-C  12/16/24 [id number]

## 2024-12-15 LAB
C GLABRATA DNA VAG QL NAA+PROBE: NEGATIVE
C KRUSEI DNA VAG QL NAA+PROBE: NEGATIVE
CANDIDA SP 6 PNL VAG NAA+PROBE: POSITIVE
M GENITALIUM DNA SPEC QL NAA+PROBE: NEGATIVE
T VAGINALIS DNA SPEC QL NAA+PROBE: NEGATIVE
T VAGINALIS DNA VAG QL NAA+PROBE: NEGATIVE
VAGINOSIS/ITIS DNA PNL VAG PROBE+SIG AMP: NEGATIVE

## 2024-12-16 ENCOUNTER — TELEPHONE (OUTPATIENT)
Dept: UROLOGY | Facility: MEDICAL CENTER | Age: 29
End: 2024-12-16

## 2024-12-16 LAB
C TRACH DNA SPEC QL NAA+PROBE: NEGATIVE
N GONORRHOEA DNA SPEC QL NAA+PROBE: NEGATIVE

## 2024-12-16 NOTE — TELEPHONE ENCOUNTER
Patient called the RX Refill Line. Message is being forwarded to the office.     Patient is requesting an appointment. States that she was in the ED and was given a referral for urology. Lives in [place] and states that she will take the first available appointment.     Please contact patient at [phone]

## 2024-12-16 NOTE — TELEPHONE ENCOUNTER
Spoke to patient, scheduled new patient appt for 12/31 at 2:30. Patient did request to have afternoon appt.

## 2024-12-27 ENCOUNTER — PROCEDURE VISIT (OUTPATIENT)
Dept: OBGYN CLINIC | Facility: CLINIC | Age: 29
End: 2024-12-27
Payer: COMMERCIAL

## 2024-12-27 VITALS
HEIGHT: 65 IN | BODY MASS INDEX: 35.69 KG/M2 | WEIGHT: 214.2 LBS | DIASTOLIC BLOOD PRESSURE: 62 MMHG | SYSTOLIC BLOOD PRESSURE: 100 MMHG

## 2024-12-27 DIAGNOSIS — Z32.02 NEGATIVE PREGNANCY TEST: ICD-10-CM

## 2024-12-27 DIAGNOSIS — R87.610 ATYPICAL SQUAMOUS CELLS OF UNDETERMINED SIGNIFICANCE (ASCUS) ON PAPANICOLAOU SMEAR OF CERVIX: Primary | ICD-10-CM

## 2024-12-27 LAB — SL AMB POCT URINE HCG: NEGATIVE

## 2024-12-27 PROCEDURE — 88305 TISSUE EXAM BY PATHOLOGIST: CPT | Performed by: PATHOLOGY

## 2024-12-27 PROCEDURE — 81025 URINE PREGNANCY TEST: CPT | Performed by: OBSTETRICS & GYNECOLOGY

## 2024-12-27 PROCEDURE — 57455 BIOPSY OF CERVIX W/SCOPE: CPT | Performed by: OBSTETRICS & GYNECOLOGY

## 2024-12-27 NOTE — PROGRESS NOTES
Colposcopy    Date/Time: 12/27/2024 11:30 AM    Performed by: Teresa Hadley MD  Authorized by: Teresa Hadley MD    Other Assisting Provider: No    Verbal consent obtained?: Yes    Written consent obtained?: Yes    Risks and benefits: Risks, benefits and alternatives were discussed    Consent given by:  Patient  Patient states understanding of procedure being performed: Yes    Patient's understanding of procedure matches consent: Yes    Procedure consent matches procedure scheduled: Yes    Required items: Required blood products, implants, devices and special equipment available    Pre-procedure:     Premeds:  Diazepam and ibuprofen    Prepped with: acetic acid    Indication:     Indication:  ASC-US (HPV+ (other))  Procedure:     Procedure: Colposcopy w/ biopsy of cervix      Under satisfactory analgesia the patient was prepped and draped in the dorsal lithotomy position: yes      Willoughby speculum was placed in the vagina: yes      Under colposcopic examination the transition zone was seen in entirety: yes      Intracervical block was performed: no (topical benzocaine spray applied)      Cervical biopsy performed with a cervical biopsy punch: yes (tischler)      Monsel's solution was applied: yes      Specimen(s) to pathology: yes (7 oclock)    Post-procedure:     Findings: White epithelium      Findings comment:  Focused at 7 oclock    Impression: Low grade cervical dysplasia      Patient tolerance of procedure:  Tolerated well, no immediate complications          IUD removed earlier this month at [facility] for vaginitis. Reports has had 2 menses this month, as well as back pain and increased acne. Reviewed paraguards nonhormonal nature and would not expect this to affect cycle timing or hormonal symptoms. Took plan B 1wk prior to the 2nd bleed. Has not yet start nuvaring; instructions for initiation reviewed.

## 2024-12-31 PROCEDURE — 88305 TISSUE EXAM BY PATHOLOGIST: CPT | Performed by: PATHOLOGY

## 2025-01-08 ENCOUNTER — NURSE TRIAGE (OUTPATIENT)
Age: 30
End: 2025-01-08

## 2025-01-08 NOTE — TELEPHONE ENCOUNTER
"Patient called office c/o thick, clear mucous discharge that started yesterday and a foul smelling urine that she states is what she gets when she has a yeast infection. However, today she started to see strings of blood in her discharge and developed a rash on her face around her mouth and cheeks. The rash looks like tiny pimple clusters and the area is swollen. She also has moderate lower back pain. She denies vaginal itching, vaginal rash, vaginal odor, fever, pain with urination, abdominal pain, swelling of tongue, throat or any other symptoms to note at this time. Prior to developing the rash and blood in her discharge, she went to her PCP and had Diflucan ordered, she has not taken this yet. Scheduled her for an appointment. Called office, spoke with Deandra for sooner appointments - none available. Offered patient 1/14, she declined. Scheduled appointment 1/15. Advised will reach out to provider for advice if she should take Diflucan at this time and any other advice in the meantime prior to appointment. Patient also would like tissue exam from 12/27 reviewed. Advised she call back with worsening symptoms or new symptoms.         Reason for Disposition   Patient wants to be seen    Answer Assessment - Initial Assessment Questions  1. DISCHARGE: \"Describe the discharge.\" (e.g., white, yellow, green, gray, foamy, cottage cheese-like)      Thick, clear mucous with strings of blood  2. ODOR: \"Is there a bad odor?\"      No  3. ONSET: \"When did the discharge begin?\"      Yesterday   4. RASH: \"Is there a rash in the genital area?\" If Yes, ask: \"Describe it.\" (e.g., redness, blisters, sores, bumps)      NO   5. ABDOMEN PAIN: \"Are you having any abdomen pain?\" If Yes, ask: \"What does it feel like? \" (e.g., crampy, dull, intermittent, constant)       No   6. ABDOMEN PAIN SEVERITY: If present, ask: \"How bad is it?\" (e.g., Scale 1-10; mild, moderate, or severe)      None   7. CAUSE: \"What do you think is causing the " "discharge?\" \"Have you had the same problem before?\" \"What happened then?\"      Unknown   8. OTHER SYMPTOMS: \"Do you have any other symptoms?\" (e.g., fever, itching, vaginal bleeding, pain with urination, injury to genital area, vaginal foreign body)      Urine odor, lower back pain, facial rash (mouth and cheeks)   9. PREGNANCY: \"Is there any chance you are pregnant?\" \"When was your last menstrual period?\"      No, LMP: 12/21/24    Protocols used: Vaginal Discharge-Adult-[place]    "

## 2025-01-14 ENCOUNTER — TELEPHONE (OUTPATIENT)
Dept: UROLOGY | Facility: MEDICAL CENTER | Age: 30
End: 2025-01-14

## 2025-01-14 PROBLEM — O99.211 OBESITY COMPLICATING PREGNANCY IN FIRST TRIMESTER: Status: ACTIVE | Noted: 2023-07-14

## 2025-01-14 PROBLEM — Z98.891 S/P REPEAT LOW TRANSVERSE C-SECTION: Status: RESOLVED | Noted: 2023-12-18 | Resolved: 2025-01-14

## 2025-01-14 PROBLEM — O99.211 OBESITY COMPLICATING PREGNANCY IN FIRST TRIMESTER: Status: RESOLVED | Noted: 2023-07-14 | Resolved: 2025-01-14

## 2025-01-14 NOTE — TELEPHONE ENCOUNTER
Message left for patient that the appointment on 1/24/25 at 1:40 has been changed to 2:00.  Asked patient to call back with any issues.

## 2025-02-07 ENCOUNTER — NURSE TRIAGE (OUTPATIENT)
Age: 30
End: 2025-02-07

## 2025-02-07 NOTE — TELEPHONE ENCOUNTER
"Patient had IUD removed and prescribed Nuva ring. Used for first time 1 week ago ( 1/30/25)   Patient c/o nausea , vomiting, diarrhea and belly cramps that started that night. Patient states she took Nuva ring out on Friday night, felt better.   Period bleeding started yesterday.  LMP 1/24- thru 1/29  Patient is now constipated and having constant back pain x 4 days, pain is in low back 7-8/10 and bleeding. Bleeding is like period, not soaking pads, no clots. Mild period cramps come and go.  Pain is like the bruised tailbone pain she had in past ( 2 yrs ago) , but no recent injury.  Patient using Tylenol, Motrin, hot showers, not relieving.    Advised bleeding precautions and when to seek ED/UC.  Attempted to schedule appointment, no availability today, warm transferred to [facility] in office.          Reason for Disposition   Patient wants to be seen    Answer Assessment - Initial Assessment Questions  1. BLEEDING SEVERITY: \"Describe the bleeding that you are having.\" \"How much bleeding is there?\"       Period bleeding   2. ONSET: \"When did the bleeding begin?\" \"Is it continuing now?\"      Yesterday  3. MENSTRUAL PERIOD: \"When was the last normal menstrual period?\" \"How is this different than your period?\"      LMP 1/30/25  4. REGULARITY: \"How regular are your periods?\"      Usually regular   5. ABDOMEN PAIN: \"Do you have any pain?\" \"How bad is the pain?\"  (e.g., Scale 0-10; none, mild, moderate, or severe)      Mild cramps come nad go   6. PREGNANCY: \"Is there any chance you are pregnant?\" \"When was your last menstrual period?\"      Took pregnancy test 2/6/24 Neg   7. BREASTFEEDING: \"Are you breastfeeding?\"      no  8. HORMONE MEDICINES: \"Are you taking any hormone medicines, prescription or over-the-counter?\" (e.g., birth control pills, estrogen)      no  9. BLOOD THINNER MEDICINES: \"Do you take any blood thinners?\" (e.g., Coumadin / warfarin, Pradaxa / dabigatran, aspirin)      no  10. CAUSE: \"What do you think is " "causing the bleeding?\" (e.g., recent gyn surgery, recent gyn procedure; known bleeding disorder, cervical cancer, polycystic ovarian disease, fibroids)          Unknown   11. HEMODYNAMIC STATUS: \"Are you weak or feeling lightheaded?\" If Yes, ask: \"Can you stand and walk normally?\"         Denies  12. OTHER SYMPTOMS: \"What other symptoms are you having with the bleeding?\" (e.g., passed tissue, vaginal discharge, fever, menstrual-type cramps)        Back pain    Protocols used: Vaginal Bleeding - Abnormal-Adult-[place]    "

## 2025-02-07 NOTE — TELEPHONE ENCOUNTER
Regarding: Speak with a nurse  ----- Message from Lanette GOLDEN sent at 2/7/2025 10:14 AM EST -----  Pt called requesting to speak with a nurse would not disclose why tried to transfer no CTS available.

## 2025-04-08 ENCOUNTER — NURSE TRIAGE (OUTPATIENT)
Age: 30
End: 2025-04-08

## 2025-04-08 NOTE — TELEPHONE ENCOUNTER
"FOLLOW UP: advised ED for N/V. ESC on call EUGENE Hobson    REASON FOR CONVERSATION: Amenorrhea    SYMPTOMS: vomiting since 4 AM, unable to keep anything down. + loose stools and facial rash. Patient states she feel flu like symptoms. Feels dry lips and has not urinated in 4 hours.     OTHER: LMP 3/8/25  4 weeks 3 days   D7V 5/21/25  DISPOSITION: Go to ED/UCC Now (Or to Office with PCP Approval)  Reason for Disposition   Unable to keep ANY liquids down (vomits all oral liquids) this past 24 hours    Answer Assessment - Initial Assessment Questions  1. SEVERITY - NAUSEA: \"How bad is the nausea?\" (e.g., none; mild, moderate, severe)      Vomited 4 times this AM with loose stools and she thinks she passed out on toilet for a second, feels she is shaking all over    2. SEVERITY - VOMITING: \"Are you vomiting?\" If Yes, ask: \"How many times have you vomited in the past 24 hours?\" (e.g., nausea only; mild, moderate or severe vomiting)      Vomited 4 x's   3. ONSET: \"When did the nausea or vomiting begin?\"        4 AM   4. FLUIDS: \"What fluids or food have you vomited up today?\" \"Are you able to keep any liquids down?\"      Vomited everything   5. TREATMENT: \"What have you been doing so far to treat this?\"       Unable to drink   6. DEHYDRATION: \"When was the last time you urinated?\" \"Are you feeling lightheaded?\" \"Weight loss?\"      4 AM   7. PREGNANCY: \"How many weeks pregnant are you?\" \"How has the pregnancy been going?\"      LMP 3/8/25   8. ARRON: \"What date are you expecting to deliver?\"       4 weeks 3 days   9. MEDICINES: \"What medicines are you taking?\" (e.g., iron, opioid pain medicines, prenatal vitamins, vitamin B6)      Can't keep anything down   10. OTHER SYMPTOMS: \"Do you have any other symptoms?\" (e.g., diarrhea, fever)        Facial rash yesterday on left side of face, bright red    Protocols used: Pregnancy - Morning Sickness (Nausea and Vomiting of Pregnancy)-Adult-[place]    "

## 2025-04-13 ENCOUNTER — PATIENT MESSAGE (OUTPATIENT)
Dept: OBGYN CLINIC | Facility: CLINIC | Age: 30
End: 2025-04-13

## 2025-04-14 ENCOUNTER — NURSE TRIAGE (OUTPATIENT)
Age: 30
End: 2025-04-14

## 2025-04-14 NOTE — TELEPHONE ENCOUNTER
"Regarding: dizzy, nausea  ----- Message from Barb CORONA sent at 4/14/2025  8:49 AM EDT -----  Pt. Sent a myc message that she is newly pregnant ( has an apt in May) and said she has been really dizzy for days. She is unable to care for her family because of this. She also has not been able to eat because of nausea and says she feels like complete \"garbage\" and has a lot of anxiety.    "

## 2025-04-14 NOTE — TELEPHONE ENCOUNTER
"FOLLOW UP:   Epic Secure Chat sent to Dr Holliday- on call     REASON FOR CONVERSATION: Pregnancy Problem    SYMPTOMS: feeling dizzy, and sweating     OTHER: Pt calling in saying that she's pregnant LMP 3/8/25, pt has an appt for D&V US on 5/21/25. Pt saying that she's experiencing dizziness, and is unable to drive due to the dizziness and nausea. Pt saying that she hasn't eaten since Saturday. Pt saying when sitting the \"room is still spinning\". Pt denies feeling like she is going going to faint. Pt saying she's unable to sleep due to not being able to \"breathe right\". Pt denies having difficulties breathing now. Pt was in the hospital on Wednesday or Thursday, and was told she has \"a cold\".  Pt reporting \"sweating perfusively  and wearing a \"tank top in 46 degree weather\".     Patient is advised to go to the emergency room now for further evaluation pt stated \"I have no one here to watch my child and I'm not taking her with me to the emergency room so maybe later\". Pt is strongly advised to go to the emergency room now for further evaluation, or to call 911, pt verbalized understanding and is still refusing at this time.     DISPOSITION: Go to ED NOW     Reason for Disposition   Patient sounds very sick or weak to the triager    Answer Assessment - Initial Assessment Questions  1. DESCRIPTION: \"Describe your dizziness.\"      \"Feels like I'm hungover\".   2. LIGHTHEADED: \"Do you feel lightheaded?\" (e.g., somewhat faint, woozy, weak upon standing)      Dizzy   3. VERTIGO: \"Do you feel like either you or the room is spinning or tilting?\" (i.e., vertigo)      Pt denies vertigo   4. SEVERITY: \"How bad is it?\"  \"Do you feel like you are going to faint?\" \"Can you stand and walk?\"      Pt denies feeling like she is going going to faint.   5. ONSET:  \"When did the dizziness begin?\"      Began Thursday   6. AGGRAVATING FACTORS: \"Does anything make it worse?\" (e.g., standing, change in head position)      Changing positions " "and standing makes it worse.   7. HEART RATE: \"Can you tell me your heart rate?\" \"How many beats in 15 seconds?\"  (Note: Not all patients can do this.)        Pt denies   8. CAUSE: \"What do you think is causing the dizziness?\" (e.g., decreased fluids or food, diarrhea, emotional distress, heat exposure, new medicine, sudden standing, vomiting; unknown)      Pt unsure   9. RECURRENT SYMPTOM: \"Have you had dizziness before?\" If Yes, ask: \"When was the last time?\" \"What happened that time?\"      Pt denies   10. OTHER SYMPTOMS: \"Do you have any other symptoms?\" (e.g., fever, chest pain, vomiting, diarrhea, bleeding)        Pt reporting \"sweating perfusively  and wearing a \"tank top in 46 degree weather\".   11. PREGNANCY: \"Is there any chance you are pregnant?\" \"When was your last menstrual period?\"        Pt is pregnant LMP 3/8/25    Protocols used: Dizziness-Adult-[place]    "

## 2025-04-28 ENCOUNTER — NURSE TRIAGE (OUTPATIENT)
Dept: OBGYN CLINIC | Facility: CLINIC | Age: 30
End: 2025-04-28

## 2025-04-29 ENCOUNTER — TELEPHONE (OUTPATIENT)
Age: 30
End: 2025-04-29

## 2025-04-29 NOTE — TELEPHONE ENCOUNTER
Patient has a facial rash, she has been to derm who gave her a cream that is not helping. My chart message sent yesterday to Dr Hinojosa. Please advise. Disregard message to [place] office.

## 2025-04-30 DIAGNOSIS — T78.40XS ALLERGY, SEQUELA: Primary | ICD-10-CM

## 2025-04-30 NOTE — TELEPHONE ENCOUNTER
Call to pt.     tacrolimus 0.1 % ointment, apply thin layer twice daily for 4 weeks. Metronidazol 0.75% apply thin later twice daily to face. Clindamycin lotion and cetaphil face wash- gentle skin cleanser  Pt states that she does have celiac disease, realized she was eating food with wheat in it around the time the rash started but her dermatologist does not feel this is the cause.     Reviewed recommendations from provider and pt verbalized understanding. She does report having bad allergies and her allergist no longer takes her insurance. She would like a new referral at this time.

## 2025-04-30 NOTE — TELEPHONE ENCOUNTER
OB 7w4d by LMP  - has had facial rash for about 4 weeks. Has seen dermatology that has diagnosed as dorina-oral dermatitis. Patient has been prescribed steroid cream that is not improving symptoms. States that rash feels tight, is dry and burns - feels like a sunburn that never goes away. States she has seen Dermatologist 2x since this started. Patient is requesting OB provider to review photos of rash sent in through [facility] and see if they have any additional recommendations. RN advised follow-up with dermatology since rash is not improving.    Patient is also requesting Zofran to be sent in to pharmacy on file. States that since she got her positive pregnancy test she has been constantly nauseous. Patient also vomits about 2-3 every night. Denies feeling lightheaded/dizzy/weak. Is able to keep fluids down during the day.     Routing to on-call provider for review.

## 2025-05-08 ENCOUNTER — NURSE TRIAGE (OUTPATIENT)
Age: 30
End: 2025-05-08

## 2025-05-08 DIAGNOSIS — O21.9 NAUSEA AND VOMITING IN PREGNANCY: Primary | ICD-10-CM

## 2025-05-08 DIAGNOSIS — Z91.89 AT HIGH RISK FOR PAIN FROM PROCEDURE: ICD-10-CM

## 2025-05-08 RX ORDER — ONDANSETRON 4 MG/1
4 TABLET, FILM COATED ORAL EVERY 8 HOURS PRN
Qty: 20 TABLET | Refills: 0 | Status: SHIPPED | OUTPATIENT
Start: 2025-05-08

## 2025-05-08 RX ORDER — METOCLOPRAMIDE 5 MG/1
5 TABLET ORAL 4 TIMES DAILY
Qty: 30 TABLET | Refills: 1 | Status: SHIPPED | OUTPATIENT
Start: 2025-05-08 | End: 2025-05-08 | Stop reason: ALTCHOICE

## 2025-05-08 NOTE — TELEPHONE ENCOUNTER
"FOLLOW UP: Zofran sent to pharmacy for patient. Patient to follow-up with physiatry for anxiety and depression symptoms and to follow-up with derm as scheduled for continued management of facial rash.     REASON FOR CONVERSATION: Morning Sickness, Anxiety, and Rash    SYMPTOMS: Ongoing facial rash, increasing anxiety and depression, nausea and vomiting in pregnancy.    OTHER: OB 8w5d by LMP  with ongoing facial rash that is being treated by derm. Is currently on 4 ointments with no relief. When cream applied to rash it causes pain on face. Patient has follow-up with derm tomorrow, . Is on waitlist to see allergist per previous recommendation. Patient is having worsening anxiety and depression due to facial rash. Was previously on Xanax but discontinued when pregnant. Has psychiatrist appointment in a few days. Patient is also having nausea and vomiting. Vomiting 2-4 times each morning. Occasionally feels lightheaded and dizzy. Patient is able to keep fluids down. Informed to increase fluids and seek care in ED if unable to keep fluids down or having persistent lightheadedness. Patient requesting Zofran prescription to be sent in to pharmacy. D&V is scheduled for . There are no sooner appointments for D&V or office visit to discuss current concerns.     ESC sent to Dr. Brooks. Follow-up as noted above.     DISPOSITION: Discuss with Provider and Call Back Patient (overriding See Today in Office), Discuss with Provider and Call Back Patient (overriding See Today in Office)      Reason for Disposition   MODERATE vomiting (e.g., 3 - 5 times / day) and present > 3 days   Patient sounds very upset or troubled to the triager    Answer Assessment - Initial Assessment Questions  1. SEVERITY - NAUSEA: \"How bad is the nausea?\" (e.g., none; mild, moderate, severe)      Vomiting 2-4x every morning  2. SEVERITY - VOMITING: \"Are you vomiting?\" If Yes, ask: \"How many times have you vomited in the past 24 hours?\" " "(e.g., nausea only; mild, moderate or severe vomiting)      See above  4. FLUIDS: \"What fluids or food have you vomited up today?\" \"Are you able to keep any liquids down?\"      Drinks small amount of fluids but able to keep down  5. TREATMENT: \"What have you been doing so far to treat this?\"       Vitmain b6, currently out of zofran but was using  6. DEHYDRATION: \"When was the last time you urinated?\" \"Are you feeling lightheaded?\" \"Weight loss?\"      Occasionally feels lightheaded, dizzy.  7. PREGNANCY: \"How many weeks pregnant are you?\" \"How has the pregnancy been going?\"      8w5d by LMP  9. MEDICINES: \"What medicines are you taking?\" (e.g., iron, opioid pain medicines, prenatal vitamins, vitamin B6)      Vitmain b6, was taking zofran. Using topical creams prescribed by derm.    Answer Assessment - Initial Assessment Questions  1. CONCERN: \"Did anything happen that prompted you to call today?\"       Increasing anxiety and depression - worsened by pregnancy, stopping medications and facial rash  2. ANXIETY SYMPTOMS: \"Can you describe how you (your loved one; patient) have been feeling?\" (e.g., tense, restless, panicky, anxious, keyed up, overwhelmed, sense of impending doom).       Facial rash causing anxiety due to pain and persisting despite treatment  6. HISTORY: \"Have you felt this way before?\" \"Have you ever been diagnosed with an anxiety problem in the past?\" (e.g., generalized anxiety disorder, panic attacks, PTSD). If Yes, ask: \"How was this problem treated?\" (e.g., medicines, counseling, etc.)      Yes has previously been on xanax and stopped with pregnancy. Has psychiatrist she sees in a few days.   9. THERAPIST: \"Do you have a counselor or therapist?\" If Yes, ask: \"What is their name?\"      Does not see therapist at this time but has in the past. States she does not have time to see therapist now.  10. POTENTIAL TRIGGERS: \"Do you drink caffeinated beverages (e.g., coffee, lauren, teas), and how much " "daily?\" \"Do you drink alcohol or use any drugs?\" \"Have you started any new medicines recently?\"        Facial rash, pregnancy    Protocols used: Pregnancy - Morning Sickness (Nausea and Vomiting of Pregnancy)-Adult-[place], Anxiety and Panic Attack-Adult-[place]    "

## 2025-05-08 NOTE — PATIENT COMMUNICATION
Contacted patient in referenece to my chart message to discuss multiple concerns.     L/M on v/m to return call

## 2025-05-16 ENCOUNTER — TELEPHONE (OUTPATIENT)
Age: 30
End: 2025-05-16

## 2025-05-16 NOTE — TELEPHONE ENCOUNTER
Patient asking if ultrasound will be performed at 5/21 appointment. Informed her that yes, she will have dating ultrasound done at appointment. Patient verbalized understanding.

## 2025-05-21 ENCOUNTER — ULTRASOUND (OUTPATIENT)
Dept: OBGYN CLINIC | Facility: MEDICAL CENTER | Age: 30
End: 2025-05-21
Payer: COMMERCIAL

## 2025-05-21 VITALS
BODY MASS INDEX: 36.65 KG/M2 | SYSTOLIC BLOOD PRESSURE: 114 MMHG | WEIGHT: 220 LBS | DIASTOLIC BLOOD PRESSURE: 62 MMHG | HEIGHT: 65 IN

## 2025-05-21 DIAGNOSIS — Z34.91 FIRST TRIMESTER PREGNANCY: ICD-10-CM

## 2025-05-21 DIAGNOSIS — R11.2 NAUSEA AND VOMITING, UNSPECIFIED VOMITING TYPE: ICD-10-CM

## 2025-05-21 DIAGNOSIS — Z32.01 POSITIVE URINE PREGNANCY TEST: Primary | ICD-10-CM

## 2025-05-21 DIAGNOSIS — O21.9 NAUSEA AND VOMITING IN PREGNANCY: ICD-10-CM

## 2025-05-21 PROCEDURE — 99214 OFFICE O/P EST MOD 30 MIN: CPT | Performed by: OBSTETRICS & GYNECOLOGY

## 2025-05-21 PROCEDURE — 76801 OB US < 14 WKS SINGLE FETUS: CPT | Performed by: OBSTETRICS & GYNECOLOGY

## 2025-05-21 RX ORDER — CEPHALEXIN 500 MG/1
CAPSULE ORAL
COMMUNITY
Start: 2025-03-05 | End: 2025-06-10

## 2025-05-21 RX ORDER — KETOTIFEN FUMARATE 0.35 MG/ML
SOLUTION/ DROPS OPHTHALMIC
COMMUNITY
Start: 2025-04-16

## 2025-05-21 RX ORDER — LIDOCAINE 50 MG/G
PATCH TOPICAL
COMMUNITY
Start: 2025-02-19 | End: 2025-06-10

## 2025-05-21 RX ORDER — DOXEPIN HYDROCHLORIDE 50 MG/1
50 CAPSULE ORAL
COMMUNITY
Start: 2025-03-07 | End: 2025-05-27

## 2025-05-21 RX ORDER — ONDANSETRON 4 MG/1
4 TABLET, ORALLY DISINTEGRATING ORAL EVERY 8 HOURS PRN
Qty: 90 TABLET | Refills: 1 | Status: SHIPPED | OUTPATIENT
Start: 2025-05-21 | End: 2025-06-24 | Stop reason: SDUPTHER

## 2025-05-21 RX ORDER — METHOCARBAMOL 750 MG/1
TABLET, FILM COATED ORAL
COMMUNITY
Start: 2025-02-19 | End: 2025-06-10

## 2025-05-21 RX ORDER — MOMETASONE FUROATE 1 MG/G
OINTMENT TOPICAL
COMMUNITY
Start: 2025-04-09 | End: 2025-06-10

## 2025-05-21 RX ORDER — TACROLIMUS 1 MG/G
OINTMENT TOPICAL
COMMUNITY
Start: 2025-04-26 | End: 2025-06-10

## 2025-05-21 RX ORDER — DESVENLAFAXINE 100 MG/1
1 TABLET, EXTENDED RELEASE ORAL DAILY
COMMUNITY
Start: 2025-04-24 | End: 2025-05-27

## 2025-05-21 RX ORDER — DOXEPIN HYDROCHLORIDE 10 MG/1
10 CAPSULE ORAL
COMMUNITY
Start: 2025-04-08 | End: 2025-05-27

## 2025-05-21 RX ORDER — DESVENLAFAXINE 50 MG/1
1 TABLET, FILM COATED, EXTENDED RELEASE ORAL DAILY
COMMUNITY
Start: 2025-04-08 | End: 2025-05-27

## 2025-05-21 RX ORDER — HYDROCORTISONE 25 MG/G
CREAM TOPICAL
COMMUNITY

## 2025-05-21 RX ORDER — LORAZEPAM 1 MG/1
1 TABLET ORAL 2 TIMES DAILY PRN
COMMUNITY
Start: 2025-04-25 | End: 2025-05-27

## 2025-05-21 NOTE — PROGRESS NOTES
Assessment  29 y.o.  presenting for amenorrhea. Pregnancy confirmed, dating consistent with LMP. ARRON 2025 .    Plan  Diagnoses and all orders for this visit:    Positive urine pregnancy test  -     AMB US OB < 14 weeks single or first gestation level 1    First trimester pregnancy  - Prenatal vitamin  - Discussed and referred for genetic screening  - Prenatal Nursing Intake in 2 weeks  - Prenatal H&P in 4 weeks     Nausea and vomiting, unspecified vomiting type  Nausea and vomiting in pregnancy  -     ondansetron (ZOFRAN-ODT) 4 mg disintegrating tablet; Take 1 tablet (4 mg total) by mouth every 8 (eight) hours as needed for nausea or vomiting  -     pyridoxine (B-6) 100 MG tablet; Take 1 tablet (100 mg total) by mouth in the morning and 1 tablet (100 mg total) before bedtime.  -     doxylamine (UNISOM) 25 MG tablet; Take 1 tablet (25 mg total) by mouth daily at bedtime as needed for sleep (Patient not taking: Reported on 2025)    ____________________________________________________________      Subjective   Shruti Camilo is a 29 y.o.  with an LMP of 3/8/25 (10w4d by LMP) who presents for amenorrhea. She notes she took a home pregnancy test and it was positive.     Reporting fatigue, nausea. Feels run down. Also anxiety noted, which is exacerbating physical symptoms. Pregnancy was unplanned and having a hard time feeling ready.Not currently taking meds for same. Pt reports she is certain of her LMP and that she has regular menses. She has has no vaginal bleeding since her LMP.      The following portions of the patient's history were reviewed and updated as appropriate: allergies, current medications, past medical history, past social history, past surgical history, and problem list.    Review of Systems  Review of Systems   Constitutional:  Positive for fatigue. Negative for chills and fever.   Respiratory:  Negative for shortness of breath.    Cardiovascular:  Negative for chest  pain and palpitations.   Gastrointestinal:  Positive for nausea and vomiting. Negative for abdominal distention, abdominal pain, constipation and diarrhea.   Genitourinary:  Positive for menstrual problem (amenorrhea). Negative for dysuria, flank pain, frequency, pelvic pain, vaginal bleeding, vaginal discharge and vaginal pain.   Neurological:  Negative for dizziness, light-headedness and headaches.   Psychiatric/Behavioral:  Positive for agitation, dysphoric mood and sleep disturbance. Negative for self-injury and suicidal ideas. The patient is nervous/anxious.           Objective  There were no vitals taken for this visit.      Physical Exam:  Physical Exam  Vitals reviewed. Exam conducted with a chaperone present.   Constitutional:       General: She is not in acute distress.     Appearance: Normal appearance. She is not ill-appearing, toxic-appearing or diaphoretic.     Eyes:      General: No scleral icterus.        Right eye: No discharge.         Left eye: No discharge.      Conjunctiva/sclera: Conjunctivae normal.       Cardiovascular:      Rate and Rhythm: Normal rate.   Pulmonary:      Effort: Pulmonary effort is normal. No respiratory distress.   Abdominal:      General: There is no distension.      Palpations: There is no mass.      Tenderness: There is no abdominal tenderness. There is no guarding or rebound.      Hernia: No hernia is present.   Genitourinary:     General: Normal vulva.      Exam position: Lithotomy position.      Labia:         Right: No rash, tenderness or lesion.         Left: No rash, tenderness or lesion.       Urethra: No prolapse, urethral swelling or urethral lesion.      Vagina: No bleeding.     Musculoskeletal:         General: No swelling.     Skin:     General: Skin is warm and dry.      Coloration: Skin is not jaundiced or pale.      Findings: No bruising or erythema.     Neurological:      Mental Status: She is alert.     Psychiatric:         Mood and Affect: Mood normal.          Behavior: Behavior normal.         Thought Content: Thought content normal.         Judgment: Judgment normal.           Reviewed  [facility]

## 2025-05-23 ENCOUNTER — NURSE TRIAGE (OUTPATIENT)
Age: 30
End: 2025-05-23

## 2025-05-23 NOTE — TELEPHONE ENCOUNTER
"FOLLOW UP: Go to emergency department for evaluation     REASON FOR CONVERSATION: Pregnancy Problem    SYMPTOMS: Constant headache, pain behind left eye and in left side of face x 48hours.  Reports facial swelling, sinus congestion. Pain level 7.     OTHER: Denies visual change, fever, discolored mucous. No relief with tylenol, caffeine, sleep or darkened room.  Reviewed max dose of tylenol.   H/O migraine's in early 20's.  Provided pregnancy essential guide in event will need med for sinus congestion.     DISPOSITION: Go to ED/UCC Now (Or to Office with PCP Approval)            Reason for Disposition   SEVERE pain in one eye    Answer Assessment - Initial Assessment Questions  1. LOCATION: \"Where does it hurt?\"       Behind left eye into ear  2. ONSET: \"When did the headache start?\" (e.g., minutes, hours or days)       After u/s appt on 5/21  3. PATTERN: \"Does the pain come and go, or has it been constant since it started?\"      constant  4. SEVERITY: \"How bad is the pain?\" and \"What does it keep you from doing?\"       Pain level 7  5. RECURRENT SYMPTOM: \"Have you ever had headaches before?\" If Yes, ask: \"When was the last time?\" and \"What happened that time?\"       H/O migraine in past- mid 20's  6. CAUSE: \"What do you think is causing the headache?\"      unsure  7. MIGRAINE: \"Have you been diagnosed with migraine headaches?\" If Yes, ask: \"Is this headache similar?\"       yes  8. HEAD INJURY: \"Has there been any recent injury to the head?\"       denies  9. OTHER SYMPTOMS: \"Do you have any other symptoms?\" (e.g., abdomen pain, blurred vision, fever, stiff neck; swelling of hands, face, or feet)      Nausea/vomiting, nasal congestion, pressure in face  10. PREGNANCY: \"How many weeks pregnant are you?\"        10w3d  11. ARRON: \"What date are you expecting to deliver?\"        12/13/2025    Protocols used: Pregnancy - Headache-Adult-OH    "

## 2025-05-27 ENCOUNTER — OB ABSTRACT (OUTPATIENT)
Age: 30
End: 2025-05-27

## 2025-05-27 NOTE — PATIENT INSTRUCTIONS
Congratulations on your pregnancy!  We thank you for allowing us to participate in your care.    NEXT STEPS    Go to the lab to have your prenatal blood work competed, if you have not already done so.  We ask that you have this blood work done prior to your first routine prenatal appointment.  If you are going to a [facility] lab, the lab will be able to view the orders in our computer system.  You do not need to take the orders with you.     There is a listing of [facility] Laboratories and locations in your prenatal folder. I also attached a lab location link below where you can find additional information including the hours for each site.  Please be aware that some insurance companies may require you to go to a specific lab (ex. [facility] or [facility]). You can verify this by contacting your insurance company. Please inform me if you plan to go to a lab other than [facility] as some of the order codes may be different.   If you are interested in optional carrier screening for Cystic Fibrosis (CF) and Spinal Muscular Atrophy (SMA), cost will be based on your medical coverage, deductible, and co-insurance. You can verify insurance coverage by providing the CPT codes to your insurance company.  The CPT code for Cystic Fibrosis (CF) is 41802 and Spinal Muscular Atrophy (SMA) is 38586. These tests are processed through [facility] and billing is done directly with [facility] [employer] and your insurance company.  If your insurance company informs you that the testing requires prior authorization or if it is not covered and you have additional questions about self-pay options, please reach out to [facility] [employer] [facility] [employer] directly at [phone].   If you have chosen to check coverage with your insurance company prior to ordering the screening for CF and SMA, and you would like to proceed with testing, please message me and I will place the order.   A referral was placed to Maternal Fetal Medicine for an ultrasound and or genetic testing.  You may have  already scheduled or have had this appointment.  If not, please call their office to schedule.  Based on the referral placed by our office, they will know how to schedule you appropriately.    The phone number for Maternal Fetal Medicine is [phone]. For additional detailed contact information for Maternal Fetal Medicine, please refer to the prenatal folder provided to you by our office.   Return to our office for your routine prenatal appointments. Routinely, you will be seen once a month until you are 28 weeks, then twice a month until you are 36 weeks, and then weekly until you deliver.  Additional appointments may be added as needed. Routine prenatal care is essential for your health and the health of your baby.  If you miss an appointment, please contact the office to reschedule.    Our offices have multiple locations. Always check the address of your appointment to ensure you are going to the correct office.   [facility] should only be used for non-urgent concerns or questions.  If you have any medical concerns or an urgent problem, always call the office directly.      Our doctors deliver at [facility] in [place]. The [place] [address]    Click on the link below to review our Prenatal Website and Pregnancy Essential Guide.  [facility] Pregnancy Essentials Guide  [facility] Women's Health ([email or web address])     Click on the link below to review [facility] Lab locations and hours.  [facility] Lab Locations    Cupoint resource  Findhelp is a tool to connect you to community resources you may need.    Thank you,  Lanette NAGY LPN  OB Nurse Navigator

## 2025-05-27 NOTE — PROGRESS NOTES
OB INTAKE INTERVIEW 2025     Patient is 30 y.o. who presents for OB intake at 11w4d.  She is not accompanied by anyone during this encounter.  The father of her baby (Bairon Quijano) is involved in the pregnancy and he is 39 years old.      Patient's last menstrual period was 2025.  Ultrasound: Measured 10 weeks 2 days on 2025  Estimated Date of Delivery: 25 confirmed by dating ultrasound.    Signs/Symptoms of Pregnancy  Current pregnancy symptoms: fatigue, nausea, vomiting, frequent urination, and constipation  Headaches YES  Cramping YES  Spotting no  PICA cravings no    Diabetes-  Body mass index is 36.61 kg/m².  If patient has 1 or more, please order early 1 hour GTT  History of GDM no  BMI >35 YES  History of PCOS or current metformin use YES  History of LGA/macrosomic infant (4000g/9lbs) no    If patient has 2 or more, please order early 1 hour GTT  BMI>30 YES  AMA no  First degree relative with type 2 diabetes no  History of chronic HTN, hyperlipidemia, elevated A1C no  High risk race (, , ,  or ) no    Hypertension- if you answer yes to any of the following, please order baseline preeclampsia labs (cbc, comprehensive metabolic panel, urine protein creatinine ratio, uric acid)  History of of chronic HTN no  History of gestational HTN no  History of preeclampsia, eclampsia, or HELLP syndrome no  History of diabetes no  History of lupus,sjogrens syndrome, kidney disease no    Thyroid- if yes order TSH with reflex T4  History of thyroid disease no    Bleeding Disorder or Hx of DVT-patient or first degree relative with history of. Order the following if not done previously.   (Factor V, antithrombin III, prothrombin gene mutation, protein C and S Ag, lupus anticoagulant, anticardiolipin, beta-2 glycoprotein)   no    OB/GYN-  History of abnormal pap smear YES       Date of last pap smear 2024  History of HPV YES  History of  "Herpes/HSV no  History of other STI (gonorrhea, chlamydia, trich) no  History of prior  no  History of prior  YESx2  History of  delivery prior to 36 weeks 6 days no  History of blood transfusion no  Ok for blood transfusion  Yes    Substance screening-   History of tobacco use YES  Currently using tobacco no  Substance Use Screen Level:  High risk, patient uses medical marijuana for anxiety and depression.  Reports use in prior pregnancies and does not want to discontinue due to \"extreme anxiety\".    MRSA Screening-   Does the pt have a hx of MRSA? no    Immunizations:  History of Varicella or Vaccination: YES   Discussed Tdap vaccine:  YES  Discussed COVID Vaccine:  YES    Genetic/MFM-  Do you or your partner have a history of any of the following in yourselves or first degree relatives?  Cystic fibrosis no  Spinal muscular atrophy no  Hemoglobinopathy/Sickle Cell/Thalassemia no  Fragile X Intellectual Disability no    If yes, discuss Carrier Screening and recommend consultation with MFM/Genetic Counseling and place specific [facility] Referral for.    If no, discuss Carrier Screening being completed once in a lifetime as a standard of care lab test. Place orders for Cystic Fibrosis Gene Test (QXA529) and Spinal Muscular Atrophy DNA ([id number]).  Patient was informed that prior authorization needs to be completed for these tests and this may take 7-10 business days.  Patient does not desire testing for Cystic Fibrosis and Spinal Muscular Atrophy.  Wants to check insurance coverage prior to ordering.    Appointment for Nuchal Translucency Ultrasound at [facility] is scheduled for 6/3/2025.    Interview education  [facility] Pregnancy Essentials Book reviewed, discussed and attached to their AVS:  YES    Nurse/Family Partnership-patient may qualify YES; referral placed No     Prenatal lab work scripts YES    Extra labs ordered: Hgb Fractionation Cascade and 1 hour GTT    Aspirin/Preeclampsia Screen    Risk Level " "Risk Factor Recommendation   LOW Prior Uncomplicated full-term delivery YES No Aspirin recommendation        MODERATE Nulliparity no Recommend low-dose aspirin if     BMI>30 YES 2 or more moderate risk factors    Family History Preeclampsia (mother/sister) no     35yr old or greater no     Black Race, Concern for SDOH/Low Socioeconomic no     IVF Pregnancy  no     Personal History Risks (low birth weight, prior adverse preg outcome, >10yr preg interval) no         HIGH History of Preeclampsia no Recommend low-dose aspirin if     Multifetal gestation no 1 or more high risk factors    Chronic HTN no     Type 1 or 2 Diabetes no     Renal Disease no     Autoimmune Disease  YES      Contraindications to ASA therapy:  NSAID/ ASA allergy: no  Nasal polyps: no  Asthma with history of ASA induced bronchospasm: no  Relative contraindications:  History of GI bleed: no  Active peptic ulcer disease: no  Severe hepatic dysfunction: no    Patient does meet recommendation to take ASA 162mg during this pregnancy from 12-36 wks to lower her risk of preeclampsia.  Instructions given and patient verbalized understanding.    Mental Health:  History of depression: YES  History of anxiety: YES  EPDS Screen:  Positive    Score:  16, Patient reports currently following with a therapist.  Declines [facility] Me referral.      Dental Exam:  Last dental exam within the past 6 months: Yes    The patient has a history now or in prior pregnancy notable for: drug use and obesity.    Details that I feel the provider should be aware of: Patient has a history of acute spina bifida, Migraines, Celiac Disease, hyperplasia polyp of sigmoid colon (removed), Osteopenia, PTSD, gastroparesis, Panic disorder, anxiety, depression, and an eating disorder. Patient uses medical marijuana for anxiety and depression.  Reports use in prior pregnancies and does not want to discontinue due to \"extreme anxiety\".   EPDS was 16.  Declines referral to Baby & Me.  Reports " following with a therapist.  Positive SDOH for food insecurities.  Declined social work referral.     PN1 visit scheduled. Oriented patient to practice and the navigator role.  Reviewed delivering physicians and [facility] for delivery. Provided prenatal education and The Pregnancy Essentials Guidebook/prenatal website were both reviewed with patient and linked on AVS. All questions were answered.     Interviewed by: ARUN ALTAMIRANO LPN

## 2025-05-28 ENCOUNTER — APPOINTMENT (OUTPATIENT)
Dept: LAB | Facility: CLINIC | Age: 30
End: 2025-05-28
Payer: COMMERCIAL

## 2025-05-28 ENCOUNTER — INITIAL PRENATAL (OUTPATIENT)
Dept: OBGYN CLINIC | Facility: CLINIC | Age: 30
End: 2025-05-28
Payer: COMMERCIAL

## 2025-05-28 VITALS — WEIGHT: 218 LBS | BODY MASS INDEX: 36.32 KG/M2 | HEIGHT: 65 IN

## 2025-05-28 DIAGNOSIS — Z34.81 ENCOUNTER FOR SUPERVISION OF NORMAL PREGNANCY IN MULTIGRAVIDA IN FIRST TRIMESTER: ICD-10-CM

## 2025-05-28 DIAGNOSIS — Z13.71 SCREENING FOR GENETIC DISEASE CARRIER STATUS: ICD-10-CM

## 2025-05-28 DIAGNOSIS — O99.210 OBESITY IN PREGNANCY: ICD-10-CM

## 2025-05-28 DIAGNOSIS — Z34.81 ENCOUNTER FOR SUPERVISION OF NORMAL PREGNANCY IN MULTIGRAVIDA IN FIRST TRIMESTER: Primary | ICD-10-CM

## 2025-05-28 LAB
ABO GROUP BLD: NORMAL
BASOPHILS # BLD AUTO: 0.05 THOUSANDS/ÂΜL (ref 0–0.1)
BASOPHILS NFR BLD AUTO: 0 % (ref 0–1)
BILIRUB UR QL STRIP: NEGATIVE
BLD GP AB SCN SERPL QL: NEGATIVE
CLARITY UR: CLEAR
COLOR UR: NORMAL
EOSINOPHIL # BLD AUTO: 0.18 THOUSAND/ÂΜL (ref 0–0.61)
EOSINOPHIL NFR BLD AUTO: 1 % (ref 0–6)
ERYTHROCYTE [DISTWIDTH] IN BLOOD BY AUTOMATED COUNT: 13.5 % (ref 11.6–15.1)
GLUCOSE 1H P 50 G GLC PO SERPL-MCNC: 133 MG/DL (ref 70–134)
GLUCOSE UR STRIP-MCNC: NEGATIVE MG/DL
HCT VFR BLD AUTO: 38.6 % (ref 34.8–46.1)
HGB BLD-MCNC: 12.6 G/DL (ref 11.5–15.4)
HGB UR QL STRIP.AUTO: NEGATIVE
IMM GRANULOCYTES # BLD AUTO: 0.09 THOUSAND/UL (ref 0–0.2)
IMM GRANULOCYTES NFR BLD AUTO: 1 % (ref 0–2)
KETONES UR STRIP-MCNC: NEGATIVE MG/DL
LEUKOCYTE ESTERASE UR QL STRIP: NEGATIVE
LYMPHOCYTES # BLD AUTO: 3.18 THOUSANDS/ÂΜL (ref 0.6–4.47)
LYMPHOCYTES NFR BLD AUTO: 18 % (ref 14–44)
MCH RBC QN AUTO: 30.4 PG (ref 26.8–34.3)
MCHC RBC AUTO-ENTMCNC: 32.6 G/DL (ref 31.4–37.4)
MCV RBC AUTO: 93 FL (ref 82–98)
MONOCYTES # BLD AUTO: 0.64 THOUSAND/ÂΜL (ref 0.17–1.22)
MONOCYTES NFR BLD AUTO: 4 % (ref 4–12)
NEUTROPHILS # BLD AUTO: 13.21 THOUSANDS/ÂΜL (ref 1.85–7.62)
NEUTS SEG NFR BLD AUTO: 76 % (ref 43–75)
NITRITE UR QL STRIP: NEGATIVE
NRBC BLD AUTO-RTO: 0 /100 WBCS
PH UR STRIP.AUTO: 7 [PH]
PLATELET # BLD AUTO: 322 THOUSANDS/UL (ref 149–390)
PMV BLD AUTO: 11.5 FL (ref 8.9–12.7)
PROT UR STRIP-MCNC: NEGATIVE MG/DL
RBC # BLD AUTO: 4.15 MILLION/UL (ref 3.81–5.12)
RH BLD: POSITIVE
RUBV IGG SERPL IA-ACNC: 18.3 IU/ML
SP GR UR STRIP.AUTO: 1.01 (ref 1–1.03)
SPECIMEN EXPIRATION DATE: NORMAL
UROBILINOGEN UR STRIP-ACNC: <2 MG/DL
WBC # BLD AUTO: 17.35 THOUSAND/UL (ref 4.31–10.16)

## 2025-05-28 PROCEDURE — 81003 URINALYSIS AUTO W/O SCOPE: CPT

## 2025-05-28 PROCEDURE — 86901 BLOOD TYPING SEROLOGIC RH(D): CPT

## 2025-05-28 PROCEDURE — 86850 RBC ANTIBODY SCREEN: CPT

## 2025-05-28 PROCEDURE — 86706 HEP B SURFACE ANTIBODY: CPT

## 2025-05-28 PROCEDURE — 86900 BLOOD TYPING SEROLOGIC ABO: CPT

## 2025-05-28 PROCEDURE — 86762 RUBELLA ANTIBODY: CPT

## 2025-05-28 PROCEDURE — 85025 COMPLETE CBC W/AUTO DIFF WBC: CPT

## 2025-05-28 PROCEDURE — 86780 TREPONEMA PALLIDUM: CPT

## 2025-05-28 PROCEDURE — 87340 HEPATITIS B SURFACE AG IA: CPT

## 2025-05-28 PROCEDURE — 99211 OFF/OP EST MAY X REQ PHY/QHP: CPT

## 2025-05-28 PROCEDURE — 87389 HIV-1 AG W/HIV-1&-2 AB AG IA: CPT

## 2025-05-28 PROCEDURE — 87086 URINE CULTURE/COLONY COUNT: CPT

## 2025-05-28 PROCEDURE — 36415 COLL VENOUS BLD VENIPUNCTURE: CPT

## 2025-05-28 PROCEDURE — 86803 HEPATITIS C AB TEST: CPT

## 2025-05-28 PROCEDURE — 83020 HEMOGLOBIN ELECTROPHORESIS: CPT

## 2025-05-28 PROCEDURE — 82950 GLUCOSE TEST: CPT

## 2025-05-29 LAB
BACTERIA UR CULT: NORMAL
HBV SURFACE AB SER-ACNC: <3 MIU/ML
HBV SURFACE AG SER QL: NORMAL
HCV AB SER QL: NORMAL
HIV 1+2 AB+HIV1 P24 AG SERPL QL IA: NORMAL
TREPONEMA PALLIDUM IGG+IGM AB [PRESENCE] IN SERUM OR PLASMA BY IMMUNOASSAY: NORMAL

## 2025-05-31 LAB
HGB A MFR BLD: 2.8 % (ref 1.8–3.2)
HGB A MFR BLD: 97.2 % (ref 96.4–98.8)
HGB F MFR BLD: 0 % (ref 0–2)
HGB FRACT BLD-IMP: NORMAL
HGB S MFR BLD: 0 %

## 2025-06-03 ENCOUNTER — ANCILLARY PROCEDURE (OUTPATIENT)
Dept: PERINATAL CARE | Facility: OTHER | Age: 30
End: 2025-06-03
Attending: OBSTETRICS & GYNECOLOGY
Payer: COMMERCIAL

## 2025-06-03 VITALS
HEART RATE: 91 BPM | SYSTOLIC BLOOD PRESSURE: 110 MMHG | BODY MASS INDEX: 36.85 KG/M2 | DIASTOLIC BLOOD PRESSURE: 68 MMHG | WEIGHT: 221.2 LBS | HEIGHT: 65 IN

## 2025-06-03 DIAGNOSIS — Z3A.12 12 WEEKS GESTATION OF PREGNANCY: Primary | ICD-10-CM

## 2025-06-03 DIAGNOSIS — Z33.1 PREGNANT STATE, INCIDENTAL: ICD-10-CM

## 2025-06-03 DIAGNOSIS — Z33.1 NORMAL PREGNANCY, INCIDENTAL: ICD-10-CM

## 2025-06-03 DIAGNOSIS — Z36.0 ENCOUNTER FOR ANTENATAL SCREENING FOR CHROMOSOMAL ANOMALIES: ICD-10-CM

## 2025-06-03 PROCEDURE — 76801 OB US < 14 WKS SINGLE FETUS: CPT | Performed by: OBSTETRICS & GYNECOLOGY

## 2025-06-03 PROCEDURE — 99214 OFFICE O/P EST MOD 30 MIN: CPT | Performed by: OBSTETRICS & GYNECOLOGY

## 2025-06-03 PROCEDURE — 36415 COLL VENOUS BLD VENIPUNCTURE: CPT | Performed by: OBSTETRICS & GYNECOLOGY

## 2025-06-03 PROCEDURE — 76813 OB US NUCHAL MEAS 1 GEST: CPT | Performed by: OBSTETRICS & GYNECOLOGY

## 2025-06-03 PROCEDURE — NC001 PR NO CHARGE: Performed by: OBSTETRICS & GYNECOLOGY

## 2025-06-03 NOTE — PROGRESS NOTES
Patient chose to have [facility] [id number] Non-Invasive Prenatal Screen [id number] PLUS w/ SCA, WITH fetal sex.  Patient choose to be billed through insurance.     Patient given brochure and is aware [facility] will contact patient's insurance and coordinate coverage.  Provided [facility] contact information. General inquiries [phone], Cost estimates [phone] and [facility] Billing [phone]. Website [email or web address].     Blood collection tubes labeled with patient identifiers (name, medical record number, and date of birth).     Filled out [facility] order form. Patient chose to have blood drawn in our office at time of visit. NIPS was drawn from left arm with a straight needle by April KNIGHT       Maternal Fetal Medicine will have results in approximately 5-7 business days and will call patient or notify via PingMD.  Patient aware viewing lab result online will reveal fetal sex if ordered.    Patient verbalized understanding of all instructions and no questions at this time.

## 2025-06-09 PROBLEM — F32.A DEPRESSION AFFECTING PREGNANCY IN FIRST TRIMESTER, ANTEPARTUM: Status: ACTIVE | Noted: 2025-06-09

## 2025-06-09 PROBLEM — O09.891 SHORT INTERVAL BETWEEN PREGNANCIES AFFECTING PREGNANCY IN FIRST TRIMESTER, ANTEPARTUM: Status: ACTIVE | Noted: 2025-06-09

## 2025-06-09 PROBLEM — O99.341 DEPRESSION AFFECTING PREGNANCY IN FIRST TRIMESTER, ANTEPARTUM: Status: ACTIVE | Noted: 2025-06-09

## 2025-06-09 PROBLEM — R51.9 HEADACHE IN PREGNANCY: Status: RESOLVED | Noted: 2024-01-16 | Resolved: 2025-06-09

## 2025-06-09 PROBLEM — Z3A.13 13 WEEKS GESTATION OF PREGNANCY: Status: ACTIVE | Noted: 2025-06-09

## 2025-06-09 PROBLEM — O26.899 HEADACHE IN PREGNANCY: Status: RESOLVED | Noted: 2024-01-16 | Resolved: 2025-06-09

## 2025-06-09 PROBLEM — O99.210 OBESITY IN PREGNANCY, ANTEPARTUM: Status: ACTIVE | Noted: 2025-06-09

## 2025-06-09 NOTE — ASSESSMENT & PLAN NOTE
- Continue prenatal vitamin daily  - PNC follow-up scheduled 25  - Unit regimen reviewed visit every 4 weeks until 28 weeks, every 2 weeks until 36 weeks and then weekly until delivery.  Aware office delivers at [facility]. Reviewed depending on complaint and gestational age may recommend evaluation at [facility]  - Vaccines in Pregnancy      - TDAP vaccine after 27 gestational weeks     - RSV vaccine between 32-36.6 gestational weeks. September- January      - Reviewed with patient  Pregnancy with COVID infection is considered  high risk and can have poor outcome including  delivery, more severe infection.  therefore  pregnant patients are recommended to get  COVID-19 vaccination. Written information provided     - influenza October- March   -  Common discomforts of pregnancy and precautions reviewed.  Signs and symptoms to report reviewed.      Orders:    CBC and differential; Future    Alpha fetoprotein, maternal; Future    Chlamydia/GC amplified DNA by PCR

## 2025-06-09 NOTE — ASSESSMENT & PLAN NOTE
Continue f/u with psychiatrist as scheduled.  They need to speak to her regarding medications  Encouraged to call office with worsening symptoms, thoughts of self-harm or harm to others

## 2025-06-09 NOTE — ASSESSMENT & PLAN NOTE
2-3 times per day  Reviewed with patient there is no known safe amount of marijuana use during pregnancy.  Encourage cessation

## 2025-06-09 NOTE — ASSESSMENT & PLAN NOTE
Pregravid BMI 35.61  Weight gain in pregnancy-Who BMI -recommend 11-20 lb . Healthy diet and daily exercise reviewed and encouraged

## 2025-06-09 NOTE — PROGRESS NOTES
Name: Shruti Camilo      : 1995      MRN: [id number]  Encounter Provider: SIMONE Restrepo  Encounter Date: 6/10/2025   Encounter department: [facility]  :  Assessment & Plan  13 weeks gestation of pregnancy  - Continue prenatal vitamin daily  - PNC follow-up scheduled 25  - Unit regimen reviewed visit every 4 weeks until 28 weeks, every 2 weeks until 36 weeks and then weekly until delivery.  Aware office delivers at [facility]. Reviewed depending on complaint and gestational age may recommend evaluation at [facility]  - Vaccines in Pregnancy      - TDAP vaccine after 27 gestational weeks     - RSV vaccine between 32-36.6 gestational weeks. September- January      - Reviewed with patient  Pregnancy with COVID infection is considered  high risk and can have poor outcome including  delivery, more severe infection.  therefore  pregnant patients are recommended to get  COVID-19 vaccination. Written information provided     - influenza October- March   -  Common discomforts of pregnancy and precautions reviewed.  Signs and symptoms to report reviewed.      Orders:    CBC and differential; Future    Alpha fetoprotein, maternal; Future    Chlamydia/GC amplified DNA by PCR    Obesity in pregnancy, antepartum  Pregravid BMI 35.61  Weight gain in pregnancy-Who BMI -recommend 11-20 lb . Healthy diet and daily exercise reviewed and encouraged         Short interval between pregnancies affecting pregnancy in first trimester, antepartum  24 repeat  @ term         Depression affecting pregnancy in first trimester, antepartum  Continue f/u with psychiatrist as scheduled.  They need to speak to her regarding medications  Encouraged to call office with worsening symptoms, thoughts of self-harm or harm to others         Previous  section complicating pregnancy  Planning repeat @ term         Leukocytosis, unspecified type  Reviewed elevated white  count on CBC  Recommend repeat in 2 to 4 weeks, prior to next appointment  Orders:    CBC and differential; Future    Medical marijuana use  2-3 times per day  Reviewed with patient there is no known safe amount of marijuana use during pregnancy.  Encourage cessation         Encounter for  screening for high alpha-fetoprotein level  materniT 21-results pending  Reviewed option for MSAFP.  Reviewed is a time sensitive test should be completed between 16-21.6 gestational weeks  Orders:    Alpha fetoprotein, maternal; Future    Screening examination for STI    Orders:    Chlamydia/GC amplified DNA by PCR    Nonimmune to hepatitis B virus   patient states she received booster last pregnancy         Anxiety in pregnancy in first trimester, antepartum  Continue f/u with psychiatrist as scheduled.  They need to speak to her regarding medications  Encouraged to call office with worsening symptoms, thoughts of self-harm or harm to others         RTO 4 weeks  f/u CBC     History of Present Illness   HPI  Shruti Camilo is a 30 y.o. female who presents Prenatal H&P     Denies loss of fluid, vaginal discharge, vaginal bleeding  and abdominal pain. Not feeling fetal movement. Tolerating PNV well.   Uses medical marijuana approximately 2-3 times per day.  Was seen in ED yesterday for suspected infectious diarrhea.  Prenatal panel reviewed significant for hepatitis B nonimmune and leukocytosis MaterniT 21 for genetic screening, results pending.  welcomed pregnancy.      Patient states she has been having a hard time with depression/anxiety.  Was brought off all her medications when she found out she was pregnant.  Has appointment with psychiatrist.  Is interested in discussing with her options for treatment.  Reviewed options for treatment at today's visit.    History obtained from: patient    Review of Systems   Constitutional:  Negative for chills and fever.   Respiratory: Negative.     Cardiovascular:  "Negative.      Medical History Reviewed by provider this encounter:  Tobacco  Allergies  Meds  Problems  Med Hx  Surg Hx  Fam Hx  Soc   Hx    .     Objective   /64   Wt 101 kg (222 lb 6.4 oz)   LMP 2025   BMI 37.01 kg/m²          OB history:     G1- 2015 IAB 9 weeks     G2-  IAB       G3- 21  term male 7lb 10.8oz; complicated by failure to progress     G4-  24 repeat  section term female 7 pounds 15 ounces; denies complications     G5- current     Dating:     LMP - 3/8/25 ARRON 25     US on 25 @  10w 2d ARRON 12/15/25  Working ARRON 25     Surgical history:     Past Surgical History[1]    Medical History:     Past Medical History[2]    Social history:     Alcohol/ tobacco/ illicit drug social alcohol use prior to positive pregnancy test/denies tobacco use/medical marijuana use 2-3 times per day/denies any other illicit drug use     RYANNE high risk               [1]   Past Surgical History:  Procedure Laterality Date    [place]  DELIVERY ONLY N/A 2021    Procedure:  SECTION ();  Surgeon: Charlie Hinojosa MD;  Location: [facility];  Service: Obstetrics    [place]  DELIVERY ONLY N/A 2024    Procedure:  SECTION ();  Surgeon: Charlie Hinojosa MD;  Location: [facility];  Service: Obstetrics    TONSILLECTOMY      WISDOM TOOTH EXTRACTION     [2]   Past Medical History:  Diagnosis Date    Abnormal Pap smear of cervix     Anxiety     Celiac disease     Depression     Gastroparesis     Marijuana use, continuous 07/10/2018    Last Assessment & Plan:  Shruti reports that she has less ability to use THC due to her step-daughter being in the house.  She reports that she has significantly decrease to twice/day.  Reviewed risks of routine daily use, she does not feel this is an issue & states \"I know my body\" & is not willing to quit at this point in time.    Migraine     Personality disorder (HCC)     Trauma     PTSD    Varicella  "    vaccine

## 2025-06-10 ENCOUNTER — INITIAL PRENATAL (OUTPATIENT)
Dept: OBGYN CLINIC | Facility: CLINIC | Age: 30
End: 2025-06-10
Payer: COMMERCIAL

## 2025-06-10 VITALS — BODY MASS INDEX: 37.01 KG/M2 | SYSTOLIC BLOOD PRESSURE: 100 MMHG | DIASTOLIC BLOOD PRESSURE: 64 MMHG | WEIGHT: 222.4 LBS

## 2025-06-10 DIAGNOSIS — Z11.3 SCREENING EXAMINATION FOR STI: ICD-10-CM

## 2025-06-10 DIAGNOSIS — F41.9 ANXIETY IN PREGNANCY IN FIRST TRIMESTER, ANTEPARTUM: ICD-10-CM

## 2025-06-10 DIAGNOSIS — O99.210 OBESITY IN PREGNANCY, ANTEPARTUM: Primary | ICD-10-CM

## 2025-06-10 DIAGNOSIS — F32.A DEPRESSION AFFECTING PREGNANCY IN FIRST TRIMESTER, ANTEPARTUM: ICD-10-CM

## 2025-06-10 DIAGNOSIS — O09.891 SHORT INTERVAL BETWEEN PREGNANCIES AFFECTING PREGNANCY IN FIRST TRIMESTER, ANTEPARTUM: ICD-10-CM

## 2025-06-10 DIAGNOSIS — Z78.9 NONIMMUNE TO HEPATITIS B VIRUS: ICD-10-CM

## 2025-06-10 DIAGNOSIS — D72.829 LEUKOCYTOSIS, UNSPECIFIED TYPE: ICD-10-CM

## 2025-06-10 DIAGNOSIS — O99.341 ANXIETY IN PREGNANCY IN FIRST TRIMESTER, ANTEPARTUM: ICD-10-CM

## 2025-06-10 DIAGNOSIS — Z36.1 ENCOUNTER FOR ANTENATAL SCREENING FOR HIGH ALPHA-FETOPROTEIN LEVEL: ICD-10-CM

## 2025-06-10 DIAGNOSIS — Z79.899 MEDICAL MARIJUANA USE: ICD-10-CM

## 2025-06-10 DIAGNOSIS — Z3A.13 13 WEEKS GESTATION OF PREGNANCY: ICD-10-CM

## 2025-06-10 DIAGNOSIS — O99.341 DEPRESSION AFFECTING PREGNANCY IN FIRST TRIMESTER, ANTEPARTUM: ICD-10-CM

## 2025-06-10 DIAGNOSIS — O34.219 PREVIOUS CESAREAN SECTION COMPLICATING PREGNANCY: ICD-10-CM

## 2025-06-10 LAB
CFDNA.FET/CFDNA.TOTAL SFR FETUS: NORMAL %
CFDNA.FET/CFDNA.TOTAL SFR FETUS: NORMAL %
CITATION REF LAB TEST: NORMAL
FET 13+18+21+X+Y ANEUP PLAS.CFDNA: NEGATIVE
FET CHR 21 TS PLAS.CFDNA QL: NEGATIVE
FET CHR 21 TS PLAS.CFDNA QL: NEGATIVE
FET MS X RISK WBC.DNA+CFDNA QL: NOT DETECTED
FET SEX PLAS.CFDNA DOSAGE CFDNA: NORMAL
FET TS 13 RISK PLAS.CFDNA QL: NEGATIVE
FET X + Y ANEUP RISK PLAS.CFDNA SEQ-IMP: NOT DETECTED
GA EST FROM CONCEPTION DATE: NORMAL D
GESTATIONAL AGE > 9:: YES
LAB DIRECTOR NAME PROVIDER: NORMAL
LABORATORY COMMENT REPORT: NORMAL
LIMITATIONS OF THE TEST: NORMAL
NEGATIVE PREDICTIVE VALUE: NORMAL
PERFORMANCE CHARACTERISTICS: NORMAL
POSITIVE PREDICTIVE VALUE: NORMAL
REF LAB TEST METHOD: NORMAL
SL AMB NOTE:: NORMAL
TEST PERFORMANCE INFO SPEC: NORMAL

## 2025-06-10 PROCEDURE — 99213 OFFICE O/P EST LOW 20 MIN: CPT | Performed by: NURSE PRACTITIONER

## 2025-06-10 PROCEDURE — 87591 N.GONORRHOEAE DNA AMP PROB: CPT | Performed by: NURSE PRACTITIONER

## 2025-06-10 PROCEDURE — 87491 CHLMYD TRACH DNA AMP PROBE: CPT | Performed by: NURSE PRACTITIONER

## 2025-06-10 NOTE — LETTER
Olivia 10, 2025     Patient: Shruti Camilo  YOB: 1995  Date of Visit: 6/10/2025      To Whom it May Concern:    Shruti Camilo is under my professional care. Shruti was seen in my office on 6/10/2025. Shruti may return to work on 6/10/25.  ARRON 12/13/25    If you have any questions or concerns, please don't hesitate to call.         Sincerely,          SIMONE Restrepo        CC: No Recipients

## 2025-06-11 ENCOUNTER — RESULTS FOLLOW-UP (OUTPATIENT)
Facility: HOSPITAL | Age: 30
End: 2025-06-11

## 2025-06-11 ENCOUNTER — RESULTS FOLLOW-UP (OUTPATIENT)
Dept: OBGYN CLINIC | Facility: MEDICAL CENTER | Age: 30
End: 2025-06-11

## 2025-06-11 LAB
C TRACH DNA SPEC QL NAA+PROBE: NEGATIVE
N GONORRHOEA DNA SPEC QL NAA+PROBE: NEGATIVE

## 2025-06-13 ENCOUNTER — TELEPHONE (OUTPATIENT)
Age: 30
End: 2025-06-13

## 2025-06-13 DIAGNOSIS — Z3A.13 13 WEEKS GESTATION OF PREGNANCY: Primary | ICD-10-CM

## 2025-06-13 DIAGNOSIS — O99.891 BACK PAIN IN PREGNANCY: ICD-10-CM

## 2025-06-13 DIAGNOSIS — M54.9 BACK PAIN IN PREGNANCY: ICD-10-CM

## 2025-06-13 RX ORDER — PNV NO.95/FERROUS FUM/FOLIC AC 28MG-0.8MG
1 TABLET ORAL DAILY
Qty: 30 TABLET | Refills: 6 | Status: SHIPPED | OUTPATIENT
Start: 2025-06-13

## 2025-06-13 RX ORDER — PSEUDOEPHED/ACETAMINOPH/DIPHEN 30MG-500MG
1 TABLET ORAL EVERY 6 HOURS PRN
Qty: 60 TABLET | Refills: 1 | Status: SHIPPED | OUTPATIENT
Start: 2025-06-13

## 2025-06-13 NOTE — TELEPHONE ENCOUNTER
PA for Prenatal Vitamins 28-0.8mg SUBMITTED to [employer]     via    []CMM-KEY:   []Surescripts-Case ID # [id number]  []Availity-Auth ID # NDC #   []Faxed to plan   []Other website   []Phone call Case ID #     []PA sent as URGENT    All office notes, labs and other pertaining documents and studies sent. Clinical questions answered. Awaiting determination from insurance company.     Turnaround time for your insurance to make a decision on your Prior Authorization can take 7-21 business days.

## 2025-06-13 NOTE — TELEPHONE ENCOUNTER
Patient seen by psych today. They are willing to prescribe either, Zoloft, Buspirone, or doxepin. They would like OB opinion on which option would be suited best with pregnancy. Patient would like whichever would be best with anxiety.    Patient also requesting rx for tylenol and prenatal vitamins. She states her insurance covers these OTC.    Routing to OB provider who last saw patient.

## 2025-06-16 NOTE — TELEPHONE ENCOUNTER
PA for Prenatal Vitamin 28-0.8mg  DENIED    Reason:(Screenshot if applicable)        Message sent to office clinical pool Yes    Denial letter scanned into Media Yes    We can gladly do an appeal but the process can take about 30-60 days to provide determination. Please have the office staff schedule a Peer to Peer at phone Please see denial letter  . If an appeal is truly warranted please have Provider send clinical documentation to the PA department to support the appeal.     **Please follow up with your patient regarding denial and next steps**

## 2025-06-23 ENCOUNTER — APPOINTMENT (OUTPATIENT)
Dept: LAB | Facility: CLINIC | Age: 30
End: 2025-06-23
Attending: NURSE PRACTITIONER
Payer: COMMERCIAL

## 2025-06-23 ENCOUNTER — OFFICE VISIT (OUTPATIENT)
Dept: URGENT CARE | Facility: CLINIC | Age: 30
End: 2025-06-23
Payer: COMMERCIAL

## 2025-06-23 VITALS
HEART RATE: 86 BPM | OXYGEN SATURATION: 98 % | SYSTOLIC BLOOD PRESSURE: 108 MMHG | DIASTOLIC BLOOD PRESSURE: 68 MMHG | TEMPERATURE: 97.9 F | RESPIRATION RATE: 18 BRPM

## 2025-06-23 DIAGNOSIS — D72.829 LEUKOCYTOSIS, UNSPECIFIED TYPE: ICD-10-CM

## 2025-06-23 DIAGNOSIS — Z3A.13 13 WEEKS GESTATION OF PREGNANCY: ICD-10-CM

## 2025-06-23 DIAGNOSIS — R09.81 NASAL CONGESTION: Primary | ICD-10-CM

## 2025-06-23 DIAGNOSIS — Z36.1 ENCOUNTER FOR ANTENATAL SCREENING FOR HIGH ALPHA-FETOPROTEIN LEVEL: ICD-10-CM

## 2025-06-23 LAB
BASOPHILS # BLD AUTO: 0.03 THOUSANDS/ÂΜL (ref 0–0.1)
BASOPHILS NFR BLD AUTO: 0 % (ref 0–1)
EOSINOPHIL # BLD AUTO: 0.11 THOUSAND/ÂΜL (ref 0–0.61)
EOSINOPHIL NFR BLD AUTO: 1 % (ref 0–6)
ERYTHROCYTE [DISTWIDTH] IN BLOOD BY AUTOMATED COUNT: 13.2 % (ref 11.6–15.1)
HCT VFR BLD AUTO: 35.1 % (ref 34.8–46.1)
HGB BLD-MCNC: 11.9 G/DL (ref 11.5–15.4)
IMM GRANULOCYTES # BLD AUTO: 0.06 THOUSAND/UL (ref 0–0.2)
IMM GRANULOCYTES NFR BLD AUTO: 1 % (ref 0–2)
LYMPHOCYTES # BLD AUTO: 2.24 THOUSANDS/ÂΜL (ref 0.6–4.47)
LYMPHOCYTES NFR BLD AUTO: 17 % (ref 14–44)
MCH RBC QN AUTO: 30.6 PG (ref 26.8–34.3)
MCHC RBC AUTO-ENTMCNC: 33.9 G/DL (ref 31.4–37.4)
MCV RBC AUTO: 90 FL (ref 82–98)
MONOCYTES # BLD AUTO: 0.52 THOUSAND/ÂΜL (ref 0.17–1.22)
MONOCYTES NFR BLD AUTO: 4 % (ref 4–12)
NEUTROPHILS # BLD AUTO: 10.12 THOUSANDS/ÂΜL (ref 1.85–7.62)
NEUTS SEG NFR BLD AUTO: 77 % (ref 43–75)
NRBC BLD AUTO-RTO: 0 /100 WBCS
PLATELET # BLD AUTO: 256 THOUSANDS/UL (ref 149–390)
PMV BLD AUTO: 11.2 FL (ref 8.9–12.7)
RBC # BLD AUTO: 3.89 MILLION/UL (ref 3.81–5.12)
WBC # BLD AUTO: 13.08 THOUSAND/UL (ref 4.31–10.16)

## 2025-06-23 PROCEDURE — 36415 COLL VENOUS BLD VENIPUNCTURE: CPT

## 2025-06-23 PROCEDURE — 99213 OFFICE O/P EST LOW 20 MIN: CPT

## 2025-06-23 PROCEDURE — 85025 COMPLETE CBC W/AUTO DIFF WBC: CPT

## 2025-06-23 PROCEDURE — 82105 ALPHA-FETOPROTEIN SERUM: CPT

## 2025-06-23 NOTE — PROGRESS NOTES
[facility]  Name: Shruti Camilo      : 1995      MRN: [id number]  Encounter Provider: Shane Coker PA-C  Encounter Date: 2025   Encounter department: [facility] [place]  :  Assessment & Plan  Nasal congestion    Orders:    dextromethorphan-guaifenesin (MUCINEX DM)  MG per 12 hr tablet; Take 1 tablet by mouth every 12 (twelve) hours    Discussed with patient that symptoms appear viral in nature, no concerning findings for bacterial infection on my exam.  Will treat symptomatically with Mucinex DM, advised continuation of Tylenol as needed for headaches.  Discussed typical timeline and symptoms of acute bacterial sinusitis and when to return for further evaluation.      Patient Instructions  Mucinex DM as directed.  Increase fluid intake  Monitor for fevers, chills, eye drainage, ear pain.    Follow up with PCP in 3-5 days.  Proceed to  ER if symptoms worsen.    If tests are performed, our office will contact you with results only if changes need to made to the care plan discussed with you at the visit. You can review your full results on [facility].    Chief Complaint:   Chief Complaint   Patient presents with    Nasal Congestion     Itchy ears started 3 days     History of Present Illness   3-year-old female, 15 weeks pregnant, presenting with 3 days of nasal congestion, headache, itchy ears.  Patient does note a history of recurrent sinus infections when pregnant with her first child, also notes history of eczema in her ears causing itching.  She currently denying ear discharge, ear pain, fevers, chills, cough, sore throat, shortness of breath, GI symptoms.  Patient has only been taking Tylenol at home with mild relief of her headache.          Review of Systems   Constitutional:  Negative for chills and fever.   HENT:  Positive for congestion. Negative for ear discharge, ear pain and sore throat.    Eyes:  Negative for pain and visual disturbance.    Respiratory:  Negative for cough, chest tightness, shortness of breath and wheezing.    Cardiovascular:  Negative for chest pain and palpitations.   Gastrointestinal:  Negative for abdominal pain, diarrhea, nausea and vomiting.   Genitourinary:  Negative for dysuria and hematuria.   Musculoskeletal:  Negative for arthralgias, back pain and myalgias.   Skin:  Negative for color change and rash.   Neurological:  Positive for headaches. Negative for seizures and syncope.   All other systems reviewed and are negative.    Past Medical History   Past Medical History[1]  Past Surgical History[2]  Family History[3]  she reports that she quit smoking about 5 years ago. Her smoking use included cigarettes. She started smoking about 13 years ago. She has a 2 pack-year smoking history. She has never used smokeless tobacco. She reports that she does not currently use alcohol. She reports current drug use. Frequency: 7.00 times per week. Drug: Marijuana.  Current Outpatient Medications   Medication Instructions    Acetaminophen Extra Strength 500 MG TABS 1 tablet, Oral, Every 6 hours PRN    diphenhydrAMINE (BENADRYL) 25 mg capsule No dose, route, or frequency recorded.    fluticasone (FLONASE) 50 mcg/act nasal spray 2 sprays, Nasal, Daily    hydrocortisone 2.5 % cream Apply topically    Ketotifen Fumarate (ZADITOR) 0.035 % ophthalmic solution     ondansetron (ZOFRAN-ODT) 4 mg, Oral, Every 8 hours PRN    Prenatal Vit-Fe Fumarate-FA (prenatal vitamin) 28-0.8 mg 1 tablet, Oral, Daily    pyridoxine (B-6) 100 mg, Oral, 2 times daily    ZyrTEC Allergy 10 mg, Daily   Allergies[4]     Objective   /68   Pulse 86   Temp 97.9 °F (36.6 °C)   Resp 18   LMP 03/08/2025   SpO2 98%      Physical Exam  Vitals and nursing note reviewed.   Constitutional:       General: She is not in acute distress.     Appearance: Normal appearance. She is well-developed and normal weight. She is not ill-appearing or toxic-appearing.   HENT:      Head:  "Normocephalic and atraumatic.      Right Ear: Tympanic membrane, ear canal and external ear normal. There is no impacted cerumen.      Left Ear: Tympanic membrane, ear canal and external ear normal. There is no impacted cerumen.      Nose: Congestion present. No mucosal edema or rhinorrhea.      Right Sinus: No maxillary sinus tenderness or frontal sinus tenderness.      Left Sinus: No maxillary sinus tenderness or frontal sinus tenderness.      Mouth/Throat:      Mouth: Mucous membranes are moist.      Pharynx: Oropharynx is clear. No posterior oropharyngeal erythema.     Eyes:      General:         Right eye: No discharge.         Left eye: No discharge.      Conjunctiva/sclera: Conjunctivae normal.       Cardiovascular:      Rate and Rhythm: Normal rate and regular rhythm.      Heart sounds: No murmur heard.  Pulmonary:      Effort: Pulmonary effort is normal. No respiratory distress.      Breath sounds: Normal breath sounds.   Abdominal:      Palpations: Abdomen is soft.      Tenderness: There is no abdominal tenderness.     Musculoskeletal:         General: No swelling.      Cervical back: Neck supple.     Skin:     General: Skin is warm and dry.      Capillary Refill: Capillary refill takes less than 2 seconds.     Neurological:      Mental Status: She is alert.     Psychiatric:         Mood and Affect: Mood normal.         Portions of the record may have been created with voice recognition software.  Occasional wrong word or \"sound a like\" substitutions may have occurred due to the inherent limitations of voice recognition software.  Read the chart carefully and recognize, using context, where substitutions have occurred.         [1]   Past Medical History:  Diagnosis Date    Abnormal Pap smear of cervix     Anxiety     Celiac disease     Depression     Gastroparesis     Marijuana use, continuous 07/10/2018    Last Assessment & Plan:  Shruti reports that she has less ability to use THC due to her " "step-daughter being in the house.  She reports that she has significantly decrease to twice/day.  Reviewed risks of routine daily use, she does not feel this is an issue & states \"I know my body\" & is not willing to quit at this point in time.    Migraine     Personality disorder (HCC)     Trauma     PTSD    Varicella     vaccine   [2]   Past Surgical History:  Procedure Laterality Date    [place]  DELIVERY ONLY N/A 2021    Procedure:  SECTION ();  Surgeon: Charlie Hinojosa MD;  Location: [place] ;  Service: Obstetrics    [place]  DELIVERY ONLY N/A 2024    Procedure:  SECTION ();  Surgeon: Charlie Hinojosa MD;  Location: [facility];  Service: Obstetrics    TONSILLECTOMY      WISDOM TOOTH EXTRACTION     [3]   Family History  Problem Relation Name Age of Onset    Other Mother Nael         mental health issues    Psoriasis Mother Nael     Heart disease Mother Nael     No Known Problems Father      Scoliosis Brother      No Known Problems Son      Heart disease Maternal Grandmother Maria D     Thrombophlebitis Maternal Grandmother Maria D     Heart disease Maternal Grandfather Rodney     Heart attack Maternal Grandfather Rodney     Obesity Maternal Grandfather Rodney     Stomach cancer Paternal Grandmother      Bone cancer Paternal Grandfather      No Known Problems Daughter      Breast cancer Neg Hx      Colon cancer Neg Hx      Ovarian cancer Neg Hx     [4]   Allergies  Allergen Reactions    Apple - Food Allergy     Gluten Meal - Food Allergy Vomiting    Other Other (See Comments)     Enviromental allergies    apples    Enviromental allergies      Other reaction(s): Other (See Comments)      apples    Vaccinium Angustifolium Itching     Berries      "

## 2025-06-23 NOTE — PATIENT INSTRUCTIONS
Mucinex DM as directed.  Increase fluid intake  Monitor for fevers, chills, eye drainage, ear pain.

## 2025-06-24 DIAGNOSIS — O21.9 NAUSEA AND VOMITING IN PREGNANCY: Primary | ICD-10-CM

## 2025-06-24 DIAGNOSIS — Z3A.15 15 WEEKS GESTATION OF PREGNANCY: ICD-10-CM

## 2025-06-24 DIAGNOSIS — O21.9 NAUSEA AND VOMITING IN PREGNANCY: ICD-10-CM

## 2025-06-24 RX ORDER — ONDANSETRON 4 MG/1
4 TABLET, ORALLY DISINTEGRATING ORAL EVERY 8 HOURS PRN
Qty: 90 TABLET | Refills: 1 | Status: CANCELLED | OUTPATIENT
Start: 2025-06-24

## 2025-06-24 RX ORDER — ONDANSETRON 4 MG/1
4 TABLET, ORALLY DISINTEGRATING ORAL EVERY 8 HOURS PRN
Qty: 30 TABLET | Refills: 0 | Status: SHIPPED | OUTPATIENT
Start: 2025-06-24

## 2025-06-25 ENCOUNTER — NURSE TRIAGE (OUTPATIENT)
Age: 30
End: 2025-06-25

## 2025-06-25 DIAGNOSIS — G47.00 INSOMNIA, UNSPECIFIED TYPE: Primary | ICD-10-CM

## 2025-06-25 RX ORDER — ZOLPIDEM TARTRATE 5 MG/1
5 TABLET ORAL
Qty: 2 TABLET | Refills: 0 | Status: SHIPPED | OUTPATIENT
Start: 2025-06-25

## 2025-06-25 NOTE — TELEPHONE ENCOUNTER
Returned call to patient, patient agreeable to try Ambien. Reviewed pharmacy on file is [facility] in [place].

## 2025-06-25 NOTE — TELEPHONE ENCOUNTER
"REASON FOR CONVERSATION: Fatigue    SYMPTOMS: insomnia and extreme fatigue    OTHER HEALTH INFORMATION: Patient calling regarding symptoms of depression, extreme fatigue, anxiety and insomnia. Denies SI/HI Is under care of psychiatrist, but appointment is this Friday. Patient is wondering if provider can prescribe something to help her sleep the next few nights until psych appt. Was previously on xanax or trazadone--but patient states she will take whatever recs provider can prescribe. No relief from tylenol PM, benadryl, melatonin or unisom.      PROTOCOL DISPOSITION: No disposition on file.    CARE ADVICE PROVIDED: melatonin, unisom    PRACTICE FOLLOW-UP: n/a        Reason for Disposition   MODERATE - SEVERE insomnia (e.g., interferes with work or school)    Answer Assessment - Initial Assessment Questions  1. DESCRIPTION: \"Tell me about your sleeping problem.\"       Not able to sleep, having nightmares  2. ONSET: \"How long have you been having trouble sleeping?\" (e.g., days, weeks, months)      weeks  3. RECURRENT: \"Have you had sleeping problems before?\"  If Yes, ask: \"What happened that time?\" \"What helped your sleeping problem go away in the past?\"       Yes--was on sleeping meds and anxiety meds. Had to stop cold turkey for pregnancy  4. STRESS: \"Is there anything in your life that is making you feel stressed or tense?\"      Yes--young kids and new pregnancy  8. OTHER SYMPTOMS: \"Do you have any other symptoms?\"  (e.g., difficulty breathing)      Extreme fatigue, anxiety, depression    Protocols used: Insomnia-Adult-[place]    "

## 2025-06-26 LAB
2ND TRIMESTER 4 SCREEN SERPL-IMP: NORMAL
AFP ADJ MOM SERPL: 1.73
AFP INTERP AMN-IMP: NORMAL
AFP INTERP SERPL-IMP: NORMAL
AFP INTERP SERPL-IMP: NORMAL
AFP SERPL-MCNC: 41.1 NG/ML
AGE AT DELIVERY: 30.5 YR
GA METHOD: NORMAL
GA: 15.3 WEEKS
IDDM PATIENT QL: NO
MULTIPLE PREGNANCY: NO
NEURAL TUBE DEFECT RISK FETUS: 1514 %

## 2025-06-27 NOTE — TELEPHONE ENCOUNTER
Patient states she discussed Xanax prescription with psychiatrist to use as needed. Patient states psychiatrist is hesitant to prescribe Xanax, however will do so with approval from OBGYN provider. Patient states psychiatrist's plan is to prescribe sparingly for the pregnancy if approved by OBGYN provider. Patient states she really needs Xanax prescription for symptoms. Patient is unsure of psychiatrist's plan for dosage or instruction.     Patient also asking for release of medical records be sent to psychiatrist with ATTN: Vivi to fax # [phone]. Informed patient release of records form should be completed via online form on [employer] website. Form sent to patient via [employer] message.

## 2025-07-01 DIAGNOSIS — O21.9 NAUSEA AND VOMITING IN PREGNANCY: ICD-10-CM

## 2025-07-01 DIAGNOSIS — Z3A.15 15 WEEKS GESTATION OF PREGNANCY: ICD-10-CM

## 2025-07-01 DIAGNOSIS — L30.9 DERMATITIS: Primary | ICD-10-CM

## 2025-07-01 RX ORDER — ONDANSETRON 4 MG/1
4 TABLET, ORALLY DISINTEGRATING ORAL EVERY 8 HOURS PRN
Qty: 30 TABLET | Refills: 0 | Status: SHIPPED | OUTPATIENT
Start: 2025-07-01 | End: 2025-07-08 | Stop reason: SDUPTHER

## 2025-07-01 RX ORDER — HYDROCORTISONE 25 MG/G
CREAM TOPICAL 2 TIMES DAILY
Qty: 20 G | Refills: 1 | Status: SHIPPED | OUTPATIENT
Start: 2025-07-01

## 2025-07-02 NOTE — TELEPHONE ENCOUNTER
Patient requests that letter from provider with recs to psychiatrist be faxed to psychiatrist's office. Letter faxed per request.

## 2025-07-07 PROBLEM — Z3A.17 17 WEEKS GESTATION OF PREGNANCY: Status: ACTIVE | Noted: 2025-06-09

## 2025-07-07 PROBLEM — O99.342 DEPRESSION COMPLICATING PREGNANCY IN SECOND TRIMESTER, ANTEPARTUM: Status: ACTIVE | Noted: 2025-06-09

## 2025-07-07 NOTE — PROGRESS NOTES
Name: Shruti Camilo      : 1995      MRN: [id number]  Encounter Provider: SIMONE Restrepo  Encounter Date: 2025   Encounter department: [facility]  :  Assessment & Plan  17 weeks gestation of pregnancy  Continue prenatal vitamin daily  Follow-up with  center- 25  Common discomforts of pregnancy precautions reviewed.  Signs and symptoms to report reviewed    Orders:    Urine culture    POCT urine dip    cyproheptadine (PERIACTIN) 4 mg tablet; Take 1 tablet (4 mg total) by mouth 3 (three) times a day as needed for allergies    ondansetron (ZOFRAN-ODT) 4 mg disintegrating tablet; Take 1 tablet (4 mg total) by mouth every 8 (eight) hours as needed for nausea or vomiting    Depression complicating pregnancy in second trimester, antepartum  Psychiatrist Follow up Friday; waiting to be assigned a new therapist  Current regimen Zoloft 50 mg daily; xanax 1mg PRN          Obesity in pregnancy, antepartum  Pregravid BMI 35.61           Short interval between pregnancies affecting pregnancy in first trimester, antepartum  21 primary  failure to progress  24 repeat    Planning repeat  @ term           Previous  section complicating pregnancy  Planning repeat @ term           Medical marijuana use  Nightly to help with sleep   Reviewed with patient there is no known safe amount of marijuana use during pregnancy.  Encourage cessation           Nonimmune to hepatitis B virus   patient states she received booster last pregnancy           Dysuria during pregnancy in second trimester  Urine dip negative in office.  Will send culture and treat based on results.  Encouraged increased hydration   Orders:    Urine culture    POCT urine dip    Nausea and vomiting in pregnancy  Refill sent per pt request   Orders:    ondansetron (ZOFRAN-ODT) 4 mg disintegrating tablet; Take 1 tablet (4 mg total) by mouth every 8 (eight)  hours as needed for nausea or vomiting    Migraine without aura and without status migrainosus, not intractable  Reviewed options including cyproheptadine, Reglan, Excedrin tension.  Patient says Reglan makes her hyper and more anxious.  Would like to try cyproheptadine prophylaxis  Orders:    cyproheptadine (PERIACTIN) 4 mg tablet; Take 1 tablet (4 mg total) by mouth 3 (three) times a day as needed for allergies    RTO 4 weeks     History of Present Illness   HPI  Shruti Camilo is a 30 y.o. female who presents for PNV      Denies loss of fluid, vaginal bleeding and abdominal pain.  Not feeling  fetal movement.   Tolerating prenatal vitamin, Zoloft, Zofran and Xanax well.  Is requesting refill on Zofran.  Has still been having difficulty with insomnia.  Will sleep 4 to 5 hours a night then wake up and be very anxious.  Follow-up with psychiatry on Friday.  Is waiting for a new therapist.  Continues to use marijuana nightly to help with sleeping.  Is also concerned with burning with urination over the past few days.  Admits to some frequency denies urgency.  Denies fever, chills or CVA tenderness.      History obtained from: patient    Review of Systems   Constitutional:  Negative for chills and fever.   Respiratory: Negative.     Cardiovascular: Negative.    Genitourinary: Negative.    Psychiatric/Behavioral:  Positive for dysphoric mood and sleep disturbance. The patient is nervous/anxious.      Medical History Reviewed by provider this encounter:  Allergies  Problems     .     Objective   /68   Wt 102 kg (225 lb)   LMP 03/08/2025   BMI 37.44 kg/m²      Physical Exam  Vitals reviewed.   Constitutional:       Appearance: Normal appearance.   Pulmonary:      Effort: Pulmonary effort is normal.     Neurological:      Mental Status: She is alert and oriented to person, place, and time.     Psychiatric:         Mood and Affect: Mood normal.         Behavior: Behavior normal.

## 2025-07-07 NOTE — ASSESSMENT & PLAN NOTE
Continue prenatal vitamin daily  Follow-up with  center- 25  Common discomforts of pregnancy precautions reviewed.  Signs and symptoms to report reviewed    Orders:    Urine culture    POCT urine dip    cyproheptadine (PERIACTIN) 4 mg tablet; Take 1 tablet (4 mg total) by mouth 3 (three) times a day as needed for allergies    ondansetron (ZOFRAN-ODT) 4 mg disintegrating tablet; Take 1 tablet (4 mg total) by mouth every 8 (eight) hours as needed for nausea or vomiting

## 2025-07-07 NOTE — ASSESSMENT & PLAN NOTE
Nightly to help with sleep   Reviewed with patient there is no known safe amount of marijuana use during pregnancy.  Encourage cessation

## 2025-07-07 NOTE — ASSESSMENT & PLAN NOTE
Psychiatrist Follow up Friday; waiting to be assigned a new therapist  Current regimen Zoloft 50 mg daily; xanax 1mg PRN

## 2025-07-08 ENCOUNTER — ROUTINE PRENATAL (OUTPATIENT)
Dept: OBGYN CLINIC | Facility: CLINIC | Age: 30
End: 2025-07-08
Payer: COMMERCIAL

## 2025-07-08 VITALS — SYSTOLIC BLOOD PRESSURE: 116 MMHG | WEIGHT: 225 LBS | DIASTOLIC BLOOD PRESSURE: 68 MMHG | BODY MASS INDEX: 37.44 KG/M2

## 2025-07-08 DIAGNOSIS — F32.A DEPRESSION COMPLICATING PREGNANCY IN SECOND TRIMESTER, ANTEPARTUM: Primary | ICD-10-CM

## 2025-07-08 DIAGNOSIS — Z79.899 MEDICAL MARIJUANA USE: ICD-10-CM

## 2025-07-08 DIAGNOSIS — O21.9 NAUSEA AND VOMITING IN PREGNANCY: ICD-10-CM

## 2025-07-08 DIAGNOSIS — O34.219 PREVIOUS CESAREAN SECTION COMPLICATING PREGNANCY: ICD-10-CM

## 2025-07-08 DIAGNOSIS — O99.210 OBESITY IN PREGNANCY, ANTEPARTUM: ICD-10-CM

## 2025-07-08 DIAGNOSIS — O26.892 DYSURIA DURING PREGNANCY IN SECOND TRIMESTER: ICD-10-CM

## 2025-07-08 DIAGNOSIS — R30.0 DYSURIA DURING PREGNANCY IN SECOND TRIMESTER: ICD-10-CM

## 2025-07-08 DIAGNOSIS — O09.891 SHORT INTERVAL BETWEEN PREGNANCIES AFFECTING PREGNANCY IN FIRST TRIMESTER, ANTEPARTUM: ICD-10-CM

## 2025-07-08 DIAGNOSIS — O99.342 DEPRESSION COMPLICATING PREGNANCY IN SECOND TRIMESTER, ANTEPARTUM: Primary | ICD-10-CM

## 2025-07-08 DIAGNOSIS — G43.009 MIGRAINE WITHOUT AURA AND WITHOUT STATUS MIGRAINOSUS, NOT INTRACTABLE: ICD-10-CM

## 2025-07-08 DIAGNOSIS — Z3A.17 17 WEEKS GESTATION OF PREGNANCY: ICD-10-CM

## 2025-07-08 DIAGNOSIS — Z78.9 NONIMMUNE TO HEPATITIS B VIRUS: ICD-10-CM

## 2025-07-08 LAB
SL AMB  POCT GLUCOSE, UA: NEGATIVE
SL AMB LEUKOCYTE ESTERASE,UA: NEGATIVE
SL AMB POCT BILIRUBIN,UA: NEGATIVE
SL AMB POCT BLOOD,UA: NEGATIVE
SL AMB POCT CLARITY,UA: CLEAR
SL AMB POCT COLOR,UA: YELLOW
SL AMB POCT KETONES,UA: NEGATIVE
SL AMB POCT PH,UA: 7.5
SL AMB POCT SPECIFIC GRAVITY,UA: NEGATIVE
SL AMB POCT URINE PROTEIN: NEGATIVE
SL AMB POCT UROBILINOGEN: NEGATIVE

## 2025-07-08 PROCEDURE — 99213 OFFICE O/P EST LOW 20 MIN: CPT | Performed by: NURSE PRACTITIONER

## 2025-07-08 PROCEDURE — 81002 URINALYSIS NONAUTO W/O SCOPE: CPT | Performed by: NURSE PRACTITIONER

## 2025-07-08 PROCEDURE — 87086 URINE CULTURE/COLONY COUNT: CPT | Performed by: NURSE PRACTITIONER

## 2025-07-08 RX ORDER — CYPROHEPTADINE HYDROCHLORIDE 4 MG/1
4 TABLET ORAL 3 TIMES DAILY PRN
Qty: 30 TABLET | Refills: 0 | Status: SHIPPED | OUTPATIENT
Start: 2025-07-08

## 2025-07-08 RX ORDER — ONDANSETRON 4 MG/1
4 TABLET, ORALLY DISINTEGRATING ORAL EVERY 8 HOURS PRN
Qty: 90 TABLET | Refills: 0 | Status: SHIPPED | OUTPATIENT
Start: 2025-07-08

## 2025-07-08 RX ORDER — ALPRAZOLAM 1 MG/1
1 TABLET ORAL 2 TIMES DAILY PRN
COMMUNITY
Start: 2025-06-03

## 2025-07-08 NOTE — ASSESSMENT & PLAN NOTE
Reviewed options including cyproheptadine, Reglan, Excedrin tension.  Patient says Reglan makes her hyper and more anxious.  Would like to try cyproheptadine prophylaxis  Orders:    cyproheptadine (PERIACTIN) 4 mg tablet; Take 1 tablet (4 mg total) by mouth 3 (three) times a day as needed for allergies

## 2025-07-09 ENCOUNTER — TELEPHONE (OUTPATIENT)
Age: 30
End: 2025-07-09

## 2025-07-09 LAB — BACTERIA UR CULT: NORMAL

## 2025-07-09 NOTE — TELEPHONE ENCOUNTER
"Patient reports she gave the letter from Jessica to her psychiatrist and she reports \"They think she sounds hesitant\", regarding allowing her to take certain medications. Patient is requesting Jessica write another letter and place it in her chart.  "

## 2025-07-10 ENCOUNTER — TELEPHONE (OUTPATIENT)
Dept: OBGYN CLINIC | Facility: CLINIC | Age: 30
End: 2025-07-10

## 2025-07-10 NOTE — TELEPHONE ENCOUNTER
Overall how are you doing? Patient states she is doing well.    Compliant with routine OB care appointments? Yes    Have you completed your 1st trimester labs? Yes    If you had NIPS with MFM, do you have a order for MSAFP? MSAFP has been completed.   Can be completed 15w-21w6d, ideally 16w-18w    Have you seen MFM and do you have your detailed US scheduled? Yes    Pregnancy Education-have you had a chance to review the classes offered and registered? Patient is not interested in prenatal classes at this time.

## 2025-07-24 ENCOUNTER — NURSE TRIAGE (OUTPATIENT)
Age: 30
End: 2025-07-24

## 2025-07-24 DIAGNOSIS — G43.809 OTHER MIGRAINE WITHOUT STATUS MIGRAINOSUS, NOT INTRACTABLE: Primary | ICD-10-CM

## 2025-07-24 RX ORDER — METOCLOPRAMIDE 5 MG/1
5 TABLET ORAL 3 TIMES DAILY PRN
Qty: 30 TABLET | Refills: 0 | Status: SHIPPED | OUTPATIENT
Start: 2025-07-24

## 2025-07-24 NOTE — TELEPHONE ENCOUNTER
"REASON FOR CONVERSATION: Pregnancy Problem    SYMPTOMS: 19w5d OB has not felt fetal movement yet  Cluster headaches     OTHER HEALTH INFORMATION: Anterior placenta on ultrasound    Headache constant pulsating sensation behind eye.     History headaches at baseline and in pregnancy     Takes 1000mg tylenol every 8 hours and cyproheptadine nightly.    Headache starts the day at 3/10 and is 10/10 by the evening.     ESC to Jessica Hobson on call. Orders placed for neurology consult and reglan. Can take excedrin tension as needed.     Anterior placenta. would not expect regular fetal movement until 28 weeks and can start to feel movement anywhere from 16-24 weeks.    PROTOCOL DISPOSITION: Home Care with Provider Message (overriding See Within 3 Days in Office), Home Care with Provider Message (overriding See Within 3 Days in Office)    CARE ADVICE PROVIDED: Reviewed provider recommendations. Call back with worsening or unimproved symptoms     PRACTICE FOLLOW-UP: none.        Reason for Disposition   Pregnant 20 to 22 weeks and has not felt baby move yet   MODERATE headache (e.g., interferes with normal activities), present more than 24 hours, and unexplained  (Exceptions: Has not tried pain medicines, typical migraine, or headache part of viral illness.)    Answer Assessment - Initial Assessment Questions  1. LOCATION: \"Where does it hurt?\"       Right behind her eye     2. ONSET: \"When did the headache start?\" (e.g., minutes, hours or days)       Has been struggling with for a while this pregnancy     3. PATTERN: \"Does the pain come and go, or has it been constant since it started?\"      Constantly there but it is like a pulsating sensation    4. SEVERITY: \"How bad is the pain?\" and \"What does it keep you from doing?\"   3/10 in the morning, progressively intensifies throughout the day and is 10/10 by end of night    5. RECURRENT SYMPTOM: \"Have you ever had headaches before?\" If Yes, ask: \"When was the last time?\" and " "\"What happened that time?\"   Yes, history headache at baseline and with prior pregnancies. This pregnancy headache seem to be worse         6. CAUSE: \"What do you think is causing the headache?\"       Cluster headache     7. MIGRAINE: \"Have you been diagnosed with migraine headaches?\" If Yes, ask: \"Is this headache similar?\"       Providers state she has migraines but patient feels they are cluster headache    9. OTHER SYMPTOMS: \"Do you have any other symptoms?\" (e.g., abdomen pain, blurred vision, fever, stiff neck; swelling of hands, face, or feet)      Carpal tunnel in wrists. Some swelling in ankles and feet. Denies right upper quadrant, Swelling to face or hands    10. PREGNANCY: \"How many weeks pregnant are you?\"        *No Answer*  11. ARRON: \"What date are you expecting to deliver?\"        *No Answer*    Answer Assessment - Initial Assessment Questions  1. FETAL MOVEMENT: \"Has the baby's movement decreased or changed significantly from normal?\" (e.g., yes, no; describe) \"When was the last time you felt the baby move?\" (e.g., minutes, hours)      20 weeks pregnant and has not felt any movement yet    2. ARRON: \"What date are you expecting to deliver?\"       12/13/2025 per chart review    3. PREGNANCY: \"How many weeks pregnant are you?\" \"How has the pregnancy been going?\"      19w5 per chart review    4. OTHER SYMPTOMS: \"Do you have any other symptoms?\" (e.g., abdomen pain, fever, leaking fluid from vagina, vaginal bleeding, widespread itching, etc.)      Cluster headache    Protocols used: Pregnancy - Decreased or Abnormal Fetal Movement-Adult-[place], Pregnancy - Headache-Adult-[place]    "

## 2025-07-31 ENCOUNTER — ANCILLARY PROCEDURE (OUTPATIENT)
Dept: PERINATAL CARE | Facility: OTHER | Age: 30
End: 2025-07-31
Attending: NURSE PRACTITIONER
Payer: COMMERCIAL

## 2025-07-31 ENCOUNTER — ROUTINE PRENATAL (OUTPATIENT)
Dept: PERINATAL CARE | Facility: OTHER | Age: 30
End: 2025-07-31
Payer: COMMERCIAL

## 2025-07-31 VITALS
HEART RATE: 87 BPM | WEIGHT: 227.8 LBS | SYSTOLIC BLOOD PRESSURE: 102 MMHG | BODY MASS INDEX: 37.95 KG/M2 | DIASTOLIC BLOOD PRESSURE: 60 MMHG | HEIGHT: 65 IN

## 2025-07-31 DIAGNOSIS — Z3A.21 21 WEEKS GESTATION OF PREGNANCY: ICD-10-CM

## 2025-07-31 DIAGNOSIS — Z33.1 NORMAL PREGNANCY, INCIDENTAL: ICD-10-CM

## 2025-07-31 DIAGNOSIS — O34.219 PREVIOUS CESAREAN SECTION COMPLICATING PREGNANCY: Primary | ICD-10-CM

## 2025-07-31 DIAGNOSIS — E66.09 OTHER OBESITY DUE TO EXCESS CALORIES AFFECTING PREGNANCY IN SECOND TRIMESTER: ICD-10-CM

## 2025-07-31 DIAGNOSIS — O99.212 OTHER OBESITY DUE TO EXCESS CALORIES AFFECTING PREGNANCY IN SECOND TRIMESTER: ICD-10-CM

## 2025-07-31 PROCEDURE — 76811 OB US DETAILED SNGL FETUS: CPT | Performed by: OBSTETRICS & GYNECOLOGY

## 2025-07-31 PROCEDURE — 76817 TRANSVAGINAL US OBSTETRIC: CPT | Performed by: OBSTETRICS & GYNECOLOGY

## 2025-08-03 PROBLEM — O99.212 OBESITY COMPLICATING PREGNANCY IN SECOND TRIMESTER: Status: ACTIVE | Noted: 2025-06-09

## 2025-08-03 PROBLEM — Z3A.21 21 WEEKS GESTATION OF PREGNANCY: Status: ACTIVE | Noted: 2025-06-09

## 2025-08-07 ENCOUNTER — ROUTINE PRENATAL (OUTPATIENT)
Dept: OBGYN CLINIC | Facility: CLINIC | Age: 30
End: 2025-08-07
Payer: COMMERCIAL

## 2025-08-07 VITALS — WEIGHT: 231 LBS | SYSTOLIC BLOOD PRESSURE: 110 MMHG | BODY MASS INDEX: 38.44 KG/M2 | DIASTOLIC BLOOD PRESSURE: 70 MMHG

## 2025-08-07 DIAGNOSIS — O09.891 SHORT INTERVAL BETWEEN PREGNANCIES AFFECTING PREGNANCY IN FIRST TRIMESTER, ANTEPARTUM: Primary | ICD-10-CM

## 2025-08-07 DIAGNOSIS — O34.219 PREVIOUS CESAREAN SECTION COMPLICATING PREGNANCY: ICD-10-CM

## 2025-08-07 DIAGNOSIS — F32.A DEPRESSION COMPLICATING PREGNANCY IN SECOND TRIMESTER, ANTEPARTUM: ICD-10-CM

## 2025-08-07 DIAGNOSIS — O99.342 DEPRESSION COMPLICATING PREGNANCY IN SECOND TRIMESTER, ANTEPARTUM: ICD-10-CM

## 2025-08-07 DIAGNOSIS — Z3A.21 21 WEEKS GESTATION OF PREGNANCY: ICD-10-CM

## 2025-08-07 DIAGNOSIS — F41.9 ANXIETY: ICD-10-CM

## 2025-08-07 DIAGNOSIS — O99.212 OBESITY AFFECTING PREGNANCY IN SECOND TRIMESTER, UNSPECIFIED OBESITY TYPE: ICD-10-CM

## 2025-08-07 PROCEDURE — 99213 OFFICE O/P EST LOW 20 MIN: CPT | Performed by: OBSTETRICS & GYNECOLOGY

## 2025-08-19 ENCOUNTER — TELEPHONE (OUTPATIENT)
Dept: OBGYN CLINIC | Facility: MEDICAL CENTER | Age: 30
End: 2025-08-19

## 2025-08-22 ENCOUNTER — NURSE TRIAGE (OUTPATIENT)
Age: 30
End: 2025-08-22

## 2025-08-22 DIAGNOSIS — O21.9 NAUSEA AND VOMITING IN PREGNANCY: ICD-10-CM

## 2025-08-22 DIAGNOSIS — G43.009 MIGRAINE WITHOUT AURA AND WITHOUT STATUS MIGRAINOSUS, NOT INTRACTABLE: ICD-10-CM

## 2025-08-22 DIAGNOSIS — Z3A.17 17 WEEKS GESTATION OF PREGNANCY: ICD-10-CM

## 2025-08-22 RX ORDER — CYPROHEPTADINE HYDROCHLORIDE 4 MG/1
4 TABLET ORAL
Qty: 30 TABLET | Refills: 0 | Status: SHIPPED | OUTPATIENT
Start: 2025-08-22

## 2025-08-22 RX ORDER — ONDANSETRON 4 MG/1
4 TABLET, ORALLY DISINTEGRATING ORAL EVERY 8 HOURS PRN
Qty: 90 TABLET | Refills: 5 | Status: SHIPPED | OUTPATIENT
Start: 2025-08-22

## (undated) DEVICE — CHLORAPREP HI-LITE 26ML ORANGE

## (undated) DEVICE — SUT VICRYL 0 CT 36 IN J958H

## (undated) DEVICE — ADHESIVE SKIN HIGH VISCOSITY EXOFIN 1ML

## (undated) DEVICE — GLOVE INDICATOR PI UNDERGLOVE SZ 7.5 BLUE

## (undated) DEVICE — SUT VICRYL 0 CTX 36 IN J978H

## (undated) DEVICE — GLOVE PI ULTRA TOUCH SZ.7.5

## (undated) DEVICE — SUT MONOCRYL 4-0 PS-2 27 IN Y426H

## (undated) DEVICE — KIT,BETHLEHEM C SECT DELIVERY: Brand: CARDINAL HEALTH

## (undated) DEVICE — SUT CHROMIC 0 CT-1 27 IN 812H

## (undated) DEVICE — MEDI-VAC YANKAUER SUCTION HANDLE W/STRAIGHT TIP & CONTROL VENT: Brand: CARDINAL HEALTH

## (undated) DEVICE — SPONGE LAP 18 X 18 IN STRL RFD